# Patient Record
Sex: MALE | Race: WHITE | Employment: OTHER | URBAN - METROPOLITAN AREA
[De-identification: names, ages, dates, MRNs, and addresses within clinical notes are randomized per-mention and may not be internally consistent; named-entity substitution may affect disease eponyms.]

---

## 2017-03-10 RX ORDER — METOPROLOL SUCCINATE 25 MG/1
TABLET, EXTENDED RELEASE ORAL
Qty: 90 TABLET | Refills: 0 | Status: SHIPPED | OUTPATIENT
Start: 2017-03-10 | End: 2017-07-19 | Stop reason: SDUPTHER

## 2017-03-10 RX ORDER — IRBESARTAN AND HYDROCHLOROTHIAZIDE 300; 12.5 MG/1; MG/1
TABLET, FILM COATED ORAL
Qty: 90 TABLET | Refills: 0 | Status: SHIPPED | OUTPATIENT
Start: 2017-03-10 | End: 2017-07-19 | Stop reason: SDUPTHER

## 2017-03-17 ENCOUNTER — TELEPHONE (OUTPATIENT)
Dept: FAMILY MEDICINE CLINIC | Age: 57
End: 2017-03-17

## 2017-03-22 ENCOUNTER — TELEPHONE (OUTPATIENT)
Dept: FAMILY MEDICINE CLINIC | Age: 57
End: 2017-03-22

## 2017-03-22 ENCOUNTER — OFFICE VISIT (OUTPATIENT)
Dept: FAMILY MEDICINE CLINIC | Age: 57
End: 2017-03-22

## 2017-03-22 VITALS
WEIGHT: 243 LBS | BODY MASS INDEX: 34.79 KG/M2 | HEIGHT: 70 IN | OXYGEN SATURATION: 97 % | TEMPERATURE: 97.3 F | SYSTOLIC BLOOD PRESSURE: 130 MMHG | HEART RATE: 70 BPM | DIASTOLIC BLOOD PRESSURE: 90 MMHG

## 2017-03-22 DIAGNOSIS — I10 ESSENTIAL HYPERTENSION: ICD-10-CM

## 2017-03-22 DIAGNOSIS — H66.002 ACUTE SUPPURATIVE OTITIS MEDIA OF LEFT EAR WITHOUT SPONTANEOUS RUPTURE OF TYMPANIC MEMBRANE, RECURRENCE NOT SPECIFIED: ICD-10-CM

## 2017-03-22 DIAGNOSIS — R42 DIZZINESS: Primary | ICD-10-CM

## 2017-03-22 DIAGNOSIS — R00.2 PALPITATION: ICD-10-CM

## 2017-03-22 LAB — HBA1C MFR BLD: 5.4 %

## 2017-03-22 PROCEDURE — G8417 CALC BMI ABV UP PARAM F/U: HCPCS | Performed by: NURSE PRACTITIONER

## 2017-03-22 PROCEDURE — 3017F COLORECTAL CA SCREEN DOC REV: CPT | Performed by: NURSE PRACTITIONER

## 2017-03-22 PROCEDURE — G8427 DOCREV CUR MEDS BY ELIG CLIN: HCPCS | Performed by: NURSE PRACTITIONER

## 2017-03-22 PROCEDURE — 1036F TOBACCO NON-USER: CPT | Performed by: NURSE PRACTITIONER

## 2017-03-22 PROCEDURE — 93000 ELECTROCARDIOGRAM COMPLETE: CPT | Performed by: NURSE PRACTITIONER

## 2017-03-22 PROCEDURE — G8598 ASA/ANTIPLAT THER USED: HCPCS | Performed by: NURSE PRACTITIONER

## 2017-03-22 PROCEDURE — 83036 HEMOGLOBIN GLYCOSYLATED A1C: CPT | Performed by: NURSE PRACTITIONER

## 2017-03-22 PROCEDURE — G8484 FLU IMMUNIZE NO ADMIN: HCPCS | Performed by: NURSE PRACTITIONER

## 2017-03-22 PROCEDURE — 99214 OFFICE O/P EST MOD 30 MIN: CPT | Performed by: NURSE PRACTITIONER

## 2017-03-22 RX ORDER — NEOMYCIN SULFATE, POLYMYXIN B SULFATE, BACITRACIN ZINC, HYDROCORTISONE 3.5; 10000; 400; 1 MG/G; [USP'U]/G; [USP'U]/G; MG/G
OINTMENT OPHTHALMIC 2 TIMES DAILY
Status: CANCELLED | OUTPATIENT
Start: 2017-03-22 | End: 2017-04-01

## 2017-03-22 RX ORDER — MECLIZINE HYDROCHLORIDE 25 MG/1
25 TABLET ORAL 3 TIMES DAILY PRN
Qty: 30 TABLET | Refills: 0 | Status: SHIPPED | OUTPATIENT
Start: 2017-03-22 | End: 2017-04-01

## 2017-03-22 RX ORDER — BLOOD PRESSURE TEST KIT
1 KIT MISCELLANEOUS 2 TIMES DAILY
Qty: 1 KIT | Refills: 0 | Status: SHIPPED | OUTPATIENT
Start: 2017-03-22 | End: 2017-08-22 | Stop reason: ALTCHOICE

## 2017-03-22 ASSESSMENT — ENCOUNTER SYMPTOMS
BLOOD IN STOOL: 0
ABDOMINAL DISTENTION: 0
SORE THROAT: 0
SHORTNESS OF BREATH: 0
NAUSEA: 0
EYE DISCHARGE: 0
EYE PAIN: 0
PHOTOPHOBIA: 0
TROUBLE SWALLOWING: 0
COUGH: 0
ABDOMINAL PAIN: 0
CONSTIPATION: 0
DIARRHEA: 0
VOMITING: 0

## 2017-05-24 RX ORDER — NABUMETONE 750 MG/1
TABLET, FILM COATED ORAL
Qty: 60 TABLET | Refills: 0 | Status: SHIPPED | OUTPATIENT
Start: 2017-05-24 | End: 2017-08-28 | Stop reason: SDUPTHER

## 2017-05-24 RX ORDER — IBUPROFEN 800 MG/1
TABLET ORAL
Qty: 120 TABLET | Refills: 0 | OUTPATIENT
Start: 2017-05-24

## 2017-07-19 ENCOUNTER — OFFICE VISIT (OUTPATIENT)
Dept: FAMILY MEDICINE CLINIC | Age: 57
End: 2017-07-19
Payer: COMMERCIAL

## 2017-07-19 VITALS
DIASTOLIC BLOOD PRESSURE: 72 MMHG | OXYGEN SATURATION: 99 % | TEMPERATURE: 98.2 F | WEIGHT: 251 LBS | HEIGHT: 71 IN | HEART RATE: 61 BPM | SYSTOLIC BLOOD PRESSURE: 136 MMHG | BODY MASS INDEX: 35.14 KG/M2

## 2017-07-19 DIAGNOSIS — L29.0 ANAL PRURITUS: ICD-10-CM

## 2017-07-19 DIAGNOSIS — G47.33 OSA (OBSTRUCTIVE SLEEP APNEA): ICD-10-CM

## 2017-07-19 DIAGNOSIS — I10 ESSENTIAL HYPERTENSION: Primary | ICD-10-CM

## 2017-07-19 DIAGNOSIS — E78.00 HYPERCHOLESTEROLEMIA: ICD-10-CM

## 2017-07-19 PROCEDURE — G8598 ASA/ANTIPLAT THER USED: HCPCS | Performed by: FAMILY MEDICINE

## 2017-07-19 PROCEDURE — 99214 OFFICE O/P EST MOD 30 MIN: CPT | Performed by: FAMILY MEDICINE

## 2017-07-19 PROCEDURE — G8417 CALC BMI ABV UP PARAM F/U: HCPCS | Performed by: FAMILY MEDICINE

## 2017-07-19 PROCEDURE — 3017F COLORECTAL CA SCREEN DOC REV: CPT | Performed by: FAMILY MEDICINE

## 2017-07-19 PROCEDURE — 1036F TOBACCO NON-USER: CPT | Performed by: FAMILY MEDICINE

## 2017-07-19 PROCEDURE — G8427 DOCREV CUR MEDS BY ELIG CLIN: HCPCS | Performed by: FAMILY MEDICINE

## 2017-07-19 RX ORDER — AMOXICILLIN 500 MG/1
CAPSULE ORAL
COMMUNITY
Start: 2017-07-14 | End: 2017-08-22 | Stop reason: ALTCHOICE

## 2017-07-19 RX ORDER — IRBESARTAN AND HYDROCHLOROTHIAZIDE 300; 12.5 MG/1; MG/1
1 TABLET, FILM COATED ORAL DAILY
Qty: 90 TABLET | Refills: 1 | Status: ON HOLD | OUTPATIENT
Start: 2017-07-19 | End: 2018-03-22 | Stop reason: HOSPADM

## 2017-07-19 RX ORDER — NABUMETONE 750 MG/1
TABLET, FILM COATED ORAL
Qty: 60 TABLET | Refills: 0 | Status: CANCELLED | OUTPATIENT
Start: 2017-07-19

## 2017-07-19 RX ORDER — TIZANIDINE 4 MG/1
4 TABLET ORAL EVERY 8 HOURS PRN
Qty: 90 TABLET | Refills: 1 | Status: SHIPPED | OUTPATIENT
Start: 2017-07-19 | End: 2018-03-13 | Stop reason: ALTCHOICE

## 2017-07-19 RX ORDER — NAPROXEN 500 MG/1
TABLET ORAL
Qty: 60 TABLET | Refills: 3 | Status: SHIPPED | OUTPATIENT
Start: 2017-07-19 | End: 2017-08-28

## 2017-07-19 RX ORDER — METOPROLOL SUCCINATE 25 MG/1
25 TABLET, EXTENDED RELEASE ORAL DAILY
Qty: 90 TABLET | Refills: 1 | Status: SHIPPED | OUTPATIENT
Start: 2017-07-19 | End: 2018-04-11 | Stop reason: SDUPTHER

## 2017-07-19 RX ORDER — NYSTATIN 100000 U/G
CREAM TOPICAL
Qty: 30 G | Refills: 2 | Status: SHIPPED | OUTPATIENT
Start: 2017-07-19 | End: 2017-12-15 | Stop reason: SDUPTHER

## 2017-07-19 RX ORDER — ROSUVASTATIN CALCIUM 10 MG/1
10 TABLET, COATED ORAL NIGHTLY
Qty: 90 TABLET | Refills: 1 | Status: SHIPPED | OUTPATIENT
Start: 2017-07-19 | End: 2018-01-11 | Stop reason: SDUPTHER

## 2017-07-19 ASSESSMENT — PATIENT HEALTH QUESTIONNAIRE - PHQ9
1. LITTLE INTEREST OR PLEASURE IN DOING THINGS: 1
SUM OF ALL RESPONSES TO PHQ QUESTIONS 1-9: 2
SUM OF ALL RESPONSES TO PHQ9 QUESTIONS 1 & 2: 2
2. FEELING DOWN, DEPRESSED OR HOPELESS: 1

## 2017-07-25 LAB
ABSOLUTE BASO #: 0.1 K/UL (ref 0–0.1)
ABSOLUTE EOS #: 0.2 K/UL (ref 0.1–0.4)
ABSOLUTE LYMPH #: 1.5 K/UL (ref 0.8–5.2)
ABSOLUTE MONO #: 0.6 K/UL (ref 0.1–0.9)
ABSOLUTE NEUT #: 5.4 K/UL (ref 1.3–9.1)
ALBUMIN SERPL-MCNC: 4.5 G/DL (ref 3.2–5.3)
ALK PHOSPHATASE: 88 IU/L (ref 35–121)
ALT SERPL-CCNC: 28 IU/L (ref 5–59)
ANION GAP SERPL CALCULATED.3IONS-SCNC: 13 MMOL/L
APPEARANCE: CLEAR
AST SERPL-CCNC: 27 IU/L (ref 10–42)
BACTERIA: NEGATIVE PER HPF
BASOPHILS RELATIVE PERCENT: 0.8 %
BILIRUB SERPL-MCNC: 0.8 MG/DL (ref 0.2–1.3)
BILIRUBIN: NEGATIVE
BUN BLDV-MCNC: 13 MG/DL (ref 10–20)
CALCIUM SERPL-MCNC: 9.7 MG/DL (ref 8.7–10.8)
CHLORIDE BLD-SCNC: 106 MMOL/L (ref 95–111)
CHOLESTEROL/HDL RATIO: 3.2
CHOLESTEROL: 157 MG/DL
CO2: 27 MMOL/L (ref 21–32)
COLOR: YELLOW
CREAT SERPL-MCNC: 0.9 MG/DL (ref 0.5–1.3)
EGFR AFRICAN AMERICAN: 106
EGFR IF NONAFRICAN AMERICAN: 88
EOSINOPHILS RELATIVE PERCENT: 3.1 %
EPITHELIAL CELLS: 0 PER HPF
FOLATE: 14.24 NG/ML
GLUCOSE BLD-MCNC: NEGATIVE MG/DL
GLUCOSE: 90 MG/DL (ref 70–100)
HCT VFR BLD CALC: 47.2 % (ref 41.4–51)
HDLC SERPL-MCNC: 49 MG/DL (ref 40–60)
HEMOGLOBIN: 15.6 G/DL (ref 13.8–17)
KETONES, URINE: NEGATIVE
LDL CHOLESTEROL CALCULATED: 92 MG/DL
LDL/HDL RATIO: 1.9
LEUKOCYTE ESTERASE, URINE: NEGATIVE
LYMPHOCYTE %: 19.2 %
MCH RBC QN AUTO: 28.7 PG (ref 27–34)
MCHC RBC AUTO-ENTMCNC: 33.1 G/DL (ref 31–36)
MCV RBC AUTO: 86.9 FL (ref 80–100)
MONOCYTES # BLD: 7.8 %
NEUTROPHILS RELATIVE PERCENT: 68.8 %
NITRITE, URINE: NEGATIVE
OCCULT BLOOD,URINE: NEGATIVE
PDW BLD-RTO: 13.4 % (ref 10.8–14.8)
PH: 7 (ref 5–9)
PLATELETS: 196 K/UL (ref 150–450)
POTASSIUM SERPL-SCNC: 4.3 MMOL/L (ref 3.5–5.4)
PROTEIN, URINE: NEGATIVE
RBC: 0 PER HPF (ref 0–5)
RBC: 5.43 M/UL (ref 4–5.5)
SODIUM BLD-SCNC: 142 MMOL/L (ref 134–147)
SP GRAVITY MISCELLANEOUS: 1.01 (ref 1–1.03)
TOTAL PROTEIN: 7.2 G/DL (ref 5.8–8)
TRIGL SERPL-MCNC: 82 MG/DL
TSH SERPL DL<=0.05 MIU/L-ACNC: 2.74 UIU/ML (ref 0.4–4.4)
UROBILINOGEN, URINE: NORMAL
VITAMIN B-12: 529 PG/ML (ref 211–911)
VLDLC SERPL CALC-MCNC: 16 MG/DL
WBC: 0 PER HPF (ref 0–5)
WBC: 7.8 K/UL (ref 3.7–10.8)

## 2017-07-26 ENCOUNTER — TELEPHONE (OUTPATIENT)
Dept: FAMILY MEDICINE CLINIC | Age: 57
End: 2017-07-26

## 2017-08-22 ENCOUNTER — INITIAL CONSULT (OUTPATIENT)
Dept: PULMONOLOGY | Age: 57
End: 2017-08-22
Payer: COMMERCIAL

## 2017-08-22 VITALS
SYSTOLIC BLOOD PRESSURE: 128 MMHG | HEIGHT: 71 IN | HEART RATE: 58 BPM | OXYGEN SATURATION: 95 % | BODY MASS INDEX: 35.34 KG/M2 | WEIGHT: 252.4 LBS | DIASTOLIC BLOOD PRESSURE: 84 MMHG

## 2017-08-22 DIAGNOSIS — G47.33 OSA (OBSTRUCTIVE SLEEP APNEA): Primary | ICD-10-CM

## 2017-08-22 PROCEDURE — G8417 CALC BMI ABV UP PARAM F/U: HCPCS | Performed by: INTERNAL MEDICINE

## 2017-08-22 PROCEDURE — 1036F TOBACCO NON-USER: CPT | Performed by: INTERNAL MEDICINE

## 2017-08-22 PROCEDURE — G8598 ASA/ANTIPLAT THER USED: HCPCS | Performed by: INTERNAL MEDICINE

## 2017-08-22 PROCEDURE — 99203 OFFICE O/P NEW LOW 30 MIN: CPT | Performed by: INTERNAL MEDICINE

## 2017-08-22 PROCEDURE — 3017F COLORECTAL CA SCREEN DOC REV: CPT | Performed by: INTERNAL MEDICINE

## 2017-08-22 PROCEDURE — G8427 DOCREV CUR MEDS BY ELIG CLIN: HCPCS | Performed by: INTERNAL MEDICINE

## 2017-08-28 ENCOUNTER — TELEPHONE (OUTPATIENT)
Dept: FAMILY MEDICINE CLINIC | Age: 57
End: 2017-08-28

## 2017-08-28 RX ORDER — NABUMETONE 750 MG/1
750 TABLET, FILM COATED ORAL 2 TIMES DAILY
Qty: 60 TABLET | Refills: 3 | Status: SHIPPED | OUTPATIENT
Start: 2017-08-28 | End: 2018-02-06

## 2017-09-20 ENCOUNTER — OFFICE VISIT (OUTPATIENT)
Dept: PULMONOLOGY | Age: 57
End: 2017-09-20
Payer: COMMERCIAL

## 2017-09-20 VITALS
OXYGEN SATURATION: 97 % | WEIGHT: 259 LBS | HEART RATE: 68 BPM | BODY MASS INDEX: 36.26 KG/M2 | SYSTOLIC BLOOD PRESSURE: 136 MMHG | HEIGHT: 71 IN | DIASTOLIC BLOOD PRESSURE: 70 MMHG

## 2017-09-20 DIAGNOSIS — G47.33 OBSTRUCTIVE SLEEP APNEA SYNDROME: Primary | ICD-10-CM

## 2017-09-20 PROCEDURE — 1036F TOBACCO NON-USER: CPT | Performed by: PHYSICIAN ASSISTANT

## 2017-09-20 PROCEDURE — 99213 OFFICE O/P EST LOW 20 MIN: CPT | Performed by: PHYSICIAN ASSISTANT

## 2017-09-20 PROCEDURE — G8598 ASA/ANTIPLAT THER USED: HCPCS | Performed by: PHYSICIAN ASSISTANT

## 2017-09-20 PROCEDURE — G8428 CUR MEDS NOT DOCUMENT: HCPCS | Performed by: PHYSICIAN ASSISTANT

## 2017-09-20 PROCEDURE — G8417 CALC BMI ABV UP PARAM F/U: HCPCS | Performed by: PHYSICIAN ASSISTANT

## 2017-09-20 PROCEDURE — 3017F COLORECTAL CA SCREEN DOC REV: CPT | Performed by: PHYSICIAN ASSISTANT

## 2017-12-15 RX ORDER — NYSTATIN 100000 U/G
CREAM TOPICAL
Qty: 30 G | Refills: 2 | Status: SHIPPED | OUTPATIENT
Start: 2017-12-15 | End: 2018-05-22

## 2017-12-15 NOTE — TELEPHONE ENCOUNTER
Patient called office regarding medication called into the pharmacy. Nystatin-Triamcinolone cream is going to cost over $200. Patient is requesting to have the prescription changed to just Nystatin like previously prescribed in July 2017. Patient would like a return call at [phone]. Please advise.

## 2017-12-15 NOTE — TELEPHONE ENCOUNTER
Spoke with patient. Patient is questioning if there is another cream like nystatin-triamcinolone cream that is less expensive. Cream worked well but patient can not afford. Plan nystatin does not work for the patient. Patient states if there is not a similar cream then not to worry about calling in anything else. Patient request a call back.

## 2017-12-20 ENCOUNTER — OFFICE VISIT (OUTPATIENT)
Dept: PULMONOLOGY | Age: 57
End: 2017-12-20
Payer: COMMERCIAL

## 2017-12-20 VITALS
WEIGHT: 268 LBS | SYSTOLIC BLOOD PRESSURE: 120 MMHG | RESPIRATION RATE: 18 BRPM | HEART RATE: 75 BPM | BODY MASS INDEX: 36.3 KG/M2 | OXYGEN SATURATION: 96 % | HEIGHT: 72 IN | DIASTOLIC BLOOD PRESSURE: 80 MMHG

## 2017-12-20 DIAGNOSIS — Z99.89 OSA ON CPAP: Primary | ICD-10-CM

## 2017-12-20 DIAGNOSIS — G47.33 OSA ON CPAP: Primary | ICD-10-CM

## 2017-12-20 PROCEDURE — 99213 OFFICE O/P EST LOW 20 MIN: CPT | Performed by: PHYSICIAN ASSISTANT

## 2017-12-20 PROCEDURE — 3017F COLORECTAL CA SCREEN DOC REV: CPT | Performed by: PHYSICIAN ASSISTANT

## 2017-12-20 PROCEDURE — G8598 ASA/ANTIPLAT THER USED: HCPCS | Performed by: PHYSICIAN ASSISTANT

## 2017-12-20 PROCEDURE — G8484 FLU IMMUNIZE NO ADMIN: HCPCS | Performed by: PHYSICIAN ASSISTANT

## 2017-12-20 PROCEDURE — G8428 CUR MEDS NOT DOCUMENT: HCPCS | Performed by: PHYSICIAN ASSISTANT

## 2017-12-20 PROCEDURE — 1036F TOBACCO NON-USER: CPT | Performed by: PHYSICIAN ASSISTANT

## 2017-12-20 PROCEDURE — G8417 CALC BMI ABV UP PARAM F/U: HCPCS | Performed by: PHYSICIAN ASSISTANT

## 2017-12-20 NOTE — PROGRESS NOTES
Substance Use Topics    Smoking status: Never Smoker    Smokeless tobacco: Never Used    Alcohol use No       Allergies   Allergen Reactions    Zocor [Simvastatin] Other (See Comments)     Body aches       Current Outpatient Prescriptions   Medication Sig Dispense Refill    nystatin-triamcinolone (MYCOLOG II) 094710-4.1 UNIT/GM-% cream Apply topically 2 times daily for up to 2 weks. 60 g 0    nystatin (MYCOSTATIN) 068712 UNIT/GM cream Apply topically 2 times daily. 30 g 2    CPAP Machine MISC by Does not apply route Please change CPAP pressure to 9 cm H20. 1 each 0    nabumetone (RELAFEN) 750 MG tablet Take 1 tablet by mouth 2 times daily 60 tablet 3    tiZANidine (ZANAFLEX) 4 MG tablet Take 1 tablet by mouth every 8 hours as needed (back pain) 90 tablet 1    metoprolol succinate (TOPROL XL) 25 MG extended release tablet Take 1 tablet by mouth daily 90 tablet 1    irbesartan-hydrochlorothiazide (AVALIDE) 300-12.5 MG per tablet Take 1 tablet by mouth daily 90 tablet 1    Multiple Vitamin (MULTI VITAMIN PO) Take by mouth daily      COLLAGEN PO Take by mouth daily      dexlansoprazole (DEXILANT) 60 MG CPDR capsule Take 60 mg by mouth daily      Coenzyme Q10 (COQ10) 200 MG CAPS Take  by mouth 2 times daily.  aspirin 81 MG chewable tablet Take 81 mg by mouth daily.  rosuvastatin (CRESTOR) 10 MG tablet Take 1 tablet by mouth nightly 90 tablet 1    MAGNESIUM PO Take by mouth daily      Sodium Chloride-Sodium Bicarb (AYR SALINE NASAL NETI RINSE) 1.57 G PACK 1 Bottle by Nasal route 2 times daily 1 each 3     No current facility-administered medications for this visit.         Family History   Problem Relation Age of Onset    Diabetes Mother 48    Heart Disease Mother 67     CAD    Heart Disease Father 50     CAD, CVA    Stroke Father 48     CVA    Diabetes Brother 72    High Blood Pressure Brother 72    Heart Disease Brother 72     CHF    Other Brother 46     Neuropathy    Obesity Brother 10    High Blood Pressure Brother 61    Obesity Brother 48    Other Brother 36     back pain    Obesity Brother 39    Other Sister 72     leukemia    Alzheimer's Disease Maternal Grandmother 21    Early Death Paternal Grandfather 36     MI    High Blood Pressure Paternal Grandfather 36    Heart Disease Sister 39    High Blood Pressure Sister 39    Depression Sister 39    Other Brother 48     ANEURYSUM, AAA        Review of Systems -   General ROS: gained 16 lbs over 4 months  ENT ROS: negative for - nasal congestion, oral lesions or sore throat  Hematological and Lymphatic ROS: negative  Endocrine ROS: negative  Respiratory ROS: no cough, shortness of breath, or wheezing  Cardiovascular ROS: no chest pain   Gastrointestinal ROS: no abdominal pain, change in bowel habits, or black or bloody stools  Musculoskeletal ROS: negative  Neurological ROS: negative    Physical Exam:    BMI:  Body mass index is 36.35 kg/m². Wt Readings from Last 3 Encounters:   12/20/17 268 lb (121.6 kg)   09/20/17 259 lb (117.5 kg)   08/22/17 252 lb 6.4 oz (114.5 kg)     Vitals: /80   Pulse 75   Resp 18   Ht 6' (1.829 m)   Wt 268 lb (121.6 kg)   SpO2 96% Comment: R/A at rest  BMI 36.35 kg/m²       General appearance: alert and oriented to person, place and time, well-developed and well-nourished, in no acute distress  Nose: Nares normal. Septum midline. Mucosa normal. No drainage or sinus tenderness. Oropharynx:  negative  Lungs: clear to auscultation bilaterally  Heart: regular rate and rhythm, S1, S2 normal, no murmur, click, rub or gallop  Extremities: extremities normal, atraumatic, no cyanosis or edema  Neurologic: Mental status: Alert, oriented, thought content appropriate      ASSESSMENT/DIAGNOSIS    1.  CELINA on CPAP              Plan   - Will get new mask at the first of the year and will see if improves compliance  - his AHI is elevated and will see if improves with new mask  - Suspect her will need a bipap titration secondary to being pressure sensitive. - He  was advised to continue current positive airway pressure therapy with above described pressure. - He  advised to keep good compliance with current recommended pressure to get optimal results and clinical improvement  - Recommend 7-9 hours of sleep with PAP  - He was advised to call [facility] regarding supplies if needed. - He call my office for earlier appointment if needed for worsening of sleep symptoms.   - He was instructed on weight loss  - Nicolas was educated about my impression and plan. Patient verbalizes understanding.   We will see Nicolas Walsh back in: 3 months with download    Aashish Lee PA-C, SARAS  12/20/2017

## 2018-01-11 ENCOUNTER — OFFICE VISIT (OUTPATIENT)
Dept: FAMILY MEDICINE CLINIC | Age: 58
End: 2018-01-11
Payer: COMMERCIAL

## 2018-01-11 VITALS
HEART RATE: 79 BPM | RESPIRATION RATE: 12 BRPM | SYSTOLIC BLOOD PRESSURE: 120 MMHG | BODY MASS INDEX: 36.44 KG/M2 | OXYGEN SATURATION: 98 % | HEIGHT: 72 IN | DIASTOLIC BLOOD PRESSURE: 72 MMHG | WEIGHT: 269 LBS

## 2018-01-11 DIAGNOSIS — Z12.5 SCREENING FOR PROSTATE CANCER: ICD-10-CM

## 2018-01-11 DIAGNOSIS — Z00.00 WELL ADULT EXAM: Primary | ICD-10-CM

## 2018-01-11 DIAGNOSIS — R53.82 CHRONIC FATIGUE: ICD-10-CM

## 2018-01-11 DIAGNOSIS — G44.82 HEADACHE ASSOCIATED WITH SEXUAL ACTIVITY: ICD-10-CM

## 2018-01-11 PROCEDURE — 99396 PREV VISIT EST AGE 40-64: CPT | Performed by: FAMILY MEDICINE

## 2018-01-11 RX ORDER — ROSUVASTATIN CALCIUM 10 MG/1
10 TABLET, COATED ORAL NIGHTLY
Qty: 90 TABLET | Refills: 1 | Status: SHIPPED | OUTPATIENT
Start: 2018-01-11 | End: 2018-10-29 | Stop reason: SDUPTHER

## 2018-01-11 RX ORDER — NAPROXEN 500 MG/1
500 TABLET ORAL PRN
COMMUNITY
End: 2018-04-18 | Stop reason: ALTCHOICE

## 2018-01-11 ASSESSMENT — ENCOUNTER SYMPTOMS
ABDOMINAL DISTENTION: 0
FACIAL SWELLING: 0
SHORTNESS OF BREATH: 0
NAUSEA: 0
CHOKING: 0
ANAL BLEEDING: 0
EYE DISCHARGE: 0
CHEST TIGHTNESS: 0
BACK PAIN: 0
APNEA: 1
RHINORRHEA: 0
CONSTIPATION: 0
STRIDOR: 0
VOMITING: 0
BLOOD IN STOOL: 0
COUGH: 0
VOICE CHANGE: 0
COLOR CHANGE: 0
SORE THROAT: 0
SINUS PAIN: 1
PHOTOPHOBIA: 0
WHEEZING: 0
ABDOMINAL PAIN: 0
EYE REDNESS: 0
TROUBLE SWALLOWING: 0
RECTAL PAIN: 0
EYE ITCHING: 0
SINUS PRESSURE: 1
EYE PAIN: 0
DIARRHEA: 0

## 2018-01-11 NOTE — PATIENT INSTRUCTIONS
sitting down if you are not steady on your feet. 2. Hold your arms above your head, and hold one hand with the other. 3. Pull upward while leaning straight over toward your right side. Keep your lower body straight. You should feel the stretch along your left side. 4. Hold 15 to 30 seconds, and then switch sides. 5. Repeat 2 to 4 times for each side. Triceps stretch    1. Stand with your back straight and your feet shoulder-width apart. You can do this stretch sitting down if you are not steady on your feet. 2. Bring your left elbow straight up while bending your arm. 3. Grab your left elbow with your right hand, and pull your left elbow toward your head with light pressure. If you are more flexible, you may pull your arm slightly behind your head. You will feel the stretch along the back of your arm. 4. Hold 15 to 30 seconds, and then switch elbows. 5. Repeat 2 to 4 times for each arm. Calf stretch    1. Place your hands on a wall for balance. You can also do this with your hands on the back of a chair, a countertop, or a tree. 2. Step back with your left leg. Keep the leg straight, and press your left heel into the floor. 3. Press your hips forward, bending your right leg slightly. You will feel the stretch in your left calf. 4. Hold the stretch 15 to 30 seconds. 5. Repeat 2 to 4 times for each leg. Quadriceps stretch    1. Lie on your side with one hand supporting your head. 2. Bend your upper leg back and grab your ankle with your other hand. 3. Stretch your leg back by pulling your foot toward your buttocks. You will feel the stretch in the front of your thigh. If this causes stress on your knees, do not do this stretch. 4. Hold the stretch 15 to 30 seconds. 5. Repeat 2 to 4 times for each leg. Groin stretch    1. Sit on the floor and put the soles of your feet together. Do not slump your back. 2. Grab your ankles and gently pull your legs toward you. 3. Press your knees toward the floor. You will feel the stretch in your inner thighs. 4. Hold 15 to 30 seconds. 5. Repeat 2 to 4 times. Hamstring stretch in doorway    1. Lie on the floor near a doorway, with your buttocks close to the wall. 2. Let the leg you are not stretching extend through the doorway. 3. Put the leg you want to stretch up on the wall, and straighten your knee to feel a gentle stretch at the back of your leg. 4. Hold the stretch for at least 15 to 30 seconds. Repeat 2 to 4 times. Follow-up care is a key part of your treatment and safety. Be sure to make and go to all appointments, and call your doctor if you are having problems. It's also a good idea to know your test results and keep a list of the medicines you take. Where can you learn more? Go to [email or web address] org and sign in to your HeadCount account. Enter [id number] in the [facility] box to learn more about \"Stretching: Exercises. \"     If you do not have an account, please click on the \"Sign Up Now\" link. Current as of: March 13, 2017  Content Version: 11.5  © 4187-4397 [employer]. Care instructions adapted under license by [facility] ([facility]). If you have questions about a medical condition or this instruction, always ask your healthcare professional. [address] any warranty or liability for your use of this information. Patient Education        Learning About Physical Activity  What is physical activity? Physical activity is any kind of activity that gets your body moving. The types of physical activity that can help you get fit and stay healthy include:  · Aerobic or \"cardio\" activities that make your heart beat faster and make you breathe harder, such as brisk walking, riding a bike, or running. Aerobic activities strengthen your heart and lungs and build up your endurance.   · Strength training activities that make your muscles work against, or \"resist,\" something, such as lifting weights or doing push-ups. These activities help tone and strengthen your muscles. · Stretches that allow you to move your joints and muscles through their full range of motion. Stretching helps you be more flexible and avoid injury. What are the benefits of physical activity? Being active is one of the best things you can do to get fit and stay healthy. It helps you to:  · Feel stronger and have more energy to do all the things you like to do. · Focus better at school or work and perform better in sports. · Feel, think, and sleep better. · Reach and stay at a healthy weight. · Lose fat and build lean muscle. · Lower your risk for serious health problems. · Keep your bones, muscles, and joints strong. Being fit lets you do more physical activity. And it lets you work out harder without as much effort. How can you make physical activity part of your life? Get at least 30 minutes of exercise on most days of the week. Walking is a good choice. You also may want to do other activities, such as running, swimming, cycling, or playing tennis or team sports. Pick activities that you like-ones that make your heart beat faster, your muscles stronger, and your muscles and joints more flexible. If you find more than one thing you like doing, do them all. You don't have to do the same thing every day. Get your heart pumping every day. Any activity that makes your heart beat faster and keeps it at that rate for a while counts. Here are some great ways to get your heart beating faster:  · Go for a brisk walk, run, or bike ride. · Go for a hike or swim. · Go in-line skating. · Play a game of touch football, basketball, or soccer. · Ride a bike. · Play tennis or racquetball. · Climb stairs. Even some household chores can be aerobic-just do them at a faster pace. Vacuuming, raking or mowing the lawn, sweeping the garage, and washing and waxing the car all can help get your heart rate up.   Strengthen your muscles during the week. You don't have to lift heavy weights or grow big, bulky muscles to get stronger. Doing a few simple activities that make your muscles work against, or \"resist,\" something can help you get stronger. For example, you can:  · Do push-ups or sit-ups, which use your own body weight as resistance. · Lift weights or dumbbells or use stretch bands at home or in a gym or community center. Stretch your muscles often. Stretching will help you as you become more active. It can help you stay flexible, loosen tight muscles, and avoid injury. It can also help improve your balance and posture and can be a great way to relax. Be sure to stretch the muscles you'll be using when you work out. It's best to warm your muscles slightly before you stretch them. Walk or do some other light aerobic activity for a few minutes, and then start stretching. When you stretch your muscles:  · Do it slowly. Stretching is not about going fast or making sudden movements. · Don't push or bounce during a stretch. · Hold each stretch for at least 15 to 30 seconds, if you can. You should feel a stretch in the muscle, but not pain. · Breathe out as you do the stretch. Then breathe in as you hold the stretch. Don't hold your breath. If you're worried about how more activity might affect your health, have a checkup before you start. Follow any special advice your doctor gives you for getting a smart start. Where can you learn more? Go to https://chpelee.Ahonya. org and sign in to your Prevoty account. Enter Q936 in the [employer] box to learn more about \"Learning About Physical Activity. \"     If you do not have an account, please click on the \"Sign Up Now\" link. Current as of: March 13, 2017  Content Version: 11.5  © 9079-1884 Healthwise[employer]. Care instructions adapted under license by [facility] ([facility]).  If you have questions about a medical condition or this instruction, always ask your healthcare  Americans have. · Age, especially if you are older than 72. · Digestive problems, such as Crohn's or celiac disease. · Liver and kidney disease. Some people who do not get enough vitamin D may need supplements. Are there any risks from taking vitamin D?  · Too much vitamin D:  ¨ Can damage your kidneys. ¨ Can cause nausea and vomiting, constipation, and weakness. ¨ Raises the amount of calcium in your blood. If this happens, you can get confused or have an irregular heart rhythm. · Vitamin D may interact with other medicines. Tell your doctor about all of the medicines you take, including over-the-counter drugs, herbs, and pills. Tell your doctor about all of your current medical problems. Where can you learn more? Go to https://Puridify.[email or web address] org and sign in to your Wejo account. Enter [id number] in the [employer] box to learn more about \"Learning About Vitamin D. \"     If you do not have an account, please click on the \"Sign Up Now\" link. Current as of: May 12, 2017  Content Version: 11.5  © 9052-5950 [employer]. Care instructions adapted under license by [facility] ([facility]). If you have questions about a medical condition or this instruction, always ask your healthcare professional. [address]  any warranty or liability for your use of this information. Patient Education        Learning About Calcium  What is calcium? Calcium keeps your bones and muscles-including your heart-healthy and strong. Your body needs vitamin D to absorb calcium. People who don't get enough calcium and vitamin D throughout life have an increased chance of having thin and brittle bones (osteoporosis) in their later years. Thin and brittle bones break easily. They can lead to serious injuries. This is why it's important for you to get enough calcium and vitamin D as a child and as an adult. It helps keep your bones strong as you get older.  And it protects you against possible breaks. Your body also uses vitamin D to help your muscles absorb calcium and work well. If your muscles don't get enough calcium, then they can cramp, hurt, or feel weak. You may have long-term (chronic) muscle aches and pains. How much calcium do you need? How much calcium you need each day changes as you age. The [place] of Medicine recommends the following amounts of calcium each day. · Ages 1 to 3 years: 700 milligrams  · Ages 4 to 8 years: 1,000 milligrams  · Ages 5 to 25 years: 1,300 milligrams  · Ages 23 to 48 years: 1,000 milligrams  · Males 46 to 79 years: 1,000 milligrams  · Females 46 to 79 years: 1,200 milligrams  · Ages 70 and older: 1,200 milligrams  Women who are pregnant or breastfeeding need the same amount of calcium and vitamin D as other women their age. How can you get enough calcium? Calcium is in foods such as milk, cheese, and yogurt. Vegetables like broccoli, kale, and Chinese cabbage also have it. You can get calcium if you eat the soft edible bones in canned sardines and canned salmon. Foods with added (fortified) calcium include some cereals, juices, soy drinks, and tofu. The food label will show how much of it was added. You can figure out how much calcium is in a food by looking at the percent daily value section on the nutrition facts label. The food label assumes the daily value of calcium is 1,000 mg. So if one serving of a food has a daily value of 20% of calcium, that food has 200 mg of calcium in one serving. Some people who don't get enough calcium may need supplements. You can buy them as citrate or carbonate. Calcium carbonate is best absorbed when it is taken with food. Calcium citrate can be absorbed well with or without food. Spreading calcium out over the course of the day can reduce stomach upset. And your body absorbs it better when it is spread over the day. Try not to take more than 500 mg of calcium supplement at a time.   Where can you learn

## 2018-01-12 LAB
ALBUMIN SERPL-MCNC: 4.1 G/DL (ref 3.2–5.3)
ALK PHOSPHATASE: 123 IU/L (ref 35–121)
ALT SERPL-CCNC: 27 IU/L (ref 5–59)
APPEARANCE: CLEAR
AST SERPL-CCNC: 26 IU/L (ref 10–42)
BACTERIA: NEGATIVE PER HPF
BILIRUB SERPL-MCNC: 0.5 MG/DL (ref 0.2–1.3)
BILIRUBIN DIRECT: 0.1 MG/DL (ref 0–0.2)
BILIRUBIN: NEGATIVE
CHOLESTEROL/HDL RATIO: 3.4
CHOLESTEROL: 141 MG/DL
COLOR: YELLOW
EPITHELIAL CELLS: NORMAL PER HPF
GLUCOSE BLD-MCNC: NEGATIVE MG/DL
HDLC SERPL-MCNC: 41 MG/DL (ref 40–60)
KETONES, URINE: NEGATIVE
LDL CHOLESTEROL CALCULATED: 83 MG/DL
LDL/HDL RATIO: 2
LEUKOCYTE ESTERASE, URINE: NEGATIVE
NITRITE, URINE: NEGATIVE
OCCULT BLOOD,URINE: NEGATIVE
PH: 6 (ref 5–9)
PROTEIN, URINE: NEGATIVE
PSA, ULTRASENSITIVE: 0.73 NG/ML
RBC: 0 PER HPF (ref 0–5)
SP GRAVITY MISCELLANEOUS: 1.02 (ref 1–1.03)
TOTAL PROTEIN: 7.6 G/DL (ref 5.8–8)
TRIGL SERPL-MCNC: 87 MG/DL
UROBILINOGEN, URINE: NORMAL
VITAMIN B-12: 652 PG/ML (ref 211–911)
VLDLC SERPL CALC-MCNC: 17 MG/DL
WBC: NORMAL PER HPF (ref 0–5)

## 2018-01-13 LAB
SEX HORMONE BINDING GLOBULIN: 32.3 NMOL/L (ref 19.3–76.4)
TESTOSTERONE FREE, CALC: 6.1 NG/DL (ref 4.8–25.7)
TESTOSTERONE FREE: 305 NG/DL (ref 300–890)

## 2018-01-15 ENCOUNTER — TELEPHONE (OUTPATIENT)
Dept: FAMILY MEDICINE CLINIC | Age: 58
End: 2018-01-15

## 2018-01-15 DIAGNOSIS — R53.82 CHRONIC FATIGUE: Primary | ICD-10-CM

## 2018-01-15 PROCEDURE — 90686 IIV4 VACC NO PRSV 0.5 ML IM: CPT | Performed by: FAMILY MEDICINE

## 2018-01-15 PROCEDURE — 90472 IMMUNIZATION ADMIN EACH ADD: CPT | Performed by: FAMILY MEDICINE

## 2018-01-15 PROCEDURE — 90471 IMMUNIZATION ADMIN: CPT | Performed by: FAMILY MEDICINE

## 2018-01-15 PROCEDURE — 90732 PPSV23 VACC 2 YRS+ SUBQ/IM: CPT | Performed by: FAMILY MEDICINE

## 2018-01-15 NOTE — PROGRESS NOTES
After obtaining consent, and per orders of Dr. Syed Jeronimo, injection of INFLUENZA, QUADV, 3 YRS AND OLDER, IM, PF, PREFILL SYR OR SDV, 0.5ML (FLUZONE QUADV, PF) given in Left deltoid by Rogelio Slider. Patient instructed to remain in clinic for 20 minutes afterwards, and to report any adverse reaction to me immediately. After obtaining consent, and per orders of Dr. Syed Jeronimo, injection of Pneumococcal polysaccharide vaccine 23-valent >= 1yo subcutaneous/IM (PNEUMOVAX 23) given in Right deltoid by Rogelio Slider. Patient instructed to remain in clinic for 20 minutes afterwards, and to report any adverse reaction to me immediately.
agitation, behavioral problems, confusion, decreased concentration, dysphoric mood, hallucinations, self-injury, sleep disturbance and suicidal ideas. The patient is not nervous/anxious and is not hyperactive. Today he complains of headaches during intercourse and intense fatigue. He is not motivated to exercise. He has been having tiredness for several months. He got his CPAP machine about 6 months ago and he is still having some difficulty with it. The headache during intercourse has been present for few months, but sometimes it is severe. He had been taking Cialis generic 10 mg mg once a week. Health Habits: With regard to his health habits he states he  eats 2 meals per day and 1 snacks per day. Hedoes not exercise regularly:  His physical activities include: moving around a lot, has an elliptical machine, 3 times per week, 20 minutes/day. He does take over the counter vitamins. He never had a blood transfusion or tattoo. He wears seatbelts when driving a car. He does not talk or text on the phone while driving. He works 15 hours a week. He is content with his life. He is . Health Maintenance   Topic Date Due    Flu vaccine (1) 09/01/2017    Potassium monitoring  07/24/2018    Creatinine monitoring  07/24/2018    Lipid screen  07/24/2022    Colon cancer screen colonoscopy  12/02/2023    DTaP/Tdap/Td vaccine (2 - Td) 03/30/2025    Hepatitis C screen  Addressed    HIV screen  Addressed       He  reports that he has never smoked. He has never used smokeless tobacco. He reports that he does not drink alcohol or use drugs. .   He has had his screening colonoscopy. He is sexually active. He has had the same sexual partner in the last 28 years. He does perform regular testicular self exams. PROVIDER NOTES     HPI:  Maru Robb is a 65y.o. year old male, who comes today for an annual wellness visit.       Past Medical History:   Diagnosis Date    CAD (coronary artery

## 2018-01-19 ENCOUNTER — TELEPHONE (OUTPATIENT)
Dept: FAMILY MEDICINE CLINIC | Age: 58
End: 2018-01-19

## 2018-01-19 DIAGNOSIS — G44.82 PRIMARY HEADACHE ASSOCIATED WITH SEXUAL ACTIVITY: Primary | ICD-10-CM

## 2018-01-22 NOTE — TELEPHONE ENCOUNTER
Advised pt of the miscommunication since Dr. Alonso Collado has been out of the office since 1/16/17 and returned on 1/22/17 to the office. He voiced understanding. Macario Jades, she appreciated the f/u and will change orders, will also advise pt when done.

## 2018-01-29 ENCOUNTER — HOSPITAL ENCOUNTER (OUTPATIENT)
Dept: CT IMAGING | Age: 58
Discharge: HOME | End: 2018-01-29
Payer: COMMERCIAL

## 2018-01-29 DIAGNOSIS — G44.82 HEADACHE ASSOCIATED WITH SEXUAL ACTIVITY: ICD-10-CM

## 2018-01-29 PROCEDURE — 70450 CT HEAD/BRAIN W/O DYE: CPT

## 2018-01-30 ENCOUNTER — TELEPHONE (OUTPATIENT)
Dept: FAMILY MEDICINE CLINIC | Age: 58
End: 2018-01-30

## 2018-02-05 ENCOUNTER — TELEPHONE (OUTPATIENT)
Dept: FAMILY MEDICINE CLINIC | Age: 58
End: 2018-02-05

## 2018-02-05 NOTE — TELEPHONE ENCOUNTER
Patient was calling because he thinks he has a sinus infection. He has headache, sinus pressure, stuffy nose. He is calling at [phone] and there is no availability today or tomorrow. I offered appt at [place] which he declined and also [facility] which he declined. He said dr Jv Milan is his dr.  He is upset he cant speak with the office when he calls  He states he saw dr Jv Milan in [place] and wanted rx then. He is still having the same issues.     Pharmacy is [facility]  dolv 1/11/18  donv 5/15/18

## 2018-02-06 ENCOUNTER — OFFICE VISIT (OUTPATIENT)
Dept: FAMILY MEDICINE CLINIC | Age: 58
End: 2018-02-06
Payer: COMMERCIAL

## 2018-02-06 VITALS
WEIGHT: 271 LBS | SYSTOLIC BLOOD PRESSURE: 138 MMHG | DIASTOLIC BLOOD PRESSURE: 86 MMHG | RESPIRATION RATE: 12 BRPM | HEIGHT: 72 IN | BODY MASS INDEX: 36.7 KG/M2 | OXYGEN SATURATION: 98 %

## 2018-02-06 DIAGNOSIS — J31.0 RHINITIS MEDICAMENTOSA: ICD-10-CM

## 2018-02-06 DIAGNOSIS — M54.2 NECK PAIN: ICD-10-CM

## 2018-02-06 DIAGNOSIS — R51.9 HEADACHE AROUND THE EYES: Primary | ICD-10-CM

## 2018-02-06 DIAGNOSIS — T48.5X5A RHINITIS MEDICAMENTOSA: ICD-10-CM

## 2018-02-06 PROCEDURE — G8484 FLU IMMUNIZE NO ADMIN: HCPCS | Performed by: FAMILY MEDICINE

## 2018-02-06 PROCEDURE — 99214 OFFICE O/P EST MOD 30 MIN: CPT | Performed by: FAMILY MEDICINE

## 2018-02-06 PROCEDURE — G8417 CALC BMI ABV UP PARAM F/U: HCPCS | Performed by: FAMILY MEDICINE

## 2018-02-06 PROCEDURE — 96372 THER/PROPH/DIAG INJ SC/IM: CPT | Performed by: FAMILY MEDICINE

## 2018-02-06 PROCEDURE — G8427 DOCREV CUR MEDS BY ELIG CLIN: HCPCS | Performed by: FAMILY MEDICINE

## 2018-02-06 PROCEDURE — 1036F TOBACCO NON-USER: CPT | Performed by: FAMILY MEDICINE

## 2018-02-06 PROCEDURE — 3017F COLORECTAL CA SCREEN DOC REV: CPT | Performed by: FAMILY MEDICINE

## 2018-02-06 RX ORDER — MONTELUKAST SODIUM 10 MG/1
10 TABLET ORAL DAILY
Qty: 30 TABLET | Refills: 3 | Status: SHIPPED | OUTPATIENT
Start: 2018-02-06 | End: 2018-03-14 | Stop reason: ALTCHOICE

## 2018-02-06 RX ORDER — CETIRIZINE HYDROCHLORIDE 10 MG/1
10 TABLET ORAL DAILY
Qty: 30 TABLET | Refills: 5 | Status: SHIPPED | OUTPATIENT
Start: 2018-02-06 | End: 2018-03-19 | Stop reason: ALTCHOICE

## 2018-02-06 RX ORDER — BETAMETHASONE SODIUM PHOSPHATE AND BETAMETHASONE ACETATE 3; 3 MG/ML; MG/ML
6 INJECTION, SUSPENSION INTRA-ARTICULAR; INTRALESIONAL; INTRAMUSCULAR; SOFT TISSUE ONCE
Status: COMPLETED | OUTPATIENT
Start: 2018-02-06 | End: 2018-02-06

## 2018-02-06 RX ORDER — METHYLPREDNISOLONE ACETATE 40 MG/ML
40 INJECTION, SUSPENSION INTRA-ARTICULAR; INTRALESIONAL; INTRAMUSCULAR; SOFT TISSUE ONCE
Status: COMPLETED | OUTPATIENT
Start: 2018-02-06 | End: 2018-02-06

## 2018-02-06 RX ADMIN — BETAMETHASONE SODIUM PHOSPHATE AND BETAMETHASONE ACETATE 6 MG: 3; 3 INJECTION, SUSPENSION INTRA-ARTICULAR; INTRALESIONAL; INTRAMUSCULAR; SOFT TISSUE at 12:25

## 2018-02-06 RX ADMIN — METHYLPREDNISOLONE ACETATE 40 MG: 40 INJECTION, SUSPENSION INTRA-ARTICULAR; INTRALESIONAL; INTRAMUSCULAR; SOFT TISSUE at 12:24

## 2018-02-09 ENCOUNTER — HOSPITAL ENCOUNTER (OUTPATIENT)
Dept: ULTRASOUND IMAGING | Age: 58
Discharge: HOME | End: 2018-02-09
Payer: COMMERCIAL

## 2018-02-09 DIAGNOSIS — R10.33 PERIUMBILICAL PAIN: ICD-10-CM

## 2018-02-09 PROCEDURE — 76705 ECHO EXAM OF ABDOMEN: CPT

## 2018-03-01 ENCOUNTER — HOSPITAL ENCOUNTER (OUTPATIENT)
Age: 58
Setting detail: OUTPATIENT SURGERY
Discharge: HOME | End: 2018-03-01
Attending: INTERNAL MEDICINE | Admitting: INTERNAL MEDICINE
Payer: COMMERCIAL

## 2018-03-01 ENCOUNTER — ANESTHESIA EVENT (OUTPATIENT)
Dept: ENDOSCOPY | Age: 58
End: 2018-03-01
Payer: COMMERCIAL

## 2018-03-01 ENCOUNTER — ANESTHESIA (OUTPATIENT)
Dept: ENDOSCOPY | Age: 58
End: 2018-03-01
Payer: COMMERCIAL

## 2018-03-01 VITALS
RESPIRATION RATE: 20 BRPM | OXYGEN SATURATION: 94 % | HEART RATE: 67 BPM | WEIGHT: 272.2 LBS | TEMPERATURE: 98.8 F | DIASTOLIC BLOOD PRESSURE: 74 MMHG | SYSTOLIC BLOOD PRESSURE: 155 MMHG | BODY MASS INDEX: 36.87 KG/M2 | HEIGHT: 72 IN

## 2018-03-01 PROCEDURE — 7100000001 HC PACU RECOVERY - ADDTL 15 MIN: Performed by: INTERNAL MEDICINE

## 2018-03-01 PROCEDURE — 3609008300 HC SIGMOIDOSCOPY W/BIOPSY SINGLE/MULTIPLE: Performed by: INTERNAL MEDICINE

## 2018-03-01 PROCEDURE — 7100000000 HC PACU RECOVERY - FIRST 15 MIN: Performed by: INTERNAL MEDICINE

## 2018-03-01 PROCEDURE — 6370000000 HC RX 637 (ALT 250 FOR IP): Performed by: INTERNAL MEDICINE

## 2018-03-01 PROCEDURE — 88305 TISSUE EXAM BY PATHOLOGIST: CPT

## 2018-03-01 RX ORDER — SODIUM CHLORIDE 450 MG/100ML
INJECTION, SOLUTION INTRAVENOUS CONTINUOUS
Status: DISCONTINUED | OUTPATIENT
Start: 2018-03-01 | End: 2018-03-01 | Stop reason: HOSPADM

## 2018-03-01 RX ORDER — OMEGA-3 FATTY ACIDS/FISH OIL 300-1000MG
1 CAPSULE ORAL PRN
Status: ON HOLD | COMMUNITY
End: 2018-04-30 | Stop reason: HOSPADM

## 2018-03-01 ASSESSMENT — PAIN SCALES - GENERAL
PAINLEVEL_OUTOF10: 0
PAINLEVEL_OUTOF10: 0

## 2018-03-01 ASSESSMENT — PAIN - FUNCTIONAL ASSESSMENT: PAIN_FUNCTIONAL_ASSESSMENT: 0-10

## 2018-03-01 NOTE — PROGRESS NOTES
4981:  Patient to recovery post procedure. Family at bedside. Vinay Debar in to discuss results/findings, plan of care with patient/family. 2877:  Patient passed flatus, tolerating oral fluids. 9789:  Patient did not receive sedation. Discharge instructions provided to patient/family, verbalized understanding. Discharged ambulatory to private vehicle to be driven home by family member.

## 2018-03-10 ENCOUNTER — APPOINTMENT (OUTPATIENT)
Dept: GENERAL RADIOLOGY | Age: 58
End: 2018-03-10
Payer: COMMERCIAL

## 2018-03-10 ENCOUNTER — HOSPITAL ENCOUNTER (EMERGENCY)
Age: 58
Discharge: HOME | End: 2018-03-10
Payer: COMMERCIAL

## 2018-03-10 ENCOUNTER — APPOINTMENT (OUTPATIENT)
Dept: CT IMAGING | Age: 58
End: 2018-03-10
Payer: COMMERCIAL

## 2018-03-10 VITALS
HEART RATE: 64 BPM | OXYGEN SATURATION: 97 % | SYSTOLIC BLOOD PRESSURE: 165 MMHG | RESPIRATION RATE: 16 BRPM | DIASTOLIC BLOOD PRESSURE: 90 MMHG | TEMPERATURE: 97.6 F

## 2018-03-10 DIAGNOSIS — R10.9 ABDOMINAL PAIN, UNSPECIFIED ABDOMINAL LOCATION: ICD-10-CM

## 2018-03-10 DIAGNOSIS — K59.00 CONSTIPATION, UNSPECIFIED CONSTIPATION TYPE: Primary | ICD-10-CM

## 2018-03-10 LAB
ALBUMIN SERPL-MCNC: 3.9 G/DL (ref 3.5–5.1)
ALP BLD-CCNC: 108 U/L (ref 38–126)
ALT SERPL-CCNC: 29 U/L (ref 11–66)
ANION GAP SERPL CALCULATED.3IONS-SCNC: 13 MEQ/L (ref 8–16)
ANISOCYTOSIS: ABNORMAL
AST SERPL-CCNC: 27 U/L (ref 5–40)
BASOPHILS # BLD: 1.4 %
BASOPHILS ABSOLUTE: 0.1 THOU/MM3 (ref 0–0.1)
BILIRUB SERPL-MCNC: 0.6 MG/DL (ref 0.3–1.2)
BILIRUBIN DIRECT: < 0.2 MG/DL (ref 0–0.3)
BUN BLDV-MCNC: 16 MG/DL (ref 7–22)
CALCIUM SERPL-MCNC: 9.7 MG/DL (ref 8.5–10.5)
CHLORIDE BLD-SCNC: 103 MEQ/L (ref 98–111)
CO2: 25 MEQ/L (ref 23–33)
CREAT SERPL-MCNC: 0.8 MG/DL (ref 0.4–1.2)
EOSINOPHIL # BLD: 2.2 %
EOSINOPHILS ABSOLUTE: 0.2 THOU/MM3 (ref 0–0.4)
GFR SERPL CREATININE-BSD FRML MDRD: > 90 ML/MIN/1.73M2
GLUCOSE BLD-MCNC: 101 MG/DL (ref 70–108)
HCT VFR BLD CALC: 43.9 % (ref 42–52)
HEMOGLOBIN: 15.1 GM/DL (ref 14–18)
LACTIC ACID, SEPSIS: 1.5 MMOL/L (ref 0.5–1.9)
LIPASE: 23.5 U/L (ref 5.6–51.3)
LYMPHOCYTES # BLD: 19.5 %
LYMPHOCYTES ABSOLUTE: 1.7 THOU/MM3 (ref 1–4.8)
MAGNESIUM: 2.3 MG/DL (ref 1.6–2.4)
MCH RBC QN AUTO: 28.9 PG (ref 27–31)
MCHC RBC AUTO-ENTMCNC: 34.3 GM/DL (ref 33–37)
MCV RBC AUTO: 84.2 FL (ref 80–94)
MONOCYTES # BLD: 9.5 %
MONOCYTES ABSOLUTE: 0.8 THOU/MM3 (ref 0.4–1.3)
NUCLEATED RED BLOOD CELLS: 0 /100 WBC
OSMOLALITY CALCULATION: 282.6 MOSMOL/KG (ref 275–300)
PDW BLD-RTO: 15 % (ref 11.5–14.5)
PLATELET # BLD: 237 THOU/MM3 (ref 130–400)
PMV BLD AUTO: 6.4 FL (ref 7.4–10.4)
POTASSIUM SERPL-SCNC: 4.5 MEQ/L (ref 3.5–5.2)
RBC # BLD: 5.22 MILL/MM3 (ref 4.7–6.1)
SEG NEUTROPHILS: 67.4 %
SEGMENTED NEUTROPHILS ABSOLUTE COUNT: 5.9 THOU/MM3 (ref 1.8–7.7)
SODIUM BLD-SCNC: 141 MEQ/L (ref 135–145)
TOTAL PROTEIN: 7.6 G/DL (ref 6.1–8)
TSH SERPL DL<=0.05 MIU/L-ACNC: 3.36 UIU/ML (ref 0.4–4.2)
WBC # BLD: 8.7 THOU/MM3 (ref 4.8–10.8)

## 2018-03-10 PROCEDURE — 36415 COLL VENOUS BLD VENIPUNCTURE: CPT

## 2018-03-10 PROCEDURE — 84443 ASSAY THYROID STIM HORMONE: CPT

## 2018-03-10 PROCEDURE — 82248 BILIRUBIN DIRECT: CPT

## 2018-03-10 PROCEDURE — 99284 EMERGENCY DEPT VISIT MOD MDM: CPT

## 2018-03-10 PROCEDURE — 74176 CT ABD & PELVIS W/O CONTRAST: CPT

## 2018-03-10 PROCEDURE — 83735 ASSAY OF MAGNESIUM: CPT

## 2018-03-10 PROCEDURE — 85025 COMPLETE CBC W/AUTO DIFF WBC: CPT

## 2018-03-10 PROCEDURE — 80053 COMPREHEN METABOLIC PANEL: CPT

## 2018-03-10 PROCEDURE — 74018 RADEX ABDOMEN 1 VIEW: CPT

## 2018-03-10 PROCEDURE — 83690 ASSAY OF LIPASE: CPT

## 2018-03-10 PROCEDURE — 83605 ASSAY OF LACTIC ACID: CPT

## 2018-03-10 RX ORDER — CEPHALEXIN 250 MG/1
250 CAPSULE ORAL 4 TIMES DAILY
COMMUNITY
End: 2018-03-14 | Stop reason: DRUGHIGH

## 2018-03-10 ASSESSMENT — PAIN DESCRIPTION - LOCATION: LOCATION: ABDOMEN

## 2018-03-10 ASSESSMENT — PAIN DESCRIPTION - FREQUENCY: FREQUENCY: INTERMITTENT

## 2018-03-10 ASSESSMENT — ENCOUNTER SYMPTOMS
RHINORRHEA: 0
SHORTNESS OF BREATH: 0
EYE REDNESS: 0
CONSTIPATION: 1
NAUSEA: 0
WHEEZING: 0
ABDOMINAL PAIN: 1
VOMITING: 0
DIARRHEA: 0
SORE THROAT: 0
BACK PAIN: 0
COUGH: 0
EYE DISCHARGE: 0

## 2018-03-10 ASSESSMENT — PAIN SCALES - GENERAL: PAINLEVEL_OUTOF10: 10

## 2018-03-10 ASSESSMENT — PAIN DESCRIPTION - DESCRIPTORS: DESCRIPTORS: CRAMPING

## 2018-03-10 NOTE — ED NOTES
Pt was given soap suds enema, he tolerated it well and was able to have a BM. Pt states he feels much improved.       Jesus Perkins RN  03/10/18 4212

## 2018-03-10 NOTE — ED PROVIDER NOTES
[facility]  eMERGENCY dEPARTMENT eNCOUnter          CHIEF COMPLAINT       Chief Complaint   Patient presents with    Constipation       Nurses Notes reviewed and I agree except as noted in the HPI. HISTORY OF PRESENT ILLNESS    Nicolas Ordonez is a 65 y.o. male who presents to the Emergency Department with a surgical history of a cholecystectomy for the evaluation of constipation. The patient reports to have a colonoscopy on 3-1-18 and states to have had bowel difficulties since his surgery. Last night around 2200, the patient reports to have started to experience abdominal cramping secondary to his constipation. He rates his current abdominal pain as a 10/10 in severity and describes it as cramping in character. He reports minimal nausea in association with his constipation. He denies any fever, vomiting, dysuria, or change in urinary frequency. He states to have taken a dulcolax on Wednesday 3-7-18 and once last night at 1600 which provided no relief of his symptoms. No additional symptoms or complaints at this time. Location/Symptom: constipation, abdominal cramping  Timing/Onset: after surgery 3-1-18, increasing in abdominal cramping since 2200 yesterday  Context/Setting: progressively increase  Quality: aching, cramping  Duration: persistent, severe  Modifying Factors: none  Severity: 10/10    The HPI was provided by the patient. REVIEW OF SYSTEMS     Review of Systems   Constitutional: Negative for appetite change, chills, fatigue and fever. HENT: Negative for congestion, ear pain, rhinorrhea and sore throat. Eyes: Negative for discharge, redness and visual disturbance. Respiratory: Negative for cough, shortness of breath and wheezing. Cardiovascular: Negative for chest pain, palpitations and leg swelling. Gastrointestinal: Positive for abdominal pain (cramping) and constipation. Negative for diarrhea, nausea and vomiting.    Genitourinary: Negative for decreased urine displays no seizure activity. GCS eye subscore is 4. GCS verbal subscore is 5. GCS motor subscore is 6. Skin: Skin is warm and dry. No rash noted. He is not diaphoretic. Psychiatric: He has a normal mood and affect. His behavior is normal. Thought content normal.   Nursing note and vitals reviewed. DIFFERENTIAL DIAGNOSIS:   Constipation, ileus obstruction less likely    DIAGNOSTIC RESULTS     EKG: All EKG's are interpreted by the Emergency Department Physician who either signs or Co-signs this chart in the absence of a cardiologist.  EKG interpreted by No att. providers found:    None    RADIOLOGY: non-plain film images(s) such as CT, Ultrasound and MRI are read by the radiologist.    CT ABDOMEN PELVIS WO CONTRAST Additional Contrast? None   Final Result      1. No evidence of an acute process in the abdomen or pelvis. A moderate amount of stool is noted within the ascending colon. 2. There is mild fusiform dilation of the abdominal aorta which measures 3.3 cm in diameter and is slightly larger compared to the prior exam where it measured 3.0 cm. **This report has been created using voice recognition software. It may contain minor errors which are inherent in voice recognition technology. **      Final report electronically signed by Dr. Shirley Concepcion on 3/10/2018 7:41 AM      XR ABDOMEN (KUB) (SINGLE AP VIEW)   Final Result      Non obstructive bowel gas pattern. Small bowel ileus. No significant stool retention to suggest constipation. **This report has been created using voice recognition software. It may contain minor errors which are inherent in voice recognition technology. **      Final report electronically signed by Dr. Juan Dia on 3/10/2018 6:17 AM          LABS:   Labs Reviewed   CBC WITH AUTO DIFFERENTIAL - Abnormal; Notable for the following:        Result Value    RDW 15.0 (*)     MPV 6.4 (*)     All other components within normal limits   BASIC DISPOSITION/PLAN   Discharged    PATIENT REFERRED TO:  Frankie Bridges MD  Chloé Riggins  [address] [address]  [phone]    In 2 days        DISCHARGE MEDICATIONS:  Discharge Medication List as of 3/10/2018  8:06 AM          (Please note that portions of this note were completed with a voice recognition program.  Efforts were made to edit the dictations but occasionally words are mis-transcribed.)    Scribe:  Fidencio Gagnon 3/10/18 6:15 AM Scribing for and in the presence of Juan Carlos Oquendo PA-C    Signed by: Love Neville, 03/10/18 10:44 AM    Provider:  I personally performed the services described in the documentation, reviewed and edited the documentation which was dictated to the scribe in my presence, and it accurately records my words and actions.     uJan Carlos Oquendo PA-C 3/10/18 10:44 AM        SHANNAN Hi  03/10/18 [id number]

## 2018-03-10 NOTE — ED NOTES
Pt presents with c/o constipation. He reports having colonoscopy on 3/1/2018. Since that time he has had difficulty having a BM. Since Tuesday he hasn't had any BM he only reports very slight liquid stool. He reports severe cramps 10/10.       Tish Castellon RN  03/10/18 [id number]

## 2018-03-13 ENCOUNTER — OFFICE VISIT (OUTPATIENT)
Dept: ENT CLINIC | Age: 58
End: 2018-03-13
Payer: COMMERCIAL

## 2018-03-13 VITALS
DIASTOLIC BLOOD PRESSURE: 76 MMHG | BODY MASS INDEX: 35.78 KG/M2 | HEIGHT: 73 IN | HEART RATE: 72 BPM | RESPIRATION RATE: 12 BRPM | WEIGHT: 270 LBS | TEMPERATURE: 97.8 F | SYSTOLIC BLOOD PRESSURE: 138 MMHG

## 2018-03-13 DIAGNOSIS — J31.0 RHINITIS MEDICAMENTOSA: ICD-10-CM

## 2018-03-13 DIAGNOSIS — J34.89 CONCHA BULLOSA: ICD-10-CM

## 2018-03-13 DIAGNOSIS — J34.3 HYPERTROPHY OF INFERIOR NASAL TURBINATE: ICD-10-CM

## 2018-03-13 DIAGNOSIS — J34.2 NASAL SEPTAL DEVIATION: Primary | ICD-10-CM

## 2018-03-13 DIAGNOSIS — G47.33 OSA (OBSTRUCTIVE SLEEP APNEA): ICD-10-CM

## 2018-03-13 DIAGNOSIS — T48.5X5A RHINITIS MEDICAMENTOSA: ICD-10-CM

## 2018-03-13 DIAGNOSIS — Z78.9 DIFFICULTY WITH CPAP USE: ICD-10-CM

## 2018-03-13 PROCEDURE — G8482 FLU IMMUNIZE ORDER/ADMIN: HCPCS | Performed by: OTOLARYNGOLOGY

## 2018-03-13 PROCEDURE — G8427 DOCREV CUR MEDS BY ELIG CLIN: HCPCS | Performed by: OTOLARYNGOLOGY

## 2018-03-13 PROCEDURE — 3017F COLORECTAL CA SCREEN DOC REV: CPT | Performed by: OTOLARYNGOLOGY

## 2018-03-13 PROCEDURE — 1036F TOBACCO NON-USER: CPT | Performed by: OTOLARYNGOLOGY

## 2018-03-13 PROCEDURE — G8417 CALC BMI ABV UP PARAM F/U: HCPCS | Performed by: OTOLARYNGOLOGY

## 2018-03-13 PROCEDURE — 31575 DIAGNOSTIC LARYNGOSCOPY: CPT | Performed by: OTOLARYNGOLOGY

## 2018-03-13 PROCEDURE — 99214 OFFICE O/P EST MOD 30 MIN: CPT | Performed by: OTOLARYNGOLOGY

## 2018-03-13 RX ORDER — MOMETASONE FUROATE 50 UG/1
1 SPRAY, METERED NASAL DAILY
Qty: 1 INHALER | Refills: 1 | Status: SHIPPED | OUTPATIENT
Start: 2018-03-13 | End: 2018-04-18 | Stop reason: ALTCHOICE

## 2018-03-13 RX ORDER — NABUMETONE 750 MG/1
750 TABLET, FILM COATED ORAL 2 TIMES DAILY PRN
Status: ON HOLD | COMMUNITY
End: 2018-04-30 | Stop reason: HOSPADM

## 2018-03-13 ASSESSMENT — ENCOUNTER SYMPTOMS
COLOR CHANGE: 0
SHORTNESS OF BREATH: 0
COUGH: 0
DIARRHEA: 0
NAUSEA: 0
TROUBLE SWALLOWING: 0
SINUS PRESSURE: 0
VOICE CHANGE: 0
FACIAL SWELLING: 0
ABDOMINAL PAIN: 0
CHOKING: 0
SORE THROAT: 0
RHINORRHEA: 0
CHEST TIGHTNESS: 0
WHEEZING: 0
VOMITING: 0
APNEA: 1
STRIDOR: 0

## 2018-03-14 ENCOUNTER — OFFICE VISIT (OUTPATIENT)
Dept: FAMILY MEDICINE CLINIC | Age: 58
End: 2018-03-14
Payer: COMMERCIAL

## 2018-03-14 ENCOUNTER — HOSPITAL ENCOUNTER (OUTPATIENT)
Age: 58
Discharge: HOME | End: 2018-03-14
Payer: COMMERCIAL

## 2018-03-14 ENCOUNTER — HOSPITAL ENCOUNTER (OUTPATIENT)
Dept: GENERAL RADIOLOGY | Age: 58
Discharge: HOME | End: 2018-03-14
Payer: COMMERCIAL

## 2018-03-14 VITALS
BODY MASS INDEX: 36.05 KG/M2 | WEIGHT: 272 LBS | DIASTOLIC BLOOD PRESSURE: 88 MMHG | OXYGEN SATURATION: 98 % | RESPIRATION RATE: 12 BRPM | HEART RATE: 71 BPM | SYSTOLIC BLOOD PRESSURE: 132 MMHG | HEIGHT: 73 IN | TEMPERATURE: 98.2 F

## 2018-03-14 DIAGNOSIS — G47.33 OSA (OBSTRUCTIVE SLEEP APNEA): ICD-10-CM

## 2018-03-14 DIAGNOSIS — I77.819 DILATION OF DESCENDING AORTA (HCC): Primary | ICD-10-CM

## 2018-03-14 DIAGNOSIS — J34.2 DEVIATED SEPTUM: ICD-10-CM

## 2018-03-14 DIAGNOSIS — M54.2 NECK PAIN: ICD-10-CM

## 2018-03-14 DIAGNOSIS — J34.3 HYPERTROPHY OF INFERIOR NASAL TURBINATE: ICD-10-CM

## 2018-03-14 DIAGNOSIS — J34.89 CONCHA BULLOSA: ICD-10-CM

## 2018-03-14 DIAGNOSIS — T48.5X5A RHINITIS MEDICAMENTOSA: ICD-10-CM

## 2018-03-14 DIAGNOSIS — J31.0 RHINITIS MEDICAMENTOSA: ICD-10-CM

## 2018-03-14 DIAGNOSIS — J34.2 NASAL SEPTAL DEVIATION: ICD-10-CM

## 2018-03-14 DIAGNOSIS — Z78.9 DIFFICULTY WITH CPAP USE: ICD-10-CM

## 2018-03-14 PROCEDURE — G8427 DOCREV CUR MEDS BY ELIG CLIN: HCPCS | Performed by: FAMILY MEDICINE

## 2018-03-14 PROCEDURE — 99214 OFFICE O/P EST MOD 30 MIN: CPT | Performed by: FAMILY MEDICINE

## 2018-03-14 PROCEDURE — 1036F TOBACCO NON-USER: CPT | Performed by: FAMILY MEDICINE

## 2018-03-14 PROCEDURE — G8482 FLU IMMUNIZE ORDER/ADMIN: HCPCS | Performed by: FAMILY MEDICINE

## 2018-03-14 PROCEDURE — 3017F COLORECTAL CA SCREEN DOC REV: CPT | Performed by: FAMILY MEDICINE

## 2018-03-14 PROCEDURE — G8417 CALC BMI ABV UP PARAM F/U: HCPCS | Performed by: FAMILY MEDICINE

## 2018-03-14 PROCEDURE — 71046 X-RAY EXAM CHEST 2 VIEWS: CPT

## 2018-03-14 RX ORDER — HYDROXYZINE HYDROCHLORIDE 10 MG/1
TABLET, FILM COATED ORAL NIGHTLY PRN
COMMUNITY
Start: 2018-03-06 | End: 2018-05-15 | Stop reason: ALTCHOICE

## 2018-03-14 RX ORDER — CEPHALEXIN 500 MG/1
CAPSULE ORAL
Refills: 0 | COMMUNITY
Start: 2018-03-06 | End: 2018-03-19 | Stop reason: ALTCHOICE

## 2018-03-15 ENCOUNTER — TELEPHONE (OUTPATIENT)
Dept: FAMILY MEDICINE CLINIC | Age: 58
End: 2018-03-15

## 2018-03-22 ENCOUNTER — HOSPITAL ENCOUNTER (OUTPATIENT)
Dept: INPATIENT UNIT | Age: 58
Discharge: HOME | End: 2018-03-22
Attending: INTERNAL MEDICINE | Admitting: INTERNAL MEDICINE
Payer: COMMERCIAL

## 2018-03-22 ENCOUNTER — APPOINTMENT (OUTPATIENT)
Dept: CARDIAC CATH/INVASIVE PROCEDURES | Age: 58
End: 2018-03-22
Attending: INTERNAL MEDICINE
Payer: COMMERCIAL

## 2018-03-22 VITALS
RESPIRATION RATE: 24 BRPM | TEMPERATURE: 98 F | DIASTOLIC BLOOD PRESSURE: 98 MMHG | HEART RATE: 94 BPM | OXYGEN SATURATION: 96 % | SYSTOLIC BLOOD PRESSURE: 164 MMHG

## 2018-03-22 DIAGNOSIS — Z99.89 OSA ON CPAP: Primary | ICD-10-CM

## 2018-03-22 DIAGNOSIS — G47.33 OSA ON CPAP: Primary | ICD-10-CM

## 2018-03-22 LAB
ABO: NORMAL
ALBUMIN SERPL-MCNC: 3.9 G/DL (ref 3.5–5.1)
ALP BLD-CCNC: 93 U/L (ref 38–126)
ALT SERPL-CCNC: 36 U/L (ref 11–66)
ANION GAP SERPL CALCULATED.3IONS-SCNC: 11 MEQ/L (ref 8–16)
ANTIBODY SCREEN: NORMAL
AST SERPL-CCNC: 29 U/L (ref 5–40)
BILIRUB SERPL-MCNC: 0.5 MG/DL (ref 0.3–1.2)
BUN BLDV-MCNC: 14 MG/DL (ref 7–22)
CALCIUM SERPL-MCNC: 9.5 MG/DL (ref 8.5–10.5)
CHLORIDE BLD-SCNC: 101 MEQ/L (ref 98–111)
CHOLESTEROL, TOTAL: 116 MG/DL (ref 100–199)
CO2: 26 MEQ/L (ref 23–33)
COLLECTED BY:: ABNORMAL
CREAT SERPL-MCNC: 0.8 MG/DL (ref 0.4–1.2)
EKG ATRIAL RATE: 62 BPM
EKG P AXIS: 35 DEGREES
EKG P-R INTERVAL: 162 MS
EKG Q-T INTERVAL: 414 MS
EKG QRS DURATION: 106 MS
EKG QTC CALCULATION (BAZETT): 420 MS
EKG R AXIS: 0 DEGREES
EKG T AXIS: 7 DEGREES
EKG VENTRICULAR RATE: 62 BPM
GFR SERPL CREATININE-BSD FRML MDRD: > 90 ML/MIN/1.73M2
GLUCOSE BLD-MCNC: 101 MG/DL (ref 70–108)
HCT VFR BLD CALC: 43.6 % (ref 42–52)
HDLC SERPL-MCNC: 40 MG/DL
HEMOGLOBIN: 14.6 GM/DL (ref 14–18)
INR BLD: 0.92 (ref 0.85–1.13)
LDL CHOLESTEROL CALCULATED: 57 MG/DL
MCH RBC QN AUTO: 27.9 PG (ref 27–31)
MCHC RBC AUTO-ENTMCNC: 33.4 GM/DL (ref 33–37)
MCV RBC AUTO: 83.4 FL (ref 80–94)
PDW BLD-RTO: 15.7 % (ref 11.5–14.5)
PLATELET # BLD: 226 THOU/MM3 (ref 130–400)
PMV BLD AUTO: 6.8 FL (ref 7.4–10.4)
POC O2 SATURATION: 70 % (ref 94–97)
POC O2 SATURATION: 72 % (ref 94–97)
POC O2 SATURATION: 74 % (ref 94–97)
POTASSIUM REFLEX MAGNESIUM: 4.3 MEQ/L (ref 3.5–5.2)
RBC # BLD: 5.23 MILL/MM3 (ref 4.7–6.1)
RH FACTOR: NORMAL
SODIUM BLD-SCNC: 138 MEQ/L (ref 135–145)
SOURCE, BLOOD GAS: ABNORMAL
TOTAL PROTEIN: 7.2 G/DL (ref 6.1–8)
TRIGL SERPL-MCNC: 95 MG/DL (ref 0–199)
WBC # BLD: 8.4 THOU/MM3 (ref 4.8–10.8)

## 2018-03-22 PROCEDURE — C1760 CLOSURE DEV, VASC: HCPCS

## 2018-03-22 PROCEDURE — 85610 PROTHROMBIN TIME: CPT

## 2018-03-22 PROCEDURE — 93460 R&L HRT ART/VENTRICLE ANGIO: CPT | Performed by: INTERNAL MEDICINE

## 2018-03-22 PROCEDURE — 85027 COMPLETE CBC AUTOMATED: CPT

## 2018-03-22 PROCEDURE — 2500000003 HC RX 250 WO HCPCS

## 2018-03-22 PROCEDURE — 86850 RBC ANTIBODY SCREEN: CPT

## 2018-03-22 PROCEDURE — 93010 ELECTROCARDIOGRAM REPORT: CPT | Performed by: NUCLEAR MEDICINE

## 2018-03-22 PROCEDURE — 92978 ENDOLUMINL IVUS OCT C 1ST: CPT | Performed by: INTERNAL MEDICINE

## 2018-03-22 PROCEDURE — 2720000010 HC SURG SUPPLY STERILE

## 2018-03-22 PROCEDURE — 86901 BLOOD TYPING SEROLOGIC RH(D): CPT

## 2018-03-22 PROCEDURE — C1894 INTRO/SHEATH, NON-LASER: HCPCS

## 2018-03-22 PROCEDURE — C1753 CATH, INTRAVAS ULTRASOUND: HCPCS

## 2018-03-22 PROCEDURE — 93005 ELECTROCARDIOGRAM TRACING: CPT | Performed by: INTERNAL MEDICINE

## 2018-03-22 PROCEDURE — C1769 GUIDE WIRE: HCPCS

## 2018-03-22 PROCEDURE — 80061 LIPID PANEL: CPT

## 2018-03-22 PROCEDURE — 2780000010 HC IMPLANT OTHER

## 2018-03-22 PROCEDURE — 93571 IV DOP VEL&/PRESS C FLO 1ST: CPT | Performed by: INTERNAL MEDICINE

## 2018-03-22 PROCEDURE — 2580000003 HC RX 258: Performed by: INTERNAL MEDICINE

## 2018-03-22 PROCEDURE — 6360000002 HC RX W HCPCS

## 2018-03-22 PROCEDURE — 86900 BLOOD TYPING SEROLOGIC ABO: CPT

## 2018-03-22 PROCEDURE — 80053 COMPREHEN METABOLIC PANEL: CPT

## 2018-03-22 PROCEDURE — 36415 COLL VENOUS BLD VENIPUNCTURE: CPT

## 2018-03-22 PROCEDURE — 82810 BLOOD GASES O2 SAT ONLY: CPT

## 2018-03-22 RX ORDER — SODIUM CHLORIDE 0.9 % (FLUSH) 0.9 %
10 SYRINGE (ML) INJECTION PRN
Status: DISCONTINUED | OUTPATIENT
Start: 2018-03-22 | End: 2018-03-22 | Stop reason: HOSPADM

## 2018-03-22 RX ORDER — DILTIAZEM HYDROCHLORIDE 120 MG/1
120 CAPSULE, COATED, EXTENDED RELEASE ORAL DAILY
Qty: 30 CAPSULE | Refills: 3 | Status: SHIPPED | OUTPATIENT
Start: 2018-03-22 | End: 2018-05-22

## 2018-03-22 RX ORDER — ASPIRIN 325 MG
325 TABLET ORAL ONCE
Status: DISCONTINUED | OUTPATIENT
Start: 2018-03-22 | End: 2018-03-22 | Stop reason: HOSPADM

## 2018-03-22 RX ORDER — ACETAMINOPHEN 325 MG/1
650 TABLET ORAL EVERY 4 HOURS PRN
Status: DISCONTINUED | OUTPATIENT
Start: 2018-03-22 | End: 2018-03-22 | Stop reason: HOSPADM

## 2018-03-22 RX ORDER — SODIUM CHLORIDE 0.9 % (FLUSH) 0.9 %
10 SYRINGE (ML) INJECTION EVERY 12 HOURS SCHEDULED
Status: DISCONTINUED | OUTPATIENT
Start: 2018-03-22 | End: 2018-03-22 | Stop reason: HOSPADM

## 2018-03-22 RX ORDER — SODIUM CHLORIDE 9 MG/ML
100 INJECTION, SOLUTION INTRAVENOUS CONTINUOUS
Status: DISCONTINUED | OUTPATIENT
Start: 2018-03-22 | End: 2018-03-22 | Stop reason: HOSPADM

## 2018-03-22 RX ORDER — ATROPINE SULFATE 0.4 MG/ML
0.5 AMPUL (ML) INJECTION
Status: DISCONTINUED | OUTPATIENT
Start: 2018-03-22 | End: 2018-03-22 | Stop reason: HOSPADM

## 2018-03-22 RX ORDER — SODIUM CHLORIDE 9 MG/ML
INJECTION, SOLUTION INTRAVENOUS CONTINUOUS
Status: DISCONTINUED | OUTPATIENT
Start: 2018-03-22 | End: 2018-03-22

## 2018-03-22 RX ORDER — SODIUM CHLORIDE 0.9 % (FLUSH) 0.9 %
10 SYRINGE (ML) INJECTION PRN
Status: DISCONTINUED | OUTPATIENT
Start: 2018-03-22 | End: 2018-03-22

## 2018-03-22 RX ORDER — ONDANSETRON 2 MG/ML
4 INJECTION INTRAMUSCULAR; INTRAVENOUS EVERY 6 HOURS PRN
Status: DISCONTINUED | OUTPATIENT
Start: 2018-03-22 | End: 2018-03-22 | Stop reason: RX

## 2018-03-22 RX ORDER — ONDANSETRON 4 MG/1
4 TABLET, ORALLY DISINTEGRATING ORAL EVERY 6 HOURS PRN
Status: DISCONTINUED | OUTPATIENT
Start: 2018-03-22 | End: 2018-03-22 | Stop reason: HOSPADM

## 2018-03-22 RX ADMIN — SODIUM CHLORIDE: 9 INJECTION, SOLUTION INTRAVENOUS at 08:48

## 2018-03-22 NOTE — FLOWSHEET NOTE
Verbalized understanding of discharge instructions and was taken per w/c to discharge door where family waited with auto. No s/s of bleeding from groin site noted.

## 2018-03-22 NOTE — FLOWSHEET NOTE
Ambulated around unit and tolerated well. On returning to room no s/s of bleeding or hematoma noted.

## 2018-03-22 NOTE — FLOWSHEET NOTE
Returned to room from cath lab. Denied c/o discomfort. Skin warm and dry. Right groin dressing w/o s/s of bleeding or hematoma. Good circulation/sensation to right groin, leg, and foot. Iv infusing w/o s/s of infiltration.

## 2018-03-23 NOTE — PROCEDURES
[address] [place] [address]                              CARDIAC CATHETERIZATION    PATIENT NAME: Adrianna Pringle                   :        1960  MED REC NO:   [id number]                           ROOM:       [id number]  ACCOUNT NO:   [de-identified]                           ADMIT DATE: 2018  PROVIDER:     Bala Denis. Melissa Lopez M.D.    [place] Marker:  2018    HISTORY OF PRESENT ILLNESS:  This patient is a 67-year-old gentleman who  has been complaining of recurrent shortness of breath and chest pain. The  patient _____ possible pulmonary hypertension. The patient had a stress  test.  It was abnormal.  He was treated medically, continued to be  symptomatic, subsequently admitted for heart cath possible intervention. He also considering a noncardiac surgery under general anesthesia. The  patient did have the nuclear stress test.  It was abnormal, had a history  of hypertension and hypercholesteremia, under control. The patient  understands the procedure, the benefits, the risks and he wants it to be  done. PROCEDURES PERFORMED:  1.  IV conscious sedation. The patient was given the IV conscious sedation  in increment dosages by the cath lab circulating RN, monitored by the  monitor cath lab monitor tech was monitored under my supervision through  the entire time of procedure. The procedure started at 11:00 a.m. and  finished at 12:00 a.m. The patient had no acute complication from the procedure. 2.  Left heart cath. Right femoral artery was cannulated with a 6-[place]izen of [place] sheath. Coronary  angiogram showed the RCA is a dominant artery, diffuse disease of the mid  RCA and an area of concentric narrowing in that area at least moderate in  nature. PDA and posterolateral branch were patent. Left main was patent, bifurcates to the LAD and circumflex. The left  coronary system is essentially patent.   It is a nondominant system. The left ventriculogram showed left ventricular end-diastolic pressure  around 10, hypokinesia and ejection fraction of about 45-50%. No mitral  regurg. No gradient across the aortic valve. 3.  Right heart cath. The right femoral vein cannulated with a 5-[place]  sheath. A balloon-tipped catheter was utilized. There was no step-up in  oxygen saturation from different chambers of the right side of the heart. RA pressure was 8 and the PA pressure was 28/12 and RV he pressure was 26  with PEEP of 2. There was no step-up in oxygen saturation from different  chambers of the right side of the heart. The wedge pressure was around 3.  4.  Due to the ambiguity of the RCA lesion, we proceeded with the IVUS of  the RCA. The patient was given the Angiomax bolus and the RCA was  cannulated with the right Amplatz catheter. It was somewhat hard to  cannulate. The BMW wire was placed distal to the RCA. The IVUS was  performed. The end arterial lumen was about 6 mm2. but the percentage of  between 65-70%. Distally, the RCA was patent. Although, the patient seemed to have about 60-70% narrowing but the patient  has large arteries and therefore before I commit the patient to stent, I  felt he will benefit in this particular situation from the FFR we provided. 5.  FFR of the RCA. The usual _____ were made and the patient RCA  cannulated this time with the right Ludwig guide catheter. The FFR wire  was placed at the distal end of the RCA passing the point of the severe  stenosis. The patient did receive adenosine per protocol over 4 minutes. The FFR ratio was 0.88. With the above findings, I decided to continue with medical treatment,  especially with the patient having the possibility of a noncardiac surgery  soon.     The angiogram of the right iliac artery showed patent right iliac artery  with a good distal runoff, successful deployment of the Angio-Seal

## 2018-03-30 ENCOUNTER — OFFICE VISIT (OUTPATIENT)
Dept: PULMONOLOGY | Age: 58
End: 2018-03-30
Payer: COMMERCIAL

## 2018-03-30 VITALS
BODY MASS INDEX: 35.92 KG/M2 | HEIGHT: 73 IN | DIASTOLIC BLOOD PRESSURE: 82 MMHG | OXYGEN SATURATION: 94 % | HEART RATE: 63 BPM | SYSTOLIC BLOOD PRESSURE: 134 MMHG | WEIGHT: 271 LBS

## 2018-03-30 DIAGNOSIS — Z99.89 OSA ON CPAP: Primary | ICD-10-CM

## 2018-03-30 DIAGNOSIS — G47.33 OSA ON CPAP: Primary | ICD-10-CM

## 2018-03-30 PROCEDURE — G8427 DOCREV CUR MEDS BY ELIG CLIN: HCPCS | Performed by: PHYSICIAN ASSISTANT

## 2018-03-30 PROCEDURE — 99213 OFFICE O/P EST LOW 20 MIN: CPT | Performed by: PHYSICIAN ASSISTANT

## 2018-03-30 PROCEDURE — G8417 CALC BMI ABV UP PARAM F/U: HCPCS | Performed by: PHYSICIAN ASSISTANT

## 2018-03-30 PROCEDURE — G8482 FLU IMMUNIZE ORDER/ADMIN: HCPCS | Performed by: PHYSICIAN ASSISTANT

## 2018-03-30 PROCEDURE — 1036F TOBACCO NON-USER: CPT | Performed by: PHYSICIAN ASSISTANT

## 2018-03-30 PROCEDURE — 3017F COLORECTAL CA SCREEN DOC REV: CPT | Performed by: PHYSICIAN ASSISTANT

## 2018-03-30 ASSESSMENT — ENCOUNTER SYMPTOMS
RESPIRATORY NEGATIVE: 1
WHEEZING: 0
EYES NEGATIVE: 1
SINUS PAIN: 0
SPUTUM PRODUCTION: 0
NAUSEA: 0
SORE THROAT: 0
HEARTBURN: 0
COUGH: 0
SHORTNESS OF BREATH: 0
GASTROINTESTINAL NEGATIVE: 1
ORTHOPNEA: 0

## 2018-04-11 RX ORDER — METOPROLOL SUCCINATE 25 MG/1
TABLET, EXTENDED RELEASE ORAL
Qty: 90 TABLET | Refills: 1 | Status: SHIPPED | OUTPATIENT
Start: 2018-04-11 | End: 2018-10-12 | Stop reason: SDUPTHER

## 2018-04-12 ENCOUNTER — OFFICE VISIT (OUTPATIENT)
Dept: ENT CLINIC | Age: 58
End: 2018-04-12

## 2018-04-12 VITALS
SYSTOLIC BLOOD PRESSURE: 118 MMHG | TEMPERATURE: 98.4 F | HEART RATE: 60 BPM | HEIGHT: 73 IN | DIASTOLIC BLOOD PRESSURE: 70 MMHG | WEIGHT: 277 LBS | BODY MASS INDEX: 36.71 KG/M2 | RESPIRATION RATE: 16 BRPM

## 2018-04-12 DIAGNOSIS — J34.3 HYPERTROPHY OF INFERIOR NASAL TURBINATE: ICD-10-CM

## 2018-04-12 DIAGNOSIS — J34.2 NASAL SEPTAL DEVIATION: Primary | ICD-10-CM

## 2018-04-12 DIAGNOSIS — T48.5X5A RHINITIS MEDICAMENTOSA: ICD-10-CM

## 2018-04-12 DIAGNOSIS — J31.0 RHINITIS MEDICAMENTOSA: ICD-10-CM

## 2018-04-12 DIAGNOSIS — Z78.9 DIFFICULTY WITH CPAP USE: ICD-10-CM

## 2018-04-12 DIAGNOSIS — G47.33 OSA (OBSTRUCTIVE SLEEP APNEA): ICD-10-CM

## 2018-04-12 DIAGNOSIS — J34.89 CONCHA BULLOSA: ICD-10-CM

## 2018-04-12 PROCEDURE — PREOPEXAM PRE-OP EXAM: Performed by: OTOLARYNGOLOGY

## 2018-04-12 ASSESSMENT — ENCOUNTER SYMPTOMS
RHINORRHEA: 0
APNEA: 0
NAUSEA: 0
SHORTNESS OF BREATH: 0
WHEEZING: 0
SORE THROAT: 0
COUGH: 0
CHOKING: 0
COLOR CHANGE: 0
DIARRHEA: 0
FACIAL SWELLING: 0
ABDOMINAL PAIN: 0
CHEST TIGHTNESS: 0
TROUBLE SWALLOWING: 0
STRIDOR: 0
SINUS PRESSURE: 0
VOICE CHANGE: 0
VOMITING: 0

## 2018-04-18 ENCOUNTER — TELEPHONE (OUTPATIENT)
Dept: ENT CLINIC | Age: 58
End: 2018-04-18

## 2018-04-24 PROBLEM — Z78.9 DIFFICULTY WITH CPAP USE: Status: ACTIVE | Noted: 2018-04-24

## 2018-04-24 PROBLEM — J31.0 RHINITIS MEDICAMENTOSA: Status: ACTIVE | Noted: 2018-04-24

## 2018-04-24 PROBLEM — J34.2 NASAL SEPTAL DEVIATION: Status: ACTIVE | Noted: 2018-04-24

## 2018-04-24 PROBLEM — T48.5X5A RHINITIS MEDICAMENTOSA: Status: ACTIVE | Noted: 2018-04-24

## 2018-04-24 PROBLEM — J34.89 CONCHA BULLOSA: Status: ACTIVE | Noted: 2018-04-24

## 2018-04-24 PROBLEM — J34.3 HYPERTROPHY OF INFERIOR NASAL TURBINATE: Status: ACTIVE | Noted: 2018-04-24

## 2018-04-24 RX ORDER — SULFAMETHOXAZOLE AND TRIMETHOPRIM 800; 160 MG/1; MG/1
1 TABLET ORAL 2 TIMES DAILY
Qty: 28 TABLET | Refills: 2 | OUTPATIENT
Start: 2018-04-24 | End: 2018-05-08

## 2018-04-24 RX ORDER — RANITIDINE 150 MG/1
150 TABLET ORAL 2 TIMES DAILY
Qty: 60 TABLET | Refills: 3 | OUTPATIENT
Start: 2018-04-24 | End: 2018-05-24

## 2018-04-24 RX ORDER — HYDROCODONE BITARTRATE AND ACETAMINOPHEN 7.5; 325 MG/1; MG/1
1 TABLET ORAL EVERY 6 HOURS PRN
Qty: 20 TABLET | Refills: 0 | OUTPATIENT
Start: 2018-04-24 | End: 2018-04-29

## 2018-04-24 RX ORDER — PREDNISONE 20 MG/1
20 TABLET ORAL DAILY
Qty: 4 TABLET | Refills: 0 | OUTPATIENT
Start: 2018-04-24 | End: 2018-04-27

## 2018-04-25 ENCOUNTER — HOSPITAL ENCOUNTER (OUTPATIENT)
Age: 58
Setting detail: OUTPATIENT SURGERY
Discharge: HOME | End: 2018-04-25
Attending: OTOLARYNGOLOGY | Admitting: OTOLARYNGOLOGY
Payer: COMMERCIAL

## 2018-04-25 ENCOUNTER — ANESTHESIA EVENT (OUTPATIENT)
Dept: OPERATING ROOM | Age: 58
End: 2018-04-25
Payer: COMMERCIAL

## 2018-04-25 ENCOUNTER — ANESTHESIA (OUTPATIENT)
Dept: OPERATING ROOM | Age: 58
End: 2018-04-25
Payer: COMMERCIAL

## 2018-04-25 VITALS
WEIGHT: 266 LBS | SYSTOLIC BLOOD PRESSURE: 137 MMHG | TEMPERATURE: 97.9 F | HEART RATE: 60 BPM | HEIGHT: 72 IN | BODY MASS INDEX: 36.03 KG/M2 | OXYGEN SATURATION: 97 % | DIASTOLIC BLOOD PRESSURE: 83 MMHG | RESPIRATION RATE: 16 BRPM

## 2018-04-25 DIAGNOSIS — J34.3 HYPERTROPHY OF INFERIOR NASAL TURBINATE: ICD-10-CM

## 2018-04-25 DIAGNOSIS — J34.89 CONCHA BULLOSA: ICD-10-CM

## 2018-04-25 DIAGNOSIS — J34.2 NASAL SEPTAL DEVIATION: Primary | ICD-10-CM

## 2018-04-25 LAB — POTASSIUM SERPL-SCNC: 4.7 MEQ/L (ref 3.5–5.2)

## 2018-04-25 PROCEDURE — 2580000003 HC RX 258: Performed by: OTOLARYNGOLOGY

## 2018-04-25 PROCEDURE — 36415 COLL VENOUS BLD VENIPUNCTURE: CPT

## 2018-04-25 PROCEDURE — 84132 ASSAY OF SERUM POTASSIUM: CPT

## 2018-04-25 RX ORDER — LABETALOL HYDROCHLORIDE 5 MG/ML
5 INJECTION, SOLUTION INTRAVENOUS EVERY 10 MIN PRN
Status: DISCONTINUED | OUTPATIENT
Start: 2018-04-25 | End: 2018-04-25 | Stop reason: HOSPADM

## 2018-04-25 RX ORDER — FENTANYL CITRATE 50 UG/ML
50 INJECTION, SOLUTION INTRAMUSCULAR; INTRAVENOUS EVERY 5 MIN PRN
Status: DISCONTINUED | OUTPATIENT
Start: 2018-04-25 | End: 2018-04-25 | Stop reason: HOSPADM

## 2018-04-25 RX ORDER — DEXAMETHASONE SODIUM PHOSPHATE 4 MG/ML
12 INJECTION, SOLUTION INTRA-ARTICULAR; INTRALESIONAL; INTRAMUSCULAR; INTRAVENOUS; SOFT TISSUE
Status: DISCONTINUED | OUTPATIENT
Start: 2018-04-25 | End: 2018-04-25 | Stop reason: HOSPADM

## 2018-04-25 RX ORDER — HYDRALAZINE HYDROCHLORIDE 20 MG/ML
5 INJECTION INTRAMUSCULAR; INTRAVENOUS EVERY 10 MIN PRN
Status: DISCONTINUED | OUTPATIENT
Start: 2018-04-25 | End: 2018-04-25 | Stop reason: HOSPADM

## 2018-04-25 RX ORDER — SODIUM CHLORIDE 9 MG/ML
INJECTION, SOLUTION INTRAVENOUS CONTINUOUS
Status: DISCONTINUED | OUTPATIENT
Start: 2018-04-25 | End: 2018-04-25 | Stop reason: HOSPADM

## 2018-04-25 RX ORDER — MEPERIDINE HYDROCHLORIDE 25 MG/ML
12.5 INJECTION INTRAMUSCULAR; INTRAVENOUS; SUBCUTANEOUS EVERY 5 MIN PRN
Status: DISCONTINUED | OUTPATIENT
Start: 2018-04-25 | End: 2018-04-25 | Stop reason: HOSPADM

## 2018-04-25 RX ORDER — PROMETHAZINE HYDROCHLORIDE 25 MG/ML
6.25 INJECTION, SOLUTION INTRAMUSCULAR; INTRAVENOUS
Status: DISCONTINUED | OUTPATIENT
Start: 2018-04-25 | End: 2018-04-25 | Stop reason: HOSPADM

## 2018-04-25 RX ORDER — ONDANSETRON 2 MG/ML
4 INJECTION INTRAMUSCULAR; INTRAVENOUS
Status: DISCONTINUED | OUTPATIENT
Start: 2018-04-25 | End: 2018-04-25 | Stop reason: HOSPADM

## 2018-04-25 RX ORDER — FENTANYL CITRATE 50 UG/ML
25 INJECTION, SOLUTION INTRAMUSCULAR; INTRAVENOUS EVERY 5 MIN PRN
Status: DISCONTINUED | OUTPATIENT
Start: 2018-04-25 | End: 2018-04-25 | Stop reason: HOSPADM

## 2018-04-25 RX ADMIN — SODIUM CHLORIDE: 9 INJECTION, SOLUTION INTRAVENOUS at 13:38

## 2018-04-25 ASSESSMENT — PAIN SCALES - GENERAL: PAINLEVEL_OUTOF10: 0

## 2018-04-25 ASSESSMENT — PAIN - FUNCTIONAL ASSESSMENT: PAIN_FUNCTIONAL_ASSESSMENT: 0-10

## 2018-04-26 ENCOUNTER — TELEPHONE (OUTPATIENT)
Dept: ENT CLINIC | Age: 58
End: 2018-04-26

## 2018-04-30 ENCOUNTER — ANESTHESIA (OUTPATIENT)
Dept: OPERATING ROOM | Age: 58
End: 2018-04-30
Payer: COMMERCIAL

## 2018-04-30 ENCOUNTER — ANESTHESIA EVENT (OUTPATIENT)
Dept: OPERATING ROOM | Age: 58
End: 2018-04-30
Payer: COMMERCIAL

## 2018-04-30 ENCOUNTER — HOSPITAL ENCOUNTER (OUTPATIENT)
Age: 58
Discharge: HOME | End: 2018-05-01
Attending: OTOLARYNGOLOGY | Admitting: OTOLARYNGOLOGY
Payer: COMMERCIAL

## 2018-04-30 VITALS
TEMPERATURE: 70 F | DIASTOLIC BLOOD PRESSURE: 91 MMHG | RESPIRATION RATE: 1 BRPM | OXYGEN SATURATION: 92 % | SYSTOLIC BLOOD PRESSURE: 139 MMHG

## 2018-04-30 DIAGNOSIS — J34.2 NASAL SEPTAL DEVIATION: Primary | ICD-10-CM

## 2018-04-30 DIAGNOSIS — Z78.9 DIFFICULTY WITH CPAP USE: ICD-10-CM

## 2018-04-30 DIAGNOSIS — J34.89 CONCHA BULLOSA: ICD-10-CM

## 2018-04-30 DIAGNOSIS — J34.3 HYPERTROPHY OF INFERIOR NASAL TURBINATE: ICD-10-CM

## 2018-04-30 PROBLEM — G89.18 POSTOPERATIVE PAIN: Status: ACTIVE | Noted: 2018-04-30

## 2018-04-30 LAB — POTASSIUM SERPL-SCNC: 4.3 MEQ/L (ref 3.5–5.2)

## 2018-04-30 PROCEDURE — 6360000002 HC RX W HCPCS: Performed by: SPECIALIST

## 2018-04-30 PROCEDURE — 7100000001 HC PACU RECOVERY - ADDTL 15 MIN: Performed by: OTOLARYNGOLOGY

## 2018-04-30 PROCEDURE — 2500000003 HC RX 250 WO HCPCS: Performed by: OTOLARYNGOLOGY

## 2018-04-30 PROCEDURE — S0028 INJECTION, FAMOTIDINE, 20 MG: HCPCS | Performed by: OTOLARYNGOLOGY

## 2018-04-30 PROCEDURE — 2580000003 HC RX 258: Performed by: OTOLARYNGOLOGY

## 2018-04-30 PROCEDURE — 6370000000 HC RX 637 (ALT 250 FOR IP): Performed by: OTOLARYNGOLOGY

## 2018-04-30 PROCEDURE — 3700000001 HC ADD 15 MINUTES (ANESTHESIA): Performed by: OTOLARYNGOLOGY

## 2018-04-30 PROCEDURE — 2500000003 HC RX 250 WO HCPCS: Performed by: SPECIALIST

## 2018-04-30 PROCEDURE — 7100000000 HC PACU RECOVERY - FIRST 15 MIN: Performed by: OTOLARYNGOLOGY

## 2018-04-30 PROCEDURE — 7100000010 HC PHASE II RECOVERY - FIRST 15 MIN: Performed by: OTOLARYNGOLOGY

## 2018-04-30 PROCEDURE — 2780000010 HC IMPLANT OTHER: Performed by: OTOLARYNGOLOGY

## 2018-04-30 PROCEDURE — 7100000011 HC PHASE II RECOVERY - ADDTL 15 MIN: Performed by: OTOLARYNGOLOGY

## 2018-04-30 PROCEDURE — 6360000002 HC RX W HCPCS: Performed by: OTOLARYNGOLOGY

## 2018-04-30 PROCEDURE — 3600000004 HC SURGERY LEVEL 4 BASE: Performed by: OTOLARYNGOLOGY

## 2018-04-30 PROCEDURE — 3700000000 HC ANESTHESIA ATTENDED CARE: Performed by: OTOLARYNGOLOGY

## 2018-04-30 PROCEDURE — 84132 ASSAY OF SERUM POTASSIUM: CPT

## 2018-04-30 PROCEDURE — 6370000000 HC RX 637 (ALT 250 FOR IP)

## 2018-04-30 PROCEDURE — 36415 COLL VENOUS BLD VENIPUNCTURE: CPT

## 2018-04-30 PROCEDURE — 3600000014 HC SURGERY LEVEL 4 ADDTL 15MIN: Performed by: OTOLARYNGOLOGY

## 2018-04-30 PROCEDURE — 6370000000 HC RX 637 (ALT 250 FOR IP): Performed by: SPECIALIST

## 2018-04-30 PROCEDURE — 2720000010 HC SURG SUPPLY STERILE: Performed by: OTOLARYNGOLOGY

## 2018-04-30 PROCEDURE — 94761 N-INVAS EAR/PLS OXIMETRY MLT: CPT

## 2018-04-30 DEVICE — AGENT HEMOSTATIC SURGIFLOW MATRIX KIT W/THROMBIN: Type: IMPLANTABLE DEVICE | Status: FUNCTIONAL

## 2018-04-30 RX ORDER — GLYCOPYRROLATE 1 MG/5 ML
SYRINGE (ML) INTRAVENOUS PRN
Status: DISCONTINUED | OUTPATIENT
Start: 2018-04-30 | End: 2018-04-30 | Stop reason: SDUPTHER

## 2018-04-30 RX ORDER — DILTIAZEM HYDROCHLORIDE 120 MG/1
120 CAPSULE, COATED, EXTENDED RELEASE ORAL DAILY
Status: DISCONTINUED | OUTPATIENT
Start: 2018-05-01 | End: 2018-05-01 | Stop reason: HOSPADM

## 2018-04-30 RX ORDER — LABETALOL HYDROCHLORIDE 5 MG/ML
5 INJECTION, SOLUTION INTRAVENOUS EVERY 10 MIN PRN
Status: DISCONTINUED | OUTPATIENT
Start: 2018-04-30 | End: 2018-04-30 | Stop reason: HOSPADM

## 2018-04-30 RX ORDER — LIDOCAINE HYDROCHLORIDE AND EPINEPHRINE 10; 10 MG/ML; UG/ML
INJECTION, SOLUTION INFILTRATION; PERINEURAL PRN
Status: DISCONTINUED | OUTPATIENT
Start: 2018-04-30 | End: 2018-04-30 | Stop reason: HOSPADM

## 2018-04-30 RX ORDER — FENTANYL CITRATE 50 UG/ML
INJECTION, SOLUTION INTRAMUSCULAR; INTRAVENOUS PRN
Status: DISCONTINUED | OUTPATIENT
Start: 2018-04-30 | End: 2018-04-30 | Stop reason: SDUPTHER

## 2018-04-30 RX ORDER — METOPROLOL TARTRATE 5 MG/5ML
INJECTION INTRAVENOUS PRN
Status: DISCONTINUED | OUTPATIENT
Start: 2018-04-30 | End: 2018-04-30 | Stop reason: SDUPTHER

## 2018-04-30 RX ORDER — MIDAZOLAM HYDROCHLORIDE 1 MG/ML
INJECTION INTRAMUSCULAR; INTRAVENOUS PRN
Status: DISCONTINUED | OUTPATIENT
Start: 2018-04-30 | End: 2018-04-30 | Stop reason: SDUPTHER

## 2018-04-30 RX ORDER — ROPIVACAINE HYDROCHLORIDE 5 MG/ML
INJECTION, SOLUTION EPIDURAL; INFILTRATION; PERINEURAL PRN
Status: DISCONTINUED | OUTPATIENT
Start: 2018-04-30 | End: 2018-04-30 | Stop reason: HOSPADM

## 2018-04-30 RX ORDER — HYDROCODONE BITARTRATE AND ACETAMINOPHEN 7.5; 325 MG/1; MG/1
TABLET ORAL
Status: COMPLETED
Start: 2018-04-30 | End: 2018-04-30

## 2018-04-30 RX ORDER — DEXAMETHASONE SODIUM PHOSPHATE 4 MG/ML
INJECTION, SOLUTION INTRA-ARTICULAR; INTRALESIONAL; INTRAMUSCULAR; INTRAVENOUS; SOFT TISSUE PRN
Status: DISCONTINUED | OUTPATIENT
Start: 2018-04-30 | End: 2018-04-30 | Stop reason: HOSPADM

## 2018-04-30 RX ORDER — OXYMETAZOLINE HYDROCHLORIDE 0.05 G/100ML
SPRAY NASAL PRN
Status: DISCONTINUED | OUTPATIENT
Start: 2018-04-30 | End: 2018-04-30 | Stop reason: HOSPADM

## 2018-04-30 RX ORDER — HYDROCODONE BITARTRATE AND ACETAMINOPHEN 7.5; 325 MG/1; MG/1
1 TABLET ORAL ONCE
Status: COMPLETED | OUTPATIENT
Start: 2018-04-30 | End: 2018-04-30

## 2018-04-30 RX ORDER — ACETAMINOPHEN 325 MG/1
650 TABLET ORAL EVERY 4 HOURS PRN
Status: DISCONTINUED | OUTPATIENT
Start: 2018-04-30 | End: 2018-05-01 | Stop reason: HOSPADM

## 2018-04-30 RX ORDER — FENTANYL CITRATE 50 UG/ML
50 INJECTION, SOLUTION INTRAMUSCULAR; INTRAVENOUS EVERY 5 MIN PRN
Status: DISCONTINUED | OUTPATIENT
Start: 2018-04-30 | End: 2018-04-30 | Stop reason: HOSPADM

## 2018-04-30 RX ORDER — MORPHINE SULFATE 2 MG/ML
2 INJECTION, SOLUTION INTRAMUSCULAR; INTRAVENOUS
Status: DISCONTINUED | OUTPATIENT
Start: 2018-04-30 | End: 2018-05-01 | Stop reason: HOSPADM

## 2018-04-30 RX ORDER — OXYMETAZOLINE HYDROCHLORIDE 0.05 G/100ML
2 SPRAY NASAL ONCE
Status: COMPLETED | OUTPATIENT
Start: 2018-04-30 | End: 2018-04-30

## 2018-04-30 RX ORDER — HYDROCODONE BITARTRATE AND ACETAMINOPHEN 5; 325 MG/1; MG/1
2 TABLET ORAL EVERY 4 HOURS PRN
Status: DISCONTINUED | OUTPATIENT
Start: 2018-04-30 | End: 2018-05-01 | Stop reason: HOSPADM

## 2018-04-30 RX ORDER — SODIUM CHLORIDE 0.9 % (FLUSH) 0.9 %
10 SYRINGE (ML) INJECTION EVERY 12 HOURS SCHEDULED
Status: DISCONTINUED | OUTPATIENT
Start: 2018-04-30 | End: 2018-05-01 | Stop reason: HOSPADM

## 2018-04-30 RX ORDER — MEPERIDINE HYDROCHLORIDE 25 MG/ML
12.5 INJECTION INTRAMUSCULAR; INTRAVENOUS; SUBCUTANEOUS EVERY 5 MIN PRN
Status: DISCONTINUED | OUTPATIENT
Start: 2018-04-30 | End: 2018-04-30 | Stop reason: HOSPADM

## 2018-04-30 RX ORDER — HYDROCODONE BITARTRATE AND ACETAMINOPHEN 5; 325 MG/1; MG/1
1 TABLET ORAL EVERY 4 HOURS PRN
Status: DISCONTINUED | OUTPATIENT
Start: 2018-04-30 | End: 2018-05-01 | Stop reason: HOSPADM

## 2018-04-30 RX ORDER — ONDANSETRON 2 MG/ML
INJECTION INTRAMUSCULAR; INTRAVENOUS PRN
Status: DISCONTINUED | OUTPATIENT
Start: 2018-04-30 | End: 2018-04-30 | Stop reason: SDUPTHER

## 2018-04-30 RX ORDER — MINERAL OIL AND WHITE PETROLATUM 150; 830 MG/G; MG/G
OINTMENT OPHTHALMIC PRN
Status: DISCONTINUED | OUTPATIENT
Start: 2018-04-30 | End: 2018-04-30 | Stop reason: SDUPTHER

## 2018-04-30 RX ORDER — GINSENG 100 MG
CAPSULE ORAL PRN
Status: DISCONTINUED | OUTPATIENT
Start: 2018-04-30 | End: 2018-04-30 | Stop reason: HOSPADM

## 2018-04-30 RX ORDER — METOPROLOL SUCCINATE 25 MG/1
25 TABLET, EXTENDED RELEASE ORAL DAILY
Status: DISCONTINUED | OUTPATIENT
Start: 2018-05-01 | End: 2018-05-01 | Stop reason: HOSPADM

## 2018-04-30 RX ORDER — PROPOFOL 10 MG/ML
INJECTION, EMULSION INTRAVENOUS PRN
Status: DISCONTINUED | OUTPATIENT
Start: 2018-04-30 | End: 2018-04-30 | Stop reason: SDUPTHER

## 2018-04-30 RX ORDER — LIDOCAINE HYDROCHLORIDE 20 MG/ML
INJECTION, SOLUTION INFILTRATION; PERINEURAL PRN
Status: DISCONTINUED | OUTPATIENT
Start: 2018-04-30 | End: 2018-04-30 | Stop reason: SDUPTHER

## 2018-04-30 RX ORDER — SODIUM CHLORIDE 9 MG/ML
INJECTION, SOLUTION INTRAVENOUS CONTINUOUS
Status: DISCONTINUED | OUTPATIENT
Start: 2018-04-30 | End: 2018-04-30

## 2018-04-30 RX ORDER — ONDANSETRON 2 MG/ML
4 INJECTION INTRAMUSCULAR; INTRAVENOUS
Status: DISCONTINUED | OUTPATIENT
Start: 2018-04-30 | End: 2018-04-30 | Stop reason: HOSPADM

## 2018-04-30 RX ORDER — PREDNISONE 20 MG/1
20 TABLET ORAL DAILY
Qty: 4 TABLET | Refills: 0 | Status: SHIPPED | OUTPATIENT
Start: 2018-04-30 | End: 2018-05-03

## 2018-04-30 RX ORDER — SODIUM CHLORIDE 0.9 % (FLUSH) 0.9 %
10 SYRINGE (ML) INJECTION PRN
Status: DISCONTINUED | OUTPATIENT
Start: 2018-04-30 | End: 2018-05-01 | Stop reason: HOSPADM

## 2018-04-30 RX ORDER — MORPHINE SULFATE 4 MG/ML
4 INJECTION, SOLUTION INTRAMUSCULAR; INTRAVENOUS
Status: DISCONTINUED | OUTPATIENT
Start: 2018-04-30 | End: 2018-05-01 | Stop reason: HOSPADM

## 2018-04-30 RX ORDER — DEXAMETHASONE SODIUM PHOSPHATE 4 MG/ML
12 INJECTION, SOLUTION INTRA-ARTICULAR; INTRALESIONAL; INTRAMUSCULAR; INTRAVENOUS; SOFT TISSUE
Status: DISCONTINUED | OUTPATIENT
Start: 2018-04-30 | End: 2018-04-30 | Stop reason: HOSPADM

## 2018-04-30 RX ORDER — DEXAMETHASONE SODIUM PHOSPHATE 4 MG/ML
INJECTION, SOLUTION INTRA-ARTICULAR; INTRALESIONAL; INTRAMUSCULAR; INTRAVENOUS; SOFT TISSUE
Status: DISPENSED
Start: 2018-04-30 | End: 2018-05-01

## 2018-04-30 RX ORDER — HYDROXYZINE HYDROCHLORIDE 10 MG/1
10 TABLET, FILM COATED ORAL 4 TIMES DAILY PRN
Status: DISCONTINUED | OUTPATIENT
Start: 2018-04-30 | End: 2018-05-01 | Stop reason: HOSPADM

## 2018-04-30 RX ORDER — HYDROCODONE BITARTRATE AND ACETAMINOPHEN 7.5; 325 MG/1; MG/1
1 TABLET ORAL EVERY 6 HOURS PRN
Qty: 20 TABLET | Refills: 0 | Status: SHIPPED | OUTPATIENT
Start: 2018-04-30 | End: 2018-05-05

## 2018-04-30 RX ORDER — DEXAMETHASONE SODIUM PHOSPHATE 4 MG/ML
4 INJECTION, SOLUTION INTRA-ARTICULAR; INTRALESIONAL; INTRAMUSCULAR; INTRAVENOUS; SOFT TISSUE ONCE
Status: COMPLETED | OUTPATIENT
Start: 2018-04-30 | End: 2018-04-30

## 2018-04-30 RX ORDER — EPHEDRINE SULFATE/0.9% NACL/PF 50 MG/5 ML
SYRINGE (ML) INTRAVENOUS PRN
Status: DISCONTINUED | OUTPATIENT
Start: 2018-04-30 | End: 2018-04-30 | Stop reason: SDUPTHER

## 2018-04-30 RX ORDER — ROCURONIUM BROMIDE 10 MG/ML
INJECTION, SOLUTION INTRAVENOUS PRN
Status: DISCONTINUED | OUTPATIENT
Start: 2018-04-30 | End: 2018-04-30 | Stop reason: SDUPTHER

## 2018-04-30 RX ORDER — RANITIDINE 150 MG/1
150 TABLET ORAL 2 TIMES DAILY
Qty: 60 TABLET | Refills: 0 | Status: SHIPPED | OUTPATIENT
Start: 2018-04-30 | End: 2018-05-22

## 2018-04-30 RX ORDER — ONDANSETRON 2 MG/ML
4 INJECTION INTRAMUSCULAR; INTRAVENOUS EVERY 6 HOURS PRN
Status: DISCONTINUED | OUTPATIENT
Start: 2018-04-30 | End: 2018-05-01 | Stop reason: HOSPADM

## 2018-04-30 RX ORDER — SUCCINYLCHOLINE CHLORIDE 20 MG/ML
INJECTION INTRAMUSCULAR; INTRAVENOUS PRN
Status: DISCONTINUED | OUTPATIENT
Start: 2018-04-30 | End: 2018-04-30 | Stop reason: SDUPTHER

## 2018-04-30 RX ADMIN — PHENYLEPHRINE HYDROCHLORIDE 100 MCG: 10 INJECTION INTRAMUSCULAR; INTRAVENOUS; SUBCUTANEOUS at 12:06

## 2018-04-30 RX ADMIN — SUCCINYLCHOLINE CHLORIDE 140 MG: 20 INJECTION, SOLUTION INTRAMUSCULAR; INTRAVENOUS at 10:36

## 2018-04-30 RX ADMIN — FENTANYL CITRATE 50 MCG: 50 INJECTION, SOLUTION INTRAMUSCULAR; INTRAVENOUS at 10:37

## 2018-04-30 RX ADMIN — HYDROCODONE BITARTRATE AND ACETAMINOPHEN 1 TABLET: 7.5; 325 TABLET ORAL at 16:35

## 2018-04-30 RX ADMIN — METOPROLOL TARTRATE 2 MG: 5 INJECTION, SOLUTION INTRAVENOUS at 12:53

## 2018-04-30 RX ADMIN — PHENYLEPHRINE HYDROCHLORIDE 100 MCG: 10 INJECTION INTRAMUSCULAR; INTRAVENOUS; SUBCUTANEOUS at 11:35

## 2018-04-30 RX ADMIN — PHENYLEPHRINE HYDROCHLORIDE 200 MCG: 10 INJECTION INTRAMUSCULAR; INTRAVENOUS; SUBCUTANEOUS at 10:55

## 2018-04-30 RX ADMIN — HYDROCODONE BITARTRATE AND ACETAMINOPHEN 1 TABLET: 5; 325 TABLET ORAL at 22:49

## 2018-04-30 RX ADMIN — Medication 0.1 MG: at 10:28

## 2018-04-30 RX ADMIN — HYDROCODONE BITARTRATE AND ACETAMINOPHEN 1 TABLET: 7.5; 325 TABLET ORAL at 14:21

## 2018-04-30 RX ADMIN — PROPOFOL 150 MG: 10 INJECTION, EMULSION INTRAVENOUS at 10:36

## 2018-04-30 RX ADMIN — FENTANYL CITRATE 25 MCG: 50 INJECTION, SOLUTION INTRAMUSCULAR; INTRAVENOUS at 12:45

## 2018-04-30 RX ADMIN — Medication 50 MG: at 10:44

## 2018-04-30 RX ADMIN — OXYMETAZOLINE HYDROCHLORIDE 2 SPRAY: 5 SPRAY NASAL at 22:54

## 2018-04-30 RX ADMIN — FENTANYL CITRATE 25 MCG: 50 INJECTION, SOLUTION INTRAMUSCULAR; INTRAVENOUS at 11:55

## 2018-04-30 RX ADMIN — FAMOTIDINE 20 MG: 10 INJECTION INTRAVENOUS at 09:28

## 2018-04-30 RX ADMIN — LIDOCAINE HYDROCHLORIDE 80 MG: 20 INJECTION, SOLUTION INFILTRATION; PERINEURAL at 10:36

## 2018-04-30 RX ADMIN — SODIUM CHLORIDE: 9 INJECTION, SOLUTION INTRAVENOUS at 09:27

## 2018-04-30 RX ADMIN — PHENYLEPHRINE HYDROCHLORIDE 100 MCG: 10 INJECTION INTRAMUSCULAR; INTRAVENOUS; SUBCUTANEOUS at 11:54

## 2018-04-30 RX ADMIN — HYDROXYZINE HYDROCHLORIDE 10 MG: 10 TABLET ORAL at 22:50

## 2018-04-30 RX ADMIN — PHENYLEPHRINE HYDROCHLORIDE 100 MCG: 10 INJECTION INTRAMUSCULAR; INTRAVENOUS; SUBCUTANEOUS at 11:16

## 2018-04-30 RX ADMIN — SODIUM CHLORIDE: 9 INJECTION, SOLUTION INTRAVENOUS at 11:30

## 2018-04-30 RX ADMIN — DEXAMETHASONE SODIUM PHOSPHATE 4 MG: 4 INJECTION, SOLUTION INTRAMUSCULAR; INTRAVENOUS at 20:08

## 2018-04-30 RX ADMIN — PHENYLEPHRINE HYDROCHLORIDE 100 MCG: 10 INJECTION INTRAMUSCULAR; INTRAVENOUS; SUBCUTANEOUS at 11:40

## 2018-04-30 RX ADMIN — FENTANYL CITRATE 25 MCG: 50 INJECTION, SOLUTION INTRAMUSCULAR; INTRAVENOUS at 12:49

## 2018-04-30 RX ADMIN — ONDANSETRON 4 MG: 2 INJECTION INTRAMUSCULAR; INTRAVENOUS at 12:40

## 2018-04-30 RX ADMIN — PROPOFOL 5 MG: 10 INJECTION, EMULSION INTRAVENOUS at 10:45

## 2018-04-30 RX ADMIN — Medication 10 MG: at 11:25

## 2018-04-30 RX ADMIN — FENTANYL CITRATE 25 MCG: 50 INJECTION, SOLUTION INTRAMUSCULAR; INTRAVENOUS at 11:59

## 2018-04-30 RX ADMIN — Medication 10 ML: at 23:19

## 2018-04-30 RX ADMIN — DEXAMETHASONE SODIUM PHOSPHATE 12 MG: 4 INJECTION, SOLUTION INTRAMUSCULAR; INTRAVENOUS at 10:17

## 2018-04-30 RX ADMIN — MIDAZOLAM HYDROCHLORIDE 2 MG: 1 INJECTION, SOLUTION INTRAMUSCULAR; INTRAVENOUS at 10:28

## 2018-04-30 RX ADMIN — FENTANYL CITRATE 50 MCG: 50 INJECTION, SOLUTION INTRAMUSCULAR; INTRAVENOUS at 10:28

## 2018-04-30 RX ADMIN — MINERAL OIL AND WHITE PETROLATUM 1 INCH: 150; 830 OINTMENT OPHTHALMIC at 10:37

## 2018-04-30 RX ADMIN — METOPROLOL TARTRATE 1 MG: 5 INJECTION, SOLUTION INTRAVENOUS at 12:56

## 2018-04-30 ASSESSMENT — PAIN SCALES - GENERAL
PAINLEVEL_OUTOF10: 6
PAINLEVEL_OUTOF10: 0
PAINLEVEL_OUTOF10: 0
PAINLEVEL_OUTOF10: 7
PAINLEVEL_OUTOF10: 0
PAINLEVEL_OUTOF10: 5
PAINLEVEL_OUTOF10: 6
PAINLEVEL_OUTOF10: 10
PAINLEVEL_OUTOF10: 0
PAINLEVEL_OUTOF10: 10
PAINLEVEL_OUTOF10: 8
PAINLEVEL_OUTOF10: 0
PAINLEVEL_OUTOF10: 10
PAINLEVEL_OUTOF10: 7
PAINLEVEL_OUTOF10: 7
PAINLEVEL_OUTOF10: 8

## 2018-04-30 ASSESSMENT — PULMONARY FUNCTION TESTS
PIF_VALUE: 20
PIF_VALUE: 19
PIF_VALUE: 20
PIF_VALUE: 22
PIF_VALUE: 20
PIF_VALUE: 22
PIF_VALUE: 19
PIF_VALUE: 17
PIF_VALUE: 20
PIF_VALUE: 20
PIF_VALUE: 18
PIF_VALUE: 18
PIF_VALUE: 20
PIF_VALUE: 19
PIF_VALUE: 20
PIF_VALUE: 0
PIF_VALUE: 19
PIF_VALUE: 20
PIF_VALUE: 19
PIF_VALUE: 20
PIF_VALUE: 18
PIF_VALUE: 19
PIF_VALUE: 20
PIF_VALUE: 20
PIF_VALUE: 21
PIF_VALUE: 10
PIF_VALUE: 20
PIF_VALUE: 20
PIF_VALUE: 18
PIF_VALUE: 19
PIF_VALUE: 20
PIF_VALUE: 19
PIF_VALUE: 20
PIF_VALUE: 19
PIF_VALUE: 20
PIF_VALUE: 19
PIF_VALUE: 19
PIF_VALUE: 20
PIF_VALUE: 21
PIF_VALUE: 19
PIF_VALUE: 20
PIF_VALUE: 22
PIF_VALUE: 19
PIF_VALUE: 20
PIF_VALUE: 19
PIF_VALUE: 20
PIF_VALUE: 19
PIF_VALUE: 21
PIF_VALUE: 19
PIF_VALUE: 18
PIF_VALUE: 20
PIF_VALUE: 18
PIF_VALUE: 20
PIF_VALUE: 1
PIF_VALUE: 20
PIF_VALUE: 19
PIF_VALUE: 20
PIF_VALUE: 21
PIF_VALUE: 7
PIF_VALUE: 18
PIF_VALUE: 19
PIF_VALUE: 19
PIF_VALUE: 20
PIF_VALUE: 21
PIF_VALUE: 17
PIF_VALUE: 19
PIF_VALUE: 19
PIF_VALUE: 21
PIF_VALUE: 21
PIF_VALUE: 20
PIF_VALUE: 18
PIF_VALUE: 19
PIF_VALUE: 21
PIF_VALUE: 19
PIF_VALUE: 20
PIF_VALUE: 19
PIF_VALUE: 20
PIF_VALUE: 22
PIF_VALUE: 9
PIF_VALUE: 8
PIF_VALUE: 6
PIF_VALUE: 1
PIF_VALUE: 18
PIF_VALUE: 19
PIF_VALUE: 1
PIF_VALUE: 1
PIF_VALUE: 20
PIF_VALUE: 19
PIF_VALUE: 20
PIF_VALUE: 19
PIF_VALUE: 20
PIF_VALUE: 1
PIF_VALUE: 20
PIF_VALUE: 0
PIF_VALUE: 0
PIF_VALUE: 18
PIF_VALUE: 20
PIF_VALUE: 22
PIF_VALUE: 20
PIF_VALUE: 8
PIF_VALUE: 19
PIF_VALUE: 0
PIF_VALUE: 18
PIF_VALUE: 20
PIF_VALUE: 18
PIF_VALUE: 19
PIF_VALUE: 0
PIF_VALUE: 20
PIF_VALUE: 5
PIF_VALUE: 20
PIF_VALUE: 20
PIF_VALUE: 18
PIF_VALUE: 19
PIF_VALUE: 17
PIF_VALUE: 19
PIF_VALUE: 20
PIF_VALUE: 19
PIF_VALUE: 20
PIF_VALUE: 19
PIF_VALUE: 20
PIF_VALUE: 18
PIF_VALUE: 19
PIF_VALUE: 20
PIF_VALUE: 18
PIF_VALUE: 20
PIF_VALUE: 19
PIF_VALUE: 20
PIF_VALUE: 21
PIF_VALUE: 19
PIF_VALUE: 20
PIF_VALUE: 20
PIF_VALUE: 21
PIF_VALUE: 18
PIF_VALUE: 19
PIF_VALUE: 20
PIF_VALUE: 1
PIF_VALUE: 0
PIF_VALUE: 20
PIF_VALUE: 21
PIF_VALUE: 19
PIF_VALUE: 1
PIF_VALUE: 1
PIF_VALUE: 5
PIF_VALUE: 23
PIF_VALUE: 20
PIF_VALUE: 24
PIF_VALUE: 19
PIF_VALUE: 19
PIF_VALUE: 21
PIF_VALUE: 20
PIF_VALUE: 19

## 2018-04-30 ASSESSMENT — PAIN DESCRIPTION - LOCATION
LOCATION: HEAD
LOCATION: HEAD

## 2018-04-30 ASSESSMENT — PAIN DESCRIPTION - PAIN TYPE
TYPE: ACUTE PAIN
TYPE: ACUTE PAIN

## 2018-04-30 ASSESSMENT — PAIN SCALES - WONG BAKER: WONGBAKER_NUMERICALRESPONSE: 0

## 2018-05-01 ENCOUNTER — OFFICE VISIT (OUTPATIENT)
Dept: ENT CLINIC | Age: 58
End: 2018-05-01

## 2018-05-01 VITALS
BODY MASS INDEX: 36.55 KG/M2 | HEART RATE: 84 BPM | TEMPERATURE: 97.5 F | WEIGHT: 269.5 LBS | SYSTOLIC BLOOD PRESSURE: 118 MMHG | RESPIRATION RATE: 18 BRPM | DIASTOLIC BLOOD PRESSURE: 76 MMHG

## 2018-05-01 VITALS
OXYGEN SATURATION: 95 % | WEIGHT: 266.4 LBS | DIASTOLIC BLOOD PRESSURE: 77 MMHG | HEART RATE: 80 BPM | SYSTOLIC BLOOD PRESSURE: 155 MMHG | HEIGHT: 72 IN | TEMPERATURE: 97.3 F | RESPIRATION RATE: 20 BRPM | BODY MASS INDEX: 36.08 KG/M2

## 2018-05-01 DIAGNOSIS — Z98.890 STATUS POST NASAL SEPTOPLASTY: ICD-10-CM

## 2018-05-01 DIAGNOSIS — Z78.9 DIFFICULTY WITH CPAP USE: ICD-10-CM

## 2018-05-01 DIAGNOSIS — J34.89 RHINORRHEA: ICD-10-CM

## 2018-05-01 DIAGNOSIS — J34.89 CONCHA BULLOSA: ICD-10-CM

## 2018-05-01 DIAGNOSIS — G47.33 OSA (OBSTRUCTIVE SLEEP APNEA): ICD-10-CM

## 2018-05-01 DIAGNOSIS — J34.3 HYPERTROPHY OF INFERIOR NASAL TURBINATE: ICD-10-CM

## 2018-05-01 DIAGNOSIS — J34.2 NASAL SEPTAL DEVIATION: Primary | ICD-10-CM

## 2018-05-01 PROCEDURE — 99024 POSTOP FOLLOW-UP VISIT: CPT | Performed by: OTOLARYNGOLOGY

## 2018-05-01 PROCEDURE — 94761 N-INVAS EAR/PLS OXIMETRY MLT: CPT

## 2018-05-01 PROCEDURE — 6370000000 HC RX 637 (ALT 250 FOR IP): Performed by: OTOLARYNGOLOGY

## 2018-05-01 PROCEDURE — 2580000003 HC RX 258: Performed by: OTOLARYNGOLOGY

## 2018-05-01 RX ORDER — PSEUDOEPHEDRINE HYDROCHLORIDE 30 MG/1
30 TABLET ORAL EVERY 6 HOURS
Qty: 56 TABLET | Refills: 1 | Status: SHIPPED | OUTPATIENT
Start: 2018-05-01 | End: 2018-05-15

## 2018-05-01 RX ORDER — OXYMETAZOLINE HYDROCHLORIDE 0.05 G/100ML
SPRAY NASAL
Qty: 1 BOTTLE | Refills: 3 | COMMUNITY
Start: 2018-05-01 | End: 2018-05-08 | Stop reason: ALTCHOICE

## 2018-05-01 RX ADMIN — HYDROCODONE BITARTRATE AND ACETAMINOPHEN 1 TABLET: 5; 325 TABLET ORAL at 08:56

## 2018-05-01 RX ADMIN — DILTIAZEM HYDROCHLORIDE 120 MG: 120 CAPSULE, EXTENDED RELEASE ORAL at 09:00

## 2018-05-01 RX ADMIN — Medication 10 ML: at 09:06

## 2018-05-01 RX ADMIN — HYDROCODONE BITARTRATE AND ACETAMINOPHEN 1 TABLET: 5; 325 TABLET ORAL at 03:55

## 2018-05-01 RX ADMIN — METOPROLOL SUCCINATE 25 MG: 25 TABLET, EXTENDED RELEASE ORAL at 09:02

## 2018-05-01 RX ADMIN — SACUBITRIL AND VALSARTAN 1 TABLET: 49; 51 TABLET, FILM COATED ORAL at 08:59

## 2018-05-01 RX ADMIN — HYDROXYZINE HYDROCHLORIDE 10 MG: 10 TABLET ORAL at 08:57

## 2018-05-01 ASSESSMENT — ENCOUNTER SYMPTOMS
ABDOMINAL PAIN: 0
CHEST TIGHTNESS: 0
RHINORRHEA: 0
COUGH: 0
SHORTNESS OF BREATH: 0
TROUBLE SWALLOWING: 0
SINUS PRESSURE: 0
WHEEZING: 0
SORE THROAT: 0
COLOR CHANGE: 0
VOMITING: 0
CHOKING: 0
DIARRHEA: 0
VOICE CHANGE: 0
FACIAL SWELLING: 0
APNEA: 0
NAUSEA: 0
STRIDOR: 0

## 2018-05-01 ASSESSMENT — PAIN SCALES - GENERAL
PAINLEVEL_OUTOF10: 4

## 2018-05-01 ASSESSMENT — PAIN DESCRIPTION - LOCATION: LOCATION: HEAD

## 2018-05-01 ASSESSMENT — PAIN DESCRIPTION - PAIN TYPE: TYPE: ACUTE PAIN

## 2018-05-08 ENCOUNTER — OFFICE VISIT (OUTPATIENT)
Dept: ENT CLINIC | Age: 58
End: 2018-05-08

## 2018-05-08 VITALS
TEMPERATURE: 97.7 F | WEIGHT: 263 LBS | HEART RATE: 60 BPM | RESPIRATION RATE: 12 BRPM | BODY MASS INDEX: 35.62 KG/M2 | DIASTOLIC BLOOD PRESSURE: 72 MMHG | SYSTOLIC BLOOD PRESSURE: 128 MMHG | HEIGHT: 72 IN

## 2018-05-08 DIAGNOSIS — G47.33 OSA (OBSTRUCTIVE SLEEP APNEA): ICD-10-CM

## 2018-05-08 DIAGNOSIS — J34.89 RHINORRHEA: ICD-10-CM

## 2018-05-08 DIAGNOSIS — Z78.9 DIFFICULTY WITH CPAP USE: ICD-10-CM

## 2018-05-08 DIAGNOSIS — J34.2 NASAL SEPTAL DEVIATION: Primary | ICD-10-CM

## 2018-05-08 DIAGNOSIS — J34.89 CONCHA BULLOSA: ICD-10-CM

## 2018-05-08 DIAGNOSIS — Z98.890 STATUS POST NASAL SEPTOPLASTY: ICD-10-CM

## 2018-05-08 DIAGNOSIS — J34.3 HYPERTROPHY OF INFERIOR NASAL TURBINATE: ICD-10-CM

## 2018-05-08 PROCEDURE — 99024 POSTOP FOLLOW-UP VISIT: CPT | Performed by: OTOLARYNGOLOGY

## 2018-05-08 RX ORDER — MOMETASONE FUROATE 50 UG/1
2 SPRAY, METERED NASAL DAILY
COMMUNITY
End: 2018-05-22

## 2018-05-08 ASSESSMENT — ENCOUNTER SYMPTOMS
RHINORRHEA: 0
VOICE CHANGE: 0
SORE THROAT: 0
SINUS PRESSURE: 0
NAUSEA: 0
VOMITING: 0
WHEEZING: 0
COUGH: 0
SHORTNESS OF BREATH: 0
CHOKING: 0
COLOR CHANGE: 0
ABDOMINAL PAIN: 0
STRIDOR: 0
CHEST TIGHTNESS: 0
TROUBLE SWALLOWING: 0
APNEA: 0
FACIAL SWELLING: 0
DIARRHEA: 0

## 2018-05-15 ENCOUNTER — OFFICE VISIT (OUTPATIENT)
Dept: FAMILY MEDICINE CLINIC | Age: 58
End: 2018-05-15
Payer: COMMERCIAL

## 2018-05-15 VITALS
DIASTOLIC BLOOD PRESSURE: 88 MMHG | BODY MASS INDEX: 35.89 KG/M2 | TEMPERATURE: 98.1 F | OXYGEN SATURATION: 98 % | SYSTOLIC BLOOD PRESSURE: 130 MMHG | HEART RATE: 72 BPM | HEIGHT: 72 IN | WEIGHT: 265 LBS | RESPIRATION RATE: 12 BRPM

## 2018-05-15 DIAGNOSIS — E78.00 HYPERCHOLESTEROLEMIA: ICD-10-CM

## 2018-05-15 DIAGNOSIS — I10 ESSENTIAL HYPERTENSION: ICD-10-CM

## 2018-05-15 DIAGNOSIS — I83.90 VARICOSE VEINS OF LOWER EXTREMITY: Primary | ICD-10-CM

## 2018-05-15 DIAGNOSIS — I86.8 SPIDER VARICOSE VEINS: ICD-10-CM

## 2018-05-15 PROCEDURE — G8417 CALC BMI ABV UP PARAM F/U: HCPCS | Performed by: FAMILY MEDICINE

## 2018-05-15 PROCEDURE — 99214 OFFICE O/P EST MOD 30 MIN: CPT | Performed by: FAMILY MEDICINE

## 2018-05-15 PROCEDURE — 1036F TOBACCO NON-USER: CPT | Performed by: FAMILY MEDICINE

## 2018-05-15 PROCEDURE — G8427 DOCREV CUR MEDS BY ELIG CLIN: HCPCS | Performed by: FAMILY MEDICINE

## 2018-05-15 PROCEDURE — 3017F COLORECTAL CA SCREEN DOC REV: CPT | Performed by: FAMILY MEDICINE

## 2018-05-16 ENCOUNTER — TELEPHONE (OUTPATIENT)
Dept: UROLOGY | Age: 58
End: 2018-05-16

## 2018-05-16 ENCOUNTER — OFFICE VISIT (OUTPATIENT)
Dept: UROLOGY | Age: 58
End: 2018-05-16
Payer: COMMERCIAL

## 2018-05-16 VITALS
DIASTOLIC BLOOD PRESSURE: 96 MMHG | BODY MASS INDEX: 35.76 KG/M2 | HEIGHT: 72 IN | SYSTOLIC BLOOD PRESSURE: 148 MMHG | WEIGHT: 264 LBS

## 2018-05-16 DIAGNOSIS — N40.0 BENIGN PROSTATIC HYPERPLASIA, UNSPECIFIED WHETHER LOWER URINARY TRACT SYMPTOMS PRESENT: Primary | ICD-10-CM

## 2018-05-16 DIAGNOSIS — N50.82 SCROTUM PAIN: ICD-10-CM

## 2018-05-16 DIAGNOSIS — Z12.5 PROSTATE CANCER SCREENING: ICD-10-CM

## 2018-05-16 LAB
BILIRUBIN URINE: NEGATIVE
BLOOD URINE, POC: NEGATIVE
CHARACTER, URINE: CLEAR
COLOR, URINE: YELLOW
GLUCOSE URINE: NEGATIVE MG/DL
KETONES, URINE: NEGATIVE
LEUKOCYTE CLUMPS, URINE: NEGATIVE
NITRITE, URINE: NEGATIVE
PH, URINE: 5.5
POST VOID RESIDUAL (PVR): 99 ML
PROTEIN, URINE: NEGATIVE MG/DL
SPECIFIC GRAVITY, URINE: 1.01 (ref 1–1.03)
UROBILINOGEN, URINE: 0.2 EU/DL

## 2018-05-16 PROCEDURE — 99213 OFFICE O/P EST LOW 20 MIN: CPT | Performed by: NURSE PRACTITIONER

## 2018-05-16 PROCEDURE — G8427 DOCREV CUR MEDS BY ELIG CLIN: HCPCS | Performed by: NURSE PRACTITIONER

## 2018-05-16 PROCEDURE — 81003 URINALYSIS AUTO W/O SCOPE: CPT | Performed by: NURSE PRACTITIONER

## 2018-05-16 PROCEDURE — 51798 US URINE CAPACITY MEASURE: CPT | Performed by: NURSE PRACTITIONER

## 2018-05-16 PROCEDURE — G8417 CALC BMI ABV UP PARAM F/U: HCPCS | Performed by: NURSE PRACTITIONER

## 2018-05-16 PROCEDURE — 1036F TOBACCO NON-USER: CPT | Performed by: NURSE PRACTITIONER

## 2018-05-16 PROCEDURE — 3017F COLORECTAL CA SCREEN DOC REV: CPT | Performed by: NURSE PRACTITIONER

## 2018-05-17 ENCOUNTER — TELEPHONE (OUTPATIENT)
Dept: FAMILY MEDICINE CLINIC | Age: 58
End: 2018-05-17

## 2018-05-22 ENCOUNTER — OFFICE VISIT (OUTPATIENT)
Dept: ENT CLINIC | Age: 58
End: 2018-05-22

## 2018-05-22 VITALS
RESPIRATION RATE: 12 BRPM | SYSTOLIC BLOOD PRESSURE: 134 MMHG | WEIGHT: 273 LBS | BODY MASS INDEX: 36.98 KG/M2 | HEART RATE: 64 BPM | HEIGHT: 72 IN | TEMPERATURE: 97.9 F | DIASTOLIC BLOOD PRESSURE: 76 MMHG

## 2018-05-22 DIAGNOSIS — J34.3 HYPERTROPHY OF INFERIOR NASAL TURBINATE: ICD-10-CM

## 2018-05-22 DIAGNOSIS — J34.2 NASAL SEPTAL DEVIATION: ICD-10-CM

## 2018-05-22 DIAGNOSIS — Z98.890 STATUS POST NASAL SEPTOPLASTY: ICD-10-CM

## 2018-05-22 DIAGNOSIS — J34.0 CELLULITIS OF MUCOUS MEMBRANE OF NOSE: Primary | ICD-10-CM

## 2018-05-22 PROCEDURE — 99024 POSTOP FOLLOW-UP VISIT: CPT | Performed by: OTOLARYNGOLOGY

## 2018-05-22 RX ORDER — LOSARTAN POTASSIUM 100 MG/1
100 TABLET ORAL DAILY
COMMUNITY
End: 2021-04-06

## 2018-05-22 RX ORDER — SPIRONOLACTONE 25 MG/1
25 TABLET ORAL DAILY
COMMUNITY
End: 2018-10-23 | Stop reason: DRUGHIGH

## 2018-05-22 RX ORDER — ASPIRIN 81 MG/1
81 TABLET, CHEWABLE ORAL DAILY
COMMUNITY
End: 2021-12-28

## 2018-05-22 RX ORDER — TIZANIDINE 4 MG/1
4 TABLET ORAL NIGHTLY PRN
COMMUNITY
End: 2018-09-10 | Stop reason: CLARIF

## 2018-05-22 RX ORDER — SULFAMETHOXAZOLE AND TRIMETHOPRIM 800; 160 MG/1; MG/1
1 TABLET ORAL 2 TIMES DAILY
Qty: 28 TABLET | Refills: 2 | Status: SHIPPED | OUTPATIENT
Start: 2018-05-22 | End: 2018-05-22

## 2018-05-22 RX ORDER — NAPROXEN 500 MG/1
500 TABLET ORAL PRN
COMMUNITY
End: 2018-07-31

## 2018-05-22 RX ORDER — NABUMETONE 750 MG/1
750 TABLET, FILM COATED ORAL PRN
COMMUNITY
End: 2018-07-31

## 2018-05-22 ASSESSMENT — ENCOUNTER SYMPTOMS
CHEST TIGHTNESS: 0
TROUBLE SWALLOWING: 0
SORE THROAT: 0
WHEEZING: 0
CHOKING: 0
SHORTNESS OF BREATH: 0
RHINORRHEA: 0
DIARRHEA: 0
FACIAL SWELLING: 0
STRIDOR: 0
COUGH: 0
ABDOMINAL PAIN: 0
SINUS PRESSURE: 0
NAUSEA: 0
COLOR CHANGE: 0
VOMITING: 0
VOICE CHANGE: 0
APNEA: 0

## 2018-05-24 ENCOUNTER — HOSPITAL ENCOUNTER (OUTPATIENT)
Dept: ULTRASOUND IMAGING | Age: 58
Discharge: HOME | End: 2018-05-24
Payer: COMMERCIAL

## 2018-05-24 DIAGNOSIS — N50.82 SCROTUM PAIN: ICD-10-CM

## 2018-05-24 PROCEDURE — 76870 US EXAM SCROTUM: CPT

## 2018-05-25 ENCOUNTER — TELEPHONE (OUTPATIENT)
Dept: ENT CLINIC | Age: 58
End: 2018-05-25

## 2018-05-25 ENCOUNTER — TELEPHONE (OUTPATIENT)
Dept: UROLOGY | Age: 58
End: 2018-05-25

## 2018-05-27 LAB
ORGANISM: ABNORMAL
THROAT/NOSE CULTURE: ABNORMAL

## 2018-05-29 ENCOUNTER — TELEPHONE (OUTPATIENT)
Dept: FAMILY MEDICINE CLINIC | Age: 58
End: 2018-05-29

## 2018-05-29 RX ORDER — AMOXICILLIN AND CLAVULANATE POTASSIUM 875; 125 MG/1; MG/1
1 TABLET, FILM COATED ORAL 2 TIMES DAILY
Qty: 20 TABLET | Refills: 0 | Status: SHIPPED | OUTPATIENT
Start: 2018-05-29 | End: 2018-06-05 | Stop reason: SDUPTHER

## 2018-06-05 ENCOUNTER — OFFICE VISIT (OUTPATIENT)
Dept: ENT CLINIC | Age: 58
End: 2018-06-05

## 2018-06-05 VITALS
WEIGHT: 273.3 LBS | HEIGHT: 72 IN | HEART RATE: 97 BPM | DIASTOLIC BLOOD PRESSURE: 60 MMHG | TEMPERATURE: 98.1 F | BODY MASS INDEX: 37.02 KG/M2 | RESPIRATION RATE: 16 BRPM | SYSTOLIC BLOOD PRESSURE: 124 MMHG

## 2018-06-05 DIAGNOSIS — G47.33 OSA (OBSTRUCTIVE SLEEP APNEA): ICD-10-CM

## 2018-06-05 DIAGNOSIS — Z98.890 STATUS POST NASAL SEPTOPLASTY: Primary | ICD-10-CM

## 2018-06-05 DIAGNOSIS — Z78.9 DIFFICULTY WITH CPAP USE: ICD-10-CM

## 2018-06-05 PROCEDURE — 99024 POSTOP FOLLOW-UP VISIT: CPT | Performed by: OTOLARYNGOLOGY

## 2018-06-05 RX ORDER — AMOXICILLIN AND CLAVULANATE POTASSIUM 875; 125 MG/1; MG/1
1 TABLET, FILM COATED ORAL 2 TIMES DAILY
Qty: 28 TABLET | Refills: 0 | Status: SHIPPED | OUTPATIENT
Start: 2018-06-05 | End: 2018-06-12 | Stop reason: ALTCHOICE

## 2018-06-05 ASSESSMENT — ENCOUNTER SYMPTOMS
COLOR CHANGE: 0
RHINORRHEA: 0
APNEA: 0
VOICE CHANGE: 0
FACIAL SWELLING: 0
DIARRHEA: 0
CHOKING: 0
COUGH: 0
CHEST TIGHTNESS: 0
TROUBLE SWALLOWING: 0
SORE THROAT: 0
WHEEZING: 0
ABDOMINAL PAIN: 0
SINUS PRESSURE: 0
VOMITING: 0
STRIDOR: 0
NAUSEA: 0
SHORTNESS OF BREATH: 0

## 2018-06-12 ENCOUNTER — TELEPHONE (OUTPATIENT)
Dept: PULMONOLOGY | Age: 58
End: 2018-06-12

## 2018-06-12 ENCOUNTER — OFFICE VISIT (OUTPATIENT)
Dept: PULMONOLOGY | Age: 58
End: 2018-06-12
Payer: COMMERCIAL

## 2018-06-12 VITALS
OXYGEN SATURATION: 93 % | DIASTOLIC BLOOD PRESSURE: 84 MMHG | SYSTOLIC BLOOD PRESSURE: 124 MMHG | HEART RATE: 70 BPM | WEIGHT: 273.6 LBS | HEIGHT: 72 IN | BODY MASS INDEX: 37.06 KG/M2

## 2018-06-12 DIAGNOSIS — G47.33 OSA ON CPAP: Primary | ICD-10-CM

## 2018-06-12 DIAGNOSIS — Z99.89 OSA ON CPAP: Primary | ICD-10-CM

## 2018-06-12 PROCEDURE — G8417 CALC BMI ABV UP PARAM F/U: HCPCS | Performed by: PHYSICIAN ASSISTANT

## 2018-06-12 PROCEDURE — 3017F COLORECTAL CA SCREEN DOC REV: CPT | Performed by: PHYSICIAN ASSISTANT

## 2018-06-12 PROCEDURE — G8427 DOCREV CUR MEDS BY ELIG CLIN: HCPCS | Performed by: PHYSICIAN ASSISTANT

## 2018-06-12 PROCEDURE — 1036F TOBACCO NON-USER: CPT | Performed by: PHYSICIAN ASSISTANT

## 2018-06-12 PROCEDURE — 99213 OFFICE O/P EST LOW 20 MIN: CPT | Performed by: PHYSICIAN ASSISTANT

## 2018-06-12 ASSESSMENT — ENCOUNTER SYMPTOMS
GASTROINTESTINAL NEGATIVE: 1
SORE THROAT: 0
EYES NEGATIVE: 1
SHORTNESS OF BREATH: 0
WHEEZING: 0
ORTHOPNEA: 0
SPUTUM PRODUCTION: 0
COUGH: 0
NAUSEA: 0
HEARTBURN: 0
RESPIRATORY NEGATIVE: 1
SINUS PAIN: 0

## 2018-06-25 ENCOUNTER — TELEPHONE (OUTPATIENT)
Dept: ENT CLINIC | Age: 58
End: 2018-06-25

## 2018-07-31 RX ORDER — NABUMETONE 750 MG/1
TABLET, FILM COATED ORAL
Qty: 60 TABLET | Refills: 3 | Status: SHIPPED | OUTPATIENT
Start: 2018-07-31 | End: 2019-05-20 | Stop reason: SDUPTHER

## 2018-08-25 ENCOUNTER — HOSPITAL ENCOUNTER (EMERGENCY)
Age: 58
Discharge: HOME | End: 2018-08-25
Attending: EMERGENCY MEDICINE
Payer: COMMERCIAL

## 2018-08-25 ENCOUNTER — TELEPHONE (OUTPATIENT)
Dept: UROLOGY | Age: 58
End: 2018-08-25

## 2018-08-25 ENCOUNTER — APPOINTMENT (OUTPATIENT)
Dept: ULTRASOUND IMAGING | Age: 58
End: 2018-08-25
Payer: COMMERCIAL

## 2018-08-25 VITALS
OXYGEN SATURATION: 96 % | BODY MASS INDEX: 36.57 KG/M2 | HEIGHT: 72 IN | TEMPERATURE: 48.4 F | DIASTOLIC BLOOD PRESSURE: 80 MMHG | HEART RATE: 70 BPM | SYSTOLIC BLOOD PRESSURE: 128 MMHG | WEIGHT: 270 LBS | RESPIRATION RATE: 18 BRPM

## 2018-08-25 DIAGNOSIS — I86.1 LEFT VARICOCELE: Primary | ICD-10-CM

## 2018-08-25 DIAGNOSIS — N43.3 HYDROCELE, UNSPECIFIED HYDROCELE TYPE: ICD-10-CM

## 2018-08-25 DIAGNOSIS — N50.82 SCROTAL PAIN: Primary | ICD-10-CM

## 2018-08-25 LAB
BILIRUBIN URINE: NEGATIVE
BLOOD, URINE: NEGATIVE
CHARACTER, URINE: CLEAR
COLOR: YELLOW
GLUCOSE URINE: NEGATIVE MG/DL
KETONES, URINE: NEGATIVE
LEUKOCYTE ESTERASE, URINE: NEGATIVE
NITRITE, URINE: NEGATIVE
PH UA: 5.5
PROTEIN UA: NEGATIVE
SPECIFIC GRAVITY, URINE: 1.02 (ref 1–1.03)
UROBILINOGEN, URINE: 0.2 EU/DL

## 2018-08-25 PROCEDURE — 81003 URINALYSIS AUTO W/O SCOPE: CPT

## 2018-08-25 PROCEDURE — 76870 US EXAM SCROTUM: CPT

## 2018-08-25 PROCEDURE — 99284 EMERGENCY DEPT VISIT MOD MDM: CPT

## 2018-08-25 RX ORDER — LEVOFLOXACIN 500 MG/1
500 TABLET, FILM COATED ORAL DAILY
Qty: 10 TABLET | Refills: 0 | Status: SHIPPED | OUTPATIENT
Start: 2018-08-25 | End: 2018-09-04

## 2018-08-25 ASSESSMENT — ENCOUNTER SYMPTOMS
COUGH: 0
ABDOMINAL PAIN: 0
VOMITING: 0
CONSTIPATION: 0
BLOOD IN STOOL: 0
CHEST TIGHTNESS: 0
SHORTNESS OF BREATH: 0
BACK PAIN: 0
NAUSEA: 0
DIARRHEA: 0
SORE THROAT: 0
RHINORRHEA: 0
WHEEZING: 0

## 2018-08-25 ASSESSMENT — PAIN SCALES - GENERAL: PAINLEVEL_OUTOF10: 5

## 2018-08-25 ASSESSMENT — PAIN DESCRIPTION - LOCATION: LOCATION: GROIN

## 2018-08-25 NOTE — ED TRIAGE NOTES
Patient presents with \"lump\" on scrotum that he noticed this morning. States it is sore and has progressively gotten bigger today.  States it was not there yesterday

## 2018-08-26 NOTE — ED PROVIDER NOTES
[facility]  eMERGENCY dEPARTMENT eNCOUnter          CHIEF COMPLAINT       Chief Complaint   Patient presents with    Groin Pain       Nurses Notes reviewed and I agree except as noted in the HPI. HISTORY OF PRESENT ILLNESS    Nicolas Chin is a 65 y.o. male who presents to Emergency Department with left groin pain. Patient reports a history of hypertension, hyperlipidemia, CAD, cardiac catheterization, and a right testicular cyst for which he has seen Dr. Myrna Jackson (Urology) in the past. He states that he awoke this morning with a lump in his left groin which he reports has been progressively growing throughout the day. He reports associated pain which he rates at a 5/10 in severity. Patient denies experiencing any dysuria, fever, chills, nausea, emesis, or hematuria. Patient denies further complaints at initial encounter. REVIEW OF SYSTEMS     Review of Systems   Constitutional: Negative for appetite change, chills, diaphoresis, fatigue and fever. HENT: Negative for congestion, rhinorrhea and sore throat. Respiratory: Negative for cough, chest tightness, shortness of breath and wheezing. Cardiovascular: Negative for chest pain, palpitations and leg swelling. Gastrointestinal: Negative for abdominal pain, blood in stool, constipation, diarrhea, nausea and vomiting. Genitourinary: Negative for decreased urine volume, difficulty urinating, dysuria, hematuria and urgency. Left groin lump   Musculoskeletal: Negative for back pain, joint swelling, neck pain and neck stiffness. Skin: Negative for pallor and rash. Neurological: Negative for dizziness, syncope, weakness, light-headedness, numbness and headaches. Hematological: Negative for adenopathy. Psychiatric/Behavioral: Negative for confusion and suicidal ideas. The patient is not nervous/anxious.            PAST MEDICAL HISTORY    has a past medical history of CAD (coronary artery disease); GERD (gastroesophageal reflux disease); Hyperlipidemia; Hypertension; and CELINA (obstructive sleep apnea). SURGICAL HISTORY      has a past surgical history that includes Cholecystectomy (); Colonoscopy (); Tonsillectomy; pr sigmoidoscopy,biopsy (Left, 3/1/2018); Cardiac catheterization (2/21/15); and pr repair of nasal septum (N/A, 2018). CURRENT MEDICATIONS       Discharge Medication List as of 2018 11:37 PM      CONTINUE these medications which have NOT CHANGED    Details   levofloxacin (LEVAQUIN) 500 MG tablet Take 1 tablet by mouth daily for 10 days, Disp-10 tablet, R-0Normal      nabumetone (RELAFEN) 750 MG tablet TAKE ONE TABLET BY MOUTH TWICE DAILY, Disp-60 tablet, R-3Please consider 90 day supplies to promote better adherenceNormal      CPAP Machine MISC Starting 2018, Disp-1 each, R-0, PrintPlease change CPAP pressure to auto 5-15 cm H20.      spironolactone (ALDACTONE) 25 MG tablet Take 25 mg by mouth dailyHistorical Med      losartan (COZAAR) 100 MG tablet Take 100 mg by mouth dailyHistorical Med      aspirin 81 MG chewable tablet Take 81 mg by mouth dailyHistorical Med      HYDROXYZINE HCL PO Take by mouth nightly as neededHistorical Med      tiZANidine (ZANAFLEX) 4 MG tablet Take 4 mg by mouth nightly as neededHistorical Med      metoprolol succinate (TOPROL XL) 25 MG extended release tablet TAKE ONE TABLET BY MOUTH ONCE DAILY, Disp-90 tablet, R-1Normal      rosuvastatin (CRESTOR) 10 MG tablet Take 1 tablet by mouth nightly, Disp-90 tablet, R-1Normal      dexlansoprazole (DEXILANT) 60 MG CPDR capsule Take 60 mg by mouth daily as needed Historical Med      Coenzyme Q10 (COQ10) 200 MG CAPS Take  by mouth 2 times daily. ALLERGIES     is allergic to tramadol; bactrim [sulfamethoxazole-trimethoprim]; and zocor [simvastatin]. FAMILY HISTORY     indicated that his mother is . He indicated that his father is .  He indicated that both of his sisters are atraumatic. Right Ear: External ear normal.   Left Ear: External ear normal.   Eyes: Conjunctivae, EOM and lids are normal. Right eye exhibits no exudate. Left eye exhibits no exudate. No scleral icterus. Neck: Normal range of motion. Neck supple. No JVD present. No tracheal deviation present. Cardiovascular: Normal rate, S1 normal, S2 normal, normal heart sounds and intact distal pulses. Exam reveals no gallop. No murmur heard. Pulmonary/Chest: Effort normal and breath sounds normal. No accessory muscle usage or stridor. No respiratory distress. He has no decreased breath sounds. He has no wheezes. He has no rhonchi. He has no rales. He exhibits no tenderness. Abdominal: Soft. Normal appearance and bowel sounds are normal. He exhibits no distension. There is no tenderness. There is no rigidity, no rebound and no guarding. Hernia confirmed negative in the left inguinal area. Genitourinary: Testes normal. Left testis shows no mass, no swelling and no tenderness. Genitourinary Comments: 1.5 cm in diameter cystic lesion noted behind left testicle on upper epididymus. Associated mild tenderness also noted   Musculoskeletal: Normal range of motion. Lymphadenopathy:        Left: No inguinal adenopathy present. Neurological: He is alert and oriented to person, place, and time. He displays no atrophy and no tremor. GCS eye subscore is 4. GCS verbal subscore is 5. GCS motor subscore is 6. Skin: Skin is warm and dry. No rash noted. Psychiatric: He has a normal mood and affect. His speech is normal and behavior is normal.   Nursing note and vitals reviewed.         DIFFERENTIAL DIAGNOSIS:   Epididymal cyst, epididymitis, testicular cancer    DIAGNOSTIC RESULTS     EKG: All EKG's are interpreted by the Emergency Department Physician who either signs or Co-signs this chart in the absence of a cardiologist.  None    RADIOLOGY: non-plain film images(s) such as CT, Ultrasound and MRI are read by the

## 2018-08-26 NOTE — ED NOTES
Updated on plan of care. Call light in reach. Resting comfortably.        Chanelle Gibson RN  08/25/18 [id number]

## 2018-08-26 NOTE — ED NOTES
Patient in ultrasound     [address], [address] [place] [address]  08/25/18 [id number]

## 2018-08-27 ENCOUNTER — TELEPHONE (OUTPATIENT)
Dept: UROLOGY | Age: 58
End: 2018-08-27

## 2018-08-27 NOTE — TELEPHONE ENCOUNTER
See telephone encounter with [facility] ER f/u made for 8/28/18.

## 2018-08-27 NOTE — TELEPHONE ENCOUNTER
Patient was calling to let Johnathan Goff know he had a US on Saturday due to he went to the [facility] ER for swelling in left testicle.

## 2018-08-28 ENCOUNTER — HOSPITAL ENCOUNTER (OUTPATIENT)
Dept: MRI IMAGING | Age: 58
Discharge: HOME | End: 2018-08-28
Payer: COMMERCIAL

## 2018-08-28 ENCOUNTER — OFFICE VISIT (OUTPATIENT)
Dept: UROLOGY | Age: 58
End: 2018-08-28
Payer: COMMERCIAL

## 2018-08-28 ENCOUNTER — OFFICE VISIT (OUTPATIENT)
Dept: ENT CLINIC | Age: 58
End: 2018-08-28
Payer: COMMERCIAL

## 2018-08-28 VITALS
SYSTOLIC BLOOD PRESSURE: 138 MMHG | BODY MASS INDEX: 36.57 KG/M2 | WEIGHT: 270 LBS | HEIGHT: 72 IN | DIASTOLIC BLOOD PRESSURE: 84 MMHG

## 2018-08-28 VITALS
TEMPERATURE: 98 F | DIASTOLIC BLOOD PRESSURE: 70 MMHG | WEIGHT: 278 LBS | HEART RATE: 80 BPM | RESPIRATION RATE: 12 BRPM | SYSTOLIC BLOOD PRESSURE: 148 MMHG | BODY MASS INDEX: 37.65 KG/M2 | HEIGHT: 72 IN

## 2018-08-28 DIAGNOSIS — F45.8 HYPERVENTILATION SYNDROME: ICD-10-CM

## 2018-08-28 DIAGNOSIS — G47.33 OSA (OBSTRUCTIVE SLEEP APNEA): ICD-10-CM

## 2018-08-28 DIAGNOSIS — S43.402A SPRAIN OF LEFT SHOULDER, UNSPECIFIED SHOULDER SPRAIN TYPE, INITIAL ENCOUNTER: ICD-10-CM

## 2018-08-28 DIAGNOSIS — M54.12 RADICULOPATHY, CERVICAL: ICD-10-CM

## 2018-08-28 DIAGNOSIS — T48.5X5A RHINITIS MEDICAMENTOSA: Primary | ICD-10-CM

## 2018-08-28 DIAGNOSIS — Z98.890 STATUS POST NASAL SEPTOPLASTY: ICD-10-CM

## 2018-08-28 DIAGNOSIS — N50.89 MASS OF LEFT TESTICLE: Primary | ICD-10-CM

## 2018-08-28 DIAGNOSIS — Z78.9 DIFFICULTY WITH CPAP USE: ICD-10-CM

## 2018-08-28 DIAGNOSIS — R09.81 NASAL CONGESTION: ICD-10-CM

## 2018-08-28 DIAGNOSIS — J31.0 RHINITIS MEDICAMENTOSA: Primary | ICD-10-CM

## 2018-08-28 LAB
BILIRUBIN URINE: NEGATIVE
BLOOD URINE, POC: NORMAL
CHARACTER, URINE: CLEAR
COLOR, URINE: YELLOW
GLUCOSE URINE: NEGATIVE MG/DL
KETONES, URINE: NEGATIVE
LEUKOCYTE CLUMPS, URINE: NEGATIVE
NITRITE, URINE: NEGATIVE
PH, URINE: 5.5
PROTEIN, URINE: NEGATIVE MG/DL
SPECIFIC GRAVITY, URINE: 1.02 (ref 1–1.03)
UROBILINOGEN, URINE: 0.2 EU/DL

## 2018-08-28 PROCEDURE — 99214 OFFICE O/P EST MOD 30 MIN: CPT | Performed by: NURSE PRACTITIONER

## 2018-08-28 PROCEDURE — G8427 DOCREV CUR MEDS BY ELIG CLIN: HCPCS | Performed by: NURSE PRACTITIONER

## 2018-08-28 PROCEDURE — 99213 OFFICE O/P EST LOW 20 MIN: CPT | Performed by: OTOLARYNGOLOGY

## 2018-08-28 PROCEDURE — 1036F TOBACCO NON-USER: CPT | Performed by: OTOLARYNGOLOGY

## 2018-08-28 PROCEDURE — 3017F COLORECTAL CA SCREEN DOC REV: CPT | Performed by: OTOLARYNGOLOGY

## 2018-08-28 PROCEDURE — G8417 CALC BMI ABV UP PARAM F/U: HCPCS | Performed by: NURSE PRACTITIONER

## 2018-08-28 PROCEDURE — 3017F COLORECTAL CA SCREEN DOC REV: CPT | Performed by: NURSE PRACTITIONER

## 2018-08-28 PROCEDURE — 81003 URINALYSIS AUTO W/O SCOPE: CPT | Performed by: NURSE PRACTITIONER

## 2018-08-28 PROCEDURE — G8427 DOCREV CUR MEDS BY ELIG CLIN: HCPCS | Performed by: OTOLARYNGOLOGY

## 2018-08-28 PROCEDURE — 1036F TOBACCO NON-USER: CPT | Performed by: NURSE PRACTITIONER

## 2018-08-28 PROCEDURE — G8417 CALC BMI ABV UP PARAM F/U: HCPCS | Performed by: OTOLARYNGOLOGY

## 2018-08-28 RX ORDER — IPRATROPIUM BROMIDE 42 UG/1
2 SPRAY, METERED NASAL 3 TIMES DAILY
Qty: 1 BOTTLE | Refills: 3 | Status: SHIPPED | OUTPATIENT
Start: 2018-08-28 | End: 2019-07-30

## 2018-08-28 RX ORDER — MOMETASONE FUROATE 50 UG/1
1 SPRAY, METERED NASAL DAILY
Qty: 1 INHALER | Refills: 1 | Status: SHIPPED | OUTPATIENT
Start: 2018-08-28 | End: 2018-08-28 | Stop reason: ALTCHOICE

## 2018-08-28 ASSESSMENT — ENCOUNTER SYMPTOMS
APNEA: 0
FACIAL SWELLING: 0
DIARRHEA: 0
TROUBLE SWALLOWING: 0
STRIDOR: 0
COLOR CHANGE: 0
CHOKING: 0
CHEST TIGHTNESS: 0
SORE THROAT: 0
VOMITING: 0
NAUSEA: 0
COUGH: 0
SHORTNESS OF BREATH: 0
VOICE CHANGE: 0
ABDOMINAL PAIN: 0
SINUS PRESSURE: 0
WHEEZING: 0
RHINORRHEA: 0

## 2018-08-28 NOTE — PROGRESS NOTES
[address], [facility]  [place] [address]  [address] [address]  Dept: [phone]  Dept Fax: [phone]  Loc: [phone]    Charlee Kimble is a 65 y.o. male who was referred by No ref. provider found for:  Chief Complaint   Patient presents with    Nose Problem     Patient is here having trouble breathing through nose, needs nasal culture    . HPI:     Charlee Kimble is a 65 y.o. male who presents today for trouble breathing through his nose. Would like to have a nasal culture done. He gets slimy drainage out once in a while. He feels like he is stuffy. He uses Afrin to keep his nose open. He has a CPAP and has not been able to use it all night for quite a while. He has nasal pillows for his CPAP. He wakes up and is not be able to breathe or catch his breathe. He was supposed to have a MRI and got claustrophobic. He states he has never had this happen before. He has also been having what he calls a [place] fog\". He gets a funny feeling and lightheaded. History:      Allergies   Allergen Reactions    Tramadol Other (See Comments)     Made me feel edgy    Bactrim [Sulfamethoxazole-Trimethoprim] Hives    Zocor [Simvastatin] Other (See Comments)     Body aches     Current Outpatient Prescriptions   Medication Sig Dispense Refill    ipratropium (ATROVENT) 0.06 % nasal spray 2 sprays by Nasal route 3 times daily 1 Bottle 3    mometasone (NASONEX) 50 MCG/ACT nasal spray 1 spray by Nasal route daily 1 Inhaler 1    levofloxacin (LEVAQUIN) 500 MG tablet Take 1 tablet by mouth daily for 10 days 10 tablet 0    nabumetone (RELAFEN) 750 MG tablet TAKE ONE TABLET BY MOUTH TWICE DAILY 60 tablet 3    CPAP Machine MISC by Does not apply route Please change CPAP pressure to auto 5-15 cm H20. 1 each 0    spironolactone (ALDACTONE) 25 MG tablet Take 25 mg by mouth daily      losartan (COZAAR) 100 MG tablet Take 100 mg by mouth daily  aspirin 81 MG chewable tablet Take 81 mg by mouth daily      tiZANidine (ZANAFLEX) 4 MG tablet Take 4 mg by mouth nightly as needed      metoprolol succinate (TOPROL XL) 25 MG extended release tablet TAKE ONE TABLET BY MOUTH ONCE DAILY 90 tablet 1    rosuvastatin (CRESTOR) 10 MG tablet Take 1 tablet by mouth nightly 90 tablet 1    Coenzyme Q10 (COQ10) 200 MG CAPS Take  by mouth 2 times daily. No current facility-administered medications for this visit.       Past Medical History:   Diagnosis Date    CAD (coronary artery disease) 2014    GERD (gastroesophageal reflux disease)     Hyperlipidemia 2010    Hypertension 2008    CELINA (obstructive sleep apnea) 2014    Dr. Bandar Carter      Past Surgical History:   Procedure Laterality Date    CARDIAC CATHETERIZATION  2/21/15    03/2018    CHOLECYSTECTOMY  2008    laparoscopic, Dysfunctional gallbladder    COLONOSCOPY  2013    normal    UT REPAIR OF NASAL SEPTUM N/A 4/30/2018    SEPTOPLASTY SUBMUCOUS RESECTION TURBINATES, PARTIAL RIGHT INFERIOR, NASAL ENDOSCOPY WITH LONNY BULLOSA performed by Stephanie Schuler MD at [address] [place] [address] Rd Left 3/1/2018    SIGMOIDOSCOPY BIOPSY FLEXIBLE performed by Mery Desouza MD at [facility]    TONSILLECTOMY       Family History   Problem Relation Age of Onset    Diabetes Mother 48    Heart Disease Mother 67        CAD    Heart Disease Father 50        CAD, CVA    Stroke Father 48        CVA    Diabetes Brother 72    High Blood Pressure Brother 72    Heart Disease Brother 72        CHF    Other Brother 46        Neuropathy    Obesity Brother 8    High Blood Pressure Brother 61    Obesity Brother 48    Other Brother 36        back pain    Obesity Brother 39    Other Sister 72        leukemia    Alzheimer's Disease Maternal Grandmother 21    Early Death Paternal Grandfather 36        MI    High Blood Pressure Paternal Grandfather 36    Heart Disease Sister 39    High Blood Pressure Sister 39    Depression Sister 39    Other Brother 48        [facility], [facility]     Social History   Substance Use Topics    Smoking status: Never Smoker    Smokeless tobacco: Never Used    Alcohol use No       Subjective:      Review of Systems   Constitutional: Negative for activity change, appetite change, chills, diaphoresis, fatigue, fever and unexpected weight change. HENT: Negative for congestion, dental problem, ear discharge, ear pain, facial swelling, hearing loss, mouth sores, nosebleeds, postnasal drip, rhinorrhea, sinus pressure, sneezing, sore throat, tinnitus, trouble swallowing and voice change. Eyes: Negative for visual disturbance. Respiratory: Negative for apnea, cough, choking, chest tightness, shortness of breath, wheezing and stridor. Cardiovascular: Negative for chest pain, palpitations and leg swelling. Gastrointestinal: Negative for abdominal pain, diarrhea, nausea and vomiting. Endocrine: Negative for cold intolerance, heat intolerance, polydipsia and polyuria. Genitourinary: Negative for dysuria, enuresis and hematuria. Musculoskeletal: Negative for arthralgias, gait problem, neck pain and neck stiffness. Skin: Negative for color change and rash. Allergic/Immunologic: Negative for environmental allergies, food allergies and immunocompromised state. Neurological: Negative for dizziness, syncope, facial asymmetry, speech difficulty, light-headedness and headaches. Hematological: Negative for adenopathy. Does not bruise/bleed easily. Psychiatric/Behavioral: Negative for confusion and sleep disturbance. The patient is not nervous/anxious. Objective:     BP (!) 148/70 (Site: Left Arm, Position: Sitting)   Pulse 80   Temp 98 °F (36.7 °C) (Oral)   Resp 12   Ht 6' (1.829 m)   Wt 278 lb (126.1 kg)   BMI 37.70 kg/m²     Physical Exam   Nose: nasal passages wide open, no drainage visible.   He also admitted that he used Afrin    Data:  All of the past medical history, past surgical history, family history, social history, allergies and current medications were reviewed with the patient. Assessment & Plan   Diagnoses and all orders for this visit:     Diagnosis Orders   1. Rhinitis medicamentosa  ipratropium (ATROVENT) 0.06 % nasal spray    mometasone (NASONEX) 50 MCG/ACT nasal spray   2. Nasal congestion  ipratropium (ATROVENT) 0.06 % nasal spray    mometasone (NASONEX) 50 MCG/ACT nasal spray   3. Hyperventilation syndrome     4. Status post nasal septoplasty  ipratropium (ATROVENT) 0.06 % nasal spray    mometasone (NASONEX) 50 MCG/ACT nasal spray   5. CELINA (obstructive sleep apnea)     6. Difficulty with CPAP use         The findings were explained and his questions were answered. He is instructed on how to stop using the Afrin. I will prescribe him Atrovent and Nasonex. 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 They should avoid topical decongestant sprays, Vicks, menthol cough drops and prolonged strong oral decongestants. The patient should use the topical steroid nasal spray for a few days, then try to stop the addicting decongestant medication. If needed to sleep, they may use Afrin as little as possible, on one side only, until the other side opens up. At that point, stop the Afrin altogether and continue the steroid spray, and keep the return visit. Sometimes Breathe-right strips may help. If they are having trouble or have questions they should call the office. Return in 1 month. Return in about 1 month (around 9/28/2018). Joseph Vázquez CMA ([facility]), am scribing for, and in the presence of Dr. Olya Flores.  Electronically signed by Bubba Ross on 8/28/18 at 1:42 PM.     (Please note that portions of this note were completed with a voice recognition program. Efforts were made to edit the dictations but occasionally words are mis-transcribed.)    I agree to the above

## 2018-08-29 ENCOUNTER — APPOINTMENT (OUTPATIENT)
Dept: CT IMAGING | Age: 58
End: 2018-08-29
Payer: COMMERCIAL

## 2018-08-29 ENCOUNTER — TELEPHONE (OUTPATIENT)
Dept: UROLOGY | Age: 58
End: 2018-08-29

## 2018-08-29 ENCOUNTER — HOSPITAL ENCOUNTER (EMERGENCY)
Age: 58
Discharge: HOME | End: 2018-08-29
Payer: COMMERCIAL

## 2018-08-29 VITALS
OXYGEN SATURATION: 95 % | HEART RATE: 77 BPM | TEMPERATURE: 98.3 F | DIASTOLIC BLOOD PRESSURE: 88 MMHG | RESPIRATION RATE: 17 BRPM | WEIGHT: 270 LBS | SYSTOLIC BLOOD PRESSURE: 136 MMHG | BODY MASS INDEX: 36.62 KG/M2

## 2018-08-29 DIAGNOSIS — I86.1 BILATERAL VARICOCELES: ICD-10-CM

## 2018-08-29 DIAGNOSIS — R10.84 GENERALIZED ABDOMINAL PAIN: Primary | ICD-10-CM

## 2018-08-29 LAB
ALBUMIN SERPL-MCNC: 4.1 G/DL (ref 3.5–5.1)
ALP BLD-CCNC: 114 U/L (ref 38–126)
ALT SERPL-CCNC: 26 U/L (ref 11–66)
ANION GAP SERPL CALCULATED.3IONS-SCNC: 13 MEQ/L (ref 8–16)
AST SERPL-CCNC: 23 U/L (ref 5–40)
BASOPHILS # BLD: 0.6 %
BASOPHILS ABSOLUTE: 0.1 THOU/MM3 (ref 0–0.1)
BILIRUB SERPL-MCNC: 0.5 MG/DL (ref 0.3–1.2)
BILIRUBIN URINE: NEGATIVE
BLOOD, URINE: NEGATIVE
BUN BLDV-MCNC: 11 MG/DL (ref 7–22)
C-REACTIVE PROTEIN: 5.29 MG/DL (ref 0–1)
CALCIUM SERPL-MCNC: 9.1 MG/DL (ref 8.5–10.5)
CHARACTER, URINE: CLEAR
CHLORIDE BLD-SCNC: 100 MEQ/L (ref 98–111)
CO2: 25 MEQ/L (ref 23–33)
COLOR: YELLOW
CREAT SERPL-MCNC: 0.9 MG/DL (ref 0.4–1.2)
EOSINOPHIL # BLD: 1.9 %
EOSINOPHILS ABSOLUTE: 0.2 THOU/MM3 (ref 0–0.4)
ERYTHROCYTE [DISTWIDTH] IN BLOOD BY AUTOMATED COUNT: 13.2 % (ref 11.5–14.5)
ERYTHROCYTE [DISTWIDTH] IN BLOOD BY AUTOMATED COUNT: 40.3 FL (ref 35–45)
GFR SERPL CREATININE-BSD FRML MDRD: 87 ML/MIN/1.73M2
GLUCOSE BLD-MCNC: 96 MG/DL (ref 70–108)
GLUCOSE URINE: NEGATIVE MG/DL
HCT VFR BLD CALC: 40.7 % (ref 42–52)
HEMOGLOBIN: 13.8 GM/DL (ref 14–18)
IMMATURE GRANS (ABS): 0.04 THOU/MM3 (ref 0–0.07)
IMMATURE GRANULOCYTES: 0.4 %
KETONES, URINE: NEGATIVE
LEUKOCYTE ESTERASE, URINE: NEGATIVE
LYMPHOCYTES # BLD: 16.2 %
LYMPHOCYTES ABSOLUTE: 1.8 THOU/MM3 (ref 1–4.8)
MCH RBC QN AUTO: 28.7 PG (ref 26–33)
MCHC RBC AUTO-ENTMCNC: 33.9 GM/DL (ref 32.2–35.5)
MCV RBC AUTO: 84.6 FL (ref 80–94)
MONOCYTES # BLD: 9.6 %
MONOCYTES ABSOLUTE: 1.1 THOU/MM3 (ref 0.4–1.3)
NITRITE, URINE: NEGATIVE
NUCLEATED RED BLOOD CELLS: 0 /100 WBC
OSMOLALITY CALCULATION: 274.9 MOSMOL/KG (ref 275–300)
PH UA: 5.5
PLATELET # BLD: 253 THOU/MM3 (ref 130–400)
PMV BLD AUTO: 8.1 FL (ref 9.4–12.4)
POTASSIUM SERPL-SCNC: 4.3 MEQ/L (ref 3.5–5.2)
PROTEIN UA: NEGATIVE
RBC # BLD: 4.81 MILL/MM3 (ref 4.7–6.1)
SEG NEUTROPHILS: 71.3 %
SEGMENTED NEUTROPHILS ABSOLUTE COUNT: 7.9 THOU/MM3 (ref 1.8–7.7)
SODIUM BLD-SCNC: 138 MEQ/L (ref 135–145)
SPECIFIC GRAVITY, URINE: 1.02 (ref 1–1.03)
TOTAL PROTEIN: 7.7 G/DL (ref 6.1–8)
UROBILINOGEN, URINE: 0.2 EU/DL
WBC # BLD: 11.1 THOU/MM3 (ref 4.8–10.8)

## 2018-08-29 PROCEDURE — 80053 COMPREHEN METABOLIC PANEL: CPT

## 2018-08-29 PROCEDURE — 96361 HYDRATE IV INFUSION ADD-ON: CPT

## 2018-08-29 PROCEDURE — 2580000003 HC RX 258: Performed by: PHYSICIAN ASSISTANT

## 2018-08-29 PROCEDURE — 81003 URINALYSIS AUTO W/O SCOPE: CPT

## 2018-08-29 PROCEDURE — 85025 COMPLETE CBC W/AUTO DIFF WBC: CPT

## 2018-08-29 PROCEDURE — 74177 CT ABD & PELVIS W/CONTRAST: CPT

## 2018-08-29 PROCEDURE — 36415 COLL VENOUS BLD VENIPUNCTURE: CPT

## 2018-08-29 PROCEDURE — 96374 THER/PROPH/DIAG INJ IV PUSH: CPT

## 2018-08-29 PROCEDURE — 99284 EMERGENCY DEPT VISIT MOD MDM: CPT

## 2018-08-29 PROCEDURE — 6360000004 HC RX CONTRAST MEDICATION: Performed by: PHYSICIAN ASSISTANT

## 2018-08-29 PROCEDURE — 83605 ASSAY OF LACTIC ACID: CPT

## 2018-08-29 PROCEDURE — 86140 C-REACTIVE PROTEIN: CPT

## 2018-08-29 PROCEDURE — 6360000002 HC RX W HCPCS: Performed by: PHYSICIAN ASSISTANT

## 2018-08-29 RX ORDER — 0.9 % SODIUM CHLORIDE 0.9 %
1000 INTRAVENOUS SOLUTION INTRAVENOUS ONCE
Status: COMPLETED | OUTPATIENT
Start: 2018-08-29 | End: 2018-08-29

## 2018-08-29 RX ORDER — KETOROLAC TROMETHAMINE 10 MG/1
10 TABLET, FILM COATED ORAL EVERY 6 HOURS PRN
Qty: 15 TABLET | Refills: 0 | Status: SHIPPED | OUTPATIENT
Start: 2018-08-29 | End: 2018-09-10 | Stop reason: CLARIF

## 2018-08-29 RX ORDER — KETOROLAC TROMETHAMINE 30 MG/ML
30 INJECTION, SOLUTION INTRAMUSCULAR; INTRAVENOUS ONCE
Status: COMPLETED | OUTPATIENT
Start: 2018-08-29 | End: 2018-08-29

## 2018-08-29 RX ADMIN — IOPAMIDOL 80 ML: 755 INJECTION, SOLUTION INTRAVENOUS at 21:17

## 2018-08-29 RX ADMIN — KETOROLAC TROMETHAMINE 30 MG: 30 INJECTION, SOLUTION INTRAMUSCULAR at 20:19

## 2018-08-29 RX ADMIN — SODIUM CHLORIDE 1000 ML: 9 INJECTION, SOLUTION INTRAVENOUS at 20:19

## 2018-08-29 ASSESSMENT — ENCOUNTER SYMPTOMS
EYE REDNESS: 0
NAUSEA: 0
ABDOMINAL PAIN: 0
VOMITING: 0
BACK PAIN: 0
DIARRHEA: 0
RHINORRHEA: 0
SHORTNESS OF BREATH: 0
SORE THROAT: 0
WHEEZING: 0
EYE DISCHARGE: 0
COUGH: 0

## 2018-08-29 ASSESSMENT — PAIN SCALES - GENERAL
PAINLEVEL_OUTOF10: 6
PAINLEVEL_OUTOF10: 6

## 2018-08-29 ASSESSMENT — PAIN DESCRIPTION - LOCATION
LOCATION: ABDOMEN;GROIN
LOCATION: GROIN;SCROTUM

## 2018-08-29 ASSESSMENT — PAIN DESCRIPTION - PAIN TYPE
TYPE: ACUTE PAIN
TYPE: ACUTE PAIN

## 2018-08-29 ASSESSMENT — PAIN DESCRIPTION - ORIENTATION: ORIENTATION: LEFT

## 2018-08-29 NOTE — ED PROVIDER NOTES
[facility]  eMERGENCY dEPARTMENT eNCOUnter          CHIEF COMPLAINT       Chief Complaint   Patient presents with    Abdominal Pain    Groin Swelling       Nurses Notes reviewed and I agree except as noted in the HPI. HISTORY OF PRESENT ILLNESS    Nicolas Butt is a 65 y.o. male who presents to the Emergency Department for the evaluation of left testicular pain radiating to left abdominal pain onset 8/25/18. Patient was seen within the department on 8/25/18 for similar complaints. During his visit, he had a normal testicle US and was advised to follow up with Urology. Patient was seen at Dr. Liv Godinez office yesterday where they diagnosed him with left varicocele. They ordered a CT scan and blood work which is planned for 8/31/18. Urology advised the patient to return to the ED if symptoms worsened. The patient reports the area has become larger and pain has worsened. He explains that when the pain is exacerbated it shoots into his left abdominal area. He reports a constant 6/10 pain. The patient reports dysuria and difficulty urinating. Denies fever or chills, nausea or emesis, numbness or tingling, shortness of breath, hematuria, or chest pain. Patient denies further complaints at time of initial encounter. REVIEW OF SYSTEMS     Review of Systems   Constitutional: Negative for appetite change, chills, fatigue and fever. HENT: Negative for congestion, ear pain, rhinorrhea and sore throat. Eyes: Negative for discharge, redness and visual disturbance. Respiratory: Negative for cough, shortness of breath and wheezing. Cardiovascular: Negative for chest pain, palpitations and leg swelling. Gastrointestinal: Negative for abdominal pain, diarrhea, nausea and vomiting. Genitourinary: Positive for difficulty urinating, dysuria and testicular pain. Negative for decreased urine volume and hematuria.    Musculoskeletal: Negative for arthralgias, back pain, joint swelling and neck Take 1 tablet by mouth nightly, Disp-90 tablet, R-1Normal      Coenzyme Q10 (COQ10) 200 MG CAPS Take  by mouth 2 times daily. ALLERGIES     is allergic to tramadol; bactrim [sulfamethoxazole-trimethoprim]; and zocor [simvastatin]. FAMILY HISTORY     indicated that his mother is . He indicated that his father is . He indicated that both of his sisters are alive. He indicated that two of his five brothers are alive. He indicated that his maternal grandmother is . He indicated that his maternal grandfather is . He indicated that his paternal grandmother is . He indicated that his paternal grandfather is . family history includes Alzheimer's Disease (age of onset: 21) in his maternal grandmother; Depression (age of onset: 39) in his sister; Diabetes (age of onset: 48) in his mother; Diabetes (age of onset: 72) in his brother; Early Death (age of onset: 36) in his paternal grandfather; Heart Disease (age of onset: 39) in his sister; Heart Disease (age of onset: 50) in his father; Heart Disease (age of onset: 72) in his brother; Heart Disease (age of onset: 67) in his mother; High Blood Pressure (age of onset: 36) in his paternal grandfather; High Blood Pressure (age of onset: 39) in his sister; High Blood Pressure (age of onset: 61) in his brother; High Blood Pressure (age of onset: 72) in his brother; Obesity (age of onset: 8) in his brother; Obesity (age of onset: 39) in his brother; Obesity (age of onset: 48) in his brother; Other (age of onset: 36) in his brother; Other (age of onset: 48) in his brother; Other (age of onset: 46) in his brother; Other (age of onset: 72) in his sister; Stroke (age of onset: 48) in his father. SOCIAL HISTORY      reports that he has never smoked. He has never used smokeless tobacco. He reports that he does not drink alcohol or use drugs. PHYSICAL EXAM     INITIAL VITALS:  weight is 270 lb (122.5 kg).  His oral temperature is 98.3 °F (36.8 °C). His blood pressure is 136/88 and his pulse is 77. His respiration is 17 and oxygen saturation is 95%. Physical Exam   Constitutional: He is oriented to person, place, and time. Vital signs are normal. He appears well-developed and well-nourished. Non-toxic appearance. No distress. HENT:   Head: Normocephalic and atraumatic. Right Ear: Hearing normal.   Left Ear: Hearing normal.   Nose: Nose normal. No rhinorrhea. Mouth/Throat: Uvula is midline, oropharynx is clear and moist and mucous membranes are normal.   Eyes: Pupils are equal, round, and reactive to light. Conjunctivae and EOM are normal. No scleral icterus. Neck: No muscular tenderness present. No neck rigidity. No tracheal deviation present. Cardiovascular: Normal rate, regular rhythm and normal heart sounds. No murmur heard. Pulmonary/Chest: Effort normal and breath sounds normal. No stridor. No respiratory distress. He has no decreased breath sounds. He has no wheezes. Abdominal: Soft. Normal appearance and bowel sounds are normal. He exhibits no distension and no pulsatile midline mass. There is no tenderness. There is no rigidity, no rebound, no guarding, no CVA tenderness, no tenderness at McBurney's point and negative Navarrete's sign. No hernia. Genitourinary: Testes normal and penis normal. Right testis shows no tenderness. Left testis shows no tenderness. Circumcised. Genitourinary Comments: Palpable mass of the superior and posterior to the left testicle. Tenderness to palpate the palpable mass. Negative tenderness to palpation inguinal canal.    Musculoskeletal: Normal range of motion. Lymphadenopathy:     He has no cervical adenopathy. Right: No inguinal adenopathy present. Left: No inguinal adenopathy present. Neurological: He is alert and oriented to person, place, and time. He has normal strength. No sensory deficit. Gait normal.   Skin: Skin is warm, dry and intact.  No rash noted. He is not diaphoretic. No pallor. Psychiatric: He has a normal mood and affect. His speech is normal and behavior is normal. Thought content normal.   Nursing note and vitals reviewed. DIFFERENTIAL DIAGNOSIS:   Including but not limited to Varicocele, torsion, cancer, epididymitis, kidney stone     DIAGNOSTIC RESULTS     EKG: All EKG's are interpreted by the Emergency Department Physician who either signs or Co-signs this chart in the absence of a cardiologist.    None    RADIOLOGY: non-plain film images(s) such as CT, Ultrasound and MRI are read by the radiologist.    CT ABDOMEN PELVIS W IV CONTRAST Additional Contrast? None   Final Result   1. Anorectal mucosal thickening of the colon. 2. Bilateral fat-containing inguinal hernias. **This report has been created using voice recognition software. It may contain minor errors which are inherent in voice recognition technology. **      Final report electronically signed by Dr. Vipul Albarran on 8/29/2018 9:30 PM          LABS:     Labs Reviewed   CBC WITH AUTO DIFFERENTIAL - Abnormal; Notable for the following:        Result Value    WBC 11.1 (*)     Hemoglobin 13.8 (*)     Hematocrit 40.7 (*)     MPV 8.1 (*)     Segs Absolute 7.9 (*)     All other components within normal limits   C-REACTIVE PROTEIN - Abnormal; Notable for the following:     CRP 5.29 (*)     All other components within normal limits   GLOMERULAR FILTRATION RATE, ESTIMATED - Abnormal; Notable for the following:     Est, Glom Filt Rate 87 (*)     All other components within normal limits   OSMOLALITY - Abnormal; Notable for the following:     Osmolality Calc 274.9 (*)     All other components within normal limits   URINE RT REFLEX TO CULTURE   COMPREHENSIVE METABOLIC PANEL   ANION GAP   LACTIC ACID, PLASMA       EMERGENCY DEPARTMENT COURSE:   Vitals:    Vitals:    08/29/18 1940 08/29/18 1942 08/29/18 2037   BP:  (!) 144/93 136/88   Pulse: 83  77   Resp: 18  17   Temp: 98.3 °F (36.8 °C)     TempSrc: Oral     SpO2: 96%  95%   Weight: 270 lb (122.5 kg)         7:59 PM: The patient was seen and evaluated. MDM:  The patient was seen within the ED today for the evaluation of left testicular pain. The patient arrived in no acute distress and in stable condition. Within the department, I observed the patient's vital signs to be within acceptable range. On exam, I appreciated Palpable mass of the superior and posterior aspect of the left testicle. Tenderness to palpate the palpable mass. Negative tenderness to palpation inguinal canal. Radiological studies within the department revealed Bilateral fat-containing inguinal hernias. Laboratory work was reassuring. Within the department, the patient was treated with toradol. His pain was resolved. I observed the patient's condition to improve during the duration of his stay. On re-exam the patient's abdomen is soft and non-tender, with no peritoneal signs. Vital signs have remained stable. The patient remains non-toxic appearing with no distress evident in the ER. I explained my proposed course of treatment to the patient, who was amenable to my decision, and I answered all questions that were asked. He was discharged home in stable condition with prescriptions for toradol, and the patient will return to the ED if his symptoms become more severe in nature or otherwise change. I advised the patient to follow-up with [facility]. I also discussed return to ED precautions with the patient who verbalized understanding. CRITICAL CARE:   None     CONSULTS:  Angy Cantu NP (urology)-agrees patient is appropriate to discharge and follow up with S[facility] Masterson as scheduled. PROCEDURES:  None    FINAL IMPRESSION      1. Generalized abdominal pain    2. Bilateral varicoceles          DISPOSITION/PLAN   Discharge    PATIENT REFERRED TO:  [facility]  [address] [place] [address].  [facility]    As scheduled sooner if

## 2018-08-29 NOTE — TELEPHONE ENCOUNTER
Patient called stating he wanted [facility] to know that he has a growth on his left testicle that is big and hard and he is going to the ER [facility]. He is scheduled for CT on Friday, but he did not want to wait that long. Says if you wish to talk to him please call HIM @[phone].

## 2018-08-29 NOTE — ED TRIAGE NOTES
PT arrives to the ED with a chief complaint of abdominal and testicle pain and groin swelling. The patient states he was recently seen in the ED for the same problem. The patient went to the urologist and they told him he had a swelling vein in his scrotum, and the patient was to go for a CT scan and look into further treatment. However the swelling has become worse and pain increased. Provider to see patient.

## 2018-08-30 ENCOUNTER — TELEPHONE (OUTPATIENT)
Dept: UROLOGY | Age: 58
End: 2018-08-30

## 2018-08-30 DIAGNOSIS — I86.1 VARICOCELE PRESENT ON ULTRASOUND OF SCROTUM: Primary | ICD-10-CM

## 2018-08-30 DIAGNOSIS — I82.90 THROMBOSIS: ICD-10-CM

## 2018-09-10 ENCOUNTER — OFFICE VISIT (OUTPATIENT)
Dept: FAMILY MEDICINE CLINIC | Age: 58
End: 2018-09-10
Payer: COMMERCIAL

## 2018-09-10 VITALS
TEMPERATURE: 98 F | HEIGHT: 72 IN | WEIGHT: 275 LBS | DIASTOLIC BLOOD PRESSURE: 98 MMHG | RESPIRATION RATE: 12 BRPM | SYSTOLIC BLOOD PRESSURE: 158 MMHG | BODY MASS INDEX: 37.25 KG/M2 | HEART RATE: 58 BPM

## 2018-09-10 DIAGNOSIS — I10 ESSENTIAL HYPERTENSION: ICD-10-CM

## 2018-09-10 DIAGNOSIS — Z91.89 AT RISK FOR ANXIETY: ICD-10-CM

## 2018-09-10 DIAGNOSIS — G47.9 SLEEP DISTURBANCE: ICD-10-CM

## 2018-09-10 DIAGNOSIS — F40.240 CLAUSTROPHOBIA: ICD-10-CM

## 2018-09-10 DIAGNOSIS — K14.8 TONGUE LUMP: Primary | ICD-10-CM

## 2018-09-10 PROCEDURE — 1036F TOBACCO NON-USER: CPT | Performed by: NURSE PRACTITIONER

## 2018-09-10 PROCEDURE — G8417 CALC BMI ABV UP PARAM F/U: HCPCS | Performed by: NURSE PRACTITIONER

## 2018-09-10 PROCEDURE — G8427 DOCREV CUR MEDS BY ELIG CLIN: HCPCS | Performed by: NURSE PRACTITIONER

## 2018-09-10 PROCEDURE — 3017F COLORECTAL CA SCREEN DOC REV: CPT | Performed by: NURSE PRACTITIONER

## 2018-09-10 PROCEDURE — 99214 OFFICE O/P EST MOD 30 MIN: CPT | Performed by: NURSE PRACTITIONER

## 2018-09-10 RX ORDER — ALPRAZOLAM 0.25 MG/1
TABLET ORAL
Qty: 2 TABLET | Refills: 0 | Status: SHIPPED | OUTPATIENT
Start: 2018-09-10 | End: 2018-10-23 | Stop reason: ALTCHOICE

## 2018-09-10 ASSESSMENT — PATIENT HEALTH QUESTIONNAIRE - PHQ9
SUM OF ALL RESPONSES TO PHQ9 QUESTIONS 1 & 2: 0
2. FEELING DOWN, DEPRESSED OR HOPELESS: 0
1. LITTLE INTEREST OR PLEASURE IN DOING THINGS: 0
SUM OF ALL RESPONSES TO PHQ QUESTIONS 1-9: 0
SUM OF ALL RESPONSES TO PHQ QUESTIONS 1-9: 0

## 2018-09-10 ASSESSMENT — ENCOUNTER SYMPTOMS
COUGH: 0
SORE THROAT: 0
CHEST TIGHTNESS: 0
ABDOMINAL PAIN: 0
CONSTIPATION: 0
DIARRHEA: 0
SINUS PAIN: 0
SHORTNESS OF BREATH: 0
VOMITING: 0
SINUS PRESSURE: 0
NAUSEA: 0

## 2018-09-10 NOTE — PROGRESS NOTES
to scheduled MRI. May take second tablet if 1 is ineffective to tolerate being in the MRI  MUST HAVE A  FOR THE TEST. Beckham Rotunda Dispense: 2 tablet; Refill: 0    3. At risk for anxiety    - ALPRAZolam (XANAX) 0.25 MG tablet; 0.25 mg tablets. Take 1 tablet 30-60 minutes prior to scheduled MRI. May take second tablet if 1 is ineffective to tolerate being in the MRI  MUST HAVE A  FOR THE TEST. Beckham Rotunda Dispense: 2 tablet; Refill: 0    4. Sleep disturbance      5. Essential hypertension  · Check you blood pressure at least 2 times daily. Check while sitting down, feet flat on the ground, and arm at the level of your heart  · Please let us know if you blood pressure readings are 140/90 or higher  · Avoid all salt in the diet  · Drink plenty of water - half of  Your body weight in ounces daily  · Exercise - Aim for 30 minutes daily of aerobic exercise. Can run, jog, walk, swim, weight lift - anything to get the heart rate elevated. Pt states he was in a hurry this morning, also nervous about this MRI getting done. He has not taken his blood pressure medicines yes this am.   Bring log back with you at your next visit. We will decide then if medication adjustment is needed. Return in about 2 weeks (around 9/24/2018) for already scheduled. An electronic signature was used to authenticate this note.     --CARLOS Ortiz - CNP on 9/10/2018 at 12:24 PM

## 2018-09-10 NOTE — PATIENT INSTRUCTIONS
You may receive a survey about your visit with us today. The feedback from our patients helps us identify what is working well and where the service to all patients can be enhanced. Thank you! · Check you blood pressure at least 2 times daily. Check while sitting down, feet flat on the ground, and arm at the level of your heart  · Please let us know if you blood pressure readings are 140/90 or higher  · Avoid all salt in the diet  · Drink plenty of water - half of  Your body weight in ounces daily  · Exercise - Aim for 30 minutes daily of aerobic exercise. Can run, jog, walk, swim, weight lift - anything to get the heart rate elevated. Bring your home BP monitor when you return to compare readings (if still using wrist cuff). Patient Education        DASH Diet: Care Instructions  Your Care Instructions    The DASH diet is an eating plan that can help lower your blood pressure. DASH stands for Dietary Approaches to Stop Hypertension. Hypertension is high blood pressure. The DASH diet focuses on eating foods that are high in calcium, potassium, and magnesium. These nutrients can lower blood pressure. The foods that are highest in these nutrients are fruits, vegetables, low-fat dairy products, nuts, seeds, and legumes. But taking calcium, potassium, and magnesium supplements instead of eating foods that are high in those nutrients does not have the same effect. The DASH diet also includes whole grains, fish, and poultry. The DASH diet is one of several lifestyle changes your doctor may recommend to lower your high blood pressure. Your doctor may also want you to decrease the amount of sodium in your diet. Lowering sodium while following the DASH diet can lower blood pressure even further than just the DASH diet alone. Follow-up care is a key part of your treatment and safety. Be sure to make and go to all appointments, and call your doctor if you are having problems.  It's also a good idea to know your test results and keep a list of the medicines you take. How can you care for yourself at home? Following the DASH diet  · Eat 4 to 5 servings of fruit each day. A serving is 1 medium-sized piece of fruit, ½ cup chopped or canned fruit, 1/4 cup dried fruit, or 4 ounces (½ cup) of fruit juice. Choose fruit more often than fruit juice. · Eat 4 to 5 servings of vegetables each day. A serving is 1 cup of lettuce or raw leafy vegetables, ½ cup of chopped or cooked vegetables, or 4 ounces (½ cup) of vegetable juice. Choose vegetables more often than vegetable juice. · Get 2 to 3 servings of low-fat and fat-free dairy each day. A serving is 8 ounces of milk, 1 cup of yogurt, or 1 ½ ounces of cheese. · Eat 6 to 8 servings of grains each day. A serving is 1 slice of bread, 1 ounce of dry cereal, or ½ cup of cooked rice, pasta, or cooked cereal. Try to choose whole-grain products as much as possible. · Limit lean meat, poultry, and fish to 2 servings each day. A serving is 3 ounces, about the size of a deck of cards. · Eat 4 to 5 servings of nuts, seeds, and legumes (cooked dried beans, lentils, and split peas) each week. A serving is 1/3 cup of nuts, 2 tablespoons of seeds, or ½ cup of cooked beans or peas. · Limit fats and oils to 2 to 3 servings each day. A serving is 1 teaspoon of vegetable oil or 2 tablespoons of salad dressing. · Limit sweets and added sugars to 5 servings or less a week. A serving is 1 tablespoon jelly or jam, ½ cup sorbet, or 1 cup of lemonade. · Eat less than 2,300 milligrams (mg) of sodium a day. If you limit your sodium to 1,500 mg a day, you can lower your blood pressure even more. Tips for success  · Start small. Do not try to make dramatic changes to your diet all at once. You might feel that you are missing out on your favorite foods and then be more likely to not follow the plan. Make small changes, and stick with them. Once those changes become habit, add a few more changes.   · Try relaxation exercises for 10 to 20 minutes a day. You can play soothing, relaxing music while you do them, if you wish. · Tell others in your house that you are going to do your relaxation exercises. Ask them not to disturb you. · Find a comfortable place, away from all distractions and noise. · Lie down on your back, or sit with your back straight. · Focus on your breathing. Make it slow and steady. · Breathe in through your nose. Breathe out through either your nose or mouth. · Breathe deeply, filling up the area between your navel and your rib cage. Breathe so that your belly goes up and down. · Do not hold your breath. · Breathe like this for 5 to 10 minutes. Notice the feeling of calmness throughout your whole body. As you continue to breathe slowly and deeply, relax by doing the following for another 5 to 10 minutes:  · Tighten and relax each muscle group in your body. You can begin at your toes and work your way up to your head. · Imagine your muscle groups relaxing and becoming heavy. · Empty your mind of all thoughts. · Let yourself relax more and more deeply. · Become aware of the state of calmness that surrounds you. · When your relaxation time is over, you can bring yourself back to alertness by moving your fingers and toes and then your hands and feet and then stretching and moving your entire body. Sometimes people fall asleep during relaxation, but they usually wake up shortly afterward. · Always give yourself time to return to full alertness before you drive a car or do anything that might cause an accident if you are not fully alert. Never play a relaxation tape while driving a car. When should you call for help? Call 911 anytime you think you may need emergency care.  For example, call if:    · You feel you cannot stop from hurting yourself or someone else.    Watch closely for changes in your health, and be sure to contact your doctor if:    · Your panic attacks get worse.     · You have new or different anxiety.     · You are not getting better as expected. Where can you learn more? Go to https://chpepiceweb.Bit9. org and sign in to your [employer] account. Enter H601 in the [facility] box to learn more about \"Panic Attacks: Care Instructions. \"     If you do not have an account, please click on the \"Sign Up Now\" link. Current as of: December 7, 2017  Content Version: 11.7  © 2631-1833 [employer]. Care instructions adapted under license by [facility] ([facility]). If you have questions about a medical condition or this instruction, always ask your healthcare professional. [address] any warranty or liability for your use of this information. Patient Education        High Blood Pressure: Care Instructions  Your Care Instructions    If your blood pressure is usually above 130/80, you have high blood pressure, or hypertension. That means the top number is 130 or higher or the bottom number is 80 or higher, or both. Despite what a lot of people think, high blood pressure usually doesn't cause headaches or make you feel dizzy or lightheaded. It usually has no symptoms. But it does increase your risk for heart attack, stroke, and kidney or eye damage. The higher your blood pressure, the more your risk increases. Your doctor will give you a goal for your blood pressure. Your goal will be based on your health and your age. Lifestyle changes, such as eating healthy and being active, are always important to help lower blood pressure. You might also take medicine to reach your blood pressure goal.  Follow-up care is a key part of your treatment and safety. Be sure to make and go to all appointments, and call your doctor if you are having problems. It's also a good idea to know your test results and keep a list of the medicines you take. How can you care for yourself at home?   Medical treatment  · If you stop taking your medicine, your blood pressure will go back up. You may take one or more types of medicine to lower your blood pressure. Be safe with medicines. Take your medicine exactly as prescribed. Call your doctor if you think you are having a problem with your medicine. · Talk to your doctor before you start taking aspirin every day. Aspirin can help certain people lower their risk of a heart attack or stroke. But taking aspirin isn't right for everyone, because it can cause serious bleeding. · See your doctor regularly. You may need to see the doctor more often at first or until your blood pressure comes down. · If you are taking blood pressure medicine, talk to your doctor before you take decongestants or anti-inflammatory medicine, such as ibuprofen. Some of these medicines can raise blood pressure. · Learn how to check your blood pressure at home. Lifestyle changes  · Stay at a healthy weight. This is especially important if you put on weight around the waist. Losing even 10 pounds can help you lower your blood pressure. · If your doctor recommends it, get more exercise. Walking is a good choice. Bit by bit, increase the amount you walk every day. Try for at least 30 minutes on most days of the week. You also may want to swim, bike, or do other activities. · Avoid or limit alcohol. Talk to your doctor about whether you can drink any alcohol. · Try to limit how much sodium you eat to less than 2,300 milligrams (mg) a day. Your doctor may ask you to try to eat less than 1,500 mg a day. · Eat plenty of fruits (such as bananas and oranges), vegetables, legumes, whole grains, and low-fat dairy products. · Lower the amount of saturated fat in your diet. Saturated fat is found in animal products such as milk, cheese, and meat. Limiting these foods may help you lose weight and also lower your risk for heart disease. · Do not smoke. Smoking increases your risk for heart attack and stroke.  If you need help quitting, talk to your doctor about

## 2018-09-21 ENCOUNTER — OFFICE VISIT (OUTPATIENT)
Dept: ENT CLINIC | Age: 58
End: 2018-09-21
Payer: COMMERCIAL

## 2018-09-21 VITALS
BODY MASS INDEX: 37.72 KG/M2 | SYSTOLIC BLOOD PRESSURE: 130 MMHG | TEMPERATURE: 98.2 F | DIASTOLIC BLOOD PRESSURE: 76 MMHG | RESPIRATION RATE: 16 BRPM | HEART RATE: 68 BPM | WEIGHT: 278.1 LBS

## 2018-09-21 DIAGNOSIS — K14.8 TONGUE MASS: Primary | ICD-10-CM

## 2018-09-21 DIAGNOSIS — J31.0 RHINITIS MEDICAMENTOSA: ICD-10-CM

## 2018-09-21 DIAGNOSIS — T48.5X5A RHINITIS MEDICAMENTOSA: ICD-10-CM

## 2018-09-21 DIAGNOSIS — J32.2 CHRONIC ETHMOIDAL SINUSITIS: ICD-10-CM

## 2018-09-21 DIAGNOSIS — T46.5X5A ADVERSE EFFECT OF ANGIOTENSIN 2 RECEPTOR ANTAGONIST, INITIAL ENCOUNTER: ICD-10-CM

## 2018-09-21 DIAGNOSIS — G47.33 OBSTRUCTIVE SLEEP APNEA SYNDROME: ICD-10-CM

## 2018-09-21 DIAGNOSIS — R09.89 THROAT CLEARING: ICD-10-CM

## 2018-09-21 DIAGNOSIS — R05.9 COUGH: ICD-10-CM

## 2018-09-21 PROCEDURE — 1036F TOBACCO NON-USER: CPT | Performed by: OTOLARYNGOLOGY

## 2018-09-21 PROCEDURE — 31231 NASAL ENDOSCOPY DX: CPT | Performed by: OTOLARYNGOLOGY

## 2018-09-21 PROCEDURE — 3017F COLORECTAL CA SCREEN DOC REV: CPT | Performed by: OTOLARYNGOLOGY

## 2018-09-21 PROCEDURE — G8417 CALC BMI ABV UP PARAM F/U: HCPCS | Performed by: OTOLARYNGOLOGY

## 2018-09-21 PROCEDURE — 99213 OFFICE O/P EST LOW 20 MIN: CPT | Performed by: OTOLARYNGOLOGY

## 2018-09-21 PROCEDURE — 31575 DIAGNOSTIC LARYNGOSCOPY: CPT | Performed by: OTOLARYNGOLOGY

## 2018-09-21 PROCEDURE — G8427 DOCREV CUR MEDS BY ELIG CLIN: HCPCS | Performed by: OTOLARYNGOLOGY

## 2018-09-21 RX ORDER — OLOPATADINE HYDROCHLORIDE 665 UG/1
2 SPRAY NASAL 2 TIMES DAILY
Qty: 1 BOTTLE | Refills: 5 | Status: SHIPPED | OUTPATIENT
Start: 2018-09-21 | End: 2019-07-30

## 2018-09-21 ASSESSMENT — ENCOUNTER SYMPTOMS
NAUSEA: 0
SINUS PRESSURE: 0
APNEA: 0
ABDOMINAL PAIN: 0
VOICE CHANGE: 0
SORE THROAT: 0
COUGH: 0
SHORTNESS OF BREATH: 0
FACIAL SWELLING: 0
WHEEZING: 0
STRIDOR: 0
COLOR CHANGE: 0
CHEST TIGHTNESS: 0
VOMITING: 0
CHOKING: 0
DIARRHEA: 0
TROUBLE SWALLOWING: 0
RHINORRHEA: 0

## 2018-09-21 NOTE — PROGRESS NOTES
CT Facial Bones WO Contrast       The findings were explained and his questions were answered. Options were discussed including Checking with PCP regarding avoiding ACE/ARB BP meds. Call if worse. Cough instead of clearing throat. We discussed keeping his CPAP equipment extremely clean. 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 They should avoid topical decongestant sprays, Vicks, menthol cough drops and prolonged strong oral decongestants. The patient should use the topical steroid nasal spray for a few days, then try to stop the addicting decongestant medication. If needed to sleep, they may use Afrin as little as possible, on one side only, until the other side opens up. At that point, stop the Afrin altogether and continue the steroid spray, and keep the return visit. Sometimes Breathe-right strips may help. I took a nasal culture, ordered Atrovent and a CT of his sinuses  in 4 weeks. We will add culture-directed antibiotics. He may use the Atrovent nasal spray in the evening. Do not use Olive leaf nasal spray, Afrin or any other OTC nasal sprays. He can use the mometasone and Atrovent 30 minutes apart from each other in no particular order. Also continue rinsing. If they are having trouble or have questions they should call the office. He agreed. 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 This will  need to be biopsied if it persists. ICourtney CMA (MAKIMA), am scribing for, and in the presence of Dr. Pravin Gomez. Electronically signed by Charlotte Cruz CMA ([facility]) on 9/21/18 at 8:29 AM.     (Please note that portions of this note were completed with a voice recognition program. Efforts were made to edit the dictations but occasionally words are mis-transcribed.)     I agree to the above documentation placed by my scribe. I have personally evaluated this patient.  Additional findings

## 2018-09-23 LAB — AEROBIC CULTURE: NORMAL

## 2018-09-24 ENCOUNTER — TELEPHONE (OUTPATIENT)
Dept: ENT CLINIC | Age: 58
End: 2018-09-24

## 2018-09-24 ENCOUNTER — OFFICE VISIT (OUTPATIENT)
Dept: PULMONOLOGY | Age: 58
End: 2018-09-24
Payer: COMMERCIAL

## 2018-09-24 ENCOUNTER — OFFICE VISIT (OUTPATIENT)
Dept: FAMILY MEDICINE CLINIC | Age: 58
End: 2018-09-24
Payer: COMMERCIAL

## 2018-09-24 VITALS
HEIGHT: 72 IN | WEIGHT: 275 LBS | SYSTOLIC BLOOD PRESSURE: 138 MMHG | BODY MASS INDEX: 37.25 KG/M2 | DIASTOLIC BLOOD PRESSURE: 88 MMHG | HEART RATE: 64 BPM | OXYGEN SATURATION: 97 %

## 2018-09-24 VITALS
TEMPERATURE: 97.8 F | WEIGHT: 274 LBS | RESPIRATION RATE: 14 BRPM | DIASTOLIC BLOOD PRESSURE: 91 MMHG | HEIGHT: 72 IN | SYSTOLIC BLOOD PRESSURE: 168 MMHG | BODY MASS INDEX: 37.11 KG/M2 | HEART RATE: 51 BPM

## 2018-09-24 DIAGNOSIS — G47.33 OSA ON CPAP: Primary | ICD-10-CM

## 2018-09-24 DIAGNOSIS — Z13.1 DIABETES MELLITUS SCREENING: ICD-10-CM

## 2018-09-24 DIAGNOSIS — L29.0 PRURITUS ANI: ICD-10-CM

## 2018-09-24 DIAGNOSIS — Z99.89 OSA ON CPAP: Primary | ICD-10-CM

## 2018-09-24 DIAGNOSIS — Z00.00 WELL ADULT EXAM: Primary | ICD-10-CM

## 2018-09-24 PROCEDURE — 99213 OFFICE O/P EST LOW 20 MIN: CPT | Performed by: PHYSICIAN ASSISTANT

## 2018-09-24 PROCEDURE — 90688 IIV4 VACCINE SPLT 0.5 ML IM: CPT | Performed by: FAMILY MEDICINE

## 2018-09-24 PROCEDURE — G8417 CALC BMI ABV UP PARAM F/U: HCPCS | Performed by: PHYSICIAN ASSISTANT

## 2018-09-24 PROCEDURE — 90471 IMMUNIZATION ADMIN: CPT | Performed by: FAMILY MEDICINE

## 2018-09-24 PROCEDURE — 99386 PREV VISIT NEW AGE 40-64: CPT | Performed by: FAMILY MEDICINE

## 2018-09-24 PROCEDURE — 3017F COLORECTAL CA SCREEN DOC REV: CPT | Performed by: PHYSICIAN ASSISTANT

## 2018-09-24 PROCEDURE — G8427 DOCREV CUR MEDS BY ELIG CLIN: HCPCS | Performed by: PHYSICIAN ASSISTANT

## 2018-09-24 PROCEDURE — 1036F TOBACCO NON-USER: CPT | Performed by: PHYSICIAN ASSISTANT

## 2018-09-24 ASSESSMENT — ENCOUNTER SYMPTOMS
SHORTNESS OF BREATH: 1
RESPIRATORY NEGATIVE: 1
HEARTBURN: 0
WHEEZING: 0
ORTHOPNEA: 0
SPUTUM PRODUCTION: 0
NAUSEA: 0
EYES NEGATIVE: 1
APNEA: 1
TROUBLE SWALLOWING: 1
ANAL BLEEDING: 1
GASTROINTESTINAL NEGATIVE: 1
COUGH: 0
SINUS PAIN: 1
EYE REDNESS: 1
SINUS PRESSURE: 1
SORE THROAT: 0
WHEEZING: 1
EYE ITCHING: 1
CHOKING: 1
RECTAL PAIN: 1
SHORTNESS OF BREATH: 0

## 2018-09-24 NOTE — PROGRESS NOTES
smoked. He has never used smokeless tobacco. He reports that he does not drink alcohol or use drugs. .   He has had his screening colonoscopy. He is sexually active. He has had the same sexual partner in the last 39 years. He does perform regular testicular self exams. Recent (8/29/2018) CT abdomen as follows:     1. Anorectal mucosal thickening of the colon.           2. Bilateral fat-containing inguinal hernias.         PROVIDER NOTES     HPI:  Beth Nam is a 65y.o. year old male, who comes today for an annual wellness visit.       Past Medical History:   Diagnosis Date    CAD (coronary artery disease) 2014    GERD (gastroesophageal reflux disease)     Hyperlipidemia 2010    Hypertension 2008    CELINA (obstructive sleep apnea) 2014    Dr. Mera Tyler       Past Surgical History:   Procedure Laterality Date    CARDIAC CATHETERIZATION  2/21/15    03/2018    CHOLECYSTECTOMY  2008    laparoscopic, Dysfunctional gallbladder    COLONOSCOPY  2013    normal    VA REPAIR OF NASAL SEPTUM N/A 4/30/2018    SEPTOPLASTY SUBMUCOUS RESECTION TURBINATES, PARTIAL RIGHT INFERIOR, NASAL ENDOSCOPY WITH LONNY BULLOSA performed by Alana Martinez MD at [address] [place] [address] Left 3/1/2018    SIGMOIDOSCOPY BIOPSY FLEXIBLE performed by Leslie dOell MD at [facility]    TONSILLECTOMY         Family History   Problem Relation Age of Onset    Diabetes Mother 48    Heart Disease Mother 67        CAD    Heart Disease Father 50        CAD, CVA    Stroke Father 48        CVA    Diabetes Brother 72    High Blood Pressure Brother 72    Heart Disease Brother 72        CHF    Other Brother 46        Neuropathy    Obesity Brother 8    High Blood Pressure Brother 61    Obesity Brother 48    Other Brother 36        back pain    Obesity Brother 39    Other Sister 72        leukemia    Alzheimer's Disease Maternal Grandmother 21    Early Death Paternal Grandfather 36        MI    High Blood gallops  Abdomen - soft, nontender, nondistended, no masses or organomegaly   Male - no penile lesions or discharge, no testicular masses or tenderness, no hernias  Rectal - normal sphincter tone, no mass, lesions or tenderness. Normal prostate exam  Neurological - alert, oriented, normal speech, no focal findings or movement disorder noted  Extremities - peripheral pulses normal, no pedal edema, no clubbing or cyanosis  Skin - normal coloration and turgor, no rashes, no suspicious skin lesions noted    Assessment:   Diagnosis Orders   1. Well adult exam     2. Pruritus ani  Vitamin D 25 Hydroxy    TSH with Reflex    HIV Screen    Vitamin A    Iron   3. Diabetes mellitus screening  Glucose, Fasting       Plan:    Return in about 3 months (around 12/24/2018). .  Check BP BID and bring results back in 1 month.    Counseling was provided today regarding the following topics:  Healthy eating habits, exercise and lifestyle, vitamin/mineral supplementation,  and testicular self exam.      Judd Mahan MD

## 2018-09-24 NOTE — PATIENT INSTRUCTIONS
testicle.     · You notice a lump in a testicle. Where can you learn more? Go to [email or web address] and sign in to your [employer] account. Enter S253 in the [facility] box to learn more about \"Testicular Self-Exam: Care Instructions. \"     If you do not have an account, please click on the \"Sign Up Now\" link. Current as of: December 3, 2017  Content Version: 11.7  © 5435-7846 [employer]. Care instructions adapted under license by [employer] [address] If you have questions about a medical condition or this instruction, always ask your healthcare professional. [address] any warranty or liability for your use of this information.

## 2018-09-25 NOTE — TELEPHONE ENCOUNTER
Did not see lots of secretions in his throat 3 days ago. Nasal culture was no growth. Last organisms that did grow were all sensitive to Cefdinir. These were strep B, Proteus mirabilis, and MSSA. I suggest at least 4 weeks of that antibiotic. It can take some time before the sinus secretions benjy. He should irrigate at least twice a day. He can double up on the packets and get a better detergent action, clearing the thicker secretions. A common cause of secretions in the throat when lying down is uncontrolled GERD. I suggest a strict GERD regimen and take omeprazole with supper. Significant elevation of the upper half of his body could help with the secretion issue and his CPAP. His Cozaar can cause most of his throat symptoms on its own and he needs to be off  that for a couple of months. They can take that long for the symptoms to go away. He should talk to his primary care physician about changing to a different class of blood pressure medication. He should follow recommendations from his visit 3 days ago. Atrovent usually helps dry up  excessive secretions. Please let me know if he wants the prescription for the cefdinir.

## 2018-10-03 ENCOUNTER — HOSPITAL ENCOUNTER (OUTPATIENT)
Age: 58
Discharge: HOME | End: 2018-10-03
Payer: COMMERCIAL

## 2018-10-03 ENCOUNTER — TELEPHONE (OUTPATIENT)
Dept: FAMILY MEDICINE CLINIC | Age: 58
End: 2018-10-03

## 2018-10-03 DIAGNOSIS — L29.0 PRURITUS ANI: ICD-10-CM

## 2018-10-03 DIAGNOSIS — Z13.1 DIABETES MELLITUS SCREENING: ICD-10-CM

## 2018-10-03 LAB
ALBUMIN SERPL-MCNC: 4 G/DL (ref 3.5–5.1)
ALP BLD-CCNC: 119 U/L (ref 38–126)
ALT SERPL-CCNC: 24 U/L (ref 11–66)
ANION GAP SERPL CALCULATED.3IONS-SCNC: 12 MEQ/L (ref 8–16)
AST SERPL-CCNC: 21 U/L (ref 5–40)
BILIRUB SERPL-MCNC: 0.5 MG/DL (ref 0.3–1.2)
BILIRUBIN DIRECT: < 0.2 MG/DL (ref 0–0.3)
BUN BLDV-MCNC: 8 MG/DL (ref 7–22)
CALCIUM SERPL-MCNC: 9.1 MG/DL (ref 8.5–10.5)
CHLORIDE BLD-SCNC: 104 MEQ/L (ref 98–111)
CHOLESTEROL, TOTAL: 146 MG/DL (ref 100–199)
CO2: 23 MEQ/L (ref 23–33)
CREAT SERPL-MCNC: 0.7 MG/DL (ref 0.4–1.2)
GFR SERPL CREATININE-BSD FRML MDRD: > 90 ML/MIN/1.73M2
GLUCOSE BLD-MCNC: 96 MG/DL (ref 70–108)
GLUCOSE FASTING: 95 MG/DL (ref 70–108)
HDLC SERPL-MCNC: 42 MG/DL
IRON: 57 UG/DL (ref 65–195)
LDL CHOLESTEROL CALCULATED: 87 MG/DL
POTASSIUM SERPL-SCNC: 4.3 MEQ/L (ref 3.5–5.2)
SODIUM BLD-SCNC: 139 MEQ/L (ref 135–145)
TOTAL PROTEIN: 7.7 G/DL (ref 6.1–8)
TRIGL SERPL-MCNC: 83 MG/DL (ref 0–199)
TSH SERPL DL<=0.05 MIU/L-ACNC: 1.69 UIU/ML (ref 0.4–4.2)
VITAMIN D 25-HYDROXY: 30 NG/ML (ref 30–100)

## 2018-10-03 PROCEDURE — 87389 HIV-1 AG W/HIV-1&-2 AB AG IA: CPT

## 2018-10-03 PROCEDURE — 82947 ASSAY GLUCOSE BLOOD QUANT: CPT

## 2018-10-03 PROCEDURE — 82306 VITAMIN D 25 HYDROXY: CPT

## 2018-10-03 PROCEDURE — 80053 COMPREHEN METABOLIC PANEL: CPT

## 2018-10-03 PROCEDURE — 86147 CARDIOLIPIN ANTIBODY EA IG: CPT

## 2018-10-03 PROCEDURE — 83540 ASSAY OF IRON: CPT

## 2018-10-03 PROCEDURE — 84443 ASSAY THYROID STIM HORMONE: CPT

## 2018-10-03 PROCEDURE — 85730 THROMBOPLASTIN TIME PARTIAL: CPT

## 2018-10-03 PROCEDURE — 82248 BILIRUBIN DIRECT: CPT

## 2018-10-03 PROCEDURE — 84590 ASSAY OF VITAMIN A: CPT

## 2018-10-03 PROCEDURE — 86146 BETA-2 GLYCOPROTEIN ANTIBODY: CPT

## 2018-10-03 PROCEDURE — 85303 CLOT INHIBIT PROT C ACTIVITY: CPT

## 2018-10-03 PROCEDURE — 81240 F2 GENE: CPT

## 2018-10-03 PROCEDURE — 85306 CLOT INHIBIT PROT S FREE: CPT

## 2018-10-03 PROCEDURE — 85300 ANTITHROMBIN III ACTIVITY: CPT

## 2018-10-03 PROCEDURE — 83090 ASSAY OF HOMOCYSTEINE: CPT

## 2018-10-03 PROCEDURE — 85610 PROTHROMBIN TIME: CPT

## 2018-10-03 PROCEDURE — 85613 RUSSELL VIPER VENOM DILUTED: CPT

## 2018-10-03 PROCEDURE — 36415 COLL VENOUS BLD VENIPUNCTURE: CPT

## 2018-10-03 PROCEDURE — 85307 ASSAY ACTIVATED PROTEIN C: CPT

## 2018-10-03 PROCEDURE — 80061 LIPID PANEL: CPT

## 2018-10-03 NOTE — TELEPHONE ENCOUNTER
In the [place], the [employer] issued guidelines in 2006 advocating routine voluntary HIV screening of all patients aged 15 to 59 years as a normal part of medical care, including patients without risk factors like him. We routinely do it during wellness visits. Its  voluntary. If he does not want to do it, its okay.

## 2018-10-05 LAB — HIV-2 AB: NEGATIVE

## 2018-10-06 LAB — RETINOL (VITAMIN A): NORMAL

## 2018-10-09 ENCOUNTER — HOSPITAL ENCOUNTER (OUTPATIENT)
Age: 58
Discharge: HOME | End: 2018-10-09
Payer: COMMERCIAL

## 2018-10-09 ENCOUNTER — TELEPHONE (OUTPATIENT)
Dept: FAMILY MEDICINE CLINIC | Age: 58
End: 2018-10-09

## 2018-10-09 DIAGNOSIS — D64.9 ANEMIA, UNSPECIFIED TYPE: ICD-10-CM

## 2018-10-09 DIAGNOSIS — D64.9 ANEMIA, UNSPECIFIED TYPE: Primary | ICD-10-CM

## 2018-10-09 LAB
BASOPHILS # BLD: 0.5 %
BASOPHILS ABSOLUTE: 0 THOU/MM3 (ref 0–0.1)
EOSINOPHIL # BLD: 1.9 %
EOSINOPHILS ABSOLUTE: 0.2 THOU/MM3 (ref 0–0.4)
ERYTHROCYTE [DISTWIDTH] IN BLOOD BY AUTOMATED COUNT: 13.4 % (ref 11.5–14.5)
ERYTHROCYTE [DISTWIDTH] IN BLOOD BY AUTOMATED COUNT: 39.6 FL (ref 35–45)
HCT VFR BLD CALC: 39.5 % (ref 42–52)
HEMOGLOBIN: 13.4 GM/DL (ref 14–18)
IMMATURE GRANS (ABS): 0.04 THOU/MM3 (ref 0–0.07)
IMMATURE GRANULOCYTES: 0.4 %
LYMPHOCYTES # BLD: 18.5 %
LYMPHOCYTES ABSOLUTE: 1.8 THOU/MM3 (ref 1–4.8)
MCH RBC QN AUTO: 27.7 PG (ref 26–33)
MCHC RBC AUTO-ENTMCNC: 33.9 GM/DL (ref 32.2–35.5)
MCV RBC AUTO: 81.6 FL (ref 80–94)
MISC. #1 REFERENCE GROUP TEST: ABNORMAL
MONOCYTES # BLD: 11.4 %
MONOCYTES ABSOLUTE: 1.1 THOU/MM3 (ref 0.4–1.3)
NUCLEATED RED BLOOD CELLS: 0 /100 WBC
PLATELET # BLD: 260 THOU/MM3 (ref 130–400)
PMV BLD AUTO: 7.8 FL (ref 9.4–12.4)
RBC # BLD: 4.84 MILL/MM3 (ref 4.7–6.1)
SEG NEUTROPHILS: 67.3 %
SEGMENTED NEUTROPHILS ABSOLUTE COUNT: 6.5 THOU/MM3 (ref 1.8–7.7)
WBC # BLD: 9.6 THOU/MM3 (ref 4.8–10.8)

## 2018-10-09 PROCEDURE — 85025 COMPLETE CBC W/AUTO DIFF WBC: CPT

## 2018-10-09 PROCEDURE — 36415 COLL VENOUS BLD VENIPUNCTURE: CPT

## 2018-10-09 NOTE — TELEPHONE ENCOUNTER
Patient returned call. States that he was reviewing some of the blood work he can see and his wife is a former phlebotomist so she told him that his vitamin d level and his iron level were low. Patient states that he has a brother and sister currently going through CLL (Chronic lymphocytic leukemia) so is wanting to get a CBC done. Patient also wants to know what you have to say about all lab results from 10-3-18.

## 2018-10-10 ENCOUNTER — TELEPHONE (OUTPATIENT)
Dept: UROLOGY | Age: 58
End: 2018-10-10

## 2018-10-10 DIAGNOSIS — I82.90 THROMBOSIS: Primary | ICD-10-CM

## 2018-10-12 ENCOUNTER — TELEPHONE (OUTPATIENT)
Dept: UROLOGY | Age: 58
End: 2018-10-12

## 2018-10-12 RX ORDER — METOPROLOL SUCCINATE 25 MG/1
TABLET, EXTENDED RELEASE ORAL
Qty: 90 TABLET | Refills: 1 | Status: SHIPPED | OUTPATIENT
Start: 2018-10-12 | End: 2021-04-06

## 2018-10-17 ENCOUNTER — HOSPITAL ENCOUNTER (OUTPATIENT)
Dept: ULTRASOUND IMAGING | Age: 58
Discharge: HOME | End: 2018-10-17
Payer: COMMERCIAL

## 2018-10-17 DIAGNOSIS — I86.1 VARICOCELE PRESENT ON ULTRASOUND OF SCROTUM: ICD-10-CM

## 2018-10-17 PROCEDURE — 76870 US EXAM SCROTUM: CPT

## 2018-10-19 ENCOUNTER — HOSPITAL ENCOUNTER (OUTPATIENT)
Dept: CT IMAGING | Age: 58
Discharge: HOME | End: 2018-10-19
Payer: COMMERCIAL

## 2018-10-19 DIAGNOSIS — J32.2 CHRONIC ETHMOIDAL SINUSITIS: ICD-10-CM

## 2018-10-19 PROCEDURE — 70486 CT MAXILLOFACIAL W/O DYE: CPT

## 2018-10-23 ENCOUNTER — OFFICE VISIT (OUTPATIENT)
Dept: UROLOGY | Age: 58
End: 2018-10-23
Payer: COMMERCIAL

## 2018-10-23 VITALS
DIASTOLIC BLOOD PRESSURE: 78 MMHG | WEIGHT: 281 LBS | HEIGHT: 72 IN | SYSTOLIC BLOOD PRESSURE: 138 MMHG | BODY MASS INDEX: 38.06 KG/M2

## 2018-10-23 DIAGNOSIS — I86.1 VARICOCELE: Primary | ICD-10-CM

## 2018-10-23 LAB
BILIRUBIN URINE: NEGATIVE
BLOOD URINE, POC: NORMAL
CHARACTER, URINE: CLEAR
COLOR, URINE: YELLOW
GLUCOSE URINE: NEGATIVE MG/DL
KETONES, URINE: NEGATIVE
LEUKOCYTE CLUMPS, URINE: NEGATIVE
NITRITE, URINE: NEGATIVE
PH, URINE: 6
PROTEIN, URINE: NEGATIVE MG/DL
SPECIFIC GRAVITY, URINE: 1.01 (ref 1–1.03)
UROBILINOGEN, URINE: 0.2 EU/DL

## 2018-10-23 PROCEDURE — G8417 CALC BMI ABV UP PARAM F/U: HCPCS | Performed by: NURSE PRACTITIONER

## 2018-10-23 PROCEDURE — G8482 FLU IMMUNIZE ORDER/ADMIN: HCPCS | Performed by: NURSE PRACTITIONER

## 2018-10-23 PROCEDURE — 1036F TOBACCO NON-USER: CPT | Performed by: NURSE PRACTITIONER

## 2018-10-23 PROCEDURE — 99213 OFFICE O/P EST LOW 20 MIN: CPT | Performed by: NURSE PRACTITIONER

## 2018-10-23 PROCEDURE — 81003 URINALYSIS AUTO W/O SCOPE: CPT | Performed by: NURSE PRACTITIONER

## 2018-10-23 PROCEDURE — G8427 DOCREV CUR MEDS BY ELIG CLIN: HCPCS | Performed by: NURSE PRACTITIONER

## 2018-10-23 PROCEDURE — 3017F COLORECTAL CA SCREEN DOC REV: CPT | Performed by: NURSE PRACTITIONER

## 2018-10-23 RX ORDER — NAPROXEN 500 MG/1
500 TABLET ORAL PRN
COMMUNITY
End: 2019-10-08

## 2018-10-23 RX ORDER — HYDROXYZINE HYDROCHLORIDE 10 MG/1
10 TABLET, FILM COATED ORAL 3 TIMES DAILY PRN
COMMUNITY
End: 2019-10-08

## 2018-10-23 RX ORDER — ESOMEPRAZOLE MAGNESIUM 20 MG/1
20 FOR SUSPENSION ORAL PRN
COMMUNITY
End: 2021-04-06

## 2018-10-23 RX ORDER — AMLODIPINE BESYLATE 5 MG/1
5 TABLET ORAL DAILY
COMMUNITY
End: 2021-04-06

## 2018-10-23 RX ORDER — ACETAMINOPHEN 160 MG
5000 TABLET,DISINTEGRATING ORAL
COMMUNITY

## 2018-10-23 RX ORDER — SPIRONOLACTONE 50 MG/1
50 TABLET, FILM COATED ORAL DAILY
COMMUNITY
End: 2020-04-27 | Stop reason: SDUPTHER

## 2018-10-24 NOTE — PROGRESS NOTES
Chasity Mercado is a 63 y.o. male seen for testicular pain and swelling. Pt follows with our office for BPH. PSA 1/2018 0.73. He reports he uses Cialis 5 mg on Monday and 5 mg on Thursday that he obtains off the internet to improve urinary symptoms as well as aid in erectile dysfunction. Pt reports he can tell a difference when he takes the medication routinely. Pt hasn't wanted to start flomax or finasteride previously. Pt presented to ER on 8/25/18 secondary to complaints of left sided scrotal pain and swelling. Pt was started on Levaquin by the on call provider and had scrotal US while in ER that noted bilateral mild debris-containing hydroceles. Probable tiny appendix epididymis adjacent to the right epididymal head. 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 Ct abd/pelvis with IV contrast on 8/29/18 was negative for abdominal mass and noted bilateral fat containing inguinal hernias. He completed a thrombotic risk panel that noted a low to moderately positive cardiolipin Ab IgM level. Pt was referred to hematology. Nicolas reports pain and swelling have improved. He denies dysuria, gross hematuria, flank pain, fever, chills, suprapubic pain, and feeling of incomplete emptying. He reports nocturia of 2 x per night.       Current Outpatient Prescriptions   Medication Sig Dispense Refill    amLODIPine (NORVASC) 5 MG tablet Take 5 mg by mouth daily      spironolactone (ALDACTONE) 50 MG tablet Take 50 mg by mouth daily      naproxen (NAPROSYN) 500 MG tablet Take 500 mg by mouth as needed for Pain      hydrOXYzine (ATARAX) 10 MG tablet Take 10 mg by mouth 3 times daily as needed for Itching      Cholecalciferol (VITAMIN D3) 2000 units CAPS Take by mouth      MAGNESIUM GLYCINATE PLUS PO Take by mouth      esomeprazole Magnesium (NEXIUM) 20 MG PACK Take 20 mg by mouth as needed      metoprolol succinate (TOPROL XL) 25 MG extended release tablet TAKE 1 TABLET BY MOUTH ONCE DAILY 90 tablet 1

## 2018-10-25 ENCOUNTER — OFFICE VISIT (OUTPATIENT)
Dept: ENT CLINIC | Age: 58
End: 2018-10-25
Payer: COMMERCIAL

## 2018-10-25 VITALS
RESPIRATION RATE: 16 BRPM | DIASTOLIC BLOOD PRESSURE: 70 MMHG | HEART RATE: 64 BPM | SYSTOLIC BLOOD PRESSURE: 110 MMHG | BODY MASS INDEX: 38.37 KG/M2 | HEIGHT: 72 IN | TEMPERATURE: 97.8 F | WEIGHT: 283.3 LBS

## 2018-10-25 DIAGNOSIS — J34.1 MAXILLARY SINUS CYST: ICD-10-CM

## 2018-10-25 DIAGNOSIS — Z78.9 DIFFICULTY WITH CPAP USE: ICD-10-CM

## 2018-10-25 DIAGNOSIS — Z98.890 STATUS POST NASAL SEPTOPLASTY: ICD-10-CM

## 2018-10-25 DIAGNOSIS — J34.2 NASAL SEPTAL DEVIATION: ICD-10-CM

## 2018-10-25 DIAGNOSIS — G47.33 OSA (OBSTRUCTIVE SLEEP APNEA): ICD-10-CM

## 2018-10-25 DIAGNOSIS — M26.629 TMJ SYNDROME: ICD-10-CM

## 2018-10-25 DIAGNOSIS — R09.81 NASAL CONGESTION: Primary | ICD-10-CM

## 2018-10-25 PROCEDURE — G8417 CALC BMI ABV UP PARAM F/U: HCPCS | Performed by: OTOLARYNGOLOGY

## 2018-10-25 PROCEDURE — G8428 CUR MEDS NOT DOCUMENT: HCPCS | Performed by: OTOLARYNGOLOGY

## 2018-10-25 PROCEDURE — G8482 FLU IMMUNIZE ORDER/ADMIN: HCPCS | Performed by: OTOLARYNGOLOGY

## 2018-10-25 PROCEDURE — 1036F TOBACCO NON-USER: CPT | Performed by: OTOLARYNGOLOGY

## 2018-10-25 PROCEDURE — 99213 OFFICE O/P EST LOW 20 MIN: CPT | Performed by: OTOLARYNGOLOGY

## 2018-10-25 PROCEDURE — 3017F COLORECTAL CA SCREEN DOC REV: CPT | Performed by: OTOLARYNGOLOGY

## 2018-10-25 ASSESSMENT — ENCOUNTER SYMPTOMS
TROUBLE SWALLOWING: 0
SHORTNESS OF BREATH: 0
DIARRHEA: 0
SINUS PRESSURE: 1
STRIDOR: 0
COLOR CHANGE: 0
ABDOMINAL PAIN: 0
FACIAL SWELLING: 0
APNEA: 1
SORE THROAT: 0
VOMITING: 0
WHEEZING: 0
CHOKING: 0
NAUSEA: 0
COUGH: 0
RHINORRHEA: 0
CHEST TIGHTNESS: 0
VOICE CHANGE: 0

## 2018-10-29 RX ORDER — ROSUVASTATIN CALCIUM 10 MG/1
TABLET, COATED ORAL
Qty: 90 TABLET | Refills: 1 | Status: SHIPPED | OUTPATIENT
Start: 2018-10-29 | End: 2019-03-20 | Stop reason: SDUPTHER

## 2018-11-06 ENCOUNTER — TELEPHONE (OUTPATIENT)
Dept: FAMILY MEDICINE CLINIC | Age: 58
End: 2018-11-06

## 2018-11-06 DIAGNOSIS — R93.89 ABNORMAL CT SCAN: Primary | ICD-10-CM

## 2018-11-06 NOTE — TELEPHONE ENCOUNTER
Patient has an outstanding order for a CT Abdomen order by Dr. Vila Dubin. Upon calling and speaking with the patient he indicated he completed a CT Abdomen Pelvis order by Urology on 8-. Patient wanting to know if Dr. Vila Dubin is still wanting him to proceed with her CT she suggested for Dec 2018. Please review chart and advise.

## 2018-11-08 NOTE — TELEPHONE ENCOUNTER
Spoke with patient and advised of Dr. Scout Ma recommendation. Patient declined additional testing at this time and will discuss with provider in the future.

## 2018-11-08 NOTE — TELEPHONE ENCOUNTER
Ultrasound ordered. Left message for patient to return call to office since number listed is a work number.

## 2018-12-19 ENCOUNTER — OFFICE VISIT (OUTPATIENT)
Dept: FAMILY MEDICINE CLINIC | Age: 58
End: 2018-12-19
Payer: COMMERCIAL

## 2018-12-19 VITALS
WEIGHT: 286 LBS | SYSTOLIC BLOOD PRESSURE: 130 MMHG | OXYGEN SATURATION: 98 % | TEMPERATURE: 98.6 F | HEIGHT: 72 IN | BODY MASS INDEX: 38.74 KG/M2 | DIASTOLIC BLOOD PRESSURE: 85 MMHG | HEART RATE: 72 BPM

## 2018-12-19 DIAGNOSIS — I77.819 DILATION OF AORTA (HCC): ICD-10-CM

## 2018-12-19 DIAGNOSIS — B02.9 HERPES ZOSTER WITHOUT COMPLICATION: Primary | ICD-10-CM

## 2018-12-19 PROCEDURE — 99213 OFFICE O/P EST LOW 20 MIN: CPT | Performed by: NURSE PRACTITIONER

## 2018-12-19 PROCEDURE — G8417 CALC BMI ABV UP PARAM F/U: HCPCS | Performed by: NURSE PRACTITIONER

## 2018-12-19 PROCEDURE — G8427 DOCREV CUR MEDS BY ELIG CLIN: HCPCS | Performed by: NURSE PRACTITIONER

## 2018-12-19 PROCEDURE — 1036F TOBACCO NON-USER: CPT | Performed by: NURSE PRACTITIONER

## 2018-12-19 PROCEDURE — 3017F COLORECTAL CA SCREEN DOC REV: CPT | Performed by: NURSE PRACTITIONER

## 2018-12-19 PROCEDURE — G8482 FLU IMMUNIZE ORDER/ADMIN: HCPCS | Performed by: NURSE PRACTITIONER

## 2018-12-19 RX ORDER — FAMCICLOVIR 500 MG/1
500 TABLET, FILM COATED ORAL 3 TIMES DAILY
Qty: 21 TABLET | Refills: 0 | Status: SHIPPED | OUTPATIENT
Start: 2018-12-19 | End: 2018-12-26

## 2018-12-19 NOTE — PROGRESS NOTES
facility-administered medications for this visit. Review of systems:  Review of Systems - History obtained from the patient  General ROS: negative for - chills, fatigue or fever  Psychological ROS: negative for - anxiety, depression or sleep disturbances  ENT ROS: negative for - headaches, nasal congestion or nasal discharge  Allergy and Immunology ROS: negative for - seasonal allergies  Hematological and Lymphatic ROS: negative for - bruising  Endocrine ROS: negative for - malaise/lethargy, polydipsia/polyuria or temperature intolerance  Respiratory ROS: negative for - cough,  shortness of breath or wheezing  Cardiovascular ROS: negative for - chest pain or edema  Gastrointestinal ROS: negative for - abdominal pain, constipation, diarrhea or nausea/vomiting  Musculoskeletal ROS: negative for - joint pain or muscle pain  Neurological ROS: negative for - dizziness  Dermatological ROS: Positive for - rash    Physical Examination:  /85 (Site: Right Upper Arm, Position: Sitting, Cuff Size: Large Adult)   Pulse 72   Temp 98.6 °F (37 °C) (Oral)   Ht 6' (1.829 m)   Wt 286 lb (129.7 kg)   SpO2 98%   BMI 38.79 kg/m²     General-  Alert and oriented x 3, NAD  HEENT: NC, AT, PERRLA, EOMI, anicteric sclerae  Ears: Normal tympanic membranes bilaterally  Nose: patent, no lesions  Mouth: no lesions, moist mucosas  Neck - supple, no significant adenopathy  Chest - clear to auscultation, no wheezes, rales or rhonchi, symmetric air entry  Heart - normal rate, regular rhythm, normal S1, S2, no murmurs, rubs, clicks or gallops  Abdomen - soft, nontender, nondistended, no masses or organomegaly  Extremities - peripheral pulses normal, no pedal edema, no clubbing or cyanosis  Derm - there is a macular rash to the low mid back area approximately the size in adult man's hand. In the right groin there is a similar macular rash, with some maculopapular lesions. There are no vesicles noted at this point.   There is no rash

## 2018-12-26 ENCOUNTER — OFFICE VISIT (OUTPATIENT)
Dept: PULMONOLOGY | Age: 58
End: 2018-12-26
Payer: COMMERCIAL

## 2018-12-26 VITALS
HEIGHT: 72 IN | WEIGHT: 286.8 LBS | HEART RATE: 55 BPM | DIASTOLIC BLOOD PRESSURE: 84 MMHG | BODY MASS INDEX: 38.85 KG/M2 | OXYGEN SATURATION: 96 % | SYSTOLIC BLOOD PRESSURE: 128 MMHG

## 2018-12-26 DIAGNOSIS — Z99.89 OSA ON CPAP: Primary | ICD-10-CM

## 2018-12-26 DIAGNOSIS — G47.33 OSA ON CPAP: Primary | ICD-10-CM

## 2018-12-26 PROCEDURE — 1036F TOBACCO NON-USER: CPT | Performed by: PHYSICIAN ASSISTANT

## 2018-12-26 PROCEDURE — G8482 FLU IMMUNIZE ORDER/ADMIN: HCPCS | Performed by: PHYSICIAN ASSISTANT

## 2018-12-26 PROCEDURE — 3017F COLORECTAL CA SCREEN DOC REV: CPT | Performed by: PHYSICIAN ASSISTANT

## 2018-12-26 PROCEDURE — G8417 CALC BMI ABV UP PARAM F/U: HCPCS | Performed by: PHYSICIAN ASSISTANT

## 2018-12-26 PROCEDURE — 99213 OFFICE O/P EST LOW 20 MIN: CPT | Performed by: PHYSICIAN ASSISTANT

## 2018-12-26 PROCEDURE — G8427 DOCREV CUR MEDS BY ELIG CLIN: HCPCS | Performed by: PHYSICIAN ASSISTANT

## 2018-12-26 ASSESSMENT — ENCOUNTER SYMPTOMS
BACK PAIN: 0
DIARRHEA: 0
NAUSEA: 0
STRIDOR: 0
CHEST TIGHTNESS: 0
WHEEZING: 0
SHORTNESS OF BREATH: 0
ALLERGIC/IMMUNOLOGIC NEGATIVE: 1
COUGH: 0
EYES NEGATIVE: 1

## 2019-03-20 RX ORDER — ROSUVASTATIN CALCIUM 10 MG/1
TABLET, COATED ORAL
Qty: 90 TABLET | Refills: 1 | Status: SHIPPED | OUTPATIENT
Start: 2019-03-20 | End: 2019-12-07 | Stop reason: SDUPTHER

## 2019-04-29 ENCOUNTER — OFFICE VISIT (OUTPATIENT)
Dept: FAMILY MEDICINE CLINIC | Age: 59
End: 2019-04-29
Payer: COMMERCIAL

## 2019-04-29 VITALS
SYSTOLIC BLOOD PRESSURE: 135 MMHG | HEIGHT: 72 IN | WEIGHT: 258.8 LBS | RESPIRATION RATE: 12 BRPM | OXYGEN SATURATION: 98 % | HEART RATE: 80 BPM | DIASTOLIC BLOOD PRESSURE: 85 MMHG | BODY MASS INDEX: 35.05 KG/M2

## 2019-04-29 DIAGNOSIS — I10 ESSENTIAL HYPERTENSION: ICD-10-CM

## 2019-04-29 DIAGNOSIS — M54.9 UPPER BACK PAIN: Primary | ICD-10-CM

## 2019-04-29 DIAGNOSIS — K14.8 TONGUE LESION: ICD-10-CM

## 2019-04-29 DIAGNOSIS — E78.00 HYPERCHOLESTEROLEMIA: ICD-10-CM

## 2019-04-29 PROCEDURE — 1036F TOBACCO NON-USER: CPT | Performed by: FAMILY MEDICINE

## 2019-04-29 PROCEDURE — G8427 DOCREV CUR MEDS BY ELIG CLIN: HCPCS | Performed by: FAMILY MEDICINE

## 2019-04-29 PROCEDURE — 99214 OFFICE O/P EST MOD 30 MIN: CPT | Performed by: FAMILY MEDICINE

## 2019-04-29 PROCEDURE — G8417 CALC BMI ABV UP PARAM F/U: HCPCS | Performed by: FAMILY MEDICINE

## 2019-04-29 PROCEDURE — 3017F COLORECTAL CA SCREEN DOC REV: CPT | Performed by: FAMILY MEDICINE

## 2019-04-29 ASSESSMENT — PATIENT HEALTH QUESTIONNAIRE - PHQ9
SUM OF ALL RESPONSES TO PHQ QUESTIONS 1-9: 0
SUM OF ALL RESPONSES TO PHQ9 QUESTIONS 1 & 2: 0
1. LITTLE INTEREST OR PLEASURE IN DOING THINGS: 0
SUM OF ALL RESPONSES TO PHQ QUESTIONS 1-9: 0
2. FEELING DOWN, DEPRESSED OR HOPELESS: 0

## 2019-04-29 NOTE — PROGRESS NOTES
[facility] [address] [facility], [address]  Ph:   [phone]  Fax: [phone]    Provider:  Dr. Selma Lara    Patient:  Ingrid Small  YOB: 1960      Visit Date:  4/29/2019     Reason For Visit:   Chief Complaint   Patient presents with    Other     spot on tongue x 1 year, denies pain    Back Pain     in between shoulder blades, states is seeing chiropractor      Other     pt states he stopped taking amlodipine d/t blood pressures better after weight loss         Nicolas is a 63 y.o. male who comes today to the office for follow up of HTN and hypercholesterolemia. He has hx of CAD and has been doing well. He saw Arminda Perez few months ago (January 28, 2019)    Hypertension: He is taking Losartan 100 mg 1 tablet PO daily and Metoprolol Succinate ER 25 mg 1 tablet PO daily. Recently he stopped the Norvasc 5 mg prescribed by Dr. Ambar Liang in October when he adjusted his medications due to high BP. Nicolas states his he has lost several pounds (30) and his BP was getting to 100/60 and he was fatigue so he decided to stop it. Today his BP was 135/85. He is not sure what he is taking as today. He is adherent to low salt diet. His blood pressure is well controlled at home. He denies chest pain, dyspnea, irregular heart beat, lower extremity edema, near-syncope, orthopnea and palpitations. Cardiovascular risk factors: dyslipidemia, family history of premature cardiovascular disease, hypertension, male gender and obesity (BMI >= 30 kg/m2). History of target organ damage: CAD.      Hyperlipidemia:  He is taking Crestor 10 mg PO 3-4 times a week. He forgets to take it every day. Last labs done in October 2018 showed Total cholesterol of 146, HDL 42, , Triglycerides 83. There is a family history of hyperlipidemia. There is a family history of early ischemia heart disease. He has personal history of CAD. He is adherent to low cholesterol diet.     Upper back pain: Today he complains of upper back pain which is constant there. He has been seeing the chiropractor, but it is not getting better. He is taking antiinflammatories off and on. The pain is at T7-8-9    Tongue lesion:   He found this lesion in the tongue about a year ago. Frim time to time it gets thicker and tender. Now it is flared up. Significant Past Medical History:    Past Medical History:   Diagnosis Date    CAD (coronary artery disease) 2014    GERD (gastroesophageal reflux disease)     Hyperlipidemia 2010    Hypertension 2008    CELINA (obstructive sleep apnea) 2014    Dr. Mccullough Iron           Allergies:  is allergic to tramadol; bactrim [sulfamethoxazole-trimethoprim]; and zocor [simvastatin]. Medications:   Current Outpatient Medications   Medication Sig Dispense Refill    rosuvastatin (CRESTOR) 10 MG tablet TAKE ONE TABLET BY MOUTH NIGHTLY 90 tablet 1    spironolactone (ALDACTONE) 50 MG tablet Take 50 mg by mouth daily      naproxen (NAPROSYN) 500 MG tablet Take 500 mg by mouth as needed for Pain      hydrOXYzine (ATARAX) 10 MG tablet Take 10 mg by mouth 3 times daily as needed for Itching      Cholecalciferol (VITAMIN D3) 2000 units CAPS Take by mouth      MAGNESIUM GLYCINATE PLUS PO Take by mouth      esomeprazole Magnesium (NEXIUM) 20 MG PACK Take 20 mg by mouth as needed      metoprolol succinate (TOPROL XL) 25 MG extended release tablet TAKE 1 TABLET BY MOUTH ONCE DAILY 90 tablet 1    nystatin-triamcinolone (MYCOLOG II) 911966-6.1 UNIT/GM-% cream Apply topically 2 times daily.  (Patient taking differently: as needed Apply topically 2 times daily.) 30 g 0    nabumetone (RELAFEN) 750 MG tablet TAKE ONE TABLET BY MOUTH TWICE DAILY 60 tablet 3    CPAP Machine MISC by Does not apply route Please change CPAP pressure to auto 5-15 cm H20. 1 each 0    losartan (COZAAR) 100 MG tablet Take 100 mg by mouth daily      aspirin 81 MG chewable tablet Take 81 mg by mouth daily      Coenzyme Q10 (COQ10) 200 MG CAPS Take  by mouth 2 times daily.  amLODIPine (NORVASC) 5 MG tablet Take 5 mg by mouth daily      olopatadine (PATANASE) 0.6 % SOLN nassl soln 2 sprays by Nasal route 2 times daily 1 Bottle 5    ipratropium (ATROVENT) 0.06 % nasal spray 2 sprays by Nasal route 3 times daily 1 Bottle 3     No current facility-administered medications for this visit.         Review of systems:  Review of Systems - History obtained from chart review and the patient  General ROS: negative for - chills, fatigue or fever  Psychological ROS: negative for - anxiety, depression or sleep disturbances  ENT ROS: negative for - headaches, nasal congestion or nasal discharge  Allergy and Immunology ROS: negative for - seasonal allergies  Hematological and Lymphatic ROS: negative for - bruising  Endocrine ROS: negative for - malaise/lethargy, polydipsia/polyuria or temperature intolerance  Respiratory ROS: negative for - cough,  shortness of breath or wheezing  Cardiovascular ROS: negative for - chest pain or edema  Gastrointestinal ROS: negative for - abdominal pain, constipation, diarrhea or nausea/vomiting  Musculoskeletal ROS: negative for - joint pain or muscle pain  Neurological ROS: negative for - dizziness  Dermatological ROS: negative for - rash    Physical Examination:  /85 (Site: Left Upper Arm, Position: Sitting, Cuff Size: Large Adult)   Pulse 80   Resp 12   Ht 6' (1.829 m)   Wt 258 lb 12.8 oz (117.4 kg)   SpO2 98%   BMI 35.10 kg/m²     General-  Alert and oriented x 3, NAD  HEENT: NC, AT, PERRLA, EOMI, anicteric sclerae  Ears: Normal tympanic membranes bilaterally  Nose: patent, no lesions  Mouth: no lesions, moist mucosas  Neck - supple, no significant adenopathy  Chest - clear to auscultation, no wheezes, rales or rhonchi, symmetric air entry  Heart - normal rate, regular rhythm, normal S1, S2, no murmurs, rubs, clicks or gallops  Abdomen - soft, nontender, nondistended, no masses or organomegaly  Extremities - peripheral pulses normal, no pedal edema, no clubbing or cyanosis. He has several spider varicose veins in the ankle area    Impression:   Diagnosis Orders   1. Upper back pain  XR THORACIC SPINE (3 VIEWS)   2. Tongue lesion  Liza Montes MD, Otolaryngology, [facility]   3. Essential hypertension     4. Hypercholesterolemia         Plan:  Was due to a thoracic spine x-ray. We'll refer back to Dr. Justin New for the tongue lesion  He has been compared to later on his weight loss and encouraged to continue it. Continue Losartan 100 mg 1 tablet PO daily and Metoprolol Succinate ER 25 mg 1 tablet PO daily. Continue Crestor 10 mg PO 3-4 times a week        No orders of the defined types were placed in this encounter. Follow Up:  Return in about 4 months (around 8/29/2019).     Mode Smith MD

## 2019-04-30 ENCOUNTER — HOSPITAL ENCOUNTER (OUTPATIENT)
Dept: GENERAL RADIOLOGY | Age: 59
Discharge: HOME | End: 2019-04-30
Payer: COMMERCIAL

## 2019-04-30 ENCOUNTER — HOSPITAL ENCOUNTER (OUTPATIENT)
Age: 59
Discharge: HOME | End: 2019-04-30
Payer: COMMERCIAL

## 2019-04-30 DIAGNOSIS — M54.9 UPPER BACK PAIN: ICD-10-CM

## 2019-04-30 PROCEDURE — 72072 X-RAY EXAM THORAC SPINE 3VWS: CPT

## 2019-05-01 ENCOUNTER — TELEPHONE (OUTPATIENT)
Dept: FAMILY MEDICINE CLINIC | Age: 59
End: 2019-05-01

## 2019-05-01 NOTE — TELEPHONE ENCOUNTER
----- Message from Aamir López MD sent at 5/1/2019  1:27 PM EDT -----  X-ray of the thoracic spine show mild scoliosis of the cervical thoracic spine, mild level is scoliosis of the lower thoracic spine and moderate spondylosis of the lower thoracic spine with a few bulky bridging osteophytes. In few words he has several bone spurs in the thoracic spine which could be the reason for his upper back pain. These are probably caused by degenerative arthritis of the spine.   Treatment should be physical therapy and nonsteroidal anti-inflammatory medication if no contraindication

## 2019-05-01 NOTE — TELEPHONE ENCOUNTER
Spoke with patient regarding results. Patient currently has Relafen 750 mg that he has not been taking on a regular basis and says as long as that qualifies as a med Dr. Mani Hood is okay with. Patient declines referral for physical therapy at this time and states he will increased exercises at home. Please advise.

## 2019-05-01 NOTE — TELEPHONE ENCOUNTER
I rather see him away from [facility]'s due to his hx of CAD. There is not an absolute contraindication. But if he  absolutely needs to take medicine,  Naprosyn is the one linked the least to increase Cardiovascular events/strokes according to one study. Will give him some muscle relaxants (Zanaflex 4 mg PO TID as needed # 60) to help when the pain exacerbates.

## 2019-06-22 ENCOUNTER — APPOINTMENT (OUTPATIENT)
Dept: ULTRASOUND IMAGING | Age: 59
End: 2019-06-22
Payer: COMMERCIAL

## 2019-06-22 ENCOUNTER — HOSPITAL ENCOUNTER (EMERGENCY)
Age: 59
Discharge: HOME | End: 2019-06-22
Payer: COMMERCIAL

## 2019-06-22 VITALS
RESPIRATION RATE: 16 BRPM | HEIGHT: 72 IN | TEMPERATURE: 98.5 F | DIASTOLIC BLOOD PRESSURE: 77 MMHG | WEIGHT: 255 LBS | HEART RATE: 68 BPM | BODY MASS INDEX: 34.54 KG/M2 | SYSTOLIC BLOOD PRESSURE: 142 MMHG | OXYGEN SATURATION: 96 %

## 2019-06-22 DIAGNOSIS — I86.1 VARICOCELE: Primary | ICD-10-CM

## 2019-06-22 DIAGNOSIS — N50.3 EPIDIDYMAL CYST: ICD-10-CM

## 2019-06-22 LAB
ALBUMIN SERPL-MCNC: 3.7 G/DL (ref 3.5–5.1)
ALP BLD-CCNC: 106 U/L (ref 38–126)
ALT SERPL-CCNC: 23 U/L (ref 11–66)
ANION GAP SERPL CALCULATED.3IONS-SCNC: 11 MEQ/L (ref 8–16)
AST SERPL-CCNC: 21 U/L (ref 5–40)
BASOPHILS # BLD: 0.4 %
BASOPHILS ABSOLUTE: 0 THOU/MM3 (ref 0–0.1)
BILIRUB SERPL-MCNC: 0.3 MG/DL (ref 0.3–1.2)
BILIRUBIN DIRECT: < 0.2 MG/DL (ref 0–0.3)
BILIRUBIN URINE: NEGATIVE
BLOOD, URINE: NEGATIVE
BUN BLDV-MCNC: 17 MG/DL (ref 7–22)
C-REACTIVE PROTEIN: 3.93 MG/DL (ref 0–1)
CALCIUM SERPL-MCNC: 9.3 MG/DL (ref 8.5–10.5)
CHARACTER, URINE: CLEAR
CHLORIDE BLD-SCNC: 103 MEQ/L (ref 98–111)
CO2: 24 MEQ/L (ref 23–33)
COLOR: YELLOW
CREAT SERPL-MCNC: 0.9 MG/DL (ref 0.4–1.2)
EOSINOPHIL # BLD: 2.1 %
EOSINOPHILS ABSOLUTE: 0.2 THOU/MM3 (ref 0–0.4)
ERYTHROCYTE [DISTWIDTH] IN BLOOD BY AUTOMATED COUNT: 12.7 % (ref 11.5–14.5)
ERYTHROCYTE [DISTWIDTH] IN BLOOD BY AUTOMATED COUNT: 39.9 FL (ref 35–45)
GFR SERPL CREATININE-BSD FRML MDRD: 87 ML/MIN/1.73M2
GLUCOSE BLD-MCNC: 100 MG/DL (ref 70–108)
GLUCOSE URINE: NEGATIVE MG/DL
HCT VFR BLD CALC: 38.2 % (ref 42–52)
HEMOGLOBIN: 12.8 GM/DL (ref 14–18)
IMMATURE GRANS (ABS): 0.04 THOU/MM3 (ref 0–0.07)
IMMATURE GRANULOCYTES: 0.4 %
KETONES, URINE: NEGATIVE
LEUKOCYTE ESTERASE, URINE: NEGATIVE
LYMPHOCYTES # BLD: 20.3 %
LYMPHOCYTES ABSOLUTE: 2.1 THOU/MM3 (ref 1–4.8)
MCH RBC QN AUTO: 28.9 PG (ref 26–33)
MCHC RBC AUTO-ENTMCNC: 33.5 GM/DL (ref 32.2–35.5)
MCV RBC AUTO: 86.2 FL (ref 80–94)
MONOCYTES # BLD: 12 %
MONOCYTES ABSOLUTE: 1.2 THOU/MM3 (ref 0.4–1.3)
NITRITE, URINE: NEGATIVE
NUCLEATED RED BLOOD CELLS: 0 /100 WBC
OSMOLALITY CALCULATION: 277.3 MOSMOL/KG (ref 275–300)
PH UA: 5.5 (ref 5–9)
PLATELET # BLD: 253 THOU/MM3 (ref 130–400)
PMV BLD AUTO: 8.1 FL (ref 9.4–12.4)
POTASSIUM SERPL-SCNC: 4.2 MEQ/L (ref 3.5–5.2)
PROTEIN UA: NEGATIVE
RBC # BLD: 4.43 MILL/MM3 (ref 4.7–6.1)
SEDIMENTATION RATE, ERYTHROCYTE: 42 MM/HR (ref 0–10)
SEG NEUTROPHILS: 64.8 %
SEGMENTED NEUTROPHILS ABSOLUTE COUNT: 6.7 THOU/MM3 (ref 1.8–7.7)
SODIUM BLD-SCNC: 138 MEQ/L (ref 135–145)
SPECIFIC GRAVITY, URINE: 1.03 (ref 1–1.03)
TOTAL PROTEIN: 7 G/DL (ref 6.1–8)
UROBILINOGEN, URINE: 0.2 EU/DL (ref 0–1)
WBC # BLD: 10.4 THOU/MM3 (ref 4.8–10.8)

## 2019-06-22 PROCEDURE — 80053 COMPREHEN METABOLIC PANEL: CPT

## 2019-06-22 PROCEDURE — 76870 US EXAM SCROTUM: CPT

## 2019-06-22 PROCEDURE — 82248 BILIRUBIN DIRECT: CPT

## 2019-06-22 PROCEDURE — 86140 C-REACTIVE PROTEIN: CPT

## 2019-06-22 PROCEDURE — 85651 RBC SED RATE NONAUTOMATED: CPT

## 2019-06-22 PROCEDURE — 81003 URINALYSIS AUTO W/O SCOPE: CPT

## 2019-06-22 PROCEDURE — 99284 EMERGENCY DEPT VISIT MOD MDM: CPT

## 2019-06-22 PROCEDURE — 85025 COMPLETE CBC W/AUTO DIFF WBC: CPT

## 2019-06-22 PROCEDURE — 36415 COLL VENOUS BLD VENIPUNCTURE: CPT

## 2019-06-22 RX ORDER — CIPROFLOXACIN 500 MG/1
500 TABLET, FILM COATED ORAL 2 TIMES DAILY
Qty: 14 TABLET | Refills: 0 | Status: SHIPPED | OUTPATIENT
Start: 2019-06-22 | End: 2019-06-29

## 2019-06-22 RX ORDER — HYDROCODONE BITARTRATE AND ACETAMINOPHEN 5; 325 MG/1; MG/1
1 TABLET ORAL ONCE
Status: DISCONTINUED | OUTPATIENT
Start: 2019-06-22 | End: 2019-06-23 | Stop reason: HOSPADM

## 2019-06-22 ASSESSMENT — ENCOUNTER SYMPTOMS
BACK PAIN: 0
DIARRHEA: 0
SORE THROAT: 0
VOMITING: 0
EYE DISCHARGE: 0
RHINORRHEA: 0
NAUSEA: 0
CONSTIPATION: 0
WHEEZING: 0
SHORTNESS OF BREATH: 0
EYE REDNESS: 0
COLOR CHANGE: 0
COUGH: 0

## 2019-06-22 ASSESSMENT — PAIN DESCRIPTION - PAIN TYPE: TYPE: ACUTE PAIN

## 2019-06-22 ASSESSMENT — PAIN SCALES - GENERAL
PAINLEVEL_OUTOF10: 0
PAINLEVEL_OUTOF10: 6

## 2019-06-22 ASSESSMENT — PAIN DESCRIPTION - LOCATION: LOCATION: GROIN;SCROTUM

## 2019-06-22 NOTE — ED NOTES
Patient states last year he had the same symptoms on his left testicle that he was put on antibiotic for. He rates pain at a 5/10 but pain increases with touch. He denies any heat or redness to site. He denies history of a fever. VSS. Respirations easy and regular.       Andrew Jordan RN  06/22/19 [id number]

## 2019-06-23 ASSESSMENT — ENCOUNTER SYMPTOMS: ABDOMINAL PAIN: 1

## 2019-06-23 NOTE — ED PROVIDER NOTES
[facility] Emergency Department    CHIEF COMPLAINT       Chief Complaint   Patient presents with    Groin Swelling       Nurses Notes reviewed and I agree except as noted in the HPI. HISTORY OF PRESENT ILLNESS    Nicolas Walsh joseph 63 y.o. male who presents to the ED for evaluation of right testicle pain and swelling. The patient states that he had similar symptoms in his left testicle last fall and was diagnosed with a varicocele. The patient denies any burning or pain with urination, blood in his urine, penile pain or swelling, or penile discharge. He states that he is followed by Dr. Ramona Overton, Urology. The patient rates his current pain at a 6/10 but states that it has been getting progressively worse and now radiating into his right lower abdomen. The patient otherwise denies any abdominal pain, nausea, vomiting, diarrhea, constipation, chest pain, or shortness of breath. He denies any fevers or chills. The patient denies any other symptoms or relevant history at this time. HPI was provided by the patient. REVIEW OF SYSTEMS     Review of Systems   Constitutional: Negative for chills, fatigue and fever. HENT: Negative for congestion, ear pain, rhinorrhea and sore throat. Eyes: Negative for discharge and redness. Respiratory: Negative for cough, shortness of breath and wheezing. Cardiovascular: Negative for chest pain and palpitations. Gastrointestinal: Positive for abdominal pain (right lower; radiation from right testicle). Negative for constipation, diarrhea, nausea and vomiting. Genitourinary: Positive for scrotal swelling and testicular pain. Negative for decreased urine volume, difficulty urinating, discharge, dysuria, flank pain, frequency, genital sores, hematuria, penile pain, penile swelling and urgency. Musculoskeletal: Negative for arthralgias, back pain, myalgias, neck pain and neck stiffness. Skin: Negative for color change, pallor and rash.    Neurological: Negative for dizziness, syncope, weakness, light-headedness, numbness and headaches. Hematological: Negative for adenopathy. Psychiatric/Behavioral: Negative for agitation, confusion, dysphoric mood and suicidal ideas. The patient is not nervous/anxious. PAST MEDICAL HISTORY     Past Medical History:   Diagnosis Date    CAD (coronary artery disease) 2014    GERD (gastroesophageal reflux disease)     Hyperlipidemia 2010    Hypertension 2008    CELINA (obstructive sleep apnea) 2014    Dr. William Clemente      has a past surgical history that includes Cholecystectomy (2008); Colonoscopy (2013); Tonsillectomy; pr sigmoidoscopy,biopsy (Left, 3/1/2018); Cardiac catheterization (2/21/15); and pr repair of nasal septum (N/A, 4/30/2018).     CURRENT MEDICATIONS       Discharge Medication List as of 6/22/2019 11:23 PM      CONTINUE these medications which have NOT CHANGED    Details   nabumetone (RELAFEN) 750 MG tablet TAKE 1 TABLET BY MOUTH TWICE DAILY, Disp-60 tablet, R-3Please consider 90 day supplies to promote better adherenceNormal      chlorhexidine (PERIDEX) 0.12 % solution R-3Historical Med      rosuvastatin (CRESTOR) 10 MG tablet TAKE ONE TABLET BY MOUTH NIGHTLY, Disp-90 tablet, R-1Normal      amLODIPine (NORVASC) 5 MG tablet Take 5 mg by mouth dailyHistorical Med      spironolactone (ALDACTONE) 50 MG tablet Take 50 mg by mouth dailyHistorical Med      naproxen (NAPROSYN) 500 MG tablet Take 500 mg by mouth as needed for PainHistorical Med      hydrOXYzine (ATARAX) 10 MG tablet Take 10 mg by mouth 3 times daily as needed for ItchingHistorical Med      Cholecalciferol (VITAMIN D3) 2000 units CAPS Take by mouthHistorical Med      MAGNESIUM GLYCINATE PLUS PO Take by mouthHistorical Med      esomeprazole Magnesium (NEXIUM) 20 MG PACK Take 20 mg by mouth as neededHistorical Med      metoprolol succinate (TOPROL XL) 25 MG extended release tablet TAKE 1 TABLET BY MOUTH ONCE DAILY, Disp-90 in his brother; Obesity (age of onset: 8) in his brother; Obesity (age of onset: 39) in his brother; Obesity (age of onset: 48) in his brother; Other (age of onset: 36) in his brother; Other (age of onset: 48) in his brother; Other (age of onset: 46) in his brother; Other (age of onset: 72) in his sister; Stroke (age of onset: 48) in his father. SOCIAL HISTORY       Social History     Socioeconomic History    Marital status:      Spouse name: Eugenia Mathews Number of children: 2    Years of education: 15    Highest education level: Not on file   Occupational History    Occupation: [employer]     Comment: self employeed   Social Needs    Financial resource strain: Not on file    Food insecurity:     Worry: Not on file     Inability: Not on file    Transportation needs:     Medical: Not on file     Non-medical: Not on file   Tobacco Use    Smoking status: Never Smoker    Smokeless tobacco: Never Used   Substance and Sexual Activity    Alcohol use: No    Drug use: No    Sexual activity: Yes     Partners: Female   Lifestyle    Physical activity:     Days per week: Not on file     Minutes per session: Not on file    Stress: Not on file   Relationships    Social connections:     Talks on phone: Not on file     Gets together: Not on file     Attends Christianity service: Not on file     Active member of club or organization: Not on file     Attends meetings of clubs or organizations: Not on file     Relationship status: Not on file    Intimate partner violence:     Fear of current or ex partner: Not on file     Emotionally abused: Not on file     Physically abused: Not on file     Forced sexual activity: Not on file   Other Topics Concern    Not on file   Social History Narrative    Not on file       PHYSICAL EXAM     INITIAL VITALS:  height is 6' (1.829 m) and weight is 255 lb (115.7 kg). His oral temperature is 98.5 °F (36.9 °C). His blood pressure is 142/77 (abnormal) and his pulse is 68.  His is warm and dry. No rash noted. He is not diaphoretic. No erythema. No pallor. Psychiatric: He has a normal mood and affect. His behavior is normal. Thought content normal.   Nursing note and vitals reviewed. DIFFERENTIAL DIAGNOSIS:   Including but not limited to: varicocele, hydrocele, spermatocele, epididymitis, orchitis, testicular torsion, UTI     DIAGNOSTIC RESULTS     EKG: All EKG's are interpreted by the Emergency Department Physician who eithersigns or Co-signs this chart in the absence of a cardiologist.    None    RADIOLOGY: non-plainfilm images(s) such as CT, Ultrasound and MRI are read by the radiologist.  Plain radiographic images are visualized and preliminarily interpreted by the emergency physician unless otherwise stated below. US SCROTUM AND TESTICLES   Final Result   1. Negative exam for testicular torsion. 2. Small bilateral varicoceles not excluded. 3. Small bilateral hydroceles present. 4. Small right epididymal head cyst.   =   **This report has been created using voice recognition software. It may contain minor errors which are inherent in voice recognition technology. **      Final report electronically signed by Dr. Griselda Joaquin on 6/22/2019 10:33 PM            LABS:   Labs Reviewed   CBC WITH AUTO DIFFERENTIAL - Abnormal; Notable for the following components:       Result Value    RBC 4.43 (*)     Hemoglobin 12.8 (*)     Hematocrit 38.2 (*)     MPV 8.1 (*)     All other components within normal limits   C-REACTIVE PROTEIN - Abnormal; Notable for the following components:    CRP 3.93 (*)     All other components within normal limits   SEDIMENTATION RATE - Abnormal; Notable for the following components:    Sed Rate 42 (*)     All other components within normal limits   GLOMERULAR FILTRATION RATE, ESTIMATED - Abnormal; Notable for the following components:    Est, Glom Filt Rate 87 (*)     All other components within normal limits   URINE RT REFLEX TO CULTURE   BASIC METABOLIC PANEL   HEPATIC FUNCTION PANEL   ANION GAP   OSMOLALITY       EMERGENCY DEPARTMENT COURSE:   Vitals:    Vitals:    06/22/19 1915   BP: (!) 142/77   Pulse: 68   Resp: 16   Temp: 98.5 °F (36.9 °C)   TempSrc: Oral   SpO2: 96%   Weight: 255 lb (115.7 kg)   Height: 6' (1.829 m)     MDM  The patient was seen and evaluated within the ED today with right testicular pain and swelling. Within the department, I observed the patient's vital signs to be within acceptable range, and he was afebrile. On exam, I appreciated mild edema of the left testicle with minimal associated tenderness. I did not palpate a firm mass. There is no erythema, warmth, or signs of cellulitis of the scrotum. There is no abdominal tenderness. US studies within the department revealed no testicular torsion. There are possible small bilateral varicoceles as well as small bilateral hydroceles. There is a small right epididymal head cyst.  Laboratory work revealed a CRP of 3.93 and ESR of 42. Within the department, the patient was treated with Tramadol. I observed the patient's condition to improve during the duration of the stay. I explained my proposed course of treatment to the patient and his wife, who were amenable to my treatment and discharge decisions. He was discharged home in stable condition with prescriptions for Cipro, and the patient will return to the ED if the symptoms become more severe in nature or otherwise change. He will otherwise follow-up with Dr. Lauren Reid, Urology, for further evaluation and management. CRITICAL CARE:   None    CONSULTS:  Discussed the case with my attending physician in the Emergency Department, who agreed with my workup, treatment, and disposition decisions. PROCEDURES:  None    FINAL IMPRESSION     1. Varicocele    2.  Epididymal cyst          DISPOSITION/PLAN   I have given the patient strict written and verbal instructions about care at home, follow-up, and signs and symptoms of worsening of condition, and the patient did verbalize understanding of these instructions. Patient was discharged in stable condition. Will return if symptoms change or worsen, or for any sign or symptom deemed emergent by the patient or family members. Follow up as an outpatient or sooner if symptoms warrant. PATIENT REFERREDTO:  Iliana Muller MD  [address] [phone]    Call in 2 days  As needed, If symptoms worsen, For re-check      DISCHARGE MEDICATIONS:  Discharge Medication List as of 6/22/2019 11:23 PM      START taking these medications    Details   ciprofloxacin (CIPRO) 500 MG tablet Take 1 tablet by mouth 2 times daily for 7 days, Disp-14 tablet, R-0Print             (Please note that portions of this note were completed with a voice recognition program.  Efforts were made to edit the dictations but occasionally words are mis-transcribed.)    Scribe:  Tata Melchor 6/22/19 8:01 PM Scribing for and in the presence of Cal[facility]. Signed by: Love Diaz, 06/23/19 9:11 AM    Provider:  I personally performed the services described in the documentation,reviewed and edited the documentation which was dictated to the scribe in my presence, and it accurately records my words and actions.     Calixto Capone [facility]  06/23/19 9:11 AM    GIN Diane PA-C  06/23/19 [id number]

## 2019-06-23 NOTE — ED NOTES
Patient resting in bed, urine obtained. Patient able to ambulate to restroom without assistance. No other needs voiced at this time. Will continue to monitor.       Eduardo Bolton RN  06/22/19 [id number]

## 2019-06-25 ENCOUNTER — OFFICE VISIT (OUTPATIENT)
Dept: UROLOGY | Age: 59
End: 2019-06-25
Payer: COMMERCIAL

## 2019-06-25 VITALS
DIASTOLIC BLOOD PRESSURE: 72 MMHG | WEIGHT: 261.4 LBS | SYSTOLIC BLOOD PRESSURE: 122 MMHG | BODY MASS INDEX: 35.41 KG/M2 | HEIGHT: 72 IN

## 2019-06-25 DIAGNOSIS — Z12.5 SCREENING PSA (PROSTATE SPECIFIC ANTIGEN): ICD-10-CM

## 2019-06-25 DIAGNOSIS — K40.90 RIGHT INGUINAL HERNIA: ICD-10-CM

## 2019-06-25 DIAGNOSIS — N50.82 SCROTAL PAIN: ICD-10-CM

## 2019-06-25 DIAGNOSIS — R39.12 WEAK URINARY STREAM: ICD-10-CM

## 2019-06-25 DIAGNOSIS — I82.90 THROMBOSIS: Primary | ICD-10-CM

## 2019-06-25 LAB
BILIRUBIN URINE: NEGATIVE
BLOOD URINE, POC: NEGATIVE
CHARACTER, URINE: CLEAR
COLOR, URINE: YELLOW
GLUCOSE URINE: NEGATIVE MG/DL
KETONES, URINE: NEGATIVE
LEUKOCYTE CLUMPS, URINE: NEGATIVE
NITRITE, URINE: NEGATIVE
PH, URINE: 7 (ref 5–9)
POST VOID RESIDUAL (PVR): 109 ML
PROTEIN, URINE: NEGATIVE MG/DL
SPECIFIC GRAVITY, URINE: 1.02 (ref 1–1.03)
UROBILINOGEN, URINE: 0.2 EU/DL (ref 0–1)

## 2019-06-25 PROCEDURE — 81003 URINALYSIS AUTO W/O SCOPE: CPT | Performed by: NURSE PRACTITIONER

## 2019-06-25 PROCEDURE — 99214 OFFICE O/P EST MOD 30 MIN: CPT | Performed by: NURSE PRACTITIONER

## 2019-06-25 PROCEDURE — G8417 CALC BMI ABV UP PARAM F/U: HCPCS | Performed by: NURSE PRACTITIONER

## 2019-06-25 PROCEDURE — 51798 US URINE CAPACITY MEASURE: CPT | Performed by: NURSE PRACTITIONER

## 2019-06-25 PROCEDURE — 1036F TOBACCO NON-USER: CPT | Performed by: NURSE PRACTITIONER

## 2019-06-25 PROCEDURE — 3017F COLORECTAL CA SCREEN DOC REV: CPT | Performed by: NURSE PRACTITIONER

## 2019-06-25 PROCEDURE — G8427 DOCREV CUR MEDS BY ELIG CLIN: HCPCS | Performed by: NURSE PRACTITIONER

## 2019-06-25 ASSESSMENT — ENCOUNTER SYMPTOMS
EYES NEGATIVE: 1
ABDOMINAL PAIN: 1
ALLERGIC/IMMUNOLOGIC NEGATIVE: 1
RESPIRATORY NEGATIVE: 1

## 2019-06-25 NOTE — PATIENT INSTRUCTIONS
now or seek immediate medical care if:    · You have new or worse belly pain.     · You are vomiting.     · You cannot pass stools or gas.     · You cannot push the hernia back into place with gentle pressure when you are lying down.     · The area over the hernia turns red or becomes tender.    Watch closely for changes in your health, and be sure to contact your doctor if you have any problems. Where can you learn more? Go to https://ShelfFlippeWeeding Technologieseb.Planana. org and sign in to your [employer] account. Enter C129 in the Mobile Health Consumer box to learn more about \"Hernia: Care Instructions. \"     If you do not have an account, please click on the \"Sign Up Now\" link. Current as of: November 7, 2018  Content Version: 12.0  © 1329-7205 Healthwise, Incorporated. Care instructions adapted under license by [facility] ([facility]). If you have questions about a medical condition or this instruction, always ask your healthcare professional. [place] [address] any warranty or liability for your use of this information.

## 2019-06-25 NOTE — PROGRESS NOTES
for 7 days 14 tablet 0    nabumetone (RELAFEN) 750 MG tablet TAKE 1 TABLET BY MOUTH TWICE DAILY 60 tablet 3    chlorhexidine (PERIDEX) 0.12 % solution   3    rosuvastatin (CRESTOR) 10 MG tablet TAKE ONE TABLET BY MOUTH NIGHTLY 90 tablet 1    amLODIPine (NORVASC) 5 MG tablet Take 5 mg by mouth daily      spironolactone (ALDACTONE) 50 MG tablet Take 50 mg by mouth daily      naproxen (NAPROSYN) 500 MG tablet Take 500 mg by mouth as needed for Pain      hydrOXYzine (ATARAX) 10 MG tablet Take 10 mg by mouth 3 times daily as needed for Itching      Cholecalciferol (VITAMIN D3) 2000 units CAPS Take by mouth      MAGNESIUM GLYCINATE PLUS PO Take by mouth      esomeprazole Magnesium (NEXIUM) 20 MG PACK Take 20 mg by mouth as needed      metoprolol succinate (TOPROL XL) 25 MG extended release tablet TAKE 1 TABLET BY MOUTH ONCE DAILY 90 tablet 1    nystatin-triamcinolone (MYCOLOG II) 042328-8.1 UNIT/GM-% cream Apply topically 2 times daily. (Patient taking differently: as needed Apply topically 2 times daily.) 30 g 0    olopatadine (PATANASE) 0.6 % SOLN nassl soln 2 sprays by Nasal route 2 times daily 1 Bottle 5    ipratropium (ATROVENT) 0.06 % nasal spray 2 sprays by Nasal route 3 times daily 1 Bottle 3    CPAP Machine MISC by Does not apply route Please change CPAP pressure to auto 5-15 cm H20. 1 each 0    losartan (COZAAR) 100 MG tablet Take 100 mg by mouth daily      aspirin 81 MG chewable tablet Take 81 mg by mouth daily      Coenzyme Q10 (COQ10) 200 MG CAPS Take  by mouth 2 times daily. No current facility-administered medications for this visit. Past Medical History  Nicolas  has a past medical history of CAD (coronary artery disease), GERD (gastroesophageal reflux disease), Hyperlipidemia, Hypertension, and CELINA (obstructive sleep apnea). Past Surgical History  The patient  has a past surgical history that includes Cholecystectomy (2008); Colonoscopy (2013);  Tonsillectomy; pr Allergic/Immunologic: Negative. Neurological: Negative. Hematological: Negative. Psychiatric/Behavioral: Negative. Objective: There were no vitals taken for this visit. Physical Exam   Constitutional: He appears well-developed and well-nourished. No distress. HENT:   Head: Normocephalic and atraumatic. Eyes: Right eye exhibits no discharge. Left eye exhibits no discharge. No scleral icterus. Neck: No JVD present. No tracheal deviation present. Cardiovascular: Normal rate, regular rhythm, normal heart sounds and intact distal pulses. No murmur heard. Pulmonary/Chest: Effort normal and breath sounds normal. No stridor. No respiratory distress. Abdominal: Soft. Bowel sounds are normal. He exhibits no distension. There is no tenderness. There is no guarding. A hernia is present. Hernia confirmed positive in the right inguinal area. Genitourinary: Penis normal. Right testis shows no swelling and no tenderness. Left testis shows no swelling and no tenderness. Circumcised. Skin: He is not diaphoretic. POC  No results found for this visit on 06/25/19. Radiology  The patient has had a Scrotal Ultrasound which I have reviewed along with its accompanying report. The study demonstrates small bilateral hydroceles, small right epididymal head cyst, small bilateral varicoceles not excluded. Assessment:   Scrotal/groin pain--Possible R groin hernia  Small bilateral hydroceles  Small right epididymal head cyst  Bilateral inguinal hernias per prior CT imaging. Hx of thrombosis of varicoceles  BPH with LUTS  Erectile dysfunction      Plan:     Referral to general surgery for possible right groin hernia. Pt has seen Dr. Dejuan Burnett in the past.  CT 8/2018 with bilateral fat-containing inguinal hernias. Pt reports symptoms started following episode of heavy lifting. No obvious acute urologic findings at this time. Scrotal US with only small suggestion of bilateral varicoceles. Pt to continue Cialis. Discussed a trial of Flomax and pt wants to continue to hold off at this time. He will call if any worsening in symptoms. F/u in 6 months with PVR. SHERICE today without nodule or induration. Check PSA now and call results. Referral to general surgery. F/u in 6 months with PVR. PSA now.

## 2019-06-26 ENCOUNTER — HOSPITAL ENCOUNTER (OUTPATIENT)
Age: 59
Discharge: HOME | End: 2019-06-26
Payer: COMMERCIAL

## 2019-06-26 ENCOUNTER — TELEPHONE (OUTPATIENT)
Dept: UROLOGY | Age: 59
End: 2019-06-26

## 2019-06-26 DIAGNOSIS — Z12.5 SCREENING PSA (PROSTATE SPECIFIC ANTIGEN): ICD-10-CM

## 2019-06-26 LAB — PROSTATE SPECIFIC ANTIGEN: 0.49 NG/ML (ref 0–1)

## 2019-06-26 PROCEDURE — 36415 COLL VENOUS BLD VENIPUNCTURE: CPT

## 2019-06-26 PROCEDURE — 84153 ASSAY OF PSA TOTAL: CPT

## 2019-06-26 NOTE — TELEPHONE ENCOUNTER
Pt notified and voiced understanding. He also was asking if he needed to be taking the Cipro prescribed by the ER and he said that it makes him feel awful. Inocencia Larson said that he does not need to take it anymore.  He voiced understanding

## 2019-06-27 ENCOUNTER — TELEPHONE (OUTPATIENT)
Dept: FAMILY MEDICINE CLINIC | Age: 59
End: 2019-06-27

## 2019-06-27 NOTE — TELEPHONE ENCOUNTER
Pt. Is calling and wanting to speak to someone concering a medical condition. Please contact pt.  At [phone]

## 2019-07-09 ENCOUNTER — OFFICE VISIT (OUTPATIENT)
Dept: FAMILY MEDICINE CLINIC | Age: 59
End: 2019-07-09
Payer: COMMERCIAL

## 2019-07-09 VITALS
DIASTOLIC BLOOD PRESSURE: 78 MMHG | OXYGEN SATURATION: 99 % | RESPIRATION RATE: 12 BRPM | HEIGHT: 72 IN | WEIGHT: 258.3 LBS | BODY MASS INDEX: 34.98 KG/M2 | HEART RATE: 74 BPM | SYSTOLIC BLOOD PRESSURE: 137 MMHG

## 2019-07-09 DIAGNOSIS — R10.31 INGUINAL PAIN, RIGHT: ICD-10-CM

## 2019-07-09 DIAGNOSIS — N64.4 NIPPLE PAIN: Primary | ICD-10-CM

## 2019-07-09 DIAGNOSIS — N64.59 ABNORMAL BREAST EXAM: ICD-10-CM

## 2019-07-09 DIAGNOSIS — R20.0 NUMBNESS AND TINGLING OF BOTH LEGS: ICD-10-CM

## 2019-07-09 DIAGNOSIS — R20.2 NUMBNESS AND TINGLING OF BOTH LEGS: ICD-10-CM

## 2019-07-09 DIAGNOSIS — N50.811 RIGHT TESTICULAR PAIN: ICD-10-CM

## 2019-07-09 DIAGNOSIS — K14.8 TONGUE MASS: ICD-10-CM

## 2019-07-09 PROCEDURE — G8427 DOCREV CUR MEDS BY ELIG CLIN: HCPCS | Performed by: NURSE PRACTITIONER

## 2019-07-09 PROCEDURE — 1036F TOBACCO NON-USER: CPT | Performed by: NURSE PRACTITIONER

## 2019-07-09 PROCEDURE — 99214 OFFICE O/P EST MOD 30 MIN: CPT | Performed by: NURSE PRACTITIONER

## 2019-07-09 PROCEDURE — 3017F COLORECTAL CA SCREEN DOC REV: CPT | Performed by: NURSE PRACTITIONER

## 2019-07-09 PROCEDURE — G8417 CALC BMI ABV UP PARAM F/U: HCPCS | Performed by: NURSE PRACTITIONER

## 2019-07-09 NOTE — PATIENT INSTRUCTIONS
example, call if:    · You have weakness, numbness, or tingling in both legs.     · You lose bowel or bladder control.     · You have symptoms of a stroke. These may include:  ? Sudden numbness, tingling, weakness, or loss of movement in your face, arm, or leg, especially on only one side of your body. ? Sudden vision changes. ? Sudden trouble speaking. ? Sudden confusion or trouble understanding simple statements. ? Sudden problems with walking or balance. ? A sudden, severe headache that is different from past headaches.    Watch closely for changes in your health, and be sure to contact your doctor if you have any problems, or if:    · You do not get better as expected. Where can you learn more? Go to https://Ekaya.comeb.sli.do. org and sign in to your [employer] account. Enter T706 in the [employer] box to learn more about \"Numbness and Tingling: Care Instructions. \"     If you do not have an account, please click on the \"Sign Up Now\" link. Current as of: [place] 3, 2018  Content Version: 12.0  © 6952-3117 Healthwise, Incorporated. Care instructions adapted under license by [facility] ([facility]). If you have questions about a medical condition or this instruction, always ask your healthcare professional. [place] [address] any warranty or liability for your use of this information. Patient Education        Inguinal Hernia: Care Instructions  Your Care Instructions    An inguinal hernia occurs when tissue bulges through a weak spot in your groin area. You may see or feel a tender bulge in the groin or scrotum. You may also have pain, pressure or burning, or a feeling that something has \"given way. \"  Hernias are caused by a weakness in the belly wall. The bulge or discomfort may occur after heavy lifting, straining, or coughing. Hernias do not heal on their own, and they tend to get worse over time.   If your hernia does not bother you, you most likely can wait to have

## 2019-07-11 ENCOUNTER — NURSE ONLY (OUTPATIENT)
Dept: LAB | Age: 59
End: 2019-07-11

## 2019-07-11 ENCOUNTER — OFFICE VISIT (OUTPATIENT)
Dept: SURGERY | Age: 59
End: 2019-07-11
Payer: COMMERCIAL

## 2019-07-11 VITALS
OXYGEN SATURATION: 99 % | WEIGHT: 259 LBS | DIASTOLIC BLOOD PRESSURE: 82 MMHG | HEIGHT: 72 IN | RESPIRATION RATE: 16 BRPM | TEMPERATURE: 97.4 F | SYSTOLIC BLOOD PRESSURE: 130 MMHG | BODY MASS INDEX: 35.08 KG/M2 | HEART RATE: 64 BPM

## 2019-07-11 DIAGNOSIS — N50.811 RIGHT TESTICULAR PAIN: ICD-10-CM

## 2019-07-11 DIAGNOSIS — R20.0 NUMBNESS AND TINGLING OF BOTH LEGS: ICD-10-CM

## 2019-07-11 DIAGNOSIS — K40.90 INGUINAL HERNIA, RIGHT: Primary | ICD-10-CM

## 2019-07-11 DIAGNOSIS — N64.4 NIPPLE PAIN: ICD-10-CM

## 2019-07-11 DIAGNOSIS — K14.8 TONGUE MASS: ICD-10-CM

## 2019-07-11 DIAGNOSIS — R10.31 INGUINAL PAIN, RIGHT: ICD-10-CM

## 2019-07-11 DIAGNOSIS — R20.2 NUMBNESS AND TINGLING OF BOTH LEGS: ICD-10-CM

## 2019-07-11 LAB
BASOPHILS # BLD: 0.6 %
BASOPHILS ABSOLUTE: 0.1 THOU/MM3 (ref 0–0.1)
EOSINOPHIL # BLD: 1.3 %
EOSINOPHILS ABSOLUTE: 0.1 THOU/MM3 (ref 0–0.4)
ERYTHROCYTE [DISTWIDTH] IN BLOOD BY AUTOMATED COUNT: 13 % (ref 11.5–14.5)
ERYTHROCYTE [DISTWIDTH] IN BLOOD BY AUTOMATED COUNT: 41.1 FL (ref 35–45)
ESTRADIOL LEVEL: 27 PG/ML
HCT VFR BLD CALC: 39.2 % (ref 42–52)
HEMOGLOBIN: 13 GM/DL (ref 14–18)
IMMATURE GRANS (ABS): 0.04 THOU/MM3 (ref 0–0.07)
IMMATURE GRANULOCYTES: 0.4 %
LUTEINIZING HORMONE: 4.3 MIU/ML (ref 0.6–6.3)
LYMPHOCYTES # BLD: 15.7 %
LYMPHOCYTES ABSOLUTE: 1.6 THOU/MM3 (ref 1–4.8)
MCH RBC QN AUTO: 29.1 PG (ref 26–33)
MCHC RBC AUTO-ENTMCNC: 33.2 GM/DL (ref 32.2–35.5)
MCV RBC AUTO: 87.7 FL (ref 80–94)
MONOCYTES # BLD: 10 %
MONOCYTES ABSOLUTE: 1 THOU/MM3 (ref 0.4–1.3)
NUCLEATED RED BLOOD CELLS: 0 /100 WBC
PLATELET # BLD: 259 THOU/MM3 (ref 130–400)
PMV BLD AUTO: 8.2 FL (ref 9.4–12.4)
RBC # BLD: 4.47 MILL/MM3 (ref 4.7–6.1)
SEDIMENTATION RATE, ERYTHROCYTE: 55 MM/HR (ref 0–10)
SEG NEUTROPHILS: 72 %
SEGMENTED NEUTROPHILS ABSOLUTE COUNT: 7.5 THOU/MM3 (ref 1.8–7.7)
T4 FREE: 1 NG/DL (ref 0.93–1.76)
TSH SERPL DL<=0.05 MIU/L-ACNC: 1.93 UIU/ML (ref 0.4–4.2)
WBC # BLD: 10.4 THOU/MM3 (ref 4.8–10.8)

## 2019-07-11 PROCEDURE — 1036F TOBACCO NON-USER: CPT | Performed by: SURGERY

## 2019-07-11 PROCEDURE — G8417 CALC BMI ABV UP PARAM F/U: HCPCS | Performed by: SURGERY

## 2019-07-11 PROCEDURE — G8427 DOCREV CUR MEDS BY ELIG CLIN: HCPCS | Performed by: SURGERY

## 2019-07-11 PROCEDURE — 3017F COLORECTAL CA SCREEN DOC REV: CPT | Performed by: SURGERY

## 2019-07-11 PROCEDURE — 99203 OFFICE O/P NEW LOW 30 MIN: CPT | Performed by: SURGERY

## 2019-07-13 LAB
ANA SCREEN: NORMAL
BETA HCG QUANT, MALE: < 1 IU/L (ref 0–3)
C-REACTIVE PROTEIN, HIGH SENSITIVITY: 59.1 MG/L
TESTOSTERONE FREE: NORMAL

## 2019-07-16 ENCOUNTER — TELEPHONE (OUTPATIENT)
Dept: FAMILY MEDICINE CLINIC | Age: 59
End: 2019-07-16

## 2019-07-23 ENCOUNTER — TELEPHONE (OUTPATIENT)
Dept: FAMILY MEDICINE CLINIC | Age: 59
End: 2019-07-23

## 2019-07-23 DIAGNOSIS — N64.4 NIPPLE PAIN: Primary | ICD-10-CM

## 2019-07-23 DIAGNOSIS — N64.59 ABNORMAL BREAST EXAM: ICD-10-CM

## 2019-07-25 ENCOUNTER — HOSPITAL ENCOUNTER (OUTPATIENT)
Dept: WOMENS IMAGING | Age: 59
Discharge: HOME | End: 2019-07-25
Payer: COMMERCIAL

## 2019-07-25 ENCOUNTER — TELEPHONE (OUTPATIENT)
Dept: FAMILY MEDICINE CLINIC | Age: 59
End: 2019-07-25

## 2019-07-25 DIAGNOSIS — N64.4 NIPPLE PAIN: ICD-10-CM

## 2019-07-25 DIAGNOSIS — N64.59 ABNORMAL BREAST EXAM: ICD-10-CM

## 2019-07-25 PROCEDURE — 77066 DX MAMMO INCL CAD BI: CPT

## 2019-07-25 PROCEDURE — 76642 ULTRASOUND BREAST LIMITED: CPT

## 2019-07-30 ENCOUNTER — OFFICE VISIT (OUTPATIENT)
Dept: ENT CLINIC | Age: 59
End: 2019-07-30
Payer: COMMERCIAL

## 2019-07-30 VITALS
SYSTOLIC BLOOD PRESSURE: 110 MMHG | DIASTOLIC BLOOD PRESSURE: 66 MMHG | BODY MASS INDEX: 35.08 KG/M2 | WEIGHT: 259 LBS | RESPIRATION RATE: 14 BRPM | TEMPERATURE: 98.2 F | HEIGHT: 72 IN | HEART RATE: 76 BPM

## 2019-07-30 DIAGNOSIS — K14.8 LESION OF TONGUE: Primary | ICD-10-CM

## 2019-07-30 PROCEDURE — 41100 BIOPSY OF TONGUE: CPT | Performed by: OTOLARYNGOLOGY

## 2019-07-30 PROCEDURE — 99205 OFFICE O/P NEW HI 60 MIN: CPT | Performed by: OTOLARYNGOLOGY

## 2019-08-02 ENCOUNTER — OFFICE VISIT (OUTPATIENT)
Dept: ENT CLINIC | Age: 59
End: 2019-08-02

## 2019-08-02 VITALS
DIASTOLIC BLOOD PRESSURE: 66 MMHG | TEMPERATURE: 98.2 F | HEART RATE: 76 BPM | WEIGHT: 259 LBS | SYSTOLIC BLOOD PRESSURE: 110 MMHG | RESPIRATION RATE: 14 BRPM | BODY MASS INDEX: 35.08 KG/M2 | HEIGHT: 72 IN

## 2019-08-02 DIAGNOSIS — K14.8 TONGUE LESION: Primary | ICD-10-CM

## 2019-08-02 PROCEDURE — 99024 POSTOP FOLLOW-UP VISIT: CPT | Performed by: OTOLARYNGOLOGY

## 2019-08-06 ENCOUNTER — PROCEDURE VISIT (OUTPATIENT)
Dept: NEUROLOGY | Age: 59
End: 2019-08-06
Payer: COMMERCIAL

## 2019-08-06 DIAGNOSIS — R20.0 BILATERAL LEG NUMBNESS: ICD-10-CM

## 2019-08-06 DIAGNOSIS — M54.16 LUMBAR RADICULOPATHY: Primary | ICD-10-CM

## 2019-08-06 PROCEDURE — 95886 MUSC TEST DONE W/N TEST COMP: CPT | Performed by: PSYCHIATRY & NEUROLOGY

## 2019-08-06 PROCEDURE — 95910 NRV CNDJ TEST 7-8 STUDIES: CPT | Performed by: PSYCHIATRY & NEUROLOGY

## 2019-08-07 ENCOUNTER — TELEPHONE (OUTPATIENT)
Dept: FAMILY MEDICINE CLINIC | Age: 59
End: 2019-08-07

## 2019-08-07 DIAGNOSIS — R10.31 INGUINAL PAIN, RIGHT: ICD-10-CM

## 2019-08-07 DIAGNOSIS — R20.2 NUMBNESS AND TINGLING OF BOTH LEGS: ICD-10-CM

## 2019-08-07 DIAGNOSIS — N64.4 NIPPLE PAIN: Primary | ICD-10-CM

## 2019-08-07 DIAGNOSIS — R20.0 NUMBNESS AND TINGLING OF BOTH LEGS: ICD-10-CM

## 2019-08-07 DIAGNOSIS — K14.8 TONGUE MASS: ICD-10-CM

## 2019-08-07 DIAGNOSIS — N50.811 RIGHT TESTICULAR PAIN: ICD-10-CM

## 2019-08-07 NOTE — TELEPHONE ENCOUNTER
----- Message from Daria Mcguire MD sent at 8/6/2019  5:59 PM EDT -----  Repeat CRP before Friday's visit

## 2019-08-08 ENCOUNTER — NURSE ONLY (OUTPATIENT)
Dept: LAB | Age: 59
End: 2019-08-08

## 2019-08-08 DIAGNOSIS — K14.8 TONGUE MASS: ICD-10-CM

## 2019-08-08 DIAGNOSIS — N64.4 NIPPLE PAIN: ICD-10-CM

## 2019-08-08 DIAGNOSIS — N50.811 RIGHT TESTICULAR PAIN: ICD-10-CM

## 2019-08-08 DIAGNOSIS — R20.0 NUMBNESS AND TINGLING OF BOTH LEGS: ICD-10-CM

## 2019-08-08 DIAGNOSIS — R20.2 NUMBNESS AND TINGLING OF BOTH LEGS: ICD-10-CM

## 2019-08-08 DIAGNOSIS — R10.31 INGUINAL PAIN, RIGHT: ICD-10-CM

## 2019-08-09 ENCOUNTER — OFFICE VISIT (OUTPATIENT)
Dept: FAMILY MEDICINE CLINIC | Age: 59
End: 2019-08-09
Payer: COMMERCIAL

## 2019-08-09 VITALS
DIASTOLIC BLOOD PRESSURE: 80 MMHG | HEIGHT: 72 IN | RESPIRATION RATE: 16 BRPM | SYSTOLIC BLOOD PRESSURE: 117 MMHG | HEART RATE: 77 BPM | BODY MASS INDEX: 34.97 KG/M2 | TEMPERATURE: 97.6 F | WEIGHT: 258.2 LBS

## 2019-08-09 DIAGNOSIS — R79.82 ELEVATED C-REACTIVE PROTEIN (CRP): Primary | ICD-10-CM

## 2019-08-09 DIAGNOSIS — R10.2 PELVIC PAIN IN MALE: ICD-10-CM

## 2019-08-09 LAB — C-REACTIVE PROTEIN, HIGH SENSITIVITY: 70.5 MG/L

## 2019-08-09 PROCEDURE — 3017F COLORECTAL CA SCREEN DOC REV: CPT | Performed by: FAMILY MEDICINE

## 2019-08-09 PROCEDURE — 99214 OFFICE O/P EST MOD 30 MIN: CPT | Performed by: FAMILY MEDICINE

## 2019-08-09 PROCEDURE — 1036F TOBACCO NON-USER: CPT | Performed by: FAMILY MEDICINE

## 2019-08-09 PROCEDURE — G8427 DOCREV CUR MEDS BY ELIG CLIN: HCPCS | Performed by: FAMILY MEDICINE

## 2019-08-09 PROCEDURE — G8417 CALC BMI ABV UP PARAM F/U: HCPCS | Performed by: FAMILY MEDICINE

## 2019-08-09 NOTE — PROGRESS NOTES
of the colon.           2. Bilateral fat-containing inguinal hernias.       **This report has been created using voice recognition software. It may contain minor errors which are inherent in voice recognition technology. **       Final report electronically signed by Dr. Shweta Gutierrez on 8/29/2018 9:30 PM         He was referred to Dr. Jc Amin for thromboembolic work up and to Dr. Kelsi Knowles for GI work up. In June 2019 he presented to ED secondary to groin pain and swelling on the right rated 6/10. Scrotal US 6/22/19 was negative for testicular torsion and noted small bilateral varicoceles were not excluded, noted small bilateral hydroceles, small right epididymal head cyst.  No evidence of epididymitis or orchitis. There was a concern about maybe he pulled a muscle as he was carrying a 50 lb bag days earlier to the beginning of the pain. He was referred to general surgery (by Urology) for the bilateral fat-containing  inguinal hernias. CT scan abdomen from March 2018:     2. There is mild fusiform dilation of the abdominal aorta which measures 3.3 cm in diameter and is slightly larger compared to the prior exam where it measured 3.0 cm.         July 9 he returned to my office for left breast/nipple pain and sensation of fullness in that breast.  Work up was negative, except for the elevation of the ESR and CRP. Mammogram as follows:       IMPRESSION: Mammogram BI-RADS: 0: Incomplete: Needs Additional    Imaging       1. The area of fibroglandular tissue in the right breast central    to the nipple in the retroareolar region appears indeterminate.     An ultrasound is recommended.     2. The area of fibroglandular tissue in the left breast central    to the nipple in the retroareolar region appears indeterminate.     An ultrasound is recommended.     3. A targeted ultrasound was performed.         US results as follows:       IMPRESSION: Ultrasound BI-RADS: 2: Benign   1.  There is no sonographic

## 2019-08-12 ENCOUNTER — TELEPHONE (OUTPATIENT)
Dept: FAMILY MEDICINE CLINIC | Age: 59
End: 2019-08-12

## 2019-08-12 NOTE — TELEPHONE ENCOUNTER
Pt notified and voiced understanding of results and recommendations   Dr Mumtaz Jimenez, please order blood tests and sign referral   This patient also stated that he is expecting Dr Mumtaz Jimenez to order US of the aorta area, asking if this is correct   Will call to schedule MRI test  Please advise

## 2019-08-12 NOTE — TELEPHONE ENCOUNTER
Call to [facility] central scheduling to set up MRI for this patient. Per Sarthak Wright at central scheduling, because MRI if for Pelvis, it has to be noted specifically on the MRI order \"Prostate or male reproductive system. \" Sarthak Wright states if not noted on order, scheduling this patient for MRI pelvis can be delayed.     Dr Chance Garay please advise

## 2019-08-13 ENCOUNTER — TELEPHONE (OUTPATIENT)
Dept: FAMILY MEDICINE CLINIC | Age: 59
End: 2019-08-13

## 2019-08-13 NOTE — TELEPHONE ENCOUNTER
Called and spoke with Nicolas regarding prior lab work : IGM. Patient states he has only ever gotten lab work completed at [employer] / [facility] / or Dr. Anahy Perales office. Dr. Anahy Perales office faxed all the records they have with no documentation of labs Dr. Warren Lennox is looking for. Contacted [facility] regarding prior testing and they did not find anything on record. Patient is also wondering about referral to Dr. Abdoulaye Hernandez which prior encounter indicates he will not see the patient for listed diagnosis. Please advise.

## 2019-08-13 NOTE — TELEPHONE ENCOUNTER
Dr Ilene Lopes called [facility] central scheduling again and spoke to [facility] who states basically what they need to know is if you are looking for prostate cancer because of the elevated C-reactive protein diagnosis on this order. [facility] states this then could change the duration of the MRI test from 30 mins to 60 mins. And states need to know before pt can be scheduled for MRI.

## 2019-08-14 DIAGNOSIS — R79.82 ELEVATED C-REACTIVE PROTEIN (CRP): ICD-10-CM

## 2019-08-14 DIAGNOSIS — R10.2 PELVIC PAIN IN MALE: Primary | ICD-10-CM

## 2019-08-14 ASSESSMENT — ENCOUNTER SYMPTOMS
SINUS PAIN: 0
EYE REDNESS: 0
FACIAL SWELLING: 0
STRIDOR: 0
BACK PAIN: 1
WHEEZING: 0
RESPIRATORY NEGATIVE: 1
SORE THROAT: 0
PHOTOPHOBIA: 0
SHORTNESS OF BREATH: 0
VOICE CHANGE: 0
CHOKING: 0
COUGH: 0
EYE ITCHING: 0
COLOR CHANGE: 0
RECTAL PAIN: 1
SINUS PRESSURE: 0
APNEA: 0
CHEST TIGHTNESS: 0
TROUBLE SWALLOWING: 0
EYE DISCHARGE: 0
EYE PAIN: 0
RHINORRHEA: 0

## 2019-08-14 NOTE — PROGRESS NOTES
tablet Take 81 mg by mouth daily      Coenzyme Q10 (COQ10) 200 MG CAPS Take  by mouth 2 times daily. No current facility-administered medications for this visit. Allergies   Allergen Reactions    Tramadol Other (See Comments)     Made me feel edgy    Bactrim [Sulfamethoxazole-Trimethoprim] Hives    Zocor [Simvastatin] Other (See Comments)     Body aches       Subjective:      Review of Systems   Constitutional: Negative for activity change, appetite change, chills, diaphoresis, fatigue, fever and unexpected weight change. HENT: Negative. Negative for congestion, dental problem, drooling, ear discharge, ear pain, facial swelling, hearing loss, mouth sores, nosebleeds, postnasal drip, rhinorrhea, sinus pressure, sinus pain, sneezing, sore throat, tinnitus, trouble swallowing and voice change. Eyes: Negative for photophobia, pain, discharge, redness, itching and visual disturbance. Respiratory: Negative. Negative for apnea, cough, choking, chest tightness, shortness of breath, wheezing and stridor. Cardiovascular: Negative. Negative for chest pain, palpitations and leg swelling. Gastrointestinal: Positive for rectal pain. Endocrine: Negative. Negative for cold intolerance, heat intolerance, polydipsia, polyphagia and polyuria. Genitourinary: Positive for difficulty urinating, genital sores, scrotal swelling and testicular pain. Negative for decreased urine volume, discharge, dysuria, enuresis, flank pain, frequency, hematuria, penile pain, penile swelling and urgency. Musculoskeletal: Positive for back pain. Negative for arthralgias, gait problem, joint swelling, myalgias, neck pain and neck stiffness. Skin: Negative for color change, pallor, rash and wound. Allergic/Immunologic: Negative for environmental allergies, food allergies and immunocompromised state. Neurological: Negative.   Negative for dizziness, tremors, seizures, syncope, facial asymmetry, speech difficulty,

## 2019-08-16 ENCOUNTER — HOSPITAL ENCOUNTER (OUTPATIENT)
Dept: ULTRASOUND IMAGING | Age: 59
Discharge: HOME | End: 2019-08-16
Payer: COMMERCIAL

## 2019-08-16 DIAGNOSIS — R93.89 ABNORMAL CT SCAN: ICD-10-CM

## 2019-08-16 PROCEDURE — 76775 US EXAM ABDO BACK WALL LIM: CPT

## 2019-08-20 ENCOUNTER — TELEPHONE (OUTPATIENT)
Dept: FAMILY MEDICINE CLINIC | Age: 59
End: 2019-08-20

## 2019-08-22 DIAGNOSIS — R79.82 ELEVATED C-REACTIVE PROTEIN (CRP): Primary | ICD-10-CM

## 2019-08-26 ENCOUNTER — TELEPHONE (OUTPATIENT)
Dept: UROLOGY | Age: 59
End: 2019-08-26

## 2019-08-26 ENCOUNTER — NURSE ONLY (OUTPATIENT)
Dept: LAB | Age: 59
End: 2019-08-26

## 2019-08-26 DIAGNOSIS — R79.82 ELEVATED C-REACTIVE PROTEIN (CRP): ICD-10-CM

## 2019-08-26 LAB — C-REACTIVE PROTEIN: 3.77 MG/DL (ref 0–1)

## 2019-09-03 ENCOUNTER — TELEPHONE (OUTPATIENT)
Dept: FAMILY MEDICINE CLINIC | Age: 59
End: 2019-09-03

## 2019-09-18 ENCOUNTER — NURSE ONLY (OUTPATIENT)
Dept: LAB | Age: 59
End: 2019-09-18

## 2019-09-18 LAB
ALBUMIN SERPL-MCNC: 4 G/DL (ref 3.5–5.1)
ALP BLD-CCNC: 103 U/L (ref 38–126)
ALT SERPL-CCNC: 24 U/L (ref 11–66)
ANION GAP SERPL CALCULATED.3IONS-SCNC: 11 MEQ/L (ref 8–16)
AST SERPL-CCNC: 23 U/L (ref 5–40)
BILIRUB SERPL-MCNC: 0.5 MG/DL (ref 0.3–1.2)
BILIRUBIN DIRECT: < 0.2 MG/DL (ref 0–0.3)
BUN BLDV-MCNC: 10 MG/DL (ref 7–22)
CALCIUM SERPL-MCNC: 9.6 MG/DL (ref 8.5–10.5)
CHLORIDE BLD-SCNC: 103 MEQ/L (ref 98–111)
CHOLESTEROL, TOTAL: 122 MG/DL (ref 100–199)
CO2: 25 MEQ/L (ref 23–33)
CREAT SERPL-MCNC: 0.8 MG/DL (ref 0.4–1.2)
ERYTHROCYTE [DISTWIDTH] IN BLOOD BY AUTOMATED COUNT: 13.8 % (ref 11.5–14.5)
ERYTHROCYTE [DISTWIDTH] IN BLOOD BY AUTOMATED COUNT: 43.9 FL (ref 35–45)
GFR SERPL CREATININE-BSD FRML MDRD: > 90 ML/MIN/1.73M2
GLUCOSE BLD-MCNC: 96 MG/DL (ref 70–108)
HCT VFR BLD CALC: 41.9 % (ref 42–52)
HDLC SERPL-MCNC: 38 MG/DL
HEMOGLOBIN: 13.4 GM/DL (ref 14–18)
LDL CHOLESTEROL CALCULATED: 57 MG/DL
MCH RBC QN AUTO: 28 PG (ref 26–33)
MCHC RBC AUTO-ENTMCNC: 32 GM/DL (ref 32.2–35.5)
MCV RBC AUTO: 87.5 FL (ref 80–94)
PLATELET # BLD: 225 THOU/MM3 (ref 130–400)
PMV BLD AUTO: 8.4 FL (ref 9.4–12.4)
POTASSIUM SERPL-SCNC: 4.4 MEQ/L (ref 3.5–5.2)
RBC # BLD: 4.79 MILL/MM3 (ref 4.7–6.1)
SODIUM BLD-SCNC: 139 MEQ/L (ref 135–145)
TOTAL PROTEIN: 7.5 G/DL (ref 6.1–8)
TRIGL SERPL-MCNC: 133 MG/DL (ref 0–199)
WBC # BLD: 8.4 THOU/MM3 (ref 4.8–10.8)

## 2019-10-08 ENCOUNTER — OFFICE VISIT (OUTPATIENT)
Dept: FAMILY MEDICINE CLINIC | Age: 59
End: 2019-10-08
Payer: COMMERCIAL

## 2019-10-08 VITALS
HEIGHT: 72 IN | BODY MASS INDEX: 36.03 KG/M2 | TEMPERATURE: 98.1 F | RESPIRATION RATE: 12 BRPM | SYSTOLIC BLOOD PRESSURE: 134 MMHG | WEIGHT: 266 LBS | DIASTOLIC BLOOD PRESSURE: 84 MMHG | HEART RATE: 56 BPM

## 2019-10-08 DIAGNOSIS — N50.819 TESTICULAR PAIN: ICD-10-CM

## 2019-10-08 DIAGNOSIS — I10 ESSENTIAL HYPERTENSION: Primary | ICD-10-CM

## 2019-10-08 DIAGNOSIS — E78.00 HYPERCHOLESTEROLEMIA: ICD-10-CM

## 2019-10-08 PROCEDURE — 1036F TOBACCO NON-USER: CPT | Performed by: FAMILY MEDICINE

## 2019-10-08 PROCEDURE — 99214 OFFICE O/P EST MOD 30 MIN: CPT | Performed by: FAMILY MEDICINE

## 2019-10-08 PROCEDURE — G8482 FLU IMMUNIZE ORDER/ADMIN: HCPCS | Performed by: FAMILY MEDICINE

## 2019-10-08 PROCEDURE — G8417 CALC BMI ABV UP PARAM F/U: HCPCS | Performed by: FAMILY MEDICINE

## 2019-10-08 PROCEDURE — 90688 IIV4 VACCINE SPLT 0.5 ML IM: CPT | Performed by: FAMILY MEDICINE

## 2019-10-08 PROCEDURE — 3017F COLORECTAL CA SCREEN DOC REV: CPT | Performed by: FAMILY MEDICINE

## 2019-10-08 PROCEDURE — 90471 IMMUNIZATION ADMIN: CPT | Performed by: FAMILY MEDICINE

## 2019-10-08 PROCEDURE — G8427 DOCREV CUR MEDS BY ELIG CLIN: HCPCS | Performed by: FAMILY MEDICINE

## 2019-10-08 RX ORDER — VITAMIN E 268 MG
400 CAPSULE ORAL DAILY
COMMUNITY

## 2019-10-09 ENCOUNTER — TELEPHONE (OUTPATIENT)
Dept: UROLOGY | Age: 59
End: 2019-10-09

## 2019-10-11 ENCOUNTER — OFFICE VISIT (OUTPATIENT)
Dept: UROLOGY | Age: 59
End: 2019-10-11
Payer: COMMERCIAL

## 2019-10-11 ENCOUNTER — TELEPHONE (OUTPATIENT)
Dept: UROLOGY | Age: 59
End: 2019-10-11

## 2019-10-11 ENCOUNTER — HOSPITAL ENCOUNTER (OUTPATIENT)
Age: 59
Discharge: HOME | End: 2019-10-11
Payer: COMMERCIAL

## 2019-10-11 ENCOUNTER — APPOINTMENT (OUTPATIENT)
Dept: ULTRASOUND IMAGING | Age: 59
End: 2019-10-11
Payer: COMMERCIAL

## 2019-10-11 ENCOUNTER — HOSPITAL ENCOUNTER (EMERGENCY)
Age: 59
Discharge: HOME | End: 2019-10-11
Attending: FAMILY MEDICINE
Payer: COMMERCIAL

## 2019-10-11 VITALS
OXYGEN SATURATION: 98 % | DIASTOLIC BLOOD PRESSURE: 81 MMHG | RESPIRATION RATE: 16 BRPM | WEIGHT: 264 LBS | BODY MASS INDEX: 35.8 KG/M2 | TEMPERATURE: 98.8 F | HEART RATE: 64 BPM | SYSTOLIC BLOOD PRESSURE: 133 MMHG

## 2019-10-11 VITALS
WEIGHT: 264 LBS | DIASTOLIC BLOOD PRESSURE: 80 MMHG | HEIGHT: 72 IN | SYSTOLIC BLOOD PRESSURE: 138 MMHG | BODY MASS INDEX: 35.76 KG/M2

## 2019-10-11 DIAGNOSIS — N43.2 OTHER HYDROCELE: ICD-10-CM

## 2019-10-11 DIAGNOSIS — N50.819 TESTICULAR PAIN: ICD-10-CM

## 2019-10-11 DIAGNOSIS — I86.1 VARICOCELE: Primary | ICD-10-CM

## 2019-10-11 DIAGNOSIS — N50.82 SCROTAL PAIN: Primary | ICD-10-CM

## 2019-10-11 LAB
BILIRUBIN URINE: NEGATIVE
BLOOD URINE, POC: NEGATIVE
C-REACTIVE PROTEIN: 0.89 MG/DL (ref 0–1)
CHARACTER, URINE: CLEAR
COLOR, URINE: YELLOW
GLUCOSE URINE: NEGATIVE MG/DL
KETONES, URINE: NEGATIVE
LEUKOCYTE CLUMPS, URINE: NEGATIVE
NITRITE, URINE: NEGATIVE
PH, URINE: 6 (ref 5–9)
PROTEIN, URINE: NEGATIVE MG/DL
SPECIFIC GRAVITY, URINE: >= 1.03 (ref 1–1.03)
UROBILINOGEN, URINE: 0.2 EU/DL (ref 0–1)

## 2019-10-11 PROCEDURE — 86140 C-REACTIVE PROTEIN: CPT

## 2019-10-11 PROCEDURE — 99214 OFFICE O/P EST MOD 30 MIN: CPT | Performed by: NURSE PRACTITIONER

## 2019-10-11 PROCEDURE — G8427 DOCREV CUR MEDS BY ELIG CLIN: HCPCS | Performed by: NURSE PRACTITIONER

## 2019-10-11 PROCEDURE — 1036F TOBACCO NON-USER: CPT | Performed by: NURSE PRACTITIONER

## 2019-10-11 PROCEDURE — 3017F COLORECTAL CA SCREEN DOC REV: CPT | Performed by: NURSE PRACTITIONER

## 2019-10-11 PROCEDURE — 36415 COLL VENOUS BLD VENIPUNCTURE: CPT

## 2019-10-11 PROCEDURE — G8417 CALC BMI ABV UP PARAM F/U: HCPCS | Performed by: NURSE PRACTITIONER

## 2019-10-11 PROCEDURE — G8482 FLU IMMUNIZE ORDER/ADMIN: HCPCS | Performed by: NURSE PRACTITIONER

## 2019-10-11 PROCEDURE — 81003 URINALYSIS AUTO W/O SCOPE: CPT | Performed by: NURSE PRACTITIONER

## 2019-10-11 PROCEDURE — 99284 EMERGENCY DEPT VISIT MOD MDM: CPT

## 2019-10-11 PROCEDURE — 76870 US EXAM SCROTUM: CPT

## 2019-10-11 PROCEDURE — 82105 ALPHA-FETOPROTEIN SERUM: CPT

## 2019-10-11 ASSESSMENT — ENCOUNTER SYMPTOMS
EYES NEGATIVE: 1
ALLERGIC/IMMUNOLOGIC NEGATIVE: 1
RESPIRATORY NEGATIVE: 1

## 2019-10-11 ASSESSMENT — PAIN DESCRIPTION - DESCRIPTORS: DESCRIPTORS: ACHING

## 2019-10-11 ASSESSMENT — PAIN DESCRIPTION - LOCATION: LOCATION: SCROTUM

## 2019-10-11 ASSESSMENT — PAIN DESCRIPTION - PAIN TYPE: TYPE: ACUTE PAIN

## 2019-10-11 ASSESSMENT — PAIN SCALES - GENERAL: PAINLEVEL_OUTOF10: 4

## 2019-10-11 ASSESSMENT — PAIN DESCRIPTION - FREQUENCY: FREQUENCY: CONTINUOUS

## 2019-10-11 ASSESSMENT — PAIN DESCRIPTION - ORIENTATION: ORIENTATION: RIGHT;LEFT

## 2019-10-12 ASSESSMENT — ENCOUNTER SYMPTOMS
BACK PAIN: 0
VOMITING: 0
NAUSEA: 0
CHEST TIGHTNESS: 0
CONSTIPATION: 0
ABDOMINAL PAIN: 0
STRIDOR: 0
DIARRHEA: 0
APNEA: 0
COUGH: 0
SHORTNESS OF BREATH: 0
WHEEZING: 0
CHOKING: 0

## 2019-10-13 LAB — AFP-TUMOR MARKER: 2.6 UG/L

## 2019-10-14 ENCOUNTER — TELEPHONE (OUTPATIENT)
Dept: FAMILY MEDICINE CLINIC | Age: 59
End: 2019-10-14

## 2019-11-20 ENCOUNTER — OFFICE VISIT (OUTPATIENT)
Dept: FAMILY MEDICINE CLINIC | Age: 59
End: 2019-11-20
Payer: COMMERCIAL

## 2019-11-20 VITALS
WEIGHT: 272.4 LBS | SYSTOLIC BLOOD PRESSURE: 146 MMHG | HEART RATE: 80 BPM | TEMPERATURE: 97.5 F | DIASTOLIC BLOOD PRESSURE: 94 MMHG | HEIGHT: 72 IN | BODY MASS INDEX: 36.9 KG/M2

## 2019-11-20 DIAGNOSIS — J02.9 ACUTE PHARYNGITIS, UNSPECIFIED ETIOLOGY: ICD-10-CM

## 2019-11-20 DIAGNOSIS — B96.89 ACUTE BACTERIAL SINUSITIS: Primary | ICD-10-CM

## 2019-11-20 DIAGNOSIS — J01.90 ACUTE BACTERIAL SINUSITIS: Primary | ICD-10-CM

## 2019-11-20 PROCEDURE — 3017F COLORECTAL CA SCREEN DOC REV: CPT | Performed by: NURSE PRACTITIONER

## 2019-11-20 PROCEDURE — G8427 DOCREV CUR MEDS BY ELIG CLIN: HCPCS | Performed by: NURSE PRACTITIONER

## 2019-11-20 PROCEDURE — G8482 FLU IMMUNIZE ORDER/ADMIN: HCPCS | Performed by: NURSE PRACTITIONER

## 2019-11-20 PROCEDURE — 99213 OFFICE O/P EST LOW 20 MIN: CPT | Performed by: NURSE PRACTITIONER

## 2019-11-20 PROCEDURE — 1036F TOBACCO NON-USER: CPT | Performed by: NURSE PRACTITIONER

## 2019-11-20 PROCEDURE — G8417 CALC BMI ABV UP PARAM F/U: HCPCS | Performed by: NURSE PRACTITIONER

## 2019-11-20 RX ORDER — AMOXICILLIN AND CLAVULANATE POTASSIUM 875; 125 MG/1; MG/1
1 TABLET, FILM COATED ORAL 2 TIMES DAILY
Qty: 14 TABLET | Refills: 0 | Status: SHIPPED | OUTPATIENT
Start: 2019-11-20 | End: 2019-11-27

## 2019-12-09 RX ORDER — ROSUVASTATIN CALCIUM 10 MG/1
TABLET, COATED ORAL
Qty: 90 TABLET | Refills: 1 | Status: SHIPPED | OUTPATIENT
Start: 2019-12-09 | End: 2020-06-08

## 2019-12-31 ENCOUNTER — HOSPITAL ENCOUNTER (OUTPATIENT)
Dept: ULTRASOUND IMAGING | Age: 59
Discharge: HOME | End: 2019-12-31
Payer: COMMERCIAL

## 2019-12-31 ENCOUNTER — OFFICE VISIT (OUTPATIENT)
Dept: UROLOGY | Age: 59
End: 2019-12-31
Payer: COMMERCIAL

## 2019-12-31 ENCOUNTER — TELEPHONE (OUTPATIENT)
Dept: UROLOGY | Age: 59
End: 2019-12-31

## 2019-12-31 VITALS
HEIGHT: 72 IN | DIASTOLIC BLOOD PRESSURE: 86 MMHG | WEIGHT: 280 LBS | SYSTOLIC BLOOD PRESSURE: 138 MMHG | BODY MASS INDEX: 37.93 KG/M2

## 2019-12-31 DIAGNOSIS — N40.1 BENIGN PROSTATIC HYPERPLASIA WITH LOWER URINARY TRACT SYMPTOMS, SYMPTOM DETAILS UNSPECIFIED: Primary | ICD-10-CM

## 2019-12-31 DIAGNOSIS — N50.82 SCROTAL PAIN: ICD-10-CM

## 2019-12-31 LAB
BILIRUBIN URINE: NEGATIVE
BLOOD URINE, POC: NEGATIVE
CHARACTER, URINE: CLEAR
COLOR, URINE: YELLOW
GLUCOSE URINE: NEGATIVE MG/DL
KETONES, URINE: NEGATIVE
LEUKOCYTE CLUMPS, URINE: NEGATIVE
NITRITE, URINE: NEGATIVE
PH, URINE: 6 (ref 5–9)
POST VOID RESIDUAL (PVR): 61 ML
PROTEIN, URINE: NEGATIVE MG/DL
SPECIFIC GRAVITY, URINE: >= 1.03 (ref 1–1.03)
UROBILINOGEN, URINE: 0.2 EU/DL (ref 0–1)

## 2019-12-31 PROCEDURE — G8482 FLU IMMUNIZE ORDER/ADMIN: HCPCS | Performed by: NURSE PRACTITIONER

## 2019-12-31 PROCEDURE — 51798 US URINE CAPACITY MEASURE: CPT | Performed by: NURSE PRACTITIONER

## 2019-12-31 PROCEDURE — 3017F COLORECTAL CA SCREEN DOC REV: CPT | Performed by: NURSE PRACTITIONER

## 2019-12-31 PROCEDURE — 1036F TOBACCO NON-USER: CPT | Performed by: NURSE PRACTITIONER

## 2019-12-31 PROCEDURE — G8427 DOCREV CUR MEDS BY ELIG CLIN: HCPCS | Performed by: NURSE PRACTITIONER

## 2019-12-31 PROCEDURE — 81003 URINALYSIS AUTO W/O SCOPE: CPT | Performed by: NURSE PRACTITIONER

## 2019-12-31 PROCEDURE — 76870 US EXAM SCROTUM: CPT

## 2019-12-31 PROCEDURE — 99214 OFFICE O/P EST MOD 30 MIN: CPT | Performed by: NURSE PRACTITIONER

## 2019-12-31 PROCEDURE — G8417 CALC BMI ABV UP PARAM F/U: HCPCS | Performed by: NURSE PRACTITIONER

## 2019-12-31 RX ORDER — TADALAFIL 5 MG/1
5 TABLET ORAL DAILY
Qty: 30 TABLET | Refills: 5 | Status: SHIPPED | OUTPATIENT
Start: 2019-12-31 | End: 2020-07-02

## 2020-01-02 ENCOUNTER — TELEPHONE (OUTPATIENT)
Dept: UROLOGY | Age: 60
End: 2020-01-02

## 2020-01-02 NOTE — TELEPHONE ENCOUNTER
Patient was advised of the US results. He voiced understanding and declined the appointment. He will continue to use the scrotal support.

## 2020-01-02 NOTE — TELEPHONE ENCOUNTER
Please let pt know scrotal US was stable without any new findings. Small fluid collections improved in appearance. Continues with bilateral varicoceles. If pt has significant scrotal pain or continued symptoms we can schedule appt with physician to discuss if any indication for surgical intervention. Otherwise recommend scrotal support.

## 2020-03-11 ENCOUNTER — PATIENT MESSAGE (OUTPATIENT)
Dept: FAMILY MEDICINE CLINIC | Age: 60
End: 2020-03-11

## 2020-03-12 ENCOUNTER — OFFICE VISIT (OUTPATIENT)
Dept: FAMILY MEDICINE CLINIC | Age: 60
End: 2020-03-12
Payer: COMMERCIAL

## 2020-03-12 VITALS
HEIGHT: 72 IN | HEART RATE: 60 BPM | RESPIRATION RATE: 12 BRPM | SYSTOLIC BLOOD PRESSURE: 133 MMHG | WEIGHT: 284.2 LBS | BODY MASS INDEX: 38.49 KG/M2 | TEMPERATURE: 98 F | DIASTOLIC BLOOD PRESSURE: 88 MMHG

## 2020-03-12 PROCEDURE — G8427 DOCREV CUR MEDS BY ELIG CLIN: HCPCS | Performed by: FAMILY MEDICINE

## 2020-03-12 PROCEDURE — 1036F TOBACCO NON-USER: CPT | Performed by: FAMILY MEDICINE

## 2020-03-12 PROCEDURE — 99213 OFFICE O/P EST LOW 20 MIN: CPT | Performed by: FAMILY MEDICINE

## 2020-03-12 PROCEDURE — G8417 CALC BMI ABV UP PARAM F/U: HCPCS | Performed by: FAMILY MEDICINE

## 2020-03-12 PROCEDURE — G8482 FLU IMMUNIZE ORDER/ADMIN: HCPCS | Performed by: FAMILY MEDICINE

## 2020-03-12 PROCEDURE — 3017F COLORECTAL CA SCREEN DOC REV: CPT | Performed by: FAMILY MEDICINE

## 2020-03-12 RX ORDER — OLOPATADINE HYDROCHLORIDE 1 MG/ML
1 SOLUTION/ DROPS OPHTHALMIC 2 TIMES DAILY
Qty: 5 ML | Refills: 2 | Status: SHIPPED | OUTPATIENT
Start: 2020-03-12 | End: 2020-04-11

## 2020-03-12 ASSESSMENT — PATIENT HEALTH QUESTIONNAIRE - PHQ9
SUM OF ALL RESPONSES TO PHQ9 QUESTIONS 1 & 2: 0
2. FEELING DOWN, DEPRESSED OR HOPELESS: 0
SUM OF ALL RESPONSES TO PHQ QUESTIONS 1-9: 0
1. LITTLE INTEREST OR PLEASURE IN DOING THINGS: 0
SUM OF ALL RESPONSES TO PHQ QUESTIONS 1-9: 0

## 2020-03-12 NOTE — PROGRESS NOTES
[address]  [address] [place], [address]  Ph:   [phone]  Fax: [phone]    Provider:  Dr. Meera Rodriguez Note    Patient Name:  Bethany Friedman   : 1960   Age: 63 y.o. Date of Exam:  2020       Reason For Visit:   Chief Complaint   Patient presents with   [facility] Burn     Both eyes burning, watering x 2 wks     Sinusitis     Increased sinus symptoms         Nicolas is a 63 y.o. male who comes today to the office because of eyes burning and itching, sore throat and congestion. Symptoms started 2 week(s) ago. The symptoms have gotten unchanged. He has not tried medications over the counter without improvement. He has not been exposed to somebody with similar symptoms. Significant Past Medical History:    Past Medical History:   Diagnosis Date    CAD (coronary artery disease)     GERD (gastroesophageal reflux disease)     Hyperlipidemia     Hypertension     CELINA (obstructive sleep apnea)     Dr. Hester Pouch           Allergies:  is allergic to tramadol; bactrim [sulfamethoxazole-trimethoprim]; and zocor [simvastatin].     Medications:   Current Outpatient Medications   Medication Sig Dispense Refill    olopatadine (PATANOL) 0.1 % ophthalmic solution Place 1 drop into both eyes 2 times daily 5 mL 2    budesonide (RHINOCORT AQUA) 32 MCG/ACT nasal spray 2 sprays by Each Nostril route daily 3 Bottle 1    tadalafil (CIALIS) 5 MG tablet Take 1 tablet by mouth daily 30 tablet 5    rosuvastatin (CRESTOR) 10 MG tablet TAKE 1 TABLET BY MOUTH NIGHTLY 90 tablet 1    Omega-3 Fatty Acids (OMEGA-3 FISH OIL PO) Take by mouth      Multiple Vitamins-Minerals (MULTIVITAMIN ADULT PO) Take by mouth Indications: Ultra Light 121      vitamin E 400 UNIT capsule Take 400 Units by mouth daily      zinc 50 MG CAPS Take by mouth      amLODIPine (NORVASC) 5 MG tablet Take 5 mg by mouth daily      spironolactone (ALDACTONE) 50 MG tablet Take 50 mg PERRLA, EOMI, anicteric sclerae. Erythematous mildly swollen conjunctiva. Ears: Normal tympanic membranes bilaterally  Nose: swollen turbinates, clear drainage  Mouth:  moist mucosas  Neck - supple, no significant adenopathy  Chest - clear to auscultation  Heart - normal rate, regular rhythm      Impression:   Diagnosis Orders   1. Allergic conjunctivitis and rhinitis, bilateral     2. Seasonal allergic rhinitis, unspecified trigger         Plan:  No orders of the defined types were placed in this encounter. Orders Placed This Encounter   Medications    olopatadine (PATANOL) 0.1 % ophthalmic solution     Sig: Place 1 drop into both eyes 2 times daily     Dispense:  5 mL     Refill:  2    budesonide (RHINOCORT AQUA) 32 MCG/ACT nasal spray     Si sprays by Each Nostril route daily     Dispense:  3 Bottle     Refill:  1       Follow Up:  Return in about 1 month (around 2020).     Kate Benson MD

## 2020-04-24 ENCOUNTER — TELEPHONE (OUTPATIENT)
Dept: FAMILY MEDICINE CLINIC | Age: 60
End: 2020-04-24

## 2020-04-25 ENCOUNTER — PATIENT MESSAGE (OUTPATIENT)
Dept: FAMILY MEDICINE CLINIC | Age: 60
End: 2020-04-25

## 2020-04-27 ENCOUNTER — OFFICE VISIT (OUTPATIENT)
Dept: PRIMARY CARE CLINIC | Age: 60
End: 2020-04-27
Payer: COMMERCIAL

## 2020-04-27 VITALS
WEIGHT: 292 LBS | BODY MASS INDEX: 39.6 KG/M2 | SYSTOLIC BLOOD PRESSURE: 138 MMHG | TEMPERATURE: 97.9 F | HEART RATE: 90 BPM | RESPIRATION RATE: 18 BRPM | DIASTOLIC BLOOD PRESSURE: 78 MMHG

## 2020-04-27 PROCEDURE — G8417 CALC BMI ABV UP PARAM F/U: HCPCS | Performed by: FAMILY MEDICINE

## 2020-04-27 PROCEDURE — G8427 DOCREV CUR MEDS BY ELIG CLIN: HCPCS | Performed by: FAMILY MEDICINE

## 2020-04-27 PROCEDURE — 3017F COLORECTAL CA SCREEN DOC REV: CPT | Performed by: FAMILY MEDICINE

## 2020-04-27 PROCEDURE — 99214 OFFICE O/P EST MOD 30 MIN: CPT | Performed by: FAMILY MEDICINE

## 2020-04-27 PROCEDURE — 1036F TOBACCO NON-USER: CPT | Performed by: FAMILY MEDICINE

## 2020-04-27 RX ORDER — CIPROFLOXACIN 500 MG/1
500 TABLET, FILM COATED ORAL 2 TIMES DAILY
Qty: 20 TABLET | Refills: 0 | Status: SHIPPED | OUTPATIENT
Start: 2020-04-27 | End: 2020-05-07

## 2020-04-27 RX ORDER — SPIRONOLACTONE 50 MG/1
50 TABLET, FILM COATED ORAL DAILY
Qty: 90 TABLET | Refills: 1 | Status: SHIPPED | OUTPATIENT
Start: 2020-04-27 | End: 2021-04-06

## 2020-04-27 ASSESSMENT — ENCOUNTER SYMPTOMS
DIARRHEA: 0
WHEEZING: 0
RHINORRHEA: 0
EYE PAIN: 0
COUGH: 0
SHORTNESS OF BREATH: 0
BACK PAIN: 1
ABDOMINAL PAIN: 1
CHEST TIGHTNESS: 0
SORE THROAT: 0
BLOOD IN STOOL: 0
CONSTIPATION: 0
NAUSEA: 0
VOMITING: 0

## 2020-04-27 NOTE — PATIENT INSTRUCTIONS
Patient Education        Skin Abscess: Care Instructions  Your Care Instructions    A skin abscess is a bacterial infection that forms a pocket of pus. A boil is a kind of skin abscess. The doctor may have cut an opening in the abscess so that the pus can drain out. You may have gauze in the cut so that the abscess will stay open and keep draining. You may need antibiotics. You will need to follow up with your doctor to make sure the infection has gone away. The doctor has checked you carefully, but problems can develop later. If you notice any problems or new symptoms, get medical treatment right away. Follow-up care is a key part of your treatment and safety. Be sure to make and go to all appointments, and call your doctor if you are having problems. It's also a good idea to know your test results and keep a list of the medicines you take. How can you care for yourself at home? · Apply warm and dry compresses, a heating pad set on low, or a hot water bottle 3 or 4 times a day for pain. Keep a cloth between the heat source and your skin. · If your doctor prescribed antibiotics, take them as directed. Do not stop taking them just because you feel better. You need to take the full course of antibiotics. · Take pain medicines exactly as directed. ? If the doctor gave you a prescription medicine for pain, take it as prescribed. ? If you are not taking a prescription pain medicine, ask your doctor if you can take an over-the-counter medicine. · Keep your bandage clean and dry. Change the bandage whenever it gets wet or dirty, or at least one time a day. · If the abscess was packed with gauze:  ? Keep follow-up appointments to have the gauze changed or removed. If the doctor instructed you to remove the gauze, follow the instructions you were given for how to remove it. ? After the gauze is removed, soak the area in warm water for 15 to 20 minutes 2 times a day, until the wound closes.   When should you call or worsening numbness in your legs.     · You have new or worsening weakness in your legs. (This could make it hard to stand up.)     · You lose control of your bladder or bowels.    Watch closely for changes in your health, and be sure to contact your doctor if:    · You have a fever, lose weight, or don't feel well.     · You do not get better as expected. Where can you learn more? Go to https://[employer]. org and sign in to your [email or web address] account. Enter W229 in the Preo box to learn more about \"Back Pain: Care Instructions. \"     If you do not have an account, please click on the \"Sign Up Now\" link. Current as of: June 26, 2019Content Version: 12.4  © 5285-2383 Healthwise, Incorporated. Care instructions adapted under license by [facility] ([facility]). If you have questions about a medical condition or this instruction, always ask your healthcare professional. [place] [address] any warranty or liability for your use of this information. Patient Education        Leg and Ankle Edema: Care Instructions  Your Care Instructions  Swelling in the legs, ankles, and feet is called edema. It is common after you sit or stand for a while. Long plane flights or car rides often cause swelling in the legs and feet. You may also have swelling if you have to stand for long periods of time at your job. Problems with the veins in the legs (varicose veins) and changes in hormones can also cause swelling. Sometimes the swelling in the ankles and feet is caused by a more serious problem, such as heart failure, infection, blood clots, or liver or kidney disease. Follow-up care is a key part of your treatment and safety. Be sure to make and go to all appointments, and call your doctor if you are having problems. It's also a good idea to know your test results and keep a list of the medicines you take. How can you care for yourself at home?   · If your doctor gave you medicine, take

## 2020-04-28 ENCOUNTER — HOSPITAL ENCOUNTER (OUTPATIENT)
Age: 60
Discharge: HOME | End: 2020-04-28
Payer: COMMERCIAL

## 2020-04-28 ENCOUNTER — HOSPITAL ENCOUNTER (OUTPATIENT)
Dept: GENERAL RADIOLOGY | Age: 60
Discharge: HOME | End: 2020-04-28
Payer: COMMERCIAL

## 2020-04-28 PROCEDURE — 72100 X-RAY EXAM L-S SPINE 2/3 VWS: CPT

## 2020-04-28 NOTE — TELEPHONE ENCOUNTER
I talked to him today. Taking the antibiotic. X ray showed some arthritis and osteophytes. Recommended to take the Relafen for now and do the exercises for acute back pain. He will call back if worsening of the symptoms.

## 2020-04-29 ENCOUNTER — TELEPHONE (OUTPATIENT)
Dept: PRIMARY CARE CLINIC | Age: 60
End: 2020-04-29

## 2020-05-04 ENCOUNTER — VIRTUAL VISIT (OUTPATIENT)
Dept: FAMILY MEDICINE CLINIC | Age: 60
End: 2020-05-04
Payer: COMMERCIAL

## 2020-05-04 PROCEDURE — 99212 OFFICE O/P EST SF 10 MIN: CPT | Performed by: FAMILY MEDICINE

## 2020-05-04 PROCEDURE — G8428 CUR MEDS NOT DOCUMENT: HCPCS | Performed by: FAMILY MEDICINE

## 2020-05-04 PROCEDURE — 3017F COLORECTAL CA SCREEN DOC REV: CPT | Performed by: FAMILY MEDICINE

## 2020-05-04 RX ORDER — CLINDAMYCIN HYDROCHLORIDE 300 MG/1
300 CAPSULE ORAL 2 TIMES DAILY
Qty: 14 CAPSULE | Refills: 0 | Status: SHIPPED | OUTPATIENT
Start: 2020-05-04 | End: 2020-05-11

## 2020-05-04 NOTE — PROGRESS NOTES
physical exam findings- abdominal wall inspection : he has a quarter size mild erythematous area, where the infected cyst is located. No tender to palpation when he touches it. ASSESSMENT/PLAN:    1. Adverse effect due to correct medicinal substance, properly given, initial encounter  Stop Cipro. Start clindamycin 300 mg 1 tab by mouth twice a day    2. Cutaneous abscess of abdominal wall  As above      Return if symptoms worsen or fail to improve. Garo Morillo is a 63 y.o. male being evaluated by a Virtual Visit (video visit) encounter to address concerns as mentioned above. A caregiver was present when appropriate. Due to this being a TeleHealth encounter (During GZGXG-80 public health emergency), evaluation of the following organ systems was limited: Vitals/Constitutional/EENT/Resp/CV/GI//MS/Neuro/Skin/Heme-Lymph-Imm. Pursuant to the emergency declaration under the [address] [place] [address], [address] [place] [address] and the [employer] Act, this Virtual Visit was conducted with patient's (and/or legal guardian's) consent, to reduce the patient's risk of exposure to COVID-19 and provide necessary medical care. The patient (and/or legal guardian) has also been advised to contact this office for worsening conditions or problems, and seek emergency medical treatment and/or call 911 if deemed necessary. Patient identification was verified at the start of the visit: Yes    Total time spent on this encounter: 9 minutes    Services were provided through a video synchronous discussion virtually to substitute for in-person clinic visit. Patient and provider were located at their individual homes. --Alexandre Sanchez MD on 5/4/2020 at 11:49 AM    An electronic signature was used to authenticate this note.

## 2020-05-08 ENCOUNTER — TELEPHONE (OUTPATIENT)
Dept: FAMILY MEDICINE CLINIC | Age: 60
End: 2020-05-08

## 2020-05-11 ENCOUNTER — NURSE ONLY (OUTPATIENT)
Dept: LAB | Age: 60
End: 2020-05-11

## 2020-05-11 LAB
ALBUMIN SERPL-MCNC: 4.5 G/DL (ref 3.5–5.1)
ALBUMIN SERPL-MCNC: 4.6 G/DL (ref 3.5–5.1)
ALP BLD-CCNC: 99 U/L (ref 38–126)
ALP BLD-CCNC: 99 U/L (ref 38–126)
ALT SERPL-CCNC: 26 U/L (ref 11–66)
ALT SERPL-CCNC: 29 U/L (ref 11–66)
ANION GAP SERPL CALCULATED.3IONS-SCNC: 9 MEQ/L (ref 8–16)
AST SERPL-CCNC: 29 U/L (ref 5–40)
AST SERPL-CCNC: 30 U/L (ref 5–40)
BACTERIA: NORMAL
BASOPHILS # BLD: 0.8 %
BASOPHILS ABSOLUTE: 0.1 THOU/MM3 (ref 0–0.1)
BILIRUB SERPL-MCNC: 0.5 MG/DL (ref 0.3–1.2)
BILIRUB SERPL-MCNC: 0.6 MG/DL (ref 0.3–1.2)
BILIRUBIN DIRECT: < 0.2 MG/DL (ref 0–0.3)
BILIRUBIN URINE: NEGATIVE
BLOOD, URINE: NEGATIVE
BUN BLDV-MCNC: 12 MG/DL (ref 7–22)
C-REACTIVE PROTEIN, HIGH SENSITIVITY: 2.5 MG/L
CALCIUM SERPL-MCNC: 9.5 MG/DL (ref 8.5–10.5)
CASTS: NORMAL /LPF
CASTS: NORMAL /LPF
CHARACTER, URINE: CLEAR
CHLORIDE BLD-SCNC: 103 MEQ/L (ref 98–111)
CHOLESTEROL, TOTAL: 148 MG/DL (ref 100–199)
CO2: 26 MEQ/L (ref 23–33)
COLOR: YELLOW
CREAT SERPL-MCNC: 0.8 MG/DL (ref 0.4–1.2)
CRYSTALS: NORMAL
EOSINOPHIL # BLD: 2.2 %
EOSINOPHILS ABSOLUTE: 0.2 THOU/MM3 (ref 0–0.4)
EPITHELIAL CELLS, UA: NORMAL /HPF
ERYTHROCYTE [DISTWIDTH] IN BLOOD BY AUTOMATED COUNT: 12.9 % (ref 11.5–14.5)
ERYTHROCYTE [DISTWIDTH] IN BLOOD BY AUTOMATED COUNT: 42.1 FL (ref 35–45)
GFR SERPL CREATININE-BSD FRML MDRD: > 90 ML/MIN/1.73M2
GLUCOSE BLD-MCNC: 101 MG/DL (ref 70–108)
GLUCOSE, URINE: NEGATIVE MG/DL
HCT VFR BLD CALC: 47.9 % (ref 42–52)
HDLC SERPL-MCNC: 44 MG/DL
HEMOGLOBIN: 16.1 GM/DL (ref 14–18)
IMMATURE GRANS (ABS): 0.02 THOU/MM3 (ref 0–0.07)
IMMATURE GRANULOCYTES: 0.3 %
KETONES, URINE: NEGATIVE
LDL CHOLESTEROL CALCULATED: 76 MG/DL
LEUKOCYTE ESTERASE, URINE: NEGATIVE
LYMPHOCYTES # BLD: 22.8 %
LYMPHOCYTES ABSOLUTE: 1.6 THOU/MM3 (ref 1–4.8)
MCH RBC QN AUTO: 30.1 PG (ref 26–33)
MCHC RBC AUTO-ENTMCNC: 33.6 GM/DL (ref 32.2–35.5)
MCV RBC AUTO: 89.7 FL (ref 80–94)
MISCELLANEOUS LAB TEST RESULT: NORMAL
MONOCYTES # BLD: 10.6 %
MONOCYTES ABSOLUTE: 0.8 THOU/MM3 (ref 0.4–1.3)
NITRITE, URINE: NEGATIVE
NUCLEATED RED BLOOD CELLS: 0 /100 WBC
PH UA: 5.5 (ref 5–9)
PLATELET # BLD: 236 THOU/MM3 (ref 130–400)
PMV BLD AUTO: 8.9 FL (ref 9.4–12.4)
POTASSIUM SERPL-SCNC: 4.3 MEQ/L (ref 3.5–5.2)
PROSTATE SPECIFIC ANTIGEN: 0.43 NG/ML (ref 0–1)
PROTEIN UA: NEGATIVE MG/DL
RBC # BLD: 5.34 MILL/MM3 (ref 4.7–6.1)
RBC URINE: NORMAL /HPF
RENAL EPITHELIAL, UA: NORMAL
SEG NEUTROPHILS: 63.3 %
SEGMENTED NEUTROPHILS ABSOLUTE COUNT: 4.6 THOU/MM3 (ref 1.8–7.7)
SODIUM BLD-SCNC: 138 MEQ/L (ref 135–145)
SPECIFIC GRAVITY UA: 1.02 (ref 1–1.03)
TOTAL PROTEIN: 7.4 G/DL (ref 6.1–8)
TOTAL PROTEIN: 7.6 G/DL (ref 6.1–8)
TRIGL SERPL-MCNC: 141 MG/DL (ref 0–199)
TSH SERPL DL<=0.05 MIU/L-ACNC: 2.35 UIU/ML (ref 0.4–4.2)
UROBILINOGEN, URINE: 0.2 EU/DL (ref 0–1)
WBC # BLD: 7.2 THOU/MM3 (ref 4.8–10.8)
WBC UA: NORMAL /HPF
YEAST: NORMAL

## 2020-06-08 RX ORDER — ROSUVASTATIN CALCIUM 10 MG/1
TABLET, COATED ORAL
Qty: 90 TABLET | Refills: 0 | Status: SHIPPED | OUTPATIENT
Start: 2020-06-08 | End: 2021-04-06

## 2020-06-10 ENCOUNTER — TELEPHONE (OUTPATIENT)
Dept: FAMILY MEDICINE CLINIC | Age: 60
End: 2020-06-10

## 2020-06-15 ENCOUNTER — OFFICE VISIT (OUTPATIENT)
Dept: FAMILY MEDICINE CLINIC | Age: 60
End: 2020-06-15
Payer: COMMERCIAL

## 2020-06-15 VITALS
SYSTOLIC BLOOD PRESSURE: 186 MMHG | WEIGHT: 289 LBS | DIASTOLIC BLOOD PRESSURE: 100 MMHG | HEART RATE: 61 BPM | BODY MASS INDEX: 39.14 KG/M2 | RESPIRATION RATE: 10 BRPM | TEMPERATURE: 97.9 F | HEIGHT: 72 IN

## 2020-06-15 PROCEDURE — G8417 CALC BMI ABV UP PARAM F/U: HCPCS | Performed by: FAMILY MEDICINE

## 2020-06-15 PROCEDURE — 1036F TOBACCO NON-USER: CPT | Performed by: FAMILY MEDICINE

## 2020-06-15 PROCEDURE — G8427 DOCREV CUR MEDS BY ELIG CLIN: HCPCS | Performed by: FAMILY MEDICINE

## 2020-06-15 PROCEDURE — 3017F COLORECTAL CA SCREEN DOC REV: CPT | Performed by: FAMILY MEDICINE

## 2020-06-15 PROCEDURE — 99214 OFFICE O/P EST MOD 30 MIN: CPT | Performed by: FAMILY MEDICINE

## 2020-06-15 NOTE — PROGRESS NOTES
400 Units by mouth daily      zinc 50 MG CAPS Take by mouth      nabumetone (RELAFEN) 750 MG tablet TAKE 1 TABLET BY MOUTH TWICE DAILY 60 tablet 3    amLODIPine (NORVASC) 5 MG tablet Take 5 mg by mouth daily      Cholecalciferol (VITAMIN D3) 2000 units CAPS Take 5,000 Units by mouth       MAGNESIUM GLYCINATE PLUS PO Take by mouth      esomeprazole Magnesium (NEXIUM) 20 MG PACK Take 20 mg by mouth as needed      metoprolol succinate (TOPROL XL) 25 MG extended release tablet TAKE 1 TABLET BY MOUTH ONCE DAILY 90 tablet 1    CPAP Machine MISC by Does not apply route Please change CPAP pressure to auto 5-15 cm H20. 1 each 0    losartan (COZAAR) 100 MG tablet Take 100 mg by mouth daily      aspirin 81 MG chewable tablet Take 81 mg by mouth daily      Coenzyme Q10 (COQ10) 200 MG CAPS Take  by mouth 2 times daily. No current facility-administered medications for this visit.         Review of systems:  Review of Systems - History obtained from chart review and the patient  General ROS: negative for - chills, fatigue or fever  Psychological ROS: negative for - anxiety, depression or sleep disturbances  ENT ROS: negative for - headaches, nasal congestion or nasal discharge  Respiratory ROS: negative for - cough,  shortness of breath or wheezing  Cardiovascular ROS: negative for - chest pain or edema  Gastrointestinal ROS: negative for - abdominal pain, constipation, diarrhea or nausea/vomiting  Musculoskeletal ROS: negative for - joint pain or muscle pain  Neurological ROS: negative for - dizziness  Dermatological ROS: negative for - rash    Physical Examination:  BP (!) 186/100 (Site: Right Upper Arm, Position: Sitting, Cuff Size: Large Adult)   Pulse 61   Temp 97.9 °F (36.6 °C) (Oral)   Resp 10   Ht 6' (1.829 m)   Wt 289 lb (131.1 kg)   BMI 39.20 kg/m²     General-  Alert and oriented x 3, NAD  HEENT: NC, AT, PERRLA, EOMI, anicteric sclerae  Ears: Normal tympanic membranes bilaterally  Nose: patent, no

## 2020-06-19 ENCOUNTER — TELEPHONE (OUTPATIENT)
Dept: FAMILY MEDICINE CLINIC | Age: 60
End: 2020-06-19

## 2020-06-19 NOTE — TELEPHONE ENCOUNTER
MRI rt elbow was denied and the reason are as follows: Soft Tissue Mass, we cannot approve this request. Your records show that you may have a tumor or growth under your skin. The reason this request cannot be approved is because: 1. Guidelines support imaging when results of a plain x-ray taken for the initial evaluation of a mass or masses did not provide all details needed. The clinical information provided does not describe these results 2. Guidelines support imaging when results of a detailed history taken for the initial evaluation of a mass or masses did not provide all details needed. The clinical information provided does not describe these results 3. A detailed physical examination is supported for the initial evaluation of a mass or masses prior to consideration of imaging. This exam should include details relevant to the mass that documents the location, size, duration, growing or stable, solid/cystic, fixed/not fixed to the bone, discrete or ill-defined, and an association with pain. The clinical information provided does not describe these details and, therefore, the request is not indicated at this time.  RT DUVALL @ [phone] case# [id number]

## 2020-06-23 ENCOUNTER — HOSPITAL ENCOUNTER (OUTPATIENT)
Age: 60
Discharge: HOME | End: 2020-06-23
Payer: COMMERCIAL

## 2020-06-23 ENCOUNTER — HOSPITAL ENCOUNTER (OUTPATIENT)
Dept: GENERAL RADIOLOGY | Age: 60
Discharge: HOME | End: 2020-06-23
Payer: COMMERCIAL

## 2020-06-23 PROCEDURE — 73070 X-RAY EXAM OF ELBOW: CPT

## 2020-06-26 ENCOUNTER — TELEPHONE (OUTPATIENT)
Dept: FAMILY MEDICINE CLINIC | Age: 60
End: 2020-06-26

## 2020-06-26 NOTE — TELEPHONE ENCOUNTER
Pt was informed of xray results and will notify Irish Cornelius to resubmit MRI to insurance.  He is requesting the Open MRI at [facility].

## 2020-06-26 NOTE — TELEPHONE ENCOUNTER
----- Message from Paula Simmons MD sent at 6/25/2020  4:54 PM EDT -----  X-ray of elbow show spurring at the humeral epicondyle, which is the bony prominence and elbow. Show soft tissue lesion. We will try to get the MRI approved.

## 2020-06-30 NOTE — TELEPHONE ENCOUNTER
The insurance states the more appropriate procedure for the pt with the diagnosis given is an MRI with and without contrast CPT code 64688. Does the provider want to change the order?

## 2020-07-01 ENCOUNTER — TELEPHONE (OUTPATIENT)
Dept: FAMILY MEDICINE CLINIC | Age: 60
End: 2020-07-01

## 2020-07-01 NOTE — TELEPHONE ENCOUNTER
Patient called LM stating [facility] called him stating his MRI needs done at [facility] open MRI. His order is also wrong, stating his right elbow is supposed to be the order not left. MRI ordered.

## 2020-07-02 RX ORDER — TADALAFIL 5 MG/1
TABLET ORAL
Qty: 30 TABLET | Refills: 0 | Status: SHIPPED | OUTPATIENT
Start: 2020-07-02 | End: 2020-08-05

## 2020-07-02 NOTE — TELEPHONE ENCOUNTER
Last office visit was 12/19    No new follow up on file may be due to [address]

## 2020-07-06 LAB
CREAT SERPL-MCNC: 0.7 MG/DL (ref 0.6–1.3)
CREATININE CLEARANCE: 60

## 2020-07-08 ENCOUNTER — HOSPITAL ENCOUNTER (OUTPATIENT)
Dept: MRI IMAGING | Age: 60
Discharge: HOME | End: 2020-07-08
Payer: COMMERCIAL

## 2020-07-08 PROCEDURE — 3209999900 MRI COMPARISON OF OUTSIDE FILMS

## 2020-07-13 ENCOUNTER — OFFICE VISIT (OUTPATIENT)
Dept: SURGERY | Age: 60
End: 2020-07-13
Payer: COMMERCIAL

## 2020-07-13 ENCOUNTER — TELEPHONE (OUTPATIENT)
Dept: FAMILY MEDICINE CLINIC | Age: 60
End: 2020-07-13

## 2020-07-13 VITALS
DIASTOLIC BLOOD PRESSURE: 78 MMHG | HEART RATE: 61 BPM | TEMPERATURE: 97.1 F | HEIGHT: 72 IN | SYSTOLIC BLOOD PRESSURE: 115 MMHG | OXYGEN SATURATION: 97 % | WEIGHT: 289.3 LBS | BODY MASS INDEX: 39.18 KG/M2 | RESPIRATION RATE: 14 BRPM

## 2020-07-13 PROCEDURE — 24075 EXC ARM/ELBOW LES SC < 3 CM: CPT | Performed by: SURGERY

## 2020-07-15 ENCOUNTER — OFFICE VISIT (OUTPATIENT)
Dept: FAMILY MEDICINE CLINIC | Age: 60
End: 2020-07-15
Payer: COMMERCIAL

## 2020-07-15 VITALS
BODY MASS INDEX: 38.87 KG/M2 | WEIGHT: 287 LBS | DIASTOLIC BLOOD PRESSURE: 86 MMHG | TEMPERATURE: 97.6 F | SYSTOLIC BLOOD PRESSURE: 145 MMHG | HEIGHT: 72 IN | RESPIRATION RATE: 12 BRPM | HEART RATE: 54 BPM

## 2020-07-15 PROCEDURE — G8417 CALC BMI ABV UP PARAM F/U: HCPCS | Performed by: FAMILY MEDICINE

## 2020-07-15 PROCEDURE — G8427 DOCREV CUR MEDS BY ELIG CLIN: HCPCS | Performed by: FAMILY MEDICINE

## 2020-07-15 PROCEDURE — 99213 OFFICE O/P EST LOW 20 MIN: CPT | Performed by: FAMILY MEDICINE

## 2020-07-15 PROCEDURE — 3017F COLORECTAL CA SCREEN DOC REV: CPT | Performed by: FAMILY MEDICINE

## 2020-07-15 PROCEDURE — 1036F TOBACCO NON-USER: CPT | Performed by: FAMILY MEDICINE

## 2020-07-15 PROCEDURE — 96372 THER/PROPH/DIAG INJ SC/IM: CPT | Performed by: FAMILY MEDICINE

## 2020-07-15 RX ORDER — METHYLPREDNISOLONE ACETATE 40 MG/ML
40 INJECTION, SUSPENSION INTRA-ARTICULAR; INTRALESIONAL; INTRAMUSCULAR; SOFT TISSUE ONCE
Status: COMPLETED | OUTPATIENT
Start: 2020-07-15 | End: 2020-07-15

## 2020-07-15 RX ORDER — CIPROFLOXACIN 500 MG/1
500 TABLET, FILM COATED ORAL 2 TIMES DAILY
COMMUNITY
End: 2020-10-09

## 2020-07-15 RX ORDER — CETIRIZINE HYDROCHLORIDE 10 MG/1
10 TABLET ORAL DAILY
Qty: 90 TABLET | Refills: 1 | Status: SHIPPED | OUTPATIENT
Start: 2020-07-15 | End: 2021-04-06

## 2020-07-15 RX ORDER — BETAMETHASONE SODIUM PHOSPHATE AND BETAMETHASONE ACETATE 3; 3 MG/ML; MG/ML
6 INJECTION, SUSPENSION INTRA-ARTICULAR; INTRALESIONAL; INTRAMUSCULAR; SOFT TISSUE ONCE
Status: COMPLETED | OUTPATIENT
Start: 2020-07-15 | End: 2020-07-15

## 2020-07-15 RX ADMIN — BETAMETHASONE SODIUM PHOSPHATE AND BETAMETHASONE ACETATE 6 MG: 3; 3 INJECTION, SUSPENSION INTRA-ARTICULAR; INTRALESIONAL; INTRAMUSCULAR; SOFT TISSUE at 13:03

## 2020-07-15 RX ADMIN — METHYLPREDNISOLONE ACETATE 40 MG: 40 INJECTION, SUSPENSION INTRA-ARTICULAR; INTRALESIONAL; INTRAMUSCULAR; SOFT TISSUE at 13:04

## 2020-07-15 ASSESSMENT — ENCOUNTER SYMPTOMS
EYE ITCHING: 0
EYE REDNESS: 0
SINUS PRESSURE: 0
CHEST TIGHTNESS: 0
COLOR CHANGE: 0
EYES NEGATIVE: 1
PHOTOPHOBIA: 0
CHOKING: 0
WHEEZING: 0
STRIDOR: 0
RHINORRHEA: 0
GASTROINTESTINAL NEGATIVE: 1
TROUBLE SWALLOWING: 0
APNEA: 0
VOICE CHANGE: 0
BACK PAIN: 0
ALLERGIC/IMMUNOLOGIC NEGATIVE: 1
SHORTNESS OF BREATH: 0
SINUS PAIN: 0
COUGH: 0
EYE DISCHARGE: 0
SORE THROAT: 0
RESPIRATORY NEGATIVE: 1
FACIAL SWELLING: 0
EYE PAIN: 0

## 2020-07-15 NOTE — PROGRESS NOTES
vitamin E 400 UNIT capsule Take 400 Units by mouth daily      zinc 50 MG CAPS Take by mouth      nabumetone (RELAFEN) 750 MG tablet TAKE 1 TABLET BY MOUTH TWICE DAILY 60 tablet 3    amLODIPine (NORVASC) 5 MG tablet Take 5 mg by mouth daily      Cholecalciferol (VITAMIN D3) 2000 units CAPS Take 5,000 Units by mouth       MAGNESIUM GLYCINATE PLUS PO Take by mouth      esomeprazole Magnesium (NEXIUM) 20 MG PACK Take 20 mg by mouth as needed      metoprolol succinate (TOPROL XL) 25 MG extended release tablet TAKE 1 TABLET BY MOUTH ONCE DAILY 90 tablet 1    CPAP Machine MISC by Does not apply route Please change CPAP pressure to auto 5-15 cm H20. 1 each 0    losartan (COZAAR) 100 MG tablet Take 100 mg by mouth daily      aspirin 81 MG chewable tablet Take 81 mg by mouth daily      Coenzyme Q10 (COQ10) 200 MG CAPS Take  by mouth 2 times daily. No current facility-administered medications for this visit. Review of systems:  Review of Systems - History obtained from chart review and the patient  General ROS: negative for - chills, fatigue or fever  ENT ROS: positive for - nasal congestion, postnasal drip and tinnitus    Physical Examination:  BP (!) 145/86 (Site: Left Upper Arm, Position: Sitting, Cuff Size: Large Adult)   Pulse 54   Temp 97.6 °F (36.4 °C) (Oral)   Resp 12   Ht 6' (1.829 m)   Wt 287 lb (130.2 kg)   BMI 38.92 kg/m²     General-  Alert and oriented x 3, NAD  HEENT: NC, AT,  EOMI, anicteric sclerae  Ears: Normal tympanic membranes bilaterally  Nose: Swollen turbinates thick drainage on the left nostril  Mouth: no lesions, moist mucosas  Neck - supple, no significant adenopathy  Chest - clear to auscultation, no wheezes, rales or rhonchi, symmetric air entry  Heart - normal rate, regular rhythm  Extremities -no pedal edema, no clubbing or cyanosis    Impression:   Diagnosis Orders   1. Acute maxillary sinusitis, recurrence not specified     2.  Bilateral tinnitus

## 2020-07-15 NOTE — PROGRESS NOTES
[facility]  [address] [facility] [address]  Dept: [phone]  Dept Fax: [phone]  Loc: [phone]    Visit Date: 7/13/2020    Selin Hicks is a 63 y.o. male who presentstoday for:  Chief Complaint   Patient presents with    Surgical Consult     established pt--self ref for right elbow mass-MRI @ [facility] 7/7       HPI:     HPI 72-year-old white male who is had a 10-year or so history of a small lump in the lateral aspect of his right elbow it recently has been causing him some discomfort going down his arm he also has a wife who was recently diagnosed with uterine sarcoma but because of the discomfort he had an MRI performed that shows this to be a subcutaneous nodule likely a lipoma he is here for evaluation possible excision of same    Past Medical History:   Diagnosis Date    CAD (coronary artery disease) 2014    GERD (gastroesophageal reflux disease)     Hyperlipidemia 2010    Hypertension 2008    CELINA (obstructive sleep apnea) 2014    Dr. Ernie Mckeon      Past Surgical History:   Procedure Laterality Date    CARDIAC CATHETERIZATION  2/21/15    03/2018    CHOLECYSTECTOMY  2008    laparoscopic, Dysfunctional gallbladder    COLONOSCOPY  2013    normal    ELBOW SURGERY      Lyphoma removal 7- Dr. Lucila Pickett  07/13/2020    s/p excision of right elbow lipoma done in the office Dr Beatrice Mccurdy N/A 4/30/2018    SEPTOPLASTY SUBMUCOUS RESECTION TURBINATES, PARTIAL RIGHT INFERIOR, NASAL ENDOSCOPY WITH LONNY BULLOSA performed by Elio Bethea MD at [address] [place] [address] Rd Left 3/1/2018    SIGMOIDOSCOPY BIOPSY FLEXIBLE performed by Nora Varela MD at [facility]    TONSILLECTOMY          Family History   Problem Relation Age of Onset    Diabetes Mother 48    Heart Disease Mother 67        CAD    Heart Disease Father 50        CAD, CVA    Stroke not apply route Please change CPAP pressure to auto 5-15 cm H20. 1 each 0    losartan (COZAAR) 100 MG tablet Take 100 mg by mouth daily      aspirin 81 MG chewable tablet Take 81 mg by mouth daily      Coenzyme Q10 (COQ10) 200 MG CAPS Take  by mouth 2 times daily.  ciprofloxacin (CIPRO) 500 MG tablet Take 500 mg by mouth 2 times daily      cetirizine (ZYRTEC) 10 MG tablet Take 1 tablet by mouth daily 90 tablet 1     No current facility-administered medications for this visit. Allergies   Allergen Reactions    Tramadol Other (See Comments)     Made me feel edgy    Bactrim [Sulfamethoxazole-Trimethoprim] Hives    Zocor [Simvastatin] Other (See Comments)     Body aches       Subjective:      Review of Systems   Constitutional: Negative. Negative for activity change, appetite change, chills, diaphoresis, fatigue, fever and unexpected weight change. HENT: Negative. Negative for congestion, dental problem, drooling, ear discharge, ear pain, facial swelling, hearing loss, mouth sores, nosebleeds, postnasal drip, rhinorrhea, sinus pressure, sinus pain, sneezing, sore throat, tinnitus, trouble swallowing and voice change. Eyes: Negative. Negative for photophobia, pain, discharge, redness, itching and visual disturbance. Respiratory: Negative. Negative for apnea, cough, choking, chest tightness, shortness of breath, wheezing and stridor. Cardiovascular: Negative. Negative for chest pain, palpitations and leg swelling. Gastrointestinal: Negative. Endocrine: Negative. Negative for cold intolerance, heat intolerance, polydipsia, polyphagia and polyuria. Genitourinary: Negative. Negative for decreased urine volume, difficulty urinating, discharge, dysuria, enuresis, flank pain, frequency, genital sores, hematuria, penile pain, penile swelling, scrotal swelling, testicular pain and urgency. Musculoskeletal: Negative.   Negative for arthralgias, back pain, gait problem, joint swelling,

## 2020-07-22 ENCOUNTER — OFFICE VISIT (OUTPATIENT)
Dept: PHYSICAL MEDICINE AND REHAB | Age: 60
End: 2020-07-22
Payer: COMMERCIAL

## 2020-07-22 VITALS
BODY MASS INDEX: 38.87 KG/M2 | SYSTOLIC BLOOD PRESSURE: 138 MMHG | TEMPERATURE: 97.4 F | WEIGHT: 287 LBS | DIASTOLIC BLOOD PRESSURE: 84 MMHG | HEIGHT: 72 IN

## 2020-07-22 PROCEDURE — G8427 DOCREV CUR MEDS BY ELIG CLIN: HCPCS | Performed by: PHYSICAL MEDICINE & REHABILITATION

## 2020-07-22 PROCEDURE — G8417 CALC BMI ABV UP PARAM F/U: HCPCS | Performed by: PHYSICAL MEDICINE & REHABILITATION

## 2020-07-22 PROCEDURE — 1036F TOBACCO NON-USER: CPT | Performed by: PHYSICAL MEDICINE & REHABILITATION

## 2020-07-22 PROCEDURE — 3017F COLORECTAL CA SCREEN DOC REV: CPT | Performed by: PHYSICAL MEDICINE & REHABILITATION

## 2020-07-22 PROCEDURE — 99204 OFFICE O/P NEW MOD 45 MIN: CPT | Performed by: PHYSICAL MEDICINE & REHABILITATION

## 2020-07-22 NOTE — PROGRESS NOTES
 Alzheimer's Disease Maternal Grandmother 21    Early Death Paternal Grandfather 36        MI    High Blood Pressure Paternal Grandfather 36    Heart Disease Sister 39    High Blood Pressure Sister 39    Depression Sister 39    Other Brother 48        ANEURYSUM, AAA       Review of Systems        /84   Temp 97.4 °F (36.3 °C)   Ht 6' (1.829 m)   Wt 287 lb (130.2 kg)   BMI 38.92 kg/m²       Physical Exam  Constitutional:       General: He is not in acute distress. Appearance: Normal appearance. HENT:      Head: Atraumatic. Cardiovascular:      Rate and Rhythm: Normal rate. Pulmonary:      Effort: Pulmonary effort is normal. No respiratory distress. Musculoskeletal:         General: No tenderness. Comments: Thoracic spine   Skin:     General: Skin is dry. Neurological:      Mental Status: He is alert. Assessment and Plan:      Diagnosis Orders   1. Chronic thoracic back pain, unspecified back pain laterality  MRI THORACIC SPINE WO CONTRAST         He tried physical therapy, medication. Pain is severe. MRI of the T spine to check for disc herniation . FF up to review the films     I have reviewed the chief complaint and HPI including the [facility] and Vital documentation by my staff and I agree with their documentation and have added where applicable. Time spent with patient was 45 minutes. More than 50% was spent counseling/coordinating the patient's care.          Naomi Braswell MD   Spine Medicine/PM&R

## 2020-07-28 ENCOUNTER — TELEPHONE (OUTPATIENT)
Dept: PHYSICAL MEDICINE AND REHAB | Age: 60
End: 2020-07-28

## 2020-07-28 ENCOUNTER — TELEPHONE (OUTPATIENT)
Dept: FAMILY MEDICINE CLINIC | Age: 60
End: 2020-07-28

## 2020-07-28 ENCOUNTER — TELEMEDICINE (OUTPATIENT)
Dept: PHYSICAL MEDICINE AND REHAB | Age: 60
End: 2020-07-28
Payer: COMMERCIAL

## 2020-07-28 ENCOUNTER — OFFICE VISIT (OUTPATIENT)
Dept: SURGERY | Age: 60
End: 2020-07-28

## 2020-07-28 VITALS
SYSTOLIC BLOOD PRESSURE: 132 MMHG | TEMPERATURE: 96.6 F | HEIGHT: 72 IN | RESPIRATION RATE: 18 BRPM | HEART RATE: 88 BPM | WEIGHT: 287.2 LBS | BODY MASS INDEX: 38.9 KG/M2 | DIASTOLIC BLOOD PRESSURE: 62 MMHG | OXYGEN SATURATION: 98 %

## 2020-07-28 PROCEDURE — 99441 PR PHYS/QHP TELEPHONE EVALUATION 5-10 MIN: CPT | Performed by: PHYSICAL MEDICINE & REHABILITATION

## 2020-07-28 PROCEDURE — 99024 POSTOP FOLLOW-UP VISIT: CPT | Performed by: SURGERY

## 2020-07-28 NOTE — PROGRESS NOTES
Palak Pena is a 63 y.o. male evaluated via telephone on 7/28/2020. Consent:  He and/or health care decision maker is aware that that he may receive a bill for this telephone service, depending on his insurance coverage, and has provided verbal consent to proceed: Yes      Documentation:  I communicated with the patient and/or health care decision maker about MRI of the thoracic spine    . Details of this discussion including any medical advice provided:    MRI showed thoracic spondylosis. Schedule for right thoracic medial branch block, 2 levels       I affirm this is a Patient Initiated Episode with a Patient who has not had a related appointment within my department in the past 7 days or scheduled within the next 24 hours.     Patient identification was verified at the start of the visit: Yes    Total Time: minutes: 5-10 minutes    Note: not billable if this call serves to triage the patient into an appointment for the relevant concern      Eli Grove

## 2020-07-28 NOTE — TELEPHONE ENCOUNTER
Referral placed for patient to see Dr. Suzette Linares. They will contact the patient directly to schedule. Informed patient. He verbalized understanding with no questions at this time.

## 2020-07-28 NOTE — PROGRESS NOTES
[address]  [facility] [address]  Dept: [phone]  Dept Fax: [phone]  Loc: [phone]    Visit Date: 7/28/2020    Jennifer Wright is a 63 y.o. male who presentstoday for:  Chief Complaint   Patient presents with    Follow Up After Procedure     s/p- Excision of 1 cm mass of the right elbow with 2 cm margin 7/13       HPI:     HPI post excision right elbow mass patient states all preoperative symptoms are gone    Past Medical History:   Diagnosis Date    CAD (coronary artery disease) 2014    GERD (gastroesophageal reflux disease)     Hyperlipidemia 2010    Hypertension 2008    CELINA (obstructive sleep apnea) 2014    Dr. Carmelita Hernandez      Past Surgical History:   Procedure Laterality Date    CARDIAC CATHETERIZATION  2/21/15    03/2018    CHOLECYSTECTOMY  2008    laparoscopic, Dysfunctional gallbladder    COLONOSCOPY  2013    normal    ELBOW SURGERY      Lyphoma removal 7- Dr. Deleon Fearing  07/13/2020    s/p excision of right elbow lipoma done in the office Dr Deleon Fearing  07/24/2020    cyst removed right testicle Dr Sebastien More N/A 4/30/2018    SEPTOPLASTY SUBMUCOUS RESECTION TURBINATES, PARTIAL RIGHT INFERIOR, NASAL ENDOSCOPY WITH LONNY BULLOSA performed by Cornelius Castro MD at [address] Left 3/1/2018    SIGMOIDOSCOPY BIOPSY FLEXIBLE performed by Joanne Buitrago MD at [facility]    TONSILLECTOMY          Family History   Problem Relation Age of Onset    Diabetes Mother 48    Heart Disease Mother 67        CAD    Heart Disease Father 50        CAD, CVA    Stroke Father 48        CVA    Diabetes Brother 72    High Blood Pressure Brother 72    Heart Disease Brother 72        CHF    Other Brother 46        Neuropathy    Obesity Brother 10    High Blood Pressure Brother 61    Obesity Brother 48 losartan (COZAAR) 100 MG tablet Take 100 mg by mouth daily      aspirin 81 MG chewable tablet Take 81 mg by mouth daily      Coenzyme Q10 (COQ10) 200 MG CAPS Take  by mouth 2 times daily. No current facility-administered medications for this visit.       Allergies   Allergen Reactions    Tramadol Other (See Comments)     Made me feel edgy    Bactrim [Sulfamethoxazole-Trimethoprim] Hives    Zocor [Simvastatin] Other (See Comments)     Body aches       Subjective:      Review of Systems    Objective:   /62 (Site: Left Upper Arm, Position: Sitting, Cuff Size: Medium Adult)   Pulse 88   Temp 96.6 °F (35.9 °C) (Temporal)   Resp 18   Ht 6' (1.829 m)   Wt 287 lb 3.2 oz (130.3 kg)   SpO2 98%   BMI 38.95 kg/m²     Physical Exam wound good sutures  removed       Results for orders placed or performed in visit on 06/30/20   Creatinine   Result Value Ref Range    CREATININE 0.7 0.6 - 1.3 mg/dL    Creatinine Clearance 60        Assessment:     Status post excision of lipoma doing well    Plan:     Return to office as needed      Electronicallysigned by Fernando Mckeon MD on 7/28/2020 at 9:23 AM

## 2020-07-28 NOTE — TELEPHONE ENCOUNTER
Patient was last seen on 7- in office by Dr. Azucena Arthur. Patient states he has been taking Zyrtec nightly and using Flonase since appointment. However the ringing is still in his ears. Patient says the intensity has increased since last appointment. He has seen Dr. Garo Hanks int he past and wasn't sure if he should contact their office. Informed patient I would check with Dr. Azucena Arthur for recommendations at this point and call patient back.  Please advise

## 2020-07-30 ENCOUNTER — TELEPHONE (OUTPATIENT)
Dept: PHYSICAL MEDICINE AND REHAB | Age: 60
End: 2020-07-30

## 2020-07-30 ENCOUNTER — OFFICE VISIT (OUTPATIENT)
Dept: ENT CLINIC | Age: 60
End: 2020-07-30
Payer: COMMERCIAL

## 2020-07-30 ENCOUNTER — TELEPHONE (OUTPATIENT)
Dept: ENT CLINIC | Age: 60
End: 2020-07-30

## 2020-07-30 VITALS
BODY MASS INDEX: 38.38 KG/M2 | RESPIRATION RATE: 16 BRPM | HEART RATE: 80 BPM | DIASTOLIC BLOOD PRESSURE: 82 MMHG | WEIGHT: 283 LBS | SYSTOLIC BLOOD PRESSURE: 132 MMHG | TEMPERATURE: 97.6 F

## 2020-07-30 PROCEDURE — 3017F COLORECTAL CA SCREEN DOC REV: CPT | Performed by: OTOLARYNGOLOGY

## 2020-07-30 PROCEDURE — 99213 OFFICE O/P EST LOW 20 MIN: CPT | Performed by: OTOLARYNGOLOGY

## 2020-07-30 PROCEDURE — 1036F TOBACCO NON-USER: CPT | Performed by: OTOLARYNGOLOGY

## 2020-07-30 PROCEDURE — G8427 DOCREV CUR MEDS BY ELIG CLIN: HCPCS | Performed by: OTOLARYNGOLOGY

## 2020-07-30 PROCEDURE — G8417 CALC BMI ABV UP PARAM F/U: HCPCS | Performed by: OTOLARYNGOLOGY

## 2020-07-30 RX ORDER — DOCUSATE SODIUM 100 MG/1
100 CAPSULE, LIQUID FILLED ORAL 2 TIMES DAILY
COMMUNITY
Start: 2020-07-24 | End: 2021-04-06

## 2020-07-30 RX ORDER — DOCUSATE SODIUM 100 MG/1
100 CAPSULE, LIQUID FILLED ORAL 2 TIMES DAILY
COMMUNITY
Start: 2020-07-24 | End: 2020-08-08

## 2020-07-30 RX ORDER — FLUTICASONE PROPIONATE 50 MCG
1 SPRAY, SUSPENSION (ML) NASAL DAILY
Qty: 1 BOTTLE | Refills: 2 | Status: SHIPPED | OUTPATIENT
Start: 2020-07-30

## 2020-07-30 RX ORDER — HYDROCODONE BITARTRATE AND ACETAMINOPHEN 5; 325 MG/1; MG/1
1 TABLET ORAL EVERY 6 HOURS PRN
COMMUNITY
Start: 2020-07-24 | End: 2020-07-31

## 2020-07-30 ASSESSMENT — ENCOUNTER SYMPTOMS
SINUS PRESSURE: 0
VOICE CHANGE: 0
SHORTNESS OF BREATH: 0
APNEA: 0
SORE THROAT: 0
WHEEZING: 0
FACIAL SWELLING: 0
STRIDOR: 0
ABDOMINAL PAIN: 0
CHOKING: 0
RHINORRHEA: 0
CHEST TIGHTNESS: 0
DIARRHEA: 0
COLOR CHANGE: 0
NAUSEA: 0
COUGH: 0
TROUBLE SWALLOWING: 0
VOMITING: 0

## 2020-07-30 NOTE — TELEPHONE ENCOUNTER
MMO denied MRI Thoracic spine    *Patient had MRI on 7/23 without a prior auth*    Office could try for an appeal   [facility] [address], [address]   Phone: [phone] Fax: [phone]    Reason for denial:  The request has been denied:  Based on [employer] Guidelines Section: SP 4.1 Upper Back (Thoracic Spine) Pain without and with Neurological Features (Including Stenosis), we cannot approve this request. Your records show that you have upper back pain. The reason this request cannot be approved is because: 1. Guidelines may support imaging in the evaluation of suspected or known spinal disease with one of the following. -Failure to improve after a recent (within three months) six week trial of physician-guided clinical care with clinical re-evaluation. -Any signs or symptoms such as significant motor weakness, malignancy, infection, cauda equina syndrome, for which conservative treatment is not needed. The clinical information received fails to support meeting these requirements and, therefore, the request is not indicated at this time.

## 2020-07-30 NOTE — TELEPHONE ENCOUNTER
There are no ear drops that help treat whooshing sounds in ears. He can try Flonase nasal spray which could potentially help while waiting for the audiogram. 1 spray to each nostril once daily. Rx sent to the [address] [facility] for him if he would like to try.

## 2020-07-30 NOTE — TELEPHONE ENCOUNTER
Patient is wanting to know if you can prescribe some kind of ear drop to help with the swishing noise in his ear. He is scheduled on 8/20/20 for his audio/tymp which is the soonest available.     Please advise

## 2020-07-30 NOTE — PROGRESS NOTES
[facility] PROFESSIONAL [facility] EAR, NOSE AND THROAT  [address] [place] [address] [place] [address]  Dept: [phone]  Dept Fax: [phone]  Loc: [phone]    Farideh Gaspar is a 63 y.o. male who was referred Malina Whipple MD for:  Chief Complaint   Patient presents with    Follow-up     Here for bilateral tinnitis and acute maxillary sinusitis. Has ringing in ears for about 3 weeks. It feels like there is water in ears. Carlton Pee HPI:     Farideh Gaspar is a 63 y.o. male who presents today for bilateral tinnitus and acute maxillary sinusitis. He saw Dr. Cristy Ayala 2019 Also, saw [facility] 2018. He had ringing in his ears for about 3 weeks. It feels like there is water in his ears. CT Results:     1. No evidence to suggest acute sinusitis.         2. There is mucosal thickening within the ethmoid air cells. Mucosal coaptation causes narrowing of the right ostiomeatal unit.         3. The nasal septum is deviated towards the left. There is a ruthann bullosa of the left middle nasal turbinate.         4. Degenerative changes are noted involving the left temporomandibular joint.                        **This report has been created using voice recognition software. It may contain minor errors which are inherent in voice recognition technology. **         Final report electronically signed by Dr. Simi Carrera on 10/19/2018     His ears has been humming is and ringing for 3 weeks. Humming is loud. He was put on Zyrtec  from his family doctor. He was also put on Cipro due to a surgery he had last week. He has not been allergic to things. He feels like his ears are plugged. Feels like he is on an airplane. When he yawns and swallows he has jaw pain. He has had post-nasal drip. His breathing through his nose has been good. He had a tongue lesion. Dr. Cristy Ayala saw him for that. No longer present    History:      Allergies   Allergen Reactions    Tramadol Other (See Comments)     Made me feel edgy    Bactrim [Sulfamethoxazole-Trimethoprim] Hives    Zocor [Simvastatin] Other (See Comments)     Body aches     Current Outpatient Medications   Medication Sig Dispense Refill    docusate sodium (COLACE) 100 MG capsule Take 100 mg by mouth 2 times daily       MG capsule Take 100 mg by mouth 2 times daily      HYDROcodone-acetaminophen (NORCO) 5-325 MG per tablet Take 1 tablet by mouth every 6 hours as needed.       ciprofloxacin (CIPRO) 500 MG tablet Take 500 mg by mouth 2 times daily      cetirizine (ZYRTEC) 10 MG tablet Take 1 tablet by mouth daily 90 tablet 1    tadalafil (CIALIS) 5 MG tablet TAKE 1 TABLET BY MOUTH ONE TIME A DAY  30 tablet 0    rosuvastatin (CRESTOR) 10 MG tablet Take 1 tablet by mouth nightly 90 tablet 0    spironolactone (ALDACTONE) 50 MG tablet Take 1 tablet by mouth daily 90 tablet 1    budesonide (RHINOCORT AQUA) 32 MCG/ACT nasal spray 2 sprays by Each Nostril route daily 3 Bottle 1    Omega-3 Fatty Acids (OMEGA-3 FISH OIL PO) Take by mouth      Multiple Vitamins-Minerals (MULTIVITAMIN ADULT PO) Take by mouth Indications: Ultra Light 121      vitamin E 400 UNIT capsule Take 400 Units by mouth daily      zinc 50 MG CAPS Take by mouth      nabumetone (RELAFEN) 750 MG tablet TAKE 1 TABLET BY MOUTH TWICE DAILY 60 tablet 3    amLODIPine (NORVASC) 5 MG tablet Take 5 mg by mouth daily      Cholecalciferol (VITAMIN D3) 2000 units CAPS Take 5,000 Units by mouth       MAGNESIUM GLYCINATE PLUS PO Take by mouth      esomeprazole Magnesium (NEXIUM) 20 MG PACK Take 20 mg by mouth as needed      metoprolol succinate (TOPROL XL) 25 MG extended release tablet TAKE 1 TABLET BY MOUTH ONCE DAILY 90 tablet 1    CPAP Machine MISC by Does not apply route Please change CPAP pressure to auto 5-15 cm H20. 1 each 0    losartan (COZAAR) 100 MG tablet Take 100 mg by mouth daily      aspirin 81 MG chewable tablet Take 81 mg by mouth daily      Coenzyme Q10 (COQ10) 200 MG CAPS Take  by mouth 2 times daily.  fluticasone (FLONASE) 50 MCG/ACT nasal spray 1 spray by Each Nostril route daily 1 Bottle 2     No current facility-administered medications for this visit.       Past Medical History:   Diagnosis Date    CAD (coronary artery disease) 2014    GERD (gastroesophageal reflux disease)     Hyperlipidemia 2010    Hypertension 2008    CELINA (obstructive sleep apnea) 2014    Dr. Prashanth Chapin      Past Surgical History:   Procedure Laterality Date    CARDIAC CATHETERIZATION  2/21/15    03/2018    CHOLECYSTECTOMY  2008    laparoscopic, Dysfunctional gallbladder    COLONOSCOPY  2013    normal    ELBOW SURGERY      Lyphoma removal 7- Dr. Catie Rutledge  07/13/2020    s/p excision of right elbow lipoma done in the office Dr Catie Rutledge  07/24/2020    cyst removed right testicle Dr Aleena Pepper N/A 4/30/2018    SEPTOPLASTY SUBMUCOUS RESECTION TURBINATES, PARTIAL RIGHT INFERIOR, NASAL ENDOSCOPY WITH LONNY BULLOSA performed by Ratna Osorio MD at [facility] Left 3/1/2018    SIGMOIDOSCOPY BIOPSY FLEXIBLE performed by Jazmine Lennon MD at [facility]    TONSILLECTOMY       Family History   Problem Relation Age of Onset    Diabetes Mother 48    Heart Disease Mother 67        CAD    Heart Disease Father 50        CAD, CVA    Stroke Father 48        CVA    Diabetes Brother 72    High Blood Pressure Brother 72    Heart Disease Brother 72        CHF    Other Brother 46        Neuropathy    Obesity Brother 8    High Blood Pressure Brother 61    Obesity Brother 48    Other Brother 36        back pain    Obesity Brother 39    Other Sister 72        leukemia    Alzheimer's Disease Maternal Grandmother 21    Early Death Paternal Grandfather 36        MI    High Blood Pressure Paternal Grandfather 36    Heart Disease Sister 39    High Blood Pressure Sister 39    Depression Sister 39    Other Brother 48        ANEURYSUM, AAA     Social History     Tobacco Use    Smoking status: Never Smoker    Smokeless tobacco: Never Used   Substance Use Topics    Alcohol use: No       Subjective:       Review of Systems   Constitutional: Negative for activity change, appetite change, chills, diaphoresis, fatigue, fever and unexpected weight change. HENT: Negative for congestion, dental problem, ear discharge, ear pain, facial swelling, hearing loss, mouth sores, nosebleeds, postnasal drip, rhinorrhea, sinus pressure, sneezing, sore throat, tinnitus, trouble swallowing and voice change. Eyes: Negative for visual disturbance. Respiratory: Negative for apnea, cough, choking, chest tightness, shortness of breath, wheezing and stridor. Cardiovascular: Negative for chest pain, palpitations and leg swelling. Gastrointestinal: Negative for abdominal pain, diarrhea, nausea and vomiting. Endocrine: Negative for cold intolerance, heat intolerance, polydipsia and polyuria. Genitourinary: Negative for dysuria, enuresis and hematuria. Musculoskeletal: Negative for arthralgias, gait problem, neck pain and neck stiffness. Skin: Negative for color change and rash. Allergic/Immunologic: Negative for environmental allergies, food allergies and immunocompromised state. Neurological: Negative for dizziness, syncope, facial asymmetry, speech difficulty, light-headedness and headaches. Hematological: Negative for adenopathy. Does not bruise/bleed easily. Psychiatric/Behavioral: Negative for confusion and sleep disturbance. The patient is not nervous/anxious. Objective:     /82 (Site: Right Lower Arm, Position: Sitting)   Pulse 80   Temp 97.6 °F (36.4 °C) (Infrared)   Resp 16   Wt 283 lb (128.4 kg)   BMI 38.38 kg/m²     Physical Exam    Ears: Possibly retracted/couldn't make a seal.  TM clear and no erythema  Nasal fossa:looked normal    Data:   All of the past medical history, past surgical history, family history,social history, allergies and current medications were reviewed with the patient. Assessment & Plan   Diagnoses and all orders for this visit:     Diagnosis Orders   1. Bilateral tinnitus  Audiometry with tympanometry   2. Eustachian tube dysfunction, bilateral  Audiometry with tympanometry   3. TMJ syndrome         The findings were explained and his questions were answered. Options were discussed including suggestions to not sniff in after he yawns or swallows. Ordered an audiogram/tympanogram.  Consider nasopharyngoscopy pending results. His TMJs are not bothering him today but he does have some degeneration and it might cause some of the water-in-the-ear feeling. Return for Audiol review. I, Humberto Baker CMA ([facility]), am scribing for, and in the presence of Dr. Mai Darling. Electronically signed by Tony Zheng CMA ([facility]) on 7/30/20 at 10:36 AM EDT. (Please note that portions of this note were completed with a voice recognition program. Efforts were made to edit the dictations butoccasionally words are mis-transcribed.)    I agree to the above documentation placed by my scribe. I have personally evaluated this patient. Additional findings are as noted. I reviewed the scribe's note and agree with the documented findings and plan of care. Any areas of disagreement are corrected. I agree with the chief complaint, past medical history, past surgical history, allergies, medications, social and family history as documented unless otherwise noted below.      Electronically signed by Garrie Primrose, MD on 7/30/2020 at 4:22 PM

## 2020-07-30 NOTE — TELEPHONE ENCOUNTER
Spoke to insurance peer to peer needs done before an appeal    Scheduled dr Ginna Barahona for a peer to peer at 3:15 pm today  With dr Dorothy Deal

## 2020-08-03 ENCOUNTER — TELEPHONE (OUTPATIENT)
Dept: ENT CLINIC | Age: 60
End: 2020-08-03

## 2020-08-03 NOTE — TELEPHONE ENCOUNTER
Patient did not want to wait so he went to [facility] and got a hearing test.      He is still having swooshing sound in both ears. He is taking the Zyrtec. Please review his test results and advise patient if you want to see him sooner if these tests are sufficient.

## 2020-08-03 NOTE — TELEPHONE ENCOUNTER
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 He can use the results from [facility] test and compare to the repeated audiogram on 8/20/20 at [facility]. Continue the Zyrtec. Has he tried the Flonase that I prescribed? He needs to use it daily for a couple of weeks for it to potentially work.

## 2020-08-03 NOTE — TELEPHONE ENCOUNTER
Spoke with patient he has not picked up the flonase yet because of the pharmacy's new hours. The patient verbalized understanding.

## 2020-08-04 ENCOUNTER — HOSPITAL ENCOUNTER (OUTPATIENT)
Dept: AUDIOLOGY | Age: 60
Discharge: HOME | End: 2020-08-04
Payer: COMMERCIAL

## 2020-08-04 PROCEDURE — 92557 COMPREHENSIVE HEARING TEST: CPT | Performed by: AUDIOLOGIST

## 2020-08-04 PROCEDURE — 92567 TYMPANOMETRY: CPT | Performed by: AUDIOLOGIST

## 2020-08-07 ENCOUNTER — OFFICE VISIT (OUTPATIENT)
Dept: ENT CLINIC | Age: 60
End: 2020-08-07
Payer: COMMERCIAL

## 2020-08-07 VITALS
HEIGHT: 72 IN | RESPIRATION RATE: 14 BRPM | DIASTOLIC BLOOD PRESSURE: 88 MMHG | HEART RATE: 80 BPM | WEIGHT: 290 LBS | BODY MASS INDEX: 39.28 KG/M2 | SYSTOLIC BLOOD PRESSURE: 136 MMHG | TEMPERATURE: 98.4 F

## 2020-08-07 PROCEDURE — 3017F COLORECTAL CA SCREEN DOC REV: CPT | Performed by: OTOLARYNGOLOGY

## 2020-08-07 PROCEDURE — 1036F TOBACCO NON-USER: CPT | Performed by: OTOLARYNGOLOGY

## 2020-08-07 PROCEDURE — 99213 OFFICE O/P EST LOW 20 MIN: CPT | Performed by: OTOLARYNGOLOGY

## 2020-08-07 PROCEDURE — G8427 DOCREV CUR MEDS BY ELIG CLIN: HCPCS | Performed by: OTOLARYNGOLOGY

## 2020-08-07 PROCEDURE — G8417 CALC BMI ABV UP PARAM F/U: HCPCS | Performed by: OTOLARYNGOLOGY

## 2020-08-07 ASSESSMENT — ENCOUNTER SYMPTOMS
CHOKING: 0
ABDOMINAL PAIN: 0
RHINORRHEA: 0
SINUS PRESSURE: 0
FACIAL SWELLING: 0
COLOR CHANGE: 0
COUGH: 0
APNEA: 0
CHEST TIGHTNESS: 0
TROUBLE SWALLOWING: 0
VOICE CHANGE: 0
STRIDOR: 0
NAUSEA: 0
VOMITING: 0
DIARRHEA: 0
WHEEZING: 0
SORE THROAT: 0
SHORTNESS OF BREATH: 0

## 2020-08-07 NOTE — PROGRESS NOTES
[facility] PROFESSIONAL SERVS  [facility]'S EAR, NOSE AND THROAT  [facility]  Dept: [phone]  Dept Fax: [phone]  Loc: [phone]    Juan Carlos Santamaria is a 63 y.o. male who was referred byNo ref. provider found for:  Chief Complaint   Patient presents with    Follow-up     Patient is here for a follow up after audio/tymp. Vivi Green HPI:     Juan Carlos Santamaria is a 63 y.o. male who presents today for follow up audio. Reviewed Audiogram with patient. COMMENTS: Normal hearing sensitivity for both ears. The left ear is slightly worse than the right ear. There is a conductive component for the left ear and at 250Hz in the right ear. Speech discrimination ability is excellent at 100%, bilaterally. Tympanometry revealed normal peak pressure and normal middle ear compliance for the right ear. Tympanometry revealed normal middle ear compliance with negative middle ear pressure for the left ear. RECOMMENDATION(S):   1- Continue care with ENT office, as scheduled. 2- Repeat audiogram and tympanogram following any medical intervention. Study shows ETD Negative pressure in Left ear. Good word description. His ear pops. He feels like there is water in the left ear, when he lets off pressure it feels like it shuts. When he tilts his head back and swallows he gets a little relief then it comes back. When he yawns and swallows he does not sniff back to clear his ear. He has a whooshing noise in his ear but it's not as bad. He feels like he is in a barrel. He is using the Flonase. He is breathing good for the most part but feeling a little congested today. He is rinsing daily. Noticed his nose has a slimy texture. He is using a CPAP. He states he tries to keep it on at night but sometimes he just takes it off. He got 4 hours of sleep last night. He is taking aspirin. History:      Allergies   Allergen Reactions    Tramadol (COQ10) 200 MG CAPS Take  by mouth 2 times daily. No current facility-administered medications for this visit.       Past Medical History:   Diagnosis Date    CAD (coronary artery disease) 2014    GERD (gastroesophageal reflux disease)     Hyperlipidemia 2010    Hypertension 2008    CELINA (obstructive sleep apnea) 2014    Dr. Bay Thomas      Past Surgical History:   Procedure Laterality Date    CARDIAC CATHETERIZATION  2/21/15    03/2018    CHOLECYSTECTOMY  2008    laparoscopic, Dysfunctional gallbladder    COLONOSCOPY  2013    normal    ELBOW SURGERY      Lyphoma removal 7- Dr. Kesha Espitia  07/13/2020    s/p excision of right elbow lipoma done in the office Dr Kesha Espitia  07/24/2020    cyst removed right testicle Dr Nga Martinez N/A 4/30/2018    SEPTOPLASTY SUBMUCOUS RESECTION TURBINATES, PARTIAL RIGHT INFERIOR, NASAL ENDOSCOPY WITH LONNY BULLOSA performed by Jai Bocanegra MD at [address] [facility] [address] Left 3/1/2018    SIGMOIDOSCOPY BIOPSY FLEXIBLE performed by Tonia Andino MD at [address] [facility]    TONSILLECTOMY       Family History   Problem Relation Age of Onset    Diabetes Mother 48    Heart Disease Mother 67        CAD    Heart Disease Father 50        CAD, CVA    Stroke Father 48        CVA    Diabetes Brother 72    High Blood Pressure Brother 72    Heart Disease Brother 72        CHF    Other Brother 46        Neuropathy    Obesity Brother 8    High Blood Pressure Brother 61    Obesity Brother 48    Other Brother 36        back pain    Obesity Brother 39    Other Sister 72        leukemia    Alzheimer's Disease Maternal Grandmother 21    Early Death Paternal Grandfather 36        MI    High Blood Pressure Paternal Grandfather 36    Heart Disease Sister 39    High Blood Pressure Sister 39    Depression Sister 39    Other Brother 48        ANEURYSUM, AAA     Social History Tobacco Use    Smoking status: Never Smoker    Smokeless tobacco: Never Used   Substance Use Topics    Alcohol use: No       Subjective:       Review of Systems   Constitutional: Negative for activity change, appetite change, chills, diaphoresis, fatigue, fever and unexpected weight change. HENT: Negative for congestion, dental problem, ear discharge, ear pain, facial swelling, hearing loss, mouth sores, nosebleeds, postnasal drip, rhinorrhea, sinus pressure, sneezing, sore throat, tinnitus, trouble swallowing and voice change. Eyes: Negative for visual disturbance. Respiratory: Negative for apnea, cough, choking, chest tightness, shortness of breath, wheezing and stridor. Cardiovascular: Negative for chest pain, palpitations and leg swelling. Gastrointestinal: Negative for abdominal pain, diarrhea, nausea and vomiting. Endocrine: Negative for cold intolerance, heat intolerance, polydipsia and polyuria. Genitourinary: Negative for dysuria, enuresis and hematuria. Musculoskeletal: Negative for arthralgias, gait problem, neck pain and neck stiffness. Skin: Negative for color change and rash. Allergic/Immunologic: Negative for environmental allergies, food allergies and immunocompromised state. Neurological: Negative for dizziness, syncope, facial asymmetry, speech difficulty, light-headedness and headaches. Hematological: Negative for adenopathy. Does not bruise/bleed easily. Psychiatric/Behavioral: Negative for confusion and sleep disturbance. The patient is not nervous/anxious. Objective:     /88 (Site: Left Upper Arm, Position: Sitting)   Pulse 80   Temp 98.4 °F (36.9 °C) (Infrared)   Resp 14   Ht 6' (1.829 m)   Wt 290 lb (131.5 kg)   BMI 39.33 kg/m²     Physical Exam   Ears appear clear. He has some crepitus of the TMJ's.      Data:  All of the past medical history, past surgical history, family history,social history, allergies and current medications were reviewed with the patient. Assessment & Plan   Diagnoses and all orders for this visit:     Diagnosis Orders   1. Eustachian tube dysfunction, bilateral     2. TMJ syndrome         The findings were explained and his questions were answered. He has documented negative middle ear pressure on the left, so it is not all TMJ. Options were discussed including follow up in 3 months. 3 month follow up. IHoda CMA ([email or web address]), am scribing for, and in the presence of Dr. Mu Paulson. Electronically signed by Jasmyn Watson CMA ([email or web address]) on 8/7/20 at 9:51 AM EDT. (Please note that portions of this note were completed with a voice recognition program. Efforts were made to edit the dictations butoccasionally words are mis-transcribed.)    I agree to the above documentation placed by my scribe. I have personally evaluated this patient. Additional findings are as noted. I reviewed the scribe's note and agree with the documented findings and plan of care. Any areas of disagreement are corrected. I agree with the chief complaint, past medical history, past surgical history, allergies, medications, social and family history as documented unless otherwise noted below.      Electronically signed by Alessia Ruvalcaba MD on 8/30/2020 at 1:20 PM

## 2020-08-20 ENCOUNTER — HOSPITAL ENCOUNTER (OUTPATIENT)
Age: 60
Discharge: HOME | End: 2020-08-20
Payer: COMMERCIAL

## 2020-08-20 PROCEDURE — U0003 INFECTIOUS AGENT DETECTION BY NUCLEIC ACID (DNA OR RNA); SEVERE ACUTE RESPIRATORY SYNDROME CORONAVIRUS 2 (SARS-COV-2) (CORONAVIRUS DISEASE [COVID-19]), AMPLIFIED PROBE TECHNIQUE, MAKING USE OF HIGH THROUGHPUT TECHNOLOGIES AS DESCRIBED BY CMS-2020-01-R: HCPCS

## 2020-08-22 LAB — SARS-COV-2: NOT DETECTED

## 2020-08-26 ENCOUNTER — HOSPITAL ENCOUNTER (OUTPATIENT)
Age: 60
Setting detail: OUTPATIENT SURGERY
Discharge: HOME | End: 2020-08-26
Attending: PHYSICAL MEDICINE & REHABILITATION | Admitting: PHYSICAL MEDICINE & REHABILITATION
Payer: COMMERCIAL

## 2020-08-26 ENCOUNTER — APPOINTMENT (OUTPATIENT)
Dept: GENERAL RADIOLOGY | Age: 60
End: 2020-08-26
Attending: PHYSICAL MEDICINE & REHABILITATION
Payer: COMMERCIAL

## 2020-08-26 VITALS
WEIGHT: 290 LBS | DIASTOLIC BLOOD PRESSURE: 61 MMHG | TEMPERATURE: 97.1 F | BODY MASS INDEX: 39.28 KG/M2 | SYSTOLIC BLOOD PRESSURE: 109 MMHG | OXYGEN SATURATION: 93 % | HEART RATE: 61 BPM | HEIGHT: 72 IN | RESPIRATION RATE: 12 BRPM

## 2020-08-26 PROCEDURE — 3600000054 HC PAIN LEVEL 3 BASE: Performed by: PHYSICAL MEDICINE & REHABILITATION

## 2020-08-26 PROCEDURE — 99152 MOD SED SAME PHYS/QHP 5/>YRS: CPT | Performed by: PHYSICAL MEDICINE & REHABILITATION

## 2020-08-26 PROCEDURE — 7100000010 HC PHASE II RECOVERY - FIRST 15 MIN: Performed by: PHYSICAL MEDICINE & REHABILITATION

## 2020-08-26 PROCEDURE — 2709999900 HC NON-CHARGEABLE SUPPLY: Performed by: PHYSICAL MEDICINE & REHABILITATION

## 2020-08-26 PROCEDURE — 3209999900 FLUORO FOR SURGICAL PROCEDURES

## 2020-08-26 PROCEDURE — 7100000011 HC PHASE II RECOVERY - ADDTL 15 MIN: Performed by: PHYSICAL MEDICINE & REHABILITATION

## 2020-08-26 PROCEDURE — 64491 INJ PARAVERT F JNT C/T 2 LEV: CPT | Performed by: PHYSICAL MEDICINE & REHABILITATION

## 2020-08-26 PROCEDURE — 6360000002 HC RX W HCPCS: Performed by: PHYSICAL MEDICINE & REHABILITATION

## 2020-08-26 PROCEDURE — 2500000003 HC RX 250 WO HCPCS: Performed by: PHYSICAL MEDICINE & REHABILITATION

## 2020-08-26 PROCEDURE — 64490 INJ PARAVERT F JNT C/T 1 LEV: CPT | Performed by: PHYSICAL MEDICINE & REHABILITATION

## 2020-08-26 RX ORDER — TRIAMCINOLONE ACETONIDE 40 MG/ML
INJECTION, SUSPENSION INTRA-ARTICULAR; INTRAMUSCULAR PRN
Status: DISCONTINUED | OUTPATIENT
Start: 2020-08-26 | End: 2020-08-26 | Stop reason: ALTCHOICE

## 2020-08-26 RX ORDER — FENTANYL CITRATE 50 UG/ML
INJECTION, SOLUTION INTRAMUSCULAR; INTRAVENOUS PRN
Status: DISCONTINUED | OUTPATIENT
Start: 2020-08-26 | End: 2020-08-26 | Stop reason: ALTCHOICE

## 2020-08-26 RX ORDER — LIDOCAINE HYDROCHLORIDE 10 MG/ML
INJECTION, SOLUTION EPIDURAL; INFILTRATION; INTRACAUDAL; PERINEURAL PRN
Status: DISCONTINUED | OUTPATIENT
Start: 2020-08-26 | End: 2020-08-26 | Stop reason: ALTCHOICE

## 2020-08-26 RX ORDER — MIDAZOLAM HYDROCHLORIDE 1 MG/ML
INJECTION INTRAMUSCULAR; INTRAVENOUS PRN
Status: DISCONTINUED | OUTPATIENT
Start: 2020-08-26 | End: 2020-08-26 | Stop reason: ALTCHOICE

## 2020-08-26 ASSESSMENT — PAIN - FUNCTIONAL ASSESSMENT: PAIN_FUNCTIONAL_ASSESSMENT: 0-10

## 2020-08-26 ASSESSMENT — PAIN DESCRIPTION - DESCRIPTORS: DESCRIPTORS: CONSTANT

## 2020-08-26 NOTE — SEDATION DOCUMENTATION
Conscious Sedation Pre and Post Procedure Note    Indication: procedural pain management    Consent: I have discussed with the patient and/or the patient representative the indication, alternatives, and the possible risks and/or complications of the planned procedure and the anesthesia methods. The patient and/or patient representative appear to understand and agree to proceed. Physician Involvement: The attending physician was present and supervising this procedure. Pre-Sedation Documentation and Exam: I have personally completed a history, physical exam & review of systems for this patient (see notes). Airway Assessment: Mallampati Class II - (soft palate, fauces & uvula are visible)    Prior History of Anesthesia Complications: none    ASA Classification: Class 2 - A normal healthy patient with mild systemic disease    Sedation/ Anesthesia Plan: intravenous sedation    Medications Used: midazolam (Versed) intravenously and fentanyl intravenously    Monitoring and Safety: The patient was placed on a cardiac monitor and vital signs, pulse oximetry and level of consciousness were continuously evaluated throughout the procedure. The patient was closely monitored until recovery from the medications was complete and the patient had returned to baseline status. Respiratory therapy was on standby at all times during the procedure. (The following sections must be completed)    Post-Sedation Vital Signs: Vital signs were reviewed and were stable after the procedure (see flow sheet for vitals)            Post-Sedation Exam: Neurologically intact. No cardiovascular compromise post injection.      Complications: none

## 2020-08-26 NOTE — OP NOTE
Operative Note      Patient: Saba Moore  YOB: 1960  MRN: [id number]    Date of Procedure: 8/26/2020    Pre-Op Diagnosis: Chronic thoracic back pain, unspecified back pain laterality    Post-Op Diagnosis: Same       Procedure(s):  Right thoracic medial branch block, 2 levels ( T9, T10)    Surgeon(s):  Brian Whitt MD    Assistant:   * No surgical staff found *    Anesthesia: IV Sedation    Estimated Blood Loss (mL): Minimal    Complications: None    Moderate Sedation Time: > 15 minutes    Anesthesia: Moderate sedation using 2 mg IV versed & 50 mcg IV fentanyl. CONSENT:     The risks, benefits, alternatives, plan, complications, and personnel were discussed prior to the procedure. The patient understood and agreed to proceed. The patient was counseled at length about the risks of lilliana Covid-19 during their perioperative period and any recovery window from their procedure. The patient was made aware that lilliana Covid-19  may worsen their prognosis for recovering from their procedure  and lend to a higher morbidity and/or mortality risk. All material risks, benefits, and reasonable alternatives including postponing the procedure were discussed. The patient does wish to proceed with the procedure at this time. The patient was positioned prone. Sign-in and time-out was performed. Identifying the   site, in this case, right side  T9, T10 right thoracic medial branch     Sterile technique was done in the usual manner using chlorhexidine. This was followed by infiltration of a 6 ml of 1% preservative-free lidocaine. A 3.5 inch  22-gauge spinal needle(s)  were positioned under fluoroscopic guidance. A total of  2  needles were positioned. Upon confirmation that the needle was properly positioned, a solution of 40 mg of triamcinolone and  2 ml of 1% preservative-free lidocaine was injected without difficulty.     The patient tolerated the procedure well and went to the recovery room with stable vital signs. PLAN:     Home instructions were provided. The patient will follow up in 4 to 6 weeks or earlier if needed.       Electronically signed by Zhen Villanueva MD on 8/26/2020 at 10:42 AM

## 2020-08-26 NOTE — H&P
Tracy Croft MD      [address] [place] [address] Taiwo is a 63 y.o. male, who came in for a procedure,  Thoracic medial branch block 2 levels    No change since last visit. Aching pain. Worse with twisting.        Treatment done: physical therapy, anti-inflammatory medication and muscle relaxant    Allergies   Allergen Reactions    Tramadol Other (See Comments)     Made me feel edgy    Bactrim [Sulfamethoxazole-Trimethoprim] Hives    Zocor [Simvastatin] Other (See Comments)     Body aches       Social History     Socioeconomic History    Marital status:      Spouse name: Adele Morris Number of children: 2    Years of education: 15    Highest education level: Not on file   Occupational History    Occupation: [employer]     Comment: self employeed   Social Needs    Financial resource strain: Not on file    Food insecurity     Worry: Not on file     Inability: Not on file   [employer] needs     Medical: Not on file     Non-medical: Not on file   Tobacco Use    Smoking status: Never Smoker    Smokeless tobacco: Never Used   Substance and Sexual Activity    Alcohol use: No    Drug use: No    Sexual activity: Yes     Partners: Female   Lifestyle    Physical activity     Days per week: Not on file     Minutes per session: Not on file    Stress: Not on file   Relationships    Social connections     Talks on phone: Not on file     Gets together: Not on file     Attends Taoist service: Not on file     Active member of club or organization: Not on file     Attends meetings of clubs or organizations: Not on file     Relationship status: Not on file    Intimate partner violence     Fear of current or ex partner: Not on file     Emotionally abused: Not on file     Physically abused: Not on file     Forced sexual activity: Not on file   Other Topics Concern    Not on file   Social History Narrative    Not on file       Past Medical History:   Diagnosis Date    CAD (coronary artery disease) 2014    GERD (gastroesophageal reflux disease)     Hyperlipidemia 2010    Hypertension 2008    CELINA (obstructive sleep apnea) 2014    Dr. Henny Asher       Past Surgical History:   Procedure Laterality Date    CARDIAC CATHETERIZATION  2/21/15    03/2018    CHOLECYSTECTOMY  2008    laparoscopic, Dysfunctional gallbladder    COLONOSCOPY  2013    normal    ELBOW SURGERY      Lyphoma removal 7- Dr. Dieudonne Mata HISTORY  07/13/2020    s/p excision of right elbow lipoma done in the office Dr Nayeli Cross  07/24/2020    cyst removed right testicle Dr Hortencia Grey N/A 4/30/2018    SEPTOPLASTY SUBMUCOUS RESECTION TURBINATES, PARTIAL RIGHT INFERIOR, NASAL ENDOSCOPY WITH LONNY BULLOSA performed by April Moreno MD at [address] [facility] [address] Left 3/1/2018    SIGMOIDOSCOPY BIOPSY FLEXIBLE performed by Audrey Link MD at [address] [facility]         Prior to Visit Medications    Medication Sig Taking?  Authorizing Provider   fluticasone (FLONASE) 50 MCG/ACT nasal spray 1 spray by Each Nostril route daily Yes SHANNAN [place]   ciprofloxacin (CIPRO) 500 MG tablet Take 500 mg by mouth 2 times daily Yes Historical Provider, MD   cetirizine (ZYRTEC) 10 MG tablet Take 1 tablet by mouth daily Yes Clint Cornejo MD   rosuvastatin (CRESTOR) 10 MG tablet Take 1 tablet by mouth nightly Yes Laurel Trinidad APRN - CNP   spironolactone (ALDACTONE) 50 MG tablet Take 1 tablet by mouth daily Yes Suh Wilks MD   budesonide (RHINOCORT AQUA) 32 MCG/ACT nasal spray 2 sprays by Each Nostril route daily Yes Clint Cornejo MD   esomeprazole Magnesium (NEXIUM) 20 MG PACK Take 20 mg by mouth as needed Yes Historical Provider, MD   metoprolol succinate (TOPROL XL) 25 MG extended release tablet TAKE 1 TABLET BY MOUTH ONCE DAILY Yes Clint Cornejo MD   losartan (COZAAR) 100 MG tablet Take 100 mg by mouth daily Yes Historical Provider, MD injection.        Electronically signed by Salty Andino MD on 8/26/2020 at 10:11 AM

## 2020-08-26 NOTE — PROGRESS NOTES
1040: Patient arrived to Phase II recovery via cart. Awake and alert. Report received from surgical RN. 1042: VSS, patient denies pain at this time. HOB elevated and snack and drink provided. 1058: IV removed without complications. Bandid applied. Patient sat edge of bed with this RN present. Dressed independently. 1103: Patient discharge home with wife in stable condition.

## 2020-09-01 ENCOUNTER — TELEPHONE (OUTPATIENT)
Dept: ENT CLINIC | Age: 60
End: 2020-09-01

## 2020-09-01 NOTE — TELEPHONE ENCOUNTER
Patient called and stated that something needs to be done with the off balance he is having. He stated that its been going on for awhile now. He stated that some days are worse then others and today is one of the days were it is worse. He stated that he doesn't have any ear drainage or ear pain. He stated it hard to explain but its like when you get up out of a chair and sometime you are off balance and need to caught yourself but its all the time when walking or standing. Asked patient if the room is spinning and he stated he doesn't notice the room spinning. The only other symptom patient mention was some nasal drainage he had a few days back at night which was substantial amount he stated but that about all.      He stated that he wanted to schedule with Dr. Aaliyah SHELTON or get a referral to a specialized ear specialist.

## 2020-09-01 NOTE — TELEPHONE ENCOUNTER
Per Sue, she stated we can schedule patient this week with Dr. Matthew Lazaro, even the 8:30 am appointments. Attempted to call patient. Got voicemail, left message to call the office.

## 2020-09-02 ENCOUNTER — HOSPITAL ENCOUNTER (EMERGENCY)
Age: 60
Discharge: HOME | End: 2020-09-02
Attending: EMERGENCY MEDICINE
Payer: COMMERCIAL

## 2020-09-02 ENCOUNTER — APPOINTMENT (OUTPATIENT)
Dept: CT IMAGING | Age: 60
End: 2020-09-02
Payer: COMMERCIAL

## 2020-09-02 VITALS
RESPIRATION RATE: 23 BRPM | HEIGHT: 72 IN | HEART RATE: 50 BPM | BODY MASS INDEX: 39.28 KG/M2 | DIASTOLIC BLOOD PRESSURE: 84 MMHG | OXYGEN SATURATION: 97 % | SYSTOLIC BLOOD PRESSURE: 139 MMHG | TEMPERATURE: 97.8 F | WEIGHT: 290 LBS

## 2020-09-02 LAB
ANION GAP SERPL CALCULATED.3IONS-SCNC: 8 MEQ/L (ref 8–16)
BASOPHILS # BLD: 0.6 %
BASOPHILS ABSOLUTE: 0.1 THOU/MM3 (ref 0–0.1)
BUN BLDV-MCNC: 15 MG/DL (ref 7–22)
CALCIUM SERPL-MCNC: 9.4 MG/DL (ref 8.5–10.5)
CHLORIDE BLD-SCNC: 105 MEQ/L (ref 98–111)
CO2: 23 MEQ/L (ref 23–33)
CREAT SERPL-MCNC: 0.8 MG/DL (ref 0.4–1.2)
EKG ATRIAL RATE: 56 BPM
EKG P AXIS: 31 DEGREES
EKG P-R INTERVAL: 168 MS
EKG Q-T INTERVAL: 442 MS
EKG QRS DURATION: 112 MS
EKG QTC CALCULATION (BAZETT): 426 MS
EKG R AXIS: -6 DEGREES
EKG T AXIS: 31 DEGREES
EKG VENTRICULAR RATE: 56 BPM
EOSINOPHIL # BLD: 0.9 %
EOSINOPHILS ABSOLUTE: 0.1 THOU/MM3 (ref 0–0.4)
ERYTHROCYTE [DISTWIDTH] IN BLOOD BY AUTOMATED COUNT: 12.8 % (ref 11.5–14.5)
ERYTHROCYTE [DISTWIDTH] IN BLOOD BY AUTOMATED COUNT: 41.9 FL (ref 35–45)
GFR SERPL CREATININE-BSD FRML MDRD: > 90 ML/MIN/1.73M2
GLUCOSE BLD-MCNC: 96 MG/DL (ref 70–108)
HCT VFR BLD CALC: 45.8 % (ref 42–52)
HEMOGLOBIN: 16 GM/DL (ref 14–18)
IMMATURE GRANS (ABS): 0.06 THOU/MM3 (ref 0–0.07)
IMMATURE GRANULOCYTES: 0.5 %
LYMPHOCYTES # BLD: 19.2 %
LYMPHOCYTES ABSOLUTE: 2.2 THOU/MM3 (ref 1–4.8)
MCH RBC QN AUTO: 31.1 PG (ref 26–33)
MCHC RBC AUTO-ENTMCNC: 34.9 GM/DL (ref 32.2–35.5)
MCV RBC AUTO: 88.9 FL (ref 80–94)
MONOCYTES # BLD: 9.5 %
MONOCYTES ABSOLUTE: 1.1 THOU/MM3 (ref 0.4–1.3)
NUCLEATED RED BLOOD CELLS: 0 /100 WBC
OSMOLALITY CALCULATION: 272.7 MOSMOL/KG (ref 275–300)
PLATELET # BLD: 209 THOU/MM3 (ref 130–400)
PMV BLD AUTO: 8.2 FL (ref 9.4–12.4)
POTASSIUM REFLEX MAGNESIUM: 4.2 MEQ/L (ref 3.5–5.2)
RBC # BLD: 5.15 MILL/MM3 (ref 4.7–6.1)
SEG NEUTROPHILS: 69.3 %
SEGMENTED NEUTROPHILS ABSOLUTE COUNT: 7.8 THOU/MM3 (ref 1.8–7.7)
SODIUM BLD-SCNC: 136 MEQ/L (ref 135–145)
TROPONIN T: < 0.01 NG/ML
WBC # BLD: 11.2 THOU/MM3 (ref 4.8–10.8)

## 2020-09-02 PROCEDURE — 6370000000 HC RX 637 (ALT 250 FOR IP): Performed by: EMERGENCY MEDICINE

## 2020-09-02 PROCEDURE — 80048 BASIC METABOLIC PNL TOTAL CA: CPT

## 2020-09-02 PROCEDURE — 99285 EMERGENCY DEPT VISIT HI MDM: CPT

## 2020-09-02 PROCEDURE — 85025 COMPLETE CBC W/AUTO DIFF WBC: CPT

## 2020-09-02 PROCEDURE — 70450 CT HEAD/BRAIN W/O DYE: CPT

## 2020-09-02 PROCEDURE — 93010 ELECTROCARDIOGRAM REPORT: CPT | Performed by: INTERNAL MEDICINE

## 2020-09-02 PROCEDURE — 93005 ELECTROCARDIOGRAM TRACING: CPT | Performed by: EMERGENCY MEDICINE

## 2020-09-02 PROCEDURE — 2580000003 HC RX 258: Performed by: EMERGENCY MEDICINE

## 2020-09-02 PROCEDURE — 36415 COLL VENOUS BLD VENIPUNCTURE: CPT

## 2020-09-02 PROCEDURE — 84484 ASSAY OF TROPONIN QUANT: CPT

## 2020-09-02 RX ORDER — MECLIZINE HYDROCHLORIDE CHEWABLE TABLETS 25 MG/1
25 TABLET, CHEWABLE ORAL ONCE
Status: COMPLETED | OUTPATIENT
Start: 2020-09-02 | End: 2020-09-02

## 2020-09-02 RX ORDER — AMOXICILLIN AND CLAVULANATE POTASSIUM 875; 125 MG/1; MG/1
1 TABLET, FILM COATED ORAL 2 TIMES DAILY
Qty: 28 TABLET | Refills: 1 | Status: SHIPPED | OUTPATIENT
Start: 2020-09-02 | End: 2020-09-16

## 2020-09-02 RX ORDER — 0.9 % SODIUM CHLORIDE 0.9 %
1000 INTRAVENOUS SOLUTION INTRAVENOUS ONCE
Status: COMPLETED | OUTPATIENT
Start: 2020-09-02 | End: 2020-09-02

## 2020-09-02 RX ORDER — MECLIZINE HYDROCHLORIDE 25 MG/1
25 TABLET ORAL 3 TIMES DAILY PRN
Qty: 30 TABLET | Refills: 0 | Status: SHIPPED | OUTPATIENT
Start: 2020-09-02 | End: 2020-09-12

## 2020-09-02 RX ADMIN — MECLIZINE HCL 25 MG: 25 TABLET, CHEWABLE ORAL at 13:00

## 2020-09-02 RX ADMIN — SODIUM CHLORIDE 1000 ML: 9 INJECTION, SOLUTION INTRAVENOUS at 12:59

## 2020-09-02 ASSESSMENT — PAIN DESCRIPTION - DESCRIPTORS: DESCRIPTORS: ACHING

## 2020-09-02 ASSESSMENT — PAIN SCALES - GENERAL: PAINLEVEL_OUTOF10: 4

## 2020-09-02 ASSESSMENT — PAIN DESCRIPTION - LOCATION: LOCATION: HEAD

## 2020-09-02 ASSESSMENT — PAIN DESCRIPTION - PAIN TYPE: TYPE: CHRONIC PAIN

## 2020-09-02 NOTE — ED NOTES
Pt updated on poc. Will notify Dr. Cecil Burger that all results are back at this time. Call light in reach. No concerns voiced.      Nazia Snider RN  09/02/20 [id number]

## 2020-09-02 NOTE — TELEPHONE ENCOUNTER
Dr. Aaliyah Saeed is in the operating room today and does not have any office patients. He should keep the scheduled appointment and discuss with Dr. Aaliyah Saeed tomorrow. The patient should go to the ER if he develops weakness (especially one sided), changes in vision, changes in speech, or other concerns. There are different types of vertigo, but typically resolve after a short time on their own, but may come back later. Dr Aaliyah Saeed will likely do some position testing to evaluate his vertigo.

## 2020-09-02 NOTE — TELEPHONE ENCOUNTER
Patient called and is having issues with balance. He stated that the room is spinning. When he called yesterday the room was not spinning. His ears feel like they are sizzling and has muffed hearing. The patient has an appointment schedule tomorrow, but he would like to get in today if possible.

## 2020-09-02 NOTE — ED NOTES
Pt states he has been having intermittent dizziness for \"a few months now\". He has been seen in the ER and by an ENT physician for this issue. He states today it is much worse than normal. He also states his blood pressure is elevated compared to normal as well. Pt alert and oriented. No extremity weakness noted. EKG completed upon arrival. Pt hooked up to monitor and telemetry. Call light in reach.      Arminda Queen RN  09/02/20 [id number]

## 2020-09-02 NOTE — ED PROVIDER NOTES
Take by mouthHistorical Med      esomeprazole Magnesium (NEXIUM) 20 MG PACK Take 20 mg by mouth as neededHistorical Med      metoprolol succinate (TOPROL XL) 25 MG extended release tablet TAKE 1 TABLET BY MOUTH ONCE DAILY, Disp-90 tablet, R-1Normal      CPAP Machine MISC Starting 2018, Disp-1 each, R-0, PrintPlease change CPAP pressure to auto 5-15 cm H20.      losartan (COZAAR) 100 MG tablet Take 100 mg by mouth dailyHistorical Med      aspirin 81 MG chewable tablet Take 81 mg by mouth dailyHistorical Med      Coenzyme Q10 (COQ10) 200 MG CAPS Take  by mouth 2 times daily. ALLERGIES     is allergic to tramadol; bactrim [sulfamethoxazole-trimethoprim]; and zocor [simvastatin]. FAMILY HISTORY     He indicated that his mother is . He indicated that his father is . He indicated that both of his sisters are alive. He indicated that two of his five brothers are alive. He indicated that his maternal grandmother is . He indicated that his maternal grandfather is . He indicated that his paternal grandmother is . He indicated that his paternal grandfather is . family history includes Alzheimer's Disease (age of onset: 21) in his maternal grandmother; Depression (age of onset: 39) in his sister; Diabetes (age of onset: 48) in his mother; Diabetes (age of onset: 72) in his brother; Early Death (age of onset: 36) in his paternal grandfather; Heart Disease (age of onset: 39) in his sister;  Heart Disease (age of onset: 50) in his father; Heart Disease (age of onset: 72) in his brother; Heart Disease (age of onset: 67) in his mother; High Blood Pressure (age of onset: 36) in his paternal grandfather; High Blood Pressure (age of onset: 39) in his sister; High Blood Pressure (age of onset: 61) in his brother; High Blood Pressure (age of onset: 72) in his brother; Obesity (age of onset: 8) in his brother; Obesity (age of onset: 39) in his brother; Obesity (age or Co-signs this chart in the absence of a cardiologist.      RADIOLOGY: non-plain film images(s) such as CT, Ultrasound and MRI are read by the radiologist.  Plain radiographic images are visualized and preliminarily interpreted by the emergency physician unless otherwise stated below. LABS:   Labs Reviewed   CBC WITH AUTO DIFFERENTIAL - Abnormal; Notable for the following components:       Result Value    WBC 11.2 (*)     MPV 8.2 (*)     Segs Absolute 7.8 (*)     All other components within normal limits   OSMOLALITY - Abnormal; Notable for the following components:    Osmolality Calc 272.7 (*)     All other components within normal limits   BASIC METABOLIC PANEL W/ REFLEX TO MG FOR LOW K   TROPONIN   ANION GAP   GLOMERULAR FILTRATION RATE, ESTIMATED       EMERGENCY DEPARTMENT COURSE:   Vitals:    Vitals:    09/02/20 1131 09/02/20 1252 09/02/20 1400   BP: (!) 157/98 (!) 135/90 139/84   Pulse: 56 53 50   Resp: 13 24 23   Temp: 97.8 °F (36.6 °C)     TempSrc: Oral     SpO2: 97% 97% 97%   Weight: 290 lb (131.5 kg)     Height: 6' (1.829 m)       Patient presenting with vertigo, no ataxia, patient able to ambulate in the ED without hesitation. Patient has chronic vertigo/dizziness for which she sees ENT. Patient symptoms improved completely with meclizine. CT head shows mastoiditis possibly. Case discussed with Dr. Greg Lanier, wants patient to be started on antibiotics and referred to him for follow-up tomorrow. Patient agreeable with plan of care, states that he feels better. CRITICAL CARE:       CONSULTS:  None    PROCEDURES:  None    FINAL IMPRESSION      1. Dizziness    2.  Mastoid disorder, left          DISPOSITION/PLAN   Decision To Discharge    PATIENT REFERRED TO:  Cornelius Castro MD  [address] [place], [place] [address] [facility] [address]  [phone]    Go on 9/3/2020  RE-CHECK AND FURTHER TESTING AS NEEDED      DISCHARGE MEDICATIONS:  Discharge Medication List as of 9/2/2020  2:20 PM      START taking these medications    Details   meclizine (ANTIVERT) 25 MG tablet Take 1 tablet by mouth 3 times daily as needed for Dizziness, Disp-30 tablet,R-0Print      amoxicillin-clavulanate (AUGMENTIN) 875-125 MG per tablet Take 1 tablet by mouth 2 times daily for 14 days, Disp-28 tablet,R-1Print             (Please note that portions of this note were completed with a voice recognition program.  Efforts were made to edit the dictations but occasionally words are mis-transcribed.)    DO Dalila Yoo DO  09/03/20 [id number]

## 2020-09-02 NOTE — TELEPHONE ENCOUNTER
Patient notified to keep appointment for tomorrow. Informed patient he should go to the ER if he develops weakness (especially one sided), changes in vision, changes in speech, or other concerns. There are different types of vertigo, but typically resolve after a short time on their own, but may come back later. Dr Simon Andino will likely do some position testing to evaluate his vertigo. Patient verbalized understanding and thanked me.

## 2020-09-03 ENCOUNTER — OFFICE VISIT (OUTPATIENT)
Dept: ENT CLINIC | Age: 60
End: 2020-09-03
Payer: COMMERCIAL

## 2020-09-03 VITALS
BODY MASS INDEX: 38.59 KG/M2 | RESPIRATION RATE: 14 BRPM | HEIGHT: 72 IN | HEART RATE: 76 BPM | TEMPERATURE: 97.2 F | DIASTOLIC BLOOD PRESSURE: 72 MMHG | WEIGHT: 284.9 LBS | SYSTOLIC BLOOD PRESSURE: 124 MMHG

## 2020-09-03 PROCEDURE — 3017F COLORECTAL CA SCREEN DOC REV: CPT | Performed by: OTOLARYNGOLOGY

## 2020-09-03 PROCEDURE — 99213 OFFICE O/P EST LOW 20 MIN: CPT | Performed by: OTOLARYNGOLOGY

## 2020-09-03 PROCEDURE — 1036F TOBACCO NON-USER: CPT | Performed by: OTOLARYNGOLOGY

## 2020-09-03 PROCEDURE — G8417 CALC BMI ABV UP PARAM F/U: HCPCS | Performed by: OTOLARYNGOLOGY

## 2020-09-03 PROCEDURE — G8427 DOCREV CUR MEDS BY ELIG CLIN: HCPCS | Performed by: OTOLARYNGOLOGY

## 2020-09-03 ASSESSMENT — ENCOUNTER SYMPTOMS
TROUBLE SWALLOWING: 0
RHINORRHEA: 0
COLOR CHANGE: 0
SHORTNESS OF BREATH: 0
ABDOMINAL PAIN: 0
FACIAL SWELLING: 0
VOICE CHANGE: 0
WHEEZING: 0
APNEA: 0
DIARRHEA: 0
SORE THROAT: 0
VOMITING: 0
CHOKING: 0
SINUS PRESSURE: 0
STRIDOR: 0
NAUSEA: 0
CHEST TIGHTNESS: 0
COUGH: 0

## 2020-09-08 ENCOUNTER — TELEPHONE (OUTPATIENT)
Dept: FAMILY MEDICINE CLINIC | Age: 60
End: 2020-09-08

## 2020-09-08 ENCOUNTER — VIRTUAL VISIT (OUTPATIENT)
Dept: PHYSICAL MEDICINE AND REHAB | Age: 60
End: 2020-09-08
Payer: COMMERCIAL

## 2020-09-08 ENCOUNTER — TELEPHONE (OUTPATIENT)
Dept: PHYSICAL MEDICINE AND REHAB | Age: 60
End: 2020-09-08

## 2020-09-08 PROCEDURE — 99441 PR PHYS/QHP TELEPHONE EVALUATION 5-10 MIN: CPT | Performed by: PHYSICAL MEDICINE & REHABILITATION

## 2020-09-08 NOTE — TELEPHONE ENCOUNTER
I contacted this patient to schedule 3 mo fu VV as per Dr Jesi Mondragon instructions on AVS  This patient states he was told by Dr Jesi Mondragon to fu as needed  Pt voiced understanding to call this office if he needs anything

## 2020-09-08 NOTE — PROGRESS NOTES
Carey Chand is a 63 y.o. male evaluated via telephone on 9/8/2020. Consent:    He and/or health care decision maker is aware that that he may receive a bill for this telephone service, depending on his insurance coverage, and has provided verbal consent to proceed: Yes      Documentation:    I communicated with the patient and/or health care decision maker about  Treatment post injection. Had thoracic medial branch block - 8/26/2020. Had at least 60% relief from prior injection. I affirm this is a Patient Initiated Episode with a Patient who has not had a related appointment within my department in the past 7 days or scheduled within the next 24 hours.     Patient identification was verified at the start of the visit: Yes    Total Time: minutes: 5-10 minutes    Note: not billable if this call serves to triage the patient into an appointment for the relevant concern      Dino Kawasaki

## 2020-09-10 NOTE — PROGRESS NOTES
Dr. Taj Parada paged regarding patient's platelets of 328. Dr. Taj Parada gave verbal okay to give patient's 0900 Lovenox. [facility] PROFESSION[facility]  [facility]  [facility] [place]  Dept: [phone]  Dept Fax: [phone]  Loc: [phone]    Roxanna Hobson is a 63 y.o. male who was referred byNo ref. provider found for:  Chief Complaint   Patient presents with    Dizziness     Patient is here for dizziness, he stated Its not getting any better. He stated that he was in the ER yesterday, CT scan was completed    . HPI:     Roxanna Hobson is a 63 y.o. male who presents today for patient states dizziness is not getting any better. He stated he was in the ER yesterday, CT scan was completed. CT Head WO Contrast:     Opacified left mastoid air cells. Underlying infection is not excluded. No acute intracranial findings.                        **This report has been created using voice recognition software. It may contain minor errors which are inherent in voice recognition technology. **         Final report electronically signed by Dr. Symone Aguilar on 9/2/2020      Woke up yesterday and room was spinning. He was dizzy and couldn't stand up. He was seen in the ER. He was given Meclizine. He took 1 pill at the hospital and 1 at last night. He was also put on Augmentin. He has a humming, like a fan off in the distance in his ears that is intermittent. Almost like a sizzling sound. Yesterday he could here the sound in his ear that sounded like a loud locust.  After he was given the Meclizine he could at least stand to go to the bathroom. States yesterday's symptoms are better but the humming noise is not better. Feels like the way his ears hear are much different in the last 4 months. I reviewed the CT of his head. These are 5 mm slices and everything is fuzzy. The thin slice detail needed to assess the status of the semicircular canals is not available with the study.   Furthermore the mastoids are not completely opacified, and CT was only useful and intended to rule out a stroke. History:      Allergies   Allergen Reactions    Tramadol Other (See Comments)     Made me feel edgy    Bactrim [Sulfamethoxazole-Trimethoprim] Hives    Zocor [Simvastatin] Other (See Comments)     Body aches     Current Outpatient Medications   Medication Sig Dispense Refill    meclizine (ANTIVERT) 25 MG tablet Take 1 tablet by mouth 3 times daily as needed for Dizziness 30 tablet 0    amoxicillin-clavulanate (AUGMENTIN) 875-125 MG per tablet Take 1 tablet by mouth 2 times daily for 14 days 28 tablet 1    tadalafil (CIALIS) 5 MG tablet TAKE 1 TABLET BY MOUTH ONE TIME A DAY  30 tablet 3     MG capsule Take 100 mg by mouth 2 times daily      fluticasone (FLONASE) 50 MCG/ACT nasal spray 1 spray by Each Nostril route daily 1 Bottle 2    ciprofloxacin (CIPRO) 500 MG tablet Take 500 mg by mouth 2 times daily      rosuvastatin (CRESTOR) 10 MG tablet Take 1 tablet by mouth nightly 90 tablet 0    spironolactone (ALDACTONE) 50 MG tablet Take 1 tablet by mouth daily 90 tablet 1    budesonide (RHINOCORT AQUA) 32 MCG/ACT nasal spray 2 sprays by Each Nostril route daily 3 Bottle 1    Omega-3 Fatty Acids (OMEGA-3 FISH OIL PO) Take by mouth      Multiple Vitamins-Minerals (MULTIVITAMIN ADULT PO) Take by mouth Indications: Ultra Light 121      vitamin E 400 UNIT capsule Take 400 Units by mouth daily      zinc 50 MG CAPS Take by mouth      nabumetone (RELAFEN) 750 MG tablet TAKE 1 TABLET BY MOUTH TWICE DAILY 60 tablet 3    amLODIPine (NORVASC) 5 MG tablet Take 5 mg by mouth daily      Cholecalciferol (VITAMIN D3) 2000 units CAPS Take 5,000 Units by mouth       MAGNESIUM GLYCINATE PLUS PO Take by mouth      esomeprazole Magnesium (NEXIUM) 20 MG PACK Take 20 mg by mouth as needed      metoprolol succinate (TOPROL XL) 25 MG extended release tablet TAKE 1 TABLET BY MOUTH ONCE DAILY 90 tablet 1    CPAP Machine MISC by Does not apply route Please change CPAP pressure to auto 5-15 cm H20. 1 each 0    losartan (COZAAR) 100 MG tablet Take 100 mg by mouth daily      aspirin 81 MG chewable tablet Take 81 mg by mouth daily      Coenzyme Q10 (COQ10) 200 MG CAPS Take  by mouth 2 times daily.  cetirizine (ZYRTEC) 10 MG tablet Take 1 tablet by mouth daily (Patient not taking: Reported on 9/3/2020) 90 tablet 1     No current facility-administered medications for this visit.       Past Medical History:   Diagnosis Date    CAD (coronary artery disease) 2014    GERD (gastroesophageal reflux disease)     Hyperlipidemia 2010    Hypertension 2008    CELINA (obstructive sleep apnea) 2014    Dr. Abbey Huff      Past Surgical History:   Procedure Laterality Date    CARDIAC CATHETERIZATION  2/21/15    03/2018    CHOLECYSTECTOMY  2008    laparoscopic, Dysfunctional gallbladder    COLONOSCOPY  2013    normal    ELBOW SURGERY      Lyphoma removal 7- Dr. Sandra Reyes  07/13/2020    s/p excision of right elbow lipoma done in the office Dr Sandra Reyes  07/24/2020    cyst removed right testicle Dr Janell Sykes Right 8/26/2020    right thoracic medial branch block, 2 levels performed by Catie Elizondo MD at [address] N/A 4/30/2018    SEPTOPLASTY SUBMUCOUS RESECTION TURBINATES, PARTIAL RIGHT INFERIOR, NASAL ENDOSCOPY WITH LONNY BULLOSA performed by Hilaria Ferrer MD at [address] [place] [address] Left 3/1/2018    SIGMOIDOSCOPY BIOPSY FLEXIBLE performed by Clair Dancer, MD at [facility]    TONSILLECTOMY       Family History   Problem Relation Age of Onset    Diabetes Mother 48    Heart Disease Mother 67        CAD    Heart Disease Father 50        CAD, CVA    Stroke Father 48        CVA    Diabetes Brother 72    High Blood Pressure Brother 72    Heart Disease Brother 72        CHF    Other Brother 46        Neuropathy    Obesity Brother 10    High Blood Pressure Brother 61    Obesity Brother 48    Other Brother 36        back pain    Obesity Brother 39    Other Sister 72        leukemia    Alzheimer's Disease Maternal Grandmother 21    Early Death Paternal Grandfather 36        MI    High Blood Pressure Paternal Grandfather 36    Heart Disease Sister 39    High Blood Pressure Sister 39    Depression Sister 39    Other Brother 48        ANEURYSUM, AAA     Social History     Tobacco Use    Smoking status: Never Smoker    Smokeless tobacco: Never Used   Substance Use Topics    Alcohol use: No       Subjective:       Review of Systems   Constitutional: Negative for activity change, appetite change, chills, diaphoresis, fatigue, fever and unexpected weight change. HENT: Negative for congestion, dental problem, ear discharge, ear pain, facial swelling, hearing loss, mouth sores, nosebleeds, postnasal drip, rhinorrhea, sinus pressure, sneezing, sore throat, tinnitus, trouble swallowing and voice change. Eyes: Negative for visual disturbance. Respiratory: Negative for apnea, cough, choking, chest tightness, shortness of breath, wheezing and stridor. Cardiovascular: Negative for chest pain, palpitations and leg swelling. Gastrointestinal: Negative for abdominal pain, diarrhea, nausea and vomiting. Endocrine: Negative for cold intolerance, heat intolerance, polydipsia and polyuria. Genitourinary: Negative for dysuria, enuresis and hematuria. Musculoskeletal: Negative for arthralgias, gait problem, neck pain and neck stiffness. Skin: Negative for color change and rash. Allergic/Immunologic: Negative for environmental allergies, food allergies and immunocompromised state. Neurological: Negative for dizziness, syncope, facial asymmetry, speech difficulty, light-headedness and headaches. Hematological: Negative for adenopathy. Does not bruise/bleed easily.    Psychiatric/Behavioral: Negative for confusion and sleep disturbance. The patient is not nervous/anxious. Objective:     /72 (Site: Left Upper Arm, Position: Sitting)   Pulse 76   Temp 97.2 °F (36.2 °C) (Infrared)   Resp 14   Ht 6' (1.829 m)   Wt 284 lb 14.4 oz (129.2 kg)   BMI 38.64 kg/m²     Physical Exam     Ears: no inflammation     Data:  All of the past medical history, past surgical history, family history,social history, allergies and current medications were reviewed with the patient. Assessment & Plan   Diagnoses and all orders for this visit:     Diagnosis Orders   1. Episodic recurrent vertigo  Videonystagmography    CT SELLA TURCICA WO CONTRAST   2. Dysfunction of left eustachian tube  Videonystagmography    CT SELLA TURCICA WO CONTRAST   3. Bilateral tinnitus  Videonystagmography    CT SELLA TURCICA WO CONTRAST   4. TMJ syndrome     5. Chronic mastoiditis of left side  Videonystagmography    CT SELLA TURCICA WO CONTRAST       The findings were explained and his questions were answered. Further work-up is needed. CT of his head was 5 mm slices and a more detailed look at the abnormalities in the mastoid is indicated, as well as detailed analysis of the semicircular canals. Better characterization of his vertigo also suggests a VNG would be helpful. Options were discussed including ordering VNG and a Temporal bone CT with Thin sections. He agreed. Return for Imaging Results Review, Audiol review. Chary WYNN CMA (SUKHI), ke scribing for, and in the presence of Dr. Justus Zavala. Electronically signed by OMERO GuerreroMA) on 9/3/20 at 9:12 AM EDT. (Please note that portions of this note were completed with a voice recognition program. Efforts were made to edit the dictations butoccasionally words are mis-transcribed.)    I agree to the above documentation placed by my scribe. I have personally evaluated this patient. Additional findings are as noted.   I reviewed the scribe's note and agree with the documented findings and plan of care. Any areas of disagreement are corrected. I agree with the chief complaint, past medical history, past surgical history, allergies, medications, social and family history as documented unless otherwise noted below.      Electronically signed by Rendell Meckel, MD on 9/8/2020 at 7:33 PM

## 2020-09-16 ENCOUNTER — HOSPITAL ENCOUNTER (OUTPATIENT)
Dept: CT IMAGING | Age: 60
Discharge: HOME | End: 2020-09-16
Payer: COMMERCIAL

## 2020-09-16 PROCEDURE — 70480 CT ORBIT/EAR/FOSSA W/O DYE: CPT

## 2020-09-23 ENCOUNTER — TELEPHONE (OUTPATIENT)
Dept: ENT CLINIC | Age: 60
End: 2020-09-23

## 2020-09-23 NOTE — TELEPHONE ENCOUNTER
[facility] [address] [facility] [facility] requesting sooner appointment with Dr. Bryson Lange. Patient is currently scheduled 10/9/20 for a follow up; VNG scheduled 10/8/20. Patient c/o headache for the past few days, dizziness. Patient stated he has been taking motrin for headache, at base of skull. Patient would like a call from audio, if they have sooner availability.

## 2020-09-23 NOTE — TELEPHONE ENCOUNTER
Dr. Zac Leonard will want the VNG completed before following up. If audiology has an earlier appointment for him then we can move up the follow up.

## 2020-09-24 NOTE — TELEPHONE ENCOUNTER
Called audiology and they don't have anything earlier for the VNG at this time. They will keep an eye out if there is any cancellation. Called patient and left a message to call the office.

## 2020-09-29 ENCOUNTER — TELEPHONE (OUTPATIENT)
Dept: ENT CLINIC | Age: 60
End: 2020-09-29

## 2020-09-29 NOTE — TELEPHONE ENCOUNTER
----- Message from Margo Schaffer sent at 9/29/2020 10:55 AM EDT -----  Patient called yesterday regarding his VNG testing appt. He was questioning how this testing is going to help his severe headaches that he is experiencing at the base of his skull. I told him the testing was for dizziness but he stated that he is not having issues with dizziness at this time. He is quite troubled by his headaches and is questioning why he has to wait to see Dr. Miramontes Service until after testing that is not addressing his headaches. He is on a wait list for a sooner VNG but we do not have a sooner appt available at this time.

## 2020-09-29 NOTE — TELEPHONE ENCOUNTER
Called and informed patient of Dr Johan Gan recommendations. Patient states he will keep the VNG appointment at this point since it would be another 2 month wait to be placed back on if the dizziness returns. I advised him to call at least a couple days in advance to cancel the appointment if he wishes. Informed patient I also looked at Dr Johan Gan schedule for an opening to move his appointment up. There is no openings at this time, advised patient to keep appointment as scheduled. Patient then asked what the next step would be and where he needs to go next in figuring out his symptoms. Told patient that would be a discussion to have at his appointment with Dr Johnathan Hartman. Patient verbalized understanding and thanked me.

## 2020-09-29 NOTE — TELEPHONE ENCOUNTER
I spoke to Dr Maile Nelson in regards to patient. Dr Maile Nelson reviewed the [address]. He does not see anything of concern on the imaging. Dr Maile Nelson said if the patient has not had any further dizziness issues he may cancel the VNG if he wishes, with the understanding that if he cancels and then has dizziness again it will be 2 months before they can get another appointment scheduled. Dr Maile Nelson also stated if patient cancels the VNG and wishes to be seen sooner, we can look at the schedule to move him if , if possible.

## 2020-09-29 NOTE — TELEPHONE ENCOUNTER
I called patient to see exactly what symptoms he is having. Patient stated he has not had any dizziness since he was in the [facility] ER on 09/02/20. His main issues are the severe headaches at the base of his skull, his tinnitus and the sharp, stabbing pain behind his left ear. Again, I verbally confirmed he is not having any dizziness. Patient again stated no, not since the ER. He would like to know what the most recent CT scan shows. Patient also asked since he is no longer having the dizziness, is the VNG test necessary? Informed patient I will speak to Dr Damián Richard in regards to the results of his CT scan and the upcoming VNG. Patient verbalized understanding and thanked me. Patient currently scheduled for VNG on 10/09/20 and f/u for results with Dr Damián Richard on 10/10/20.

## 2020-10-08 ENCOUNTER — HOSPITAL ENCOUNTER (OUTPATIENT)
Dept: AUDIOLOGY | Age: 60
Discharge: HOME | End: 2020-10-08
Payer: COMMERCIAL

## 2020-10-08 PROCEDURE — 92567 TYMPANOMETRY: CPT | Performed by: AUDIOLOGIST

## 2020-10-08 PROCEDURE — 92537 CALORIC VSTBLR TEST W/REC: CPT | Performed by: AUDIOLOGIST

## 2020-10-08 PROCEDURE — 92540 BASIC VESTIBULAR EVALUATION: CPT | Performed by: AUDIOLOGIST

## 2020-10-08 NOTE — PROGRESS NOTES
ACCOUNT #: [de-identified]      VNG REPORT      CHIEF COMPLAINT: Patient reports an episode of vertigo on 9/2/20. He woke up and fell backwards on the bed. He explains that the room was spinning. It lasted for several hours and he went to the Emergency Department. He is very bothered by constant bilateral whooshing tinnitus that is worse for the left ear. He feels pressure in both ears, especially the left ear. He says it feels like there is cotton in his ear. The tinnitus started approximately six months ago. He had an audiogram here on 8/4/2020, which revealed ormal hearing sensitivity for both ears. The left ear was slightly worse than the right ear. There was a conductive component for the left ear and at 250Hz in the right ear. Speech discrimination ability was excellent at 100%, bilaterally. Tympanometry revealed normal peak pressure and normal middle ear compliance for the right ear. Tympanometry revealed normal middle ear compliance with negative middle ear pressure for the left ear. CT sella Turcica 9/3/2020    CT Head without contrast 9/2/2020        MEDICATIONS REVIEWED: Patient has held appropriate medications for VNG testing. OTOSCOPY: WNL for both ears. TYMPANOMETRY:    VNG RESULTS:    SACCADES: WNL  TRACKING: WNL  OPTOKINETICS:  WNL  GAZE: WNL  HALLPIKE MANEUVER:  WNL   POSITIONAL: WNL  HEADSHAKE TEST: WNL  CALORIC TESTING: WNL  BROWN' SOT: WNL    IMPRESSIONS/RECOMMENDATIONS:    1- Normal VNG. 2- Follow up with Dr. Heidi Olivares tomorrow as scheduled. 3- EcoG testing could be considered due to the reported pressure sensation and episode of vertigo.

## 2020-10-09 ENCOUNTER — OFFICE VISIT (OUTPATIENT)
Dept: ENT CLINIC | Age: 60
End: 2020-10-09
Payer: COMMERCIAL

## 2020-10-09 VITALS
SYSTOLIC BLOOD PRESSURE: 118 MMHG | WEIGHT: 286 LBS | TEMPERATURE: 97.8 F | BODY MASS INDEX: 38.79 KG/M2 | RESPIRATION RATE: 12 BRPM | HEART RATE: 76 BPM | DIASTOLIC BLOOD PRESSURE: 80 MMHG

## 2020-10-09 PROCEDURE — 3017F COLORECTAL CA SCREEN DOC REV: CPT | Performed by: OTOLARYNGOLOGY

## 2020-10-09 PROCEDURE — 1036F TOBACCO NON-USER: CPT | Performed by: OTOLARYNGOLOGY

## 2020-10-09 PROCEDURE — G8417 CALC BMI ABV UP PARAM F/U: HCPCS | Performed by: OTOLARYNGOLOGY

## 2020-10-09 PROCEDURE — G8427 DOCREV CUR MEDS BY ELIG CLIN: HCPCS | Performed by: OTOLARYNGOLOGY

## 2020-10-09 PROCEDURE — G8484 FLU IMMUNIZE NO ADMIN: HCPCS | Performed by: OTOLARYNGOLOGY

## 2020-10-09 PROCEDURE — 99213 OFFICE O/P EST LOW 20 MIN: CPT | Performed by: OTOLARYNGOLOGY

## 2020-10-09 ASSESSMENT — ENCOUNTER SYMPTOMS
TROUBLE SWALLOWING: 0
WHEEZING: 0
NAUSEA: 0
COLOR CHANGE: 0
DIARRHEA: 0
VOMITING: 0
SINUS PRESSURE: 0
CHOKING: 0
ABDOMINAL PAIN: 0
RHINORRHEA: 0
SORE THROAT: 0
CHEST TIGHTNESS: 0
APNEA: 0
VOICE CHANGE: 0
FACIAL SWELLING: 0
COUGH: 0
STRIDOR: 0
SHORTNESS OF BREATH: 0

## 2020-10-09 NOTE — PROGRESS NOTES
[facility] PROFESSIONAL SERVS  [facility]  [facility] [place]  Dept: [phone]  Dept Fax: [phone]  Loc: [phone]    Ananth Guzman is a 63 y.o. male who was referred byNo ref. provider found for:  Chief Complaint   Patient presents with    Results     Patient here for results of his VNG and CT scan. Ivone Patrick HPI:     Ananth Guzman is a 63 y.o. male who presents today for follow-up in his VNG and CT scan results. VNG was normal.  His hearing tests have been normal.  CT is reviewed with the radiologist.  There are no bony dehiscences of or reasons for him to have any vestibular issues. There was partial opacification of the left mastoid. Middle ear was well-aerated. He states the main problem is the tinnitus in his ears, especially a constant whooshing sound louder on the left. It is not pulsatile at all. Sometimes it makes it difficult for him to hear the television, despite his normal hearing. History:      Allergies   Allergen Reactions    Tramadol Other (See Comments)     Made me feel edgy    Bactrim [Sulfamethoxazole-Trimethoprim] Hives    Zocor [Simvastatin] Other (See Comments)     Body aches     Current Outpatient Medications   Medication Sig Dispense Refill    tadalafil (CIALIS) 5 MG tablet TAKE 1 TABLET BY MOUTH ONE TIME A DAY  30 tablet 3     MG capsule Take 100 mg by mouth 2 times daily      fluticasone (FLONASE) 50 MCG/ACT nasal spray 1 spray by Each Nostril route daily 1 Bottle 2    cetirizine (ZYRTEC) 10 MG tablet Take 1 tablet by mouth daily (Patient not taking: Reported on 9/3/2020) 90 tablet 1    rosuvastatin (CRESTOR) 10 MG tablet Take 1 tablet by mouth nightly 90 tablet 0    spironolactone (ALDACTONE) 50 MG tablet Take 1 tablet by mouth daily 90 tablet 1    budesonide (RHINOCORT AQUA) 32 MCG/ACT nasal spray 2 sprays by Each Nostril route daily 3 Bottle 1    Omega-3 Fatty Acids (OMEGA-3 FISH OIL PO) Take by mouth      Multiple Vitamins-Minerals (MULTIVITAMIN ADULT PO) Take by mouth Indications: Ultra Light 121      vitamin E 400 UNIT capsule Take 400 Units by mouth daily      zinc 50 MG CAPS Take by mouth      nabumetone (RELAFEN) 750 MG tablet TAKE 1 TABLET BY MOUTH TWICE DAILY 60 tablet 3    amLODIPine (NORVASC) 5 MG tablet Take 5 mg by mouth daily      Cholecalciferol (VITAMIN D3) 2000 units CAPS Take 5,000 Units by mouth       MAGNESIUM GLYCINATE PLUS PO Take by mouth      esomeprazole Magnesium (NEXIUM) 20 MG PACK Take 20 mg by mouth as needed      metoprolol succinate (TOPROL XL) 25 MG extended release tablet TAKE 1 TABLET BY MOUTH ONCE DAILY 90 tablet 1    CPAP Machine MISC by Does not apply route Please change CPAP pressure to auto 5-15 cm H20. 1 each 0    losartan (COZAAR) 100 MG tablet Take 100 mg by mouth daily      aspirin 81 MG chewable tablet Take 81 mg by mouth daily      Coenzyme Q10 (COQ10) 200 MG CAPS Take  by mouth 2 times daily. No current facility-administered medications for this visit.       Past Medical History:   Diagnosis Date    CAD (coronary artery disease) 2014    GERD (gastroesophageal reflux disease)     Hyperlipidemia 2010    Hypertension 2008    CELINA (obstructive sleep apnea) 2014    Dr. Stas Fisher      Past Surgical History:   Procedure Laterality Date    CARDIAC CATHETERIZATION  2/21/15    03/2018    CHOLECYSTECTOMY  2008    laparoscopic, Dysfunctional gallbladder    COLONOSCOPY  2013    normal    ELBOW SURGERY      Lyphoma removal 7- Dr. Glendora Dance HISTORY  07/13/2020    s/p excision of right elbow lipoma done in the office Dr Yandy Lamb  07/24/2020    cyst removed right testicle Dr Nadeem Marin Right 8/26/2020    right thoracic medial branch block, 2 levels performed by Jame Ortiz MD at [address] [place] [address] N/A 4/30/2018 SEPTOPLASTY SUBMUCOUS RESECTION TURBINATES, PARTIAL RIGHT INFERIOR, NASAL ENDOSCOPY WITH LONNY BULLOSA performed by Caleb Solorzano MD at [address] Left 3/1/2018    SIGMOIDOSCOPY BIOPSY FLEXIBLE performed by Lela Valdez MD at [facility]    TONSILLECTOMY       Family History   Problem Relation Age of Onset    Diabetes Mother 48    Heart Disease Mother 67        CAD    Heart Disease Father 50        CAD, CVA    Stroke Father 48        CVA    Diabetes Brother 72    High Blood Pressure Brother 72    Heart Disease Brother 72        CHF    Other Brother 46        Neuropathy    Obesity Brother 8    High Blood Pressure Brother 61    Obesity Brother 48    Other Brother 36        back pain    Obesity Brother 39    Other Sister 72        leukemia    Alzheimer's Disease Maternal Grandmother 21    Early Death Paternal Grandfather 36        MI    High Blood Pressure Paternal Grandfather 36    Heart Disease Sister 39    High Blood Pressure Sister 39    Depression Sister 39    Other Brother 48        ANEURYSUM, AAA     Social History     Tobacco Use    Smoking status: Never Smoker    Smokeless tobacco: Never Used   Substance Use Topics    Alcohol use: No       Subjective:      Review of Systems   Constitutional: Negative for activity change, appetite change, chills, diaphoresis, fatigue, fever and unexpected weight change. HENT: Negative for congestion, dental problem, ear discharge, ear pain, facial swelling, hearing loss, mouth sores, nosebleeds, postnasal drip, rhinorrhea, sinus pressure, sneezing, sore throat, tinnitus, trouble swallowing and voice change. Eyes: Negative for visual disturbance. Respiratory: Negative for apnea, cough, choking, chest tightness, shortness of breath, wheezing and stridor. Cardiovascular: Negative for chest pain, palpitations and leg swelling. Gastrointestinal: Negative for abdominal pain, diarrhea, nausea and vomiting. Endocrine: Negative for cold intolerance, heat intolerance, polydipsia and polyuria. Genitourinary: Negative for dysuria, enuresis and hematuria. Musculoskeletal: Negative for arthralgias, gait problem, neck pain and neck stiffness. Skin: Negative for color change and rash. Allergic/Immunologic: Negative for environmental allergies, food allergies and immunocompromised state. Neurological: Negative for dizziness, syncope, facial asymmetry, speech difficulty, light-headedness and headaches. Hematological: Negative for adenopathy. Does not bruise/bleed easily. Psychiatric/Behavioral: Negative for confusion and sleep disturbance. The patient is not nervous/anxious. Objective:   /80 (Site: Left Upper Arm, Position: Sitting)   Pulse 76   Temp 97.8 °F (36.6 °C) (Infrared)   Resp 12   Wt 286 lb (129.7 kg)   BMI 38.79 kg/m²     Physical Exam   Ears: TMs have normal appearance. Data:  All of the past medical history, past surgical history, family history,social history, allergies and current medications were reviewed with the patient. Assessment & Plan   Diagnoses and all orders for this visit:     Diagnosis Orders   1. Bilateral tinnitus  Audiometry with tympanometry   2. Dysfunction of left eustachian tube  Audiometry with tympanometry   3. Chronic mastoiditis of left side  Audiometry with tympanometry       The findings were explained and his questions were answered. Patient will double up on his water pill for 5 days to see if that assists with the noise. Suggested he start on antioxidants, and information is provided. An ECOG is ordered along with tympanometry since he had negative middle ear pressure on  the left side previously. Return for Audiol review. Megan Reddy. Yonatan Duarte MD    **This report has been created using voice recognition software. It may contain minor errors which are inherent in voicerecognition technology. **

## 2020-10-21 ENCOUNTER — HOSPITAL ENCOUNTER (OUTPATIENT)
Dept: AUDIOLOGY | Age: 60
Discharge: HOME | End: 2020-10-21
Payer: COMMERCIAL

## 2020-10-21 PROCEDURE — 92584 ELECTROCOCHLEOGRAPHY: CPT | Performed by: AUDIOLOGIST

## 2020-10-21 PROCEDURE — 92567 TYMPANOMETRY: CPT | Performed by: AUDIOLOGIST

## 2020-10-29 ENCOUNTER — OFFICE VISIT (OUTPATIENT)
Dept: ENT CLINIC | Age: 60
End: 2020-10-29
Payer: COMMERCIAL

## 2020-10-29 VITALS
BODY MASS INDEX: 39.31 KG/M2 | WEIGHT: 290.2 LBS | OXYGEN SATURATION: 96 % | TEMPERATURE: 97.7 F | HEART RATE: 63 BPM | SYSTOLIC BLOOD PRESSURE: 134 MMHG | HEIGHT: 72 IN | DIASTOLIC BLOOD PRESSURE: 78 MMHG

## 2020-10-29 PROCEDURE — G8484 FLU IMMUNIZE NO ADMIN: HCPCS | Performed by: OTOLARYNGOLOGY

## 2020-10-29 PROCEDURE — 3017F COLORECTAL CA SCREEN DOC REV: CPT | Performed by: OTOLARYNGOLOGY

## 2020-10-29 PROCEDURE — 99212 OFFICE O/P EST SF 10 MIN: CPT | Performed by: OTOLARYNGOLOGY

## 2020-10-29 PROCEDURE — G8427 DOCREV CUR MEDS BY ELIG CLIN: HCPCS | Performed by: OTOLARYNGOLOGY

## 2020-10-29 PROCEDURE — G8417 CALC BMI ABV UP PARAM F/U: HCPCS | Performed by: OTOLARYNGOLOGY

## 2020-10-29 PROCEDURE — 1036F TOBACCO NON-USER: CPT | Performed by: OTOLARYNGOLOGY

## 2020-10-29 ASSESSMENT — ENCOUNTER SYMPTOMS
VOICE CHANGE: 0
CHEST TIGHTNESS: 0
NAUSEA: 0
COLOR CHANGE: 0
DIARRHEA: 0
ABDOMINAL PAIN: 0
SORE THROAT: 0
TROUBLE SWALLOWING: 0
STRIDOR: 0
WHEEZING: 0
APNEA: 0
SINUS PRESSURE: 0
FACIAL SWELLING: 0
SHORTNESS OF BREATH: 0
RHINORRHEA: 0
CHOKING: 0
VOMITING: 0
COUGH: 0

## 2020-10-29 NOTE — PROGRESS NOTES
[facility] PROFESSIONAL SERVS  [facility]'S EAR, NOSE AND THROAT  [facility]  Dept: [phone]  Dept Fax: [phone]  Loc: [phone]    Festus Brar is a 62 y.o. male who was referred byNo ref. provider found for:  Chief Complaint   Patient presents with    Follow-up     Follow up, audio review 10/21/2020   . HPI:     Festus Brar is a 62 y.o. male who presents today for follow-up on his recent audiologic testing. ECOG did not show any suspicion of increased intracochlear pressure, so there was no suggestion the patient had Ménière's disease. The patient states that the ringing has subsided somewhat and is more tolerable. His balance is better. Mary Oquendo History:      Allergies   Allergen Reactions    Tramadol Other (See Comments)     Made me feel edgy    Bactrim [Sulfamethoxazole-Trimethoprim] Hives    Zocor [Simvastatin] Other (See Comments)     Body aches     Current Outpatient Medications   Medication Sig Dispense Refill    tadalafil (CIALIS) 5 MG tablet TAKE 1 TABLET BY MOUTH ONE TIME A DAY  30 tablet 3     MG capsule Take 100 mg by mouth 2 times daily      fluticasone (FLONASE) 50 MCG/ACT nasal spray 1 spray by Each Nostril route daily 1 Bottle 2    cetirizine (ZYRTEC) 10 MG tablet Take 1 tablet by mouth daily 90 tablet 1    rosuvastatin (CRESTOR) 10 MG tablet Take 1 tablet by mouth nightly 90 tablet 0    spironolactone (ALDACTONE) 50 MG tablet Take 1 tablet by mouth daily 90 tablet 1    budesonide (RHINOCORT AQUA) 32 MCG/ACT nasal spray 2 sprays by Each Nostril route daily 3 Bottle 1    Omega-3 Fatty Acids (OMEGA-3 FISH OIL PO) Take by mouth      Multiple Vitamins-Minerals (MULTIVITAMIN ADULT PO) Take by mouth Indications: Ultra Light 121      vitamin E 400 UNIT capsule Take 400 Units by mouth daily      zinc 50 MG CAPS Take by mouth      nabumetone (RELAFEN) 750 MG tablet TAKE 1 TABLET BY MOUTH TWICE DAILY 60 tablet 3    amLODIPine (NORVASC) 5 MG tablet Take 5 mg by mouth daily      Cholecalciferol (VITAMIN D3) 2000 units CAPS Take 5,000 Units by mouth       MAGNESIUM GLYCINATE PLUS PO Take by mouth      esomeprazole Magnesium (NEXIUM) 20 MG PACK Take 20 mg by mouth as needed      metoprolol succinate (TOPROL XL) 25 MG extended release tablet TAKE 1 TABLET BY MOUTH ONCE DAILY 90 tablet 1    CPAP Machine MISC by Does not apply route Please change CPAP pressure to auto 5-15 cm H20. 1 each 0    losartan (COZAAR) 100 MG tablet Take 100 mg by mouth daily      aspirin 81 MG chewable tablet Take 81 mg by mouth daily      Coenzyme Q10 (COQ10) 200 MG CAPS Take  by mouth 2 times daily. No current facility-administered medications for this visit.       Past Medical History:   Diagnosis Date    CAD (coronary artery disease) 2014    GERD (gastroesophageal reflux disease)     Hyperlipidemia 2010    Hypertension 2008    CELINA (obstructive sleep apnea) 2014    Dr. Pelon Barnes      Past Surgical History:   Procedure Laterality Date    CARDIAC CATHETERIZATION  2/21/15    03/2018    CHOLECYSTECTOMY  2008    laparoscopic, Dysfunctional gallbladder    COLONOSCOPY  2013    normal    ELBOW SURGERY      Lyphoma removal 7- Dr. Luz Pritchard HISTORY  07/13/2020    s/p excision of right elbow lipoma done in the office Dr Shamar Gonzalez  07/24/2020    cyst removed right testicle Dr Carrillo Mass Right 8/26/2020    right thoracic medial branch block, 2 levels performed by Triston Ingram MD at [address] N/A 4/30/2018    SEPTOPLASTY SUBMUCOUS RESECTION TURBINATES, PARTIAL RIGHT INFERIOR, NASAL ENDOSCOPY WITH LONNY BULLOSA performed by Juan Alberto MD at [address] Left 3/1/2018    SIGMOIDOSCOPY BIOPSY FLEXIBLE performed by Hillary Monterroso MD at [address] [facility]    TONSILLECTOMY       Family History Problem Relation Age of Onset    Diabetes Mother 48    Heart Disease Mother 67        CAD    Heart Disease Father 50        CAD, CVA    Stroke Father 48        CVA    Diabetes Brother 72    High Blood Pressure Brother 72    Heart Disease Brother 72        CHF    Other Brother 46        Neuropathy    Obesity Brother 8    High Blood Pressure Brother 61    Obesity Brother 48    Other Brother 36        back pain    Obesity Brother 39    Other Sister 72        leukemia    Alzheimer's Disease Maternal Grandmother 21    Early Death Paternal Grandfather 36        MI    High Blood Pressure Paternal Grandfather 36    Heart Disease Sister 39    High Blood Pressure Sister 39    Depression Sister 39    Other Brother 48        ANEURYSUM, AAA     Social History     Tobacco Use    Smoking status: Never Smoker    Smokeless tobacco: Never Used   Substance Use Topics    Alcohol use: No       Subjective:      Review of Systems   Constitutional: Negative for activity change, appetite change, chills, diaphoresis, fatigue, fever and unexpected weight change. HENT: Negative for congestion, dental problem, ear discharge, ear pain, facial swelling, hearing loss, mouth sores, nosebleeds, postnasal drip, rhinorrhea, sinus pressure, sneezing, sore throat, tinnitus, trouble swallowing and voice change. Eyes: Negative for visual disturbance. Respiratory: Negative for apnea, cough, choking, chest tightness, shortness of breath, wheezing and stridor. Cardiovascular: Negative for chest pain, palpitations and leg swelling. Gastrointestinal: Negative for abdominal pain, diarrhea, nausea and vomiting. Endocrine: Negative for cold intolerance, heat intolerance, polydipsia and polyuria. Genitourinary: Negative for difficulty urinating, discharge, dysuria, enuresis, hematuria, penile pain, penile swelling, scrotal swelling, testicular pain and urgency.    Musculoskeletal: Negative for arthralgias, gait problem, neck pain and neck stiffness. Skin: Negative for color change, rash and wound. Allergic/Immunologic: Negative for environmental allergies, food allergies and immunocompromised state. Neurological: Negative for dizziness, seizures, syncope, facial asymmetry, speech difficulty, light-headedness and headaches. Hematological: Negative for adenopathy. Does not bruise/bleed easily. Psychiatric/Behavioral: Negative for confusion, sleep disturbance and suicidal ideas. The patient is not nervous/anxious. Objective:   /78 (Site: Right Upper Arm, Position: Sitting, Cuff Size: Large Adult)   Pulse 63   Temp 97.7 °F (36.5 °C)   Ht 6' (1.829 m)   Wt 290 lb 3.2 oz (131.6 kg)   SpO2 96% Comment: on room air at rest  BMI 39.36 kg/m²     Physical Exam   Ears: Normal appearance    Data:  All of the past medical history, past surgical history, family history,social history, allergies and current medications were reviewed with the patient. Assessment & Plan   Diagnoses and all orders for this visit:     Diagnosis Orders   1. Bilateral tinnitus     2. Dysfunction of left eustachian tube         The findings were explained and his questions were answered. He decided to simply observe for now. He will call us if he has a flareup of his vertigo or tinnitus. He does have partial opacification of the left mastoid although none of it is near the otic capsule. .    Return as needed       Tacos Villanueva. Vikas Flores MD    **This report has been created using voice recognition software. It may contain minor errors which are inherent in voicerecognition technology. **

## 2021-02-15 ENCOUNTER — OFFICE VISIT (OUTPATIENT)
Dept: FAMILY MEDICINE CLINIC | Age: 61
End: 2021-02-15
Payer: COMMERCIAL

## 2021-02-15 VITALS
HEART RATE: 95 BPM | BODY MASS INDEX: 39.6 KG/M2 | DIASTOLIC BLOOD PRESSURE: 95 MMHG | RESPIRATION RATE: 18 BRPM | SYSTOLIC BLOOD PRESSURE: 150 MMHG | WEIGHT: 292 LBS | TEMPERATURE: 98.1 F

## 2021-02-15 DIAGNOSIS — Z23 NEED FOR INFLUENZA VACCINATION: ICD-10-CM

## 2021-02-15 DIAGNOSIS — H93.12 TINNITUS OF LEFT EAR: Primary | ICD-10-CM

## 2021-02-15 DIAGNOSIS — Z13.31 POSITIVE DEPRESSION SCREENING: ICD-10-CM

## 2021-02-15 DIAGNOSIS — F43.9 STRESS AT HOME: ICD-10-CM

## 2021-02-15 DIAGNOSIS — R51.9 UNILATERAL OCCIPITAL HEADACHE: ICD-10-CM

## 2021-02-15 DIAGNOSIS — I10 ESSENTIAL HYPERTENSION: ICD-10-CM

## 2021-02-15 PROCEDURE — 99214 OFFICE O/P EST MOD 30 MIN: CPT | Performed by: FAMILY MEDICINE

## 2021-02-15 PROCEDURE — 1036F TOBACCO NON-USER: CPT | Performed by: FAMILY MEDICINE

## 2021-02-15 PROCEDURE — 90471 IMMUNIZATION ADMIN: CPT | Performed by: FAMILY MEDICINE

## 2021-02-15 PROCEDURE — G8427 DOCREV CUR MEDS BY ELIG CLIN: HCPCS | Performed by: FAMILY MEDICINE

## 2021-02-15 PROCEDURE — G8417 CALC BMI ABV UP PARAM F/U: HCPCS | Performed by: FAMILY MEDICINE

## 2021-02-15 PROCEDURE — G8484 FLU IMMUNIZE NO ADMIN: HCPCS | Performed by: FAMILY MEDICINE

## 2021-02-15 PROCEDURE — 90686 IIV4 VACC NO PRSV 0.5 ML IM: CPT | Performed by: FAMILY MEDICINE

## 2021-02-15 PROCEDURE — 3017F COLORECTAL CA SCREEN DOC REV: CPT | Performed by: FAMILY MEDICINE

## 2021-02-15 PROCEDURE — G8431 POS CLIN DEPRES SCRN F/U DOC: HCPCS | Performed by: FAMILY MEDICINE

## 2021-02-15 ASSESSMENT — PATIENT HEALTH QUESTIONNAIRE - PHQ9
7. TROUBLE CONCENTRATING ON THINGS, SUCH AS READING THE NEWSPAPER OR WATCHING TELEVISION: 2
SUM OF ALL RESPONSES TO PHQ QUESTIONS 1-9: 10
SUM OF ALL RESPONSES TO PHQ9 QUESTIONS 1 & 2: 3
8. MOVING OR SPEAKING SO SLOWLY THAT OTHER PEOPLE COULD HAVE NOTICED. OR THE OPPOSITE, BEING SO FIGETY OR RESTLESS THAT YOU HAVE BEEN MOVING AROUND A LOT MORE THAN USUAL: 0
2. FEELING DOWN, DEPRESSED OR HOPELESS: 2
9. THOUGHTS THAT YOU WOULD BE BETTER OFF DEAD, OR OF HURTING YOURSELF: 0
SUM OF ALL RESPONSES TO PHQ QUESTIONS 1-9: 10
SUM OF ALL RESPONSES TO PHQ QUESTIONS 1-9: 10
3. TROUBLE FALLING OR STAYING ASLEEP: 2
10. IF YOU CHECKED OFF ANY PROBLEMS, HOW DIFFICULT HAVE THESE PROBLEMS MADE IT FOR YOU TO DO YOUR WORK, TAKE CARE OF THINGS AT HOME, OR GET ALONG WITH OTHER PEOPLE: 0

## 2021-02-15 NOTE — PROGRESS NOTES
Immunization(s) given during visit:    Immunizations Administered     Name Date Dose Route    Influenza, Quadv, IM, PF (6 mo and older Fluzone, Flulaval, Fluarix, and 3 yrs and older Afluria) 2/15/2021 0.5 mL Intramuscular    Site: Deltoid- Right    Lot: [id number]    NDC: 10231-175-35          Most recent Vaccine Information Sheet dated 8/15/19 given to seymour

## 2021-02-15 NOTE — PROGRESS NOTES
[address]  [address], [address]  Ph:   [phone]  Fax: [phone]    Provider:  Dr. Yong Jaquez    Patient:  Soumya Leon  YOB: 1960      Visit Date:  2/15/2021     Reason For Visit:   Chief Complaint   Patient presents with    Tinnitus     ears ringing on and off x1yrs, would like MRI    Other     dbl watere pill to decrease edema - did contact xiomara Valenzuela is a 62 y.o. male who comes today to the office because of persistent \"noise in the ears\", which is getting worse. He has been having these symptoms since July 2020. Saw Neo Perez who ordered a CT head wo contrast and CT sella turcica wo contrast which showed:          1. Partial opacification of the mastoid air cells on the left. 2. Soft tissue opacification in the oval window and Prussak's space on the left without associated bone destruction. This can represent some nonspecific fluid. An early cholesteatoma cannot be excluded. 3. Normal right temporal bone. Dr. Neo Paulino ordered a VNG and saw him for follow up in October 9, 2020, diagnosed with bilateral tinnitus, dysfunction of the left eustachian tube and chronic mastoiditis of the left side     His symptoms are getting worse, and the headache in the left occipital area is more consistent. The tinnitus gets better when he puts his finger in his ear canal and put pressure on the walls of the canal.  The tinnitus returns in about 10-15 second after he removes his finger. He has stopped the beta blocker because he felt it made his tinnitus worse. He also stopped the Amlodipine and the Crestor for the same reason. His BP has been high. Today it was 150/95. He is very stress at home with wife with cancer, the tinnitus, stress over the pandemia, but he does not believe he want medication at this point. He also declined counseling at this time.         Significant Past Medical History:    Past Medical History: Diagnosis Date    CAD (coronary artery disease) 2014    GERD (gastroesophageal reflux disease)     Hyperlipidemia 2010    Hypertension 2008    CELINA (obstructive sleep apnea) 2014    Dr. Plummer [facility]           Allergies:  is allergic to tramadol; bactrim [sulfamethoxazole-trimethoprim]; and zocor [simvastatin]. Medications:   Current Outpatient Medications   Medication Sig Dispense Refill    tadalafil (CIALIS) 5 MG tablet TAKE 1 TABLET BY MOUTH ONE TIME A DAY  30 tablet 3     MG capsule Take 100 mg by mouth 2 times daily      fluticasone (FLONASE) 50 MCG/ACT nasal spray 1 spray by Each Nostril route daily 1 Bottle 2    rosuvastatin (CRESTOR) 10 MG tablet Take 1 tablet by mouth nightly 90 tablet 0    spironolactone (ALDACTONE) 50 MG tablet Take 1 tablet by mouth daily 90 tablet 1    Omega-3 Fatty Acids (OMEGA-3 FISH OIL PO) Take by mouth      Multiple Vitamins-Minerals (MULTIVITAMIN ADULT PO) Take by mouth Indications: Ultra Light 121      vitamin E 400 UNIT capsule Take 400 Units by mouth daily      zinc 50 MG CAPS Take by mouth      nabumetone (RELAFEN) 750 MG tablet TAKE 1 TABLET BY MOUTH TWICE DAILY 60 tablet 3    amLODIPine (NORVASC) 5 MG tablet Take 5 mg by mouth daily      Cholecalciferol (VITAMIN D3) 2000 units CAPS Take 5,000 Units by mouth       MAGNESIUM GLYCINATE PLUS PO Take by mouth      esomeprazole Magnesium (NEXIUM) 20 MG PACK Take 20 mg by mouth as needed      metoprolol succinate (TOPROL XL) 25 MG extended release tablet TAKE 1 TABLET BY MOUTH ONCE DAILY 90 tablet 1    CPAP Machine MISC by Does not apply route Please change CPAP pressure to auto 5-15 cm H20. 1 each 0    losartan (COZAAR) 100 MG tablet Take 100 mg by mouth daily      aspirin 81 MG chewable tablet Take 81 mg by mouth daily      Coenzyme Q10 (COQ10) 200 MG CAPS Take  by mouth 2 times daily.  cetirizine (ZYRTEC) 10 MG tablet Take 1 tablet by mouth daily (Patient not taking: Reported on 2/15/2021) 90 tablet 1    budesonide (RHINOCORT AQUA) 32 MCG/ACT nasal spray 2 sprays by Each Nostril route daily (Patient not taking: Reported on 2/15/2021) 3 Bottle 1     No current facility-administered medications for this visit. Review of systems:  Review of Systems - History obtained from chart review and the patient  General ROS: negative for - chills, fatigue or fever  Psychological ROS: negative for - anxiety, depression or sleep disturbances. Positive for stress  ENT ROS: positive for - headaches, and tinnitus. Respiratory ROS: negative for - cough,   or wheezing  Cardiovascular ROS: negative for - chest pain. Positive for edema  Gastrointestinal ROS: negative for - abdominal pain, constipation, diarrhea or nausea/vomiting  Neurological ROS: negative for - dizziness. Positive for tinnutus      Physical Examination:  BP (!) 150/95   Pulse 95   Temp 98.1 °F (36.7 °C)   Resp 18   Wt 292 lb (132.5 kg)   BMI 39.60 kg/m²     General-  Alert and oriented x 3, NAD  HEENT: NC, AT, PERRLA, EOMI, anicteric sclerae  Ears: Normal tympanic membranes bilaterally  Nose: patent, no lesions  Mouth: no lesions, moist mucosas  Neck - supple, no significant adenopathy  Chest - clear to auscultation, no wheezes, rales or rhonchi, symmetric air entry  Heart - normal rate, regular rhythm, normal S1, S2, no murmurs, rubs, clicks or gallops  Abdomen - soft, nontender, nondistended, no masses or organomegaly  Extremities -+ pedal edema, no clubbing or cyanosis    Impression:   Diagnosis Orders   1. Tinnitus of left ear  MRI BRAIN W CONTRAST    MRI INNER AUDITORY CANALS / POSTERIOR FOSSA W CONTRAST   2. Unilateral occipital headache  MRI BRAIN W CONTRAST    MRI INNER AUDITORY CANALS / POSTERIOR FOSSA W CONTRAST   3.  Essential hypertension  Lipid Panel    CBC Auto Differential    Comprehensive Metabolic Panel Urinalysis with Microscopic   4. Positive depression screening  Positive Screen for Clinical Depression with a Documented Follow-up Plan    5. Stress at home         Plan:  Schedule MRI  Appointment with Dr. Fay Duque this coming Monday. Continue Losartan 50 mg 1 tablet PO BID  Continue Spironolactone 50 mg 1 PO daily to BID    No orders of the defined types were placed in this encounter. Follow Up:  Return in about 3 months (around 5/15/2021).     Lio Diaz MD

## 2021-02-17 ENCOUNTER — NURSE ONLY (OUTPATIENT)
Dept: LAB | Age: 61
End: 2021-02-17

## 2021-02-17 ENCOUNTER — TELEPHONE (OUTPATIENT)
Dept: FAMILY MEDICINE CLINIC | Age: 61
End: 2021-02-17

## 2021-02-17 DIAGNOSIS — I10 ESSENTIAL HYPERTENSION: ICD-10-CM

## 2021-02-17 LAB
ALBUMIN SERPL-MCNC: 4.2 G/DL (ref 3.5–5.1)
ALP BLD-CCNC: 93 U/L (ref 38–126)
ALT SERPL-CCNC: 31 U/L (ref 11–66)
ANION GAP SERPL CALCULATED.3IONS-SCNC: 10 MEQ/L (ref 8–16)
AST SERPL-CCNC: 26 U/L (ref 5–40)
BACTERIA: NORMAL
BASOPHILS # BLD: 0.7 %
BASOPHILS ABSOLUTE: 0.1 THOU/MM3 (ref 0–0.1)
BILIRUB SERPL-MCNC: 0.5 MG/DL (ref 0.3–1.2)
BILIRUBIN DIRECT: < 0.2 MG/DL (ref 0–0.3)
BILIRUBIN URINE: NEGATIVE
BLOOD, URINE: NEGATIVE
BUN BLDV-MCNC: 11 MG/DL (ref 7–22)
CALCIUM SERPL-MCNC: 9.5 MG/DL (ref 8.5–10.5)
CASTS: NORMAL /LPF
CASTS: NORMAL /LPF
CHARACTER, URINE: CLEAR
CHLORIDE BLD-SCNC: 106 MEQ/L (ref 98–111)
CHOLESTEROL, TOTAL: 172 MG/DL (ref 100–199)
CO2: 27 MEQ/L (ref 23–33)
COLOR: YELLOW
CREAT SERPL-MCNC: 0.9 MG/DL (ref 0.4–1.2)
CRYSTALS: NORMAL
EOSINOPHIL # BLD: 2.4 %
EOSINOPHILS ABSOLUTE: 0.2 THOU/MM3 (ref 0–0.4)
EPITHELIAL CELLS, UA: NORMAL /HPF
ERYTHROCYTE [DISTWIDTH] IN BLOOD BY AUTOMATED COUNT: 13.1 % (ref 11.5–14.5)
ERYTHROCYTE [DISTWIDTH] IN BLOOD BY AUTOMATED COUNT: 43.1 FL (ref 35–45)
GFR SERPL CREATININE-BSD FRML MDRD: 86 ML/MIN/1.73M2
GLUCOSE BLD-MCNC: 101 MG/DL (ref 70–108)
GLUCOSE, URINE: NEGATIVE MG/DL
HCT VFR BLD CALC: 46.8 % (ref 42–52)
HDLC SERPL-MCNC: 37 MG/DL
HEMOGLOBIN: 15.4 GM/DL (ref 14–18)
IMMATURE GRANS (ABS): 0.03 THOU/MM3 (ref 0–0.07)
IMMATURE GRANULOCYTES: 0.3 %
KETONES, URINE: NEGATIVE
LDL CHOLESTEROL CALCULATED: 100 MG/DL
LEUKOCYTE ESTERASE, URINE: NEGATIVE
LYMPHOCYTES # BLD: 22.1 %
LYMPHOCYTES ABSOLUTE: 2 THOU/MM3 (ref 1–4.8)
MCH RBC QN AUTO: 29.5 PG (ref 26–33)
MCHC RBC AUTO-ENTMCNC: 32.9 GM/DL (ref 32.2–35.5)
MCV RBC AUTO: 89.7 FL (ref 80–94)
MISCELLANEOUS LAB TEST RESULT: NORMAL
MONOCYTES # BLD: 10.1 %
MONOCYTES ABSOLUTE: 0.9 THOU/MM3 (ref 0.4–1.3)
NITRITE, URINE: NEGATIVE
NUCLEATED RED BLOOD CELLS: 0 /100 WBC
PH UA: 5 (ref 5–9)
PLATELET # BLD: 221 THOU/MM3 (ref 130–400)
PMV BLD AUTO: 8.9 FL (ref 9.4–12.4)
POTASSIUM SERPL-SCNC: 4.4 MEQ/L (ref 3.5–5.2)
PROTEIN UA: NEGATIVE MG/DL
RBC # BLD: 5.22 MILL/MM3 (ref 4.7–6.1)
RBC URINE: NORMAL /HPF
RENAL EPITHELIAL, UA: NORMAL
SEG NEUTROPHILS: 64.4 %
SEGMENTED NEUTROPHILS ABSOLUTE COUNT: 5.9 THOU/MM3 (ref 1.8–7.7)
SODIUM BLD-SCNC: 143 MEQ/L (ref 135–145)
SPECIFIC GRAVITY UA: 1.02 (ref 1–1.03)
TOTAL PROTEIN: 7.6 G/DL (ref 6.1–8)
TRIGL SERPL-MCNC: 177 MG/DL (ref 0–199)
UROBILINOGEN, URINE: 0.2 EU/DL (ref 0–1)
WBC # BLD: 9.1 THOU/MM3 (ref 4.8–10.8)
WBC UA: NORMAL /HPF
YEAST: NORMAL

## 2021-02-17 NOTE — TELEPHONE ENCOUNTER
According to the clinical provided, medical necessity is met for [phone] MRI Brain without Contrast, or [id number] MRI Brain without and with Contrast. Does the provider want to change the order?

## 2021-02-18 ENCOUNTER — HOSPITAL ENCOUNTER (EMERGENCY)
Age: 61
Discharge: HOME | End: 2021-02-18
Attending: EMERGENCY MEDICINE
Payer: COMMERCIAL

## 2021-02-18 ENCOUNTER — APPOINTMENT (OUTPATIENT)
Dept: MRI IMAGING | Age: 61
End: 2021-02-18
Payer: COMMERCIAL

## 2021-02-18 ENCOUNTER — TELEPHONE (OUTPATIENT)
Dept: FAMILY MEDICINE CLINIC | Age: 61
End: 2021-02-18

## 2021-02-18 ENCOUNTER — APPOINTMENT (OUTPATIENT)
Dept: CT IMAGING | Age: 61
End: 2021-02-18
Payer: COMMERCIAL

## 2021-02-18 VITALS
HEIGHT: 72 IN | WEIGHT: 292 LBS | BODY MASS INDEX: 39.55 KG/M2 | RESPIRATION RATE: 18 BRPM | SYSTOLIC BLOOD PRESSURE: 168 MMHG | TEMPERATURE: 97.5 F | HEART RATE: 75 BPM | DIASTOLIC BLOOD PRESSURE: 89 MMHG | OXYGEN SATURATION: 97 %

## 2021-02-18 DIAGNOSIS — H81.90 ACUTE VESTIBULAR SYNDROME: Primary | ICD-10-CM

## 2021-02-18 LAB
ALBUMIN SERPL-MCNC: 4.4 G/DL (ref 3.5–5.1)
ALP BLD-CCNC: 95 U/L (ref 38–126)
ALT SERPL-CCNC: 33 U/L (ref 11–66)
ANION GAP SERPL CALCULATED.3IONS-SCNC: 7 MEQ/L (ref 8–16)
AST SERPL-CCNC: 30 U/L (ref 5–40)
BASOPHILS # BLD: 0.7 %
BASOPHILS ABSOLUTE: 0.1 THOU/MM3 (ref 0–0.1)
BILIRUB SERPL-MCNC: 0.6 MG/DL (ref 0.3–1.2)
BUN BLDV-MCNC: 12 MG/DL (ref 7–22)
CALCIUM SERPL-MCNC: 9.3 MG/DL (ref 8.5–10.5)
CHLORIDE BLD-SCNC: 103 MEQ/L (ref 98–111)
CO2: 27 MEQ/L (ref 23–33)
CREAT SERPL-MCNC: 0.9 MG/DL (ref 0.4–1.2)
EKG ATRIAL RATE: 55 BPM
EKG P AXIS: 42 DEGREES
EKG P-R INTERVAL: 180 MS
EKG Q-T INTERVAL: 452 MS
EKG QRS DURATION: 100 MS
EKG QTC CALCULATION (BAZETT): 432 MS
EKG R AXIS: -12 DEGREES
EKG T AXIS: 41 DEGREES
EKG VENTRICULAR RATE: 55 BPM
EOSINOPHIL # BLD: 1.6 %
EOSINOPHILS ABSOLUTE: 0.1 THOU/MM3 (ref 0–0.4)
ERYTHROCYTE [DISTWIDTH] IN BLOOD BY AUTOMATED COUNT: 13 % (ref 11.5–14.5)
ERYTHROCYTE [DISTWIDTH] IN BLOOD BY AUTOMATED COUNT: 40.8 FL (ref 35–45)
GFR SERPL CREATININE-BSD FRML MDRD: 86 ML/MIN/1.73M2
GLUCOSE BLD-MCNC: 90 MG/DL (ref 70–108)
HCT VFR BLD CALC: 45.3 % (ref 42–52)
HEMOGLOBIN: 15.6 GM/DL (ref 14–18)
IMMATURE GRANS (ABS): 0.05 THOU/MM3 (ref 0–0.07)
IMMATURE GRANULOCYTES: 0.5 %
LYMPHOCYTES # BLD: 13.9 %
LYMPHOCYTES ABSOLUTE: 1.3 THOU/MM3 (ref 1–4.8)
MCH RBC QN AUTO: 29.9 PG (ref 26–33)
MCHC RBC AUTO-ENTMCNC: 34.4 GM/DL (ref 32.2–35.5)
MCV RBC AUTO: 86.9 FL (ref 80–94)
MONOCYTES # BLD: 9.5 %
MONOCYTES ABSOLUTE: 0.9 THOU/MM3 (ref 0.4–1.3)
NUCLEATED RED BLOOD CELLS: 0 /100 WBC
OSMOLALITY CALCULATION: 273.1 MOSMOL/KG (ref 275–300)
PLATELET # BLD: 192 THOU/MM3 (ref 130–400)
PMV BLD AUTO: 8.5 FL (ref 9.4–12.4)
POTASSIUM SERPL-SCNC: 4.5 MEQ/L (ref 3.5–5.2)
RBC # BLD: 5.21 MILL/MM3 (ref 4.7–6.1)
SEG NEUTROPHILS: 73.8 %
SEGMENTED NEUTROPHILS ABSOLUTE COUNT: 6.7 THOU/MM3 (ref 1.8–7.7)
SODIUM BLD-SCNC: 137 MEQ/L (ref 135–145)
TOTAL PROTEIN: 7.8 G/DL (ref 6.1–8)
TROPONIN T: < 0.01 NG/ML
WBC # BLD: 9.1 THOU/MM3 (ref 4.8–10.8)

## 2021-02-18 PROCEDURE — 70551 MRI BRAIN STEM W/O DYE: CPT

## 2021-02-18 PROCEDURE — 36415 COLL VENOUS BLD VENIPUNCTURE: CPT

## 2021-02-18 PROCEDURE — 80053 COMPREHEN METABOLIC PANEL: CPT

## 2021-02-18 PROCEDURE — 99285 EMERGENCY DEPT VISIT HI MDM: CPT

## 2021-02-18 PROCEDURE — 6360000004 HC RX CONTRAST MEDICATION: Performed by: STUDENT IN AN ORGANIZED HEALTH CARE EDUCATION/TRAINING PROGRAM

## 2021-02-18 PROCEDURE — 84484 ASSAY OF TROPONIN QUANT: CPT

## 2021-02-18 PROCEDURE — 85025 COMPLETE CBC W/AUTO DIFF WBC: CPT

## 2021-02-18 PROCEDURE — 70498 CT ANGIOGRAPHY NECK: CPT

## 2021-02-18 PROCEDURE — 70496 CT ANGIOGRAPHY HEAD: CPT

## 2021-02-18 PROCEDURE — 70450 CT HEAD/BRAIN W/O DYE: CPT

## 2021-02-18 PROCEDURE — 2580000003 HC RX 258: Performed by: STUDENT IN AN ORGANIZED HEALTH CARE EDUCATION/TRAINING PROGRAM

## 2021-02-18 PROCEDURE — G0426 INPT/ED TELECONSULT50: HCPCS | Performed by: PSYCHIATRY & NEUROLOGY

## 2021-02-18 PROCEDURE — 93005 ELECTROCARDIOGRAM TRACING: CPT | Performed by: EMERGENCY MEDICINE

## 2021-02-18 PROCEDURE — 6370000000 HC RX 637 (ALT 250 FOR IP): Performed by: STUDENT IN AN ORGANIZED HEALTH CARE EDUCATION/TRAINING PROGRAM

## 2021-02-18 RX ORDER — 0.9 % SODIUM CHLORIDE 0.9 %
1000 INTRAVENOUS SOLUTION INTRAVENOUS ONCE
Status: COMPLETED | OUTPATIENT
Start: 2021-02-18 | End: 2021-02-18

## 2021-02-18 RX ORDER — MECLIZINE HYDROCHLORIDE 25 MG/1
25 TABLET ORAL 3 TIMES DAILY PRN
Qty: 30 TABLET | Refills: 0 | Status: SHIPPED | OUTPATIENT
Start: 2021-02-18 | End: 2021-02-28

## 2021-02-18 RX ORDER — ACETAMINOPHEN 500 MG
1000 TABLET ORAL ONCE
Status: COMPLETED | OUTPATIENT
Start: 2021-02-18 | End: 2021-02-18

## 2021-02-18 RX ORDER — MECLIZINE HYDROCHLORIDE CHEWABLE TABLETS 25 MG/1
25 TABLET, CHEWABLE ORAL ONCE
Status: COMPLETED | OUTPATIENT
Start: 2021-02-18 | End: 2021-02-18

## 2021-02-18 RX ADMIN — SODIUM CHLORIDE 1000 ML: 9 INJECTION, SOLUTION INTRAVENOUS at 15:30

## 2021-02-18 RX ADMIN — MECLIZINE HCL 25 MG: 25 TABLET, CHEWABLE ORAL at 16:50

## 2021-02-18 RX ADMIN — IOPAMIDOL 80 ML: 755 INJECTION, SOLUTION INTRAVENOUS at 18:16

## 2021-02-18 RX ADMIN — ACETAMINOPHEN 1000 MG: 500 TABLET ORAL at 16:50

## 2021-02-18 ASSESSMENT — ENCOUNTER SYMPTOMS
ABDOMINAL PAIN: 0
SINUS PRESSURE: 0
EYE ITCHING: 0
RHINORRHEA: 0
CONSTIPATION: 0
NAUSEA: 0
EYE REDNESS: 0
EYE PAIN: 0
EYE DISCHARGE: 0
ABDOMINAL DISTENTION: 0
SHORTNESS OF BREATH: 0
DIARRHEA: 0
COLOR CHANGE: 0
CHEST TIGHTNESS: 0
VOMITING: 0
SINUS PAIN: 0

## 2021-02-18 ASSESSMENT — PAIN SCALES - GENERAL
PAINLEVEL_OUTOF10: 8
PAINLEVEL_OUTOF10: 3

## 2021-02-18 ASSESSMENT — PAIN DESCRIPTION - LOCATION: LOCATION: HEAD

## 2021-02-18 ASSESSMENT — PAIN DESCRIPTION - PAIN TYPE: TYPE: ACUTE PAIN

## 2021-02-18 ASSESSMENT — PAIN DESCRIPTION - ORIENTATION: ORIENTATION: LEFT

## 2021-02-18 NOTE — ED TRIAGE NOTES
Patient presents to ED for dizziness and left arm numbness. States this morning he woke up around 1100hrs and noticed he was dizzy. Patient reports he took meclizine with little relief. States approximately 1 hour ago he noticed the left arm numbness. Patient states, \"I had to take Nyquil last night to settle down because I felt so anxious and claustrophobic. \" Patient notes that this has been happening intermittently \"for weeks\" and that he is scheduled for an MRI. Patient in CT scan at this time. Shows no signs of distress. Skin warm and dry. Respirations easy and unlabored.

## 2021-02-18 NOTE — ED PROVIDER NOTES
[place] ENCOUNTER          Pt Name: Mell Walker  MRN: [id number]  [place] 1960  Date of evaluation: 2/18/2021  Treating Resident Physician: Peace Del Valle DO  Supervising Physician: Mariely Vanegas MD    [address] [place] [address]       Chief Complaint   Patient presents with    Dizziness    Numbness     left arm    Headache     History obtained from the patient. HISTORY OF PRESENT ILLNESS    Nicolas Irizarry is a 62 y.o. male who presents to the emergency department for evaluation of headache and dizziness. The patient states that he \"always has headaches \"and always has \"noise \"in his left ear. He reports that his current headache has been present for \"months. \"  He reports that he has seen Dr. Evan Anderson with the ENT service regarding this complaint and on chart review he carries a diagnosis of chronic mastoiditis of the left side, bilateral tinnitus, and had a normal VNG and CT of the sella turcica. He reports that his headache today acutely worsened after waking at approximately 11 AM today and was associated with dizziness which she characterizes as \"the room spinning. \"  The patient additionally stated that he had some numbness and tingling on his left head and left arm, which he states has not happened before which prompted him to go to the emergency department for further evaluation. He states that he feels like his symptoms are sometimes worsened after taking his home antihypertensive medications, and currently has a follow-up appointment with Dr. Milagros Ramos to evaluate his antihypertensive regimen. He endorses nausea with his symptoms, and denies chest pain. He reports that he is currently scheduled to receive an MRI of his brain as an outpatient. The patient has no other acute complaints at this time. REVIEW OF SYSTEMS   Review of Systems   Constitutional: Negative for fever. HENT: Negative for ear pain, rhinorrhea, sinus pressure and sinus pain. Eyes: Negative for pain, discharge, redness and itching. Respiratory: Negative for chest tightness and shortness of breath. Cardiovascular: Negative for chest pain and palpitations. Gastrointestinal: Negative for abdominal distention, abdominal pain, constipation, diarrhea, nausea and vomiting. Genitourinary: Negative for dysuria, frequency, hematuria and urgency. Musculoskeletal: Negative for arthralgias. Skin: Negative for color change. Neurological: Positive for dizziness, numbness and headaches. Negative for syncope and light-headedness.          PAST MEDICAL AND SURGICAL HISTORY     Past Medical History:   Diagnosis Date    CAD (coronary artery disease) 2014    GERD (gastroesophageal reflux disease)     Hyperlipidemia 2010    Hypertension 2008    CELINA (obstructive sleep apnea) 2014    Dr. Joshua Mcmillan     Past Surgical History:   Procedure Laterality Date    CARDIAC CATHETERIZATION  2/21/15    03/2018    CHOLECYSTECTOMY  2008    laparoscopic, Dysfunctional gallbladder    COLONOSCOPY  2013    normal    ELBOW SURGERY      Lyphoma removal 7- Dr. Angel Valle  07/13/2020    s/p excision of right elbow lipoma done in the office Dr Angel Valle  07/24/2020    cyst removed right testicle Dr Tianna Edwards Right 8/26/2020    right thoracic medial branch block, 2 levels performed by Chaparro Damon MD at [address] N/A 4/30/2018    SEPTOPLASTY SUBMUCOUS RESECTION TURBINATES, PARTIAL RIGHT INFERIOR, NASAL ENDOSCOPY WITH LONNY BULLOSA performed by Daisha Tucker MD at [address] Left 3/1/2018    SIGMOIDOSCOPY BIOPSY FLEXIBLE performed by Leanne Mcclendon MD at [address] [facility]   CPAP Machine MISC, by Does not apply route Please change CPAP pressure to auto 5-15 cm H20., Disp: 1 each, Rfl: 0    losartan (COZAAR) 100 MG tablet, Take 100 mg by mouth daily, Disp: , Rfl:     aspirin 81 MG chewable tablet, Take 81 mg by mouth daily, Disp: , Rfl:     Coenzyme Q10 (COQ10) 200 MG CAPS, Take  by mouth 2 times daily. , Disp: , Rfl:       SOCIAL HISTORY     Social History     Social History Narrative    Not on file     Social History     Tobacco Use    Smoking status: Never Smoker    Smokeless tobacco: Never Used   Substance Use Topics    Alcohol use: No    Drug use: No         ALLERGIES     Allergies   Allergen Reactions    Tramadol Other (See Comments)     Made me feel edgy    Bactrim [Sulfamethoxazole-Trimethoprim] Hives    Zocor [Simvastatin] Other (See Comments)     Body aches         FAMILY HISTORY     Family History   Problem Relation Age of Onset    Diabetes Mother 48    Heart Disease Mother 67        CAD   Pavel Nuñez Heart Disease Father 50        CAD, CVA    Stroke Father 48        CVA    Diabetes Brother 72    High Blood Pressure Brother 72    Heart Disease Brother 72        CHF    Other Brother 46        Neuropathy    Obesity Brother 8    High Blood Pressure Brother 61    Obesity Brother 48    Other Brother 36        back pain    Obesity Brother 39    Other Sister 72        leukemia    Alzheimer's Disease Maternal Grandmother 21    Early Death Paternal Grandfather 36        MI    High Blood Pressure Paternal Grandfather 36    Heart Disease Sister 39    High Blood Pressure Sister 39    Depression Sister 39    Other Brother 48        ANEURYSUM, AAA         PREVIOUS RECORDS   Previous records reviewed: as noted in HPI.         PHYSICAL EXAM     ED Triage Vitals [02/18/21 1503]   BP Temp Temp Source Pulse Resp SpO2 Height Weight   (!) 164/93 97.5 °F (36.4 °C) Oral 58 18 99 % 6' (1.829 m) 292 lb (132.5 kg) Initial vital signs and nursing assessment reviewed and abnormal from HTN. Body mass index is 39.6 kg/m². Pulsoximetry is normal per my interpretation. Additional Vital Signs:  Vitals:    02/18/21 1830   BP: (!) 168/89   Pulse: 75   Resp: 18   Temp:    SpO2: 97%       Physical Exam  Constitutional:       General: He is not in acute distress. Appearance: Normal appearance. He is ill-appearing. HENT:      Head: Normocephalic and atraumatic. Right Ear: Tympanic membrane, ear canal and external ear normal.      Left Ear: Tympanic membrane, ear canal and external ear normal.      Nose: Nose normal. No congestion or rhinorrhea. Mouth/Throat:      Mouth: Mucous membranes are moist.      Pharynx: Oropharynx is clear. No oropharyngeal exudate or posterior oropharyngeal erythema. Eyes:      Pupils: Pupils are equal, round, and reactive to light. Comments: Patient with left beating nystagmus. Neck:      Musculoskeletal: Normal range of motion and neck supple. Cardiovascular:      Rate and Rhythm: Normal rate and regular rhythm. Pulses: Normal pulses. Heart sounds: Normal heart sounds. Pulmonary:      Effort: Pulmonary effort is normal. No respiratory distress. Breath sounds: Normal breath sounds. Abdominal:      General: Abdomen is flat. There is no distension. Palpations: Abdomen is soft. Tenderness: There is no abdominal tenderness. Musculoskeletal: Normal range of motion. General: No swelling or tenderness. Right lower leg: No edema. Left lower leg: No edema. Skin:     General: Skin is warm and dry. Capillary Refill: Capillary refill takes less than 2 seconds. Neurological:      Mental Status: He is alert and oriented to person, place, and time. Cranial Nerves: No cranial nerve deficit. 2. Antegrade flow in the right and left vertebral arteries. 3. Cervical spondylosis. 4. Otherwise negative CTA of the neck. **This report has been created using voice recognition software. It may contain minor errors which are inherent in voice recognition technology. **      Final report electronically signed by DR Geoff Cuevas on 2/18/2021 7:04 PM      CTA HEAD W WO CONTRAST   Final Result          1. Calcification in the cavernous segments of both internal carotid arteries. No evidence of superior stenosis on either side. 2. Otherwise negative CTA of the brain. 3. Retention cysts or polyps in the right and left maxillary sinuses. .               **This report has been created using voice recognition software. It may contain minor errors which are inherent in voice recognition technology. **      Final report electronically signed by DR Geoff Cuevas on 2/18/2021 7:09 PM      MRI BRAIN WO CONTRAST   Final Result      1. No evidence for an acute infarct. 2. Probable ischemic changes in the white matter. 3. Inflammatory changes mastoid air cells and mastoid air cells bilaterally and in the left maxillary sinus. 4. Otherwise negative MRI scan of the brain. **This report has been created using voice recognition software. It may contain minor errors which are inherent in voice recognition technology. **      Final report electronically signed by DR Geoff Cuevas on 2/18/2021 6:09 PM      CT HEAD WO CONTRAST (CODE STROKE)   Final Result    No evidence of acute intracranial abnormality. **This report has been created using voice recognition software. It may contain minor errors which are inherent in voice recognition technology. **      Final report electronically signed by Dr. Jw Gandara MD on 2/18/2021 3:12 PM          ED Medications administered this visit:   Medications   meclizine (ANTIVERT) chewable tablet 25 mg (25 mg Oral Given 2/18/21 4470) acetaminophen (TYLENOL) tablet 1,000 mg (1,000 mg Oral Given 2/18/21 1650)   0.9 % sodium chloride bolus (0 mLs Intravenous Stopped 2/18/21 1630)   iopamidol (ISOVUE-370) 76 % injection 80 mL (80 mLs Intravenous Given 2/18/21 1816)         ED COURSE     ED Course as of Feb 18 2235   Thu Feb 18, 2021   1626 Consulted with Dr. Cally Laboy, who recommended obtaining CTA head and neck, MRI brain, and to call stroke alert. Stroke alert called. [DO]   6072 Consulted with Dr. Candelaria Meyer with telestroke. He evaluated the patient via stroke robot. Recommends no TPA. Will proceed with CTA head and neck and MRI brain, and to proceed with discharge if the patient is able to ambulate in the ED and there are non-emergent imaging findings. [DO]   200 Followed-up with Dr. Candelaria Meyer regarding this case after all imaging had resulted. Upon reassessment prior to discharge the patient was observed walking with a normal and steady gait unassisted in the emergency department. Reviewed imaging findings with Dr. Candelaria Meyer, who agreed with discharge home and recommended ENT follow-up. [DO]      ED Course User Index  [DO] Sanjuanita Neal DO       Strict return precautions and follow up instructions were discussed with the patient prior to discharge, with which the patient agrees.       MEDICATION CHANGES     Discharge Medication List as of 2/18/2021  9:03 PM      START taking these medications    Details   meclizine (ANTIVERT) 25 MG tablet Take 1 tablet by mouth 3 times daily as needed for Dizziness, Disp-30 tablet, R-0Normal               FINAL DISPOSITION Work-up in the emergency department was remarkable for a CBC and CMP which were grossly within normal limits, and EKG and troponin which were not suggestive of ACS, and a CT head showing no acute intracranial abnormality, CTA head and neck negative for LVO, and MRI brain negative for acute infarct and demonstrate inflammatory changes in the mastoid air cells bilaterally and in the left maxillary sinus which is consistent with the patient's prior diagnosis of chronic left mastoiditis. Dr. Candelaria Meyer with telemetry stroke and Dr. Cally Labyo was consulted as above. The patient was given meclizine and a 1 L IV fluid bolus and reported improvement in his vertigo prior to discharge. Upon reassessment at the time of discharge she was observed to be resting comfortably in bed in no acute distress and was able to ambulate in the emergency department unassisted with a normal and steady gait. The patient was provided with a prescription for meclizine. The patient is instructed to follow-up with his primary care provider and with the ENT service regarding his emergency department visit today. The patient is instructed to return to the emergency department if experiencing any new or worsening symptoms. The patient verbalized understanding and all questions were answered. The patient was subsequently discharged home from the emergency department. Final diagnoses:   Acute vestibular syndrome     Condition: condition: good  Dispo: Discharge to home      This transcription was electronically signed. Parts of this transcriptions may have been dictated by use of voice recognition software and electronically transcribed, and parts may have been transcribed with the assistance of an ED scribe. The transcription may contain errors not detected in proofreading. Please refer to my supervising physician's documentation if my documentation differs.     Electronically Signed: Sanjuanita Neal, 02/18/21, 10:35 PM Estefania Paul DO  Resident  02/18/21 [facility],   Resident  02/18/21 7201

## 2021-02-18 NOTE — VIRTUAL HEALTH
Consults  Patient Location:  [address] [place] [address] EMERGENCY DEPT    Provider Location (City/State): [place], [place]  This virtual visit was conducted via interactive/real-time audio/video. [facility] Stroke and Vascular Neurology Consult for  [facility] Stroke Alert through [address] @ 16:28  2/18/2021 4:48 PM  Pt Name: Rios Gaines  MRN: [id number]  YOB: 1960  Date of evaluation: 2/18/2021  Primary Care Physician: Natalie Powers MD  Reason for Evaluation: Stroke Evaluation with Discussion with Ed or primary team with Telemedicine and stroke evaluation with Review of imaging and labs    Rios Gaines is a 62 y.o. male man with chronic left mastoiditis,  Had sinus surgery 3 years ago, today around 11 am he suddenly c/o vertigo sensation, increased loud hearing on his left ear, pt has chronic tinnitus, and headache, he also c/o increase sensitivity on the left face. Since coming to the ED he feels better. Allergies  is allergic to tramadol; bactrim [sulfamethoxazole-trimethoprim]; and zocor [simvastatin]. Medications  Prior to Admission medications    Medication Sig Start Date End Date Taking?  Authorizing Provider   tadalafil (CIALIS) 5 MG tablet TAKE 1 TABLET BY MOUTH ONE TIME A DAY  8/5/20   CARLOS Lanza CNP    MG capsule Take 100 mg by mouth 2 times daily 7/24/20   Historical Provider, MD   fluticasone (FLONASE) 50 MCG/ACT nasal spray 1 spray by Each Nostril route daily 7/30/20   SHANNAN King   cetirizine (ZYRTEC) 10 MG tablet Take 1 tablet by mouth daily  Patient not taking: Reported on 2/15/2021 7/15/20   Natalie Powers MD   rosuvastatin (CRESTOR) 10 MG tablet Take 1 tablet by mouth nightly 6/8/20   CARLOS Main CNP   spironolactone (ALDACTONE) 50 MG tablet Take 1 tablet by mouth daily 4/27/20   Yuli Gardinerr, MD budesonide (RHINOCORT AQUA) 32 MCG/ACT nasal spray 2 sprays by Each Nostril route daily  Patient not taking: Reported on 2/15/2021 3/12/20   Sherry Webb MD   Omega-3 Fatty Acids (OMEGA-3 FISH OIL PO) Take by mouth    Historical Provider, MD   Multiple Vitamins-Minerals (MULTIVITAMIN ADULT PO) Take by mouth Indications: Ultra Light 121    Historical Provider, MD   vitamin E 400 UNIT capsule Take 400 Units by mouth daily    Historical Provider, MD   zinc 50 MG CAPS Take by mouth    Historical Provider, MD   nabumetone (RELAFEN) 750 MG tablet TAKE 1 TABLET BY MOUTH TWICE DAILY 5/20/19   Sherry Webb MD   amLODIPine (NORVASC) 5 MG tablet Take 5 mg by mouth daily    Historical Provider, MD   Cholecalciferol (VITAMIN D3) 2000 units CAPS Take 5,000 Units by mouth     Historical Provider, MD   MAGNESIUM GLYCINATE PLUS PO Take by mouth    Historical Provider, MD   esomeprazole Magnesium (NEXIUM) 20 MG PACK Take 20 mg by mouth as needed    Historical Provider, MD   metoprolol succinate (TOPROL XL) 25 MG extended release tablet TAKE 1 TABLET BY MOUTH ONCE DAILY 10/12/18   Sherry Webb MD   CPAP Machine MISC by Does not apply route Please change CPAP pressure to auto 5-15 cm H20. 6/12/18   Aaron Dougherty PA-C   losartan (COZAAR) 100 MG tablet Take 100 mg by mouth daily    Historical Provider, MD   aspirin 81 MG chewable tablet Take 81 mg by mouth daily    Historical Provider, MD   Coenzyme Q10 (COQ10) 200 MG CAPS Take  by mouth 2 times daily. Historical Provider, MD    Scheduled Meds:   meclizine  25 mg Oral Once    acetaminophen  1,000 mg Oral Once     Continuous Infusions:  PRN Meds:.  Past Medical History   has a past medical history of CAD (coronary artery disease), GERD (gastroesophageal reflux disease), Hyperlipidemia, Hypertension, and CELINA (obstructive sleep apnea).   Social History  Social History     Socioeconomic History    Marital status:      Spouse name: Rosmery Franks Number of children: 2  Years of education: 15    Highest education level: Not on file   Occupational History    Occupation: [employer]     Comment: self employeed   Social Needs    Financial resource strain: Not on file    Food insecurity     Worry: Not on file     Inability: Not on file    Transportation needs     Medical: Not on file     Non-medical: Not on file   Tobacco Use    Smoking status: Never Smoker    Smokeless tobacco: Never Used   Substance and Sexual Activity    Alcohol use: No    Drug use: No    Sexual activity: Yes     Partners: Female   Lifestyle    Physical activity     Days per week: Not on file     Minutes per session: Not on file    Stress: Not on file   Relationships    Social connections     Talks on phone: Not on file     Gets together: Not on file     Attends [employer] service: Not on file     Active member of club or organization: Not on file     Attends meetings of clubs or organizations: Not on file     Relationship status: Not on file    Intimate partner violence     Fear of current or ex partner: Not on file     Emotionally abused: Not on file     Physically abused: Not on file     Forced sexual activity: Not on file   Other Topics Concern    Not on file   Social History Narrative    Not on file     Family History      Problem Relation Age of Onset    Diabetes Mother 48    Heart Disease Mother 67        CAD    Heart Disease Father 50        CAD, CVA    Stroke Father 48        CVA    Diabetes Brother 72    High Blood Pressure Brother 72    Heart Disease Brother 72        CHF    Other Brother 46        Neuropathy    Obesity Brother 10    High Blood Pressure Brother 61    Obesity Brother 48    Other Brother 36        back pain    Obesity Brother 39    Other Sister 72        leukemia    Alzheimer's Disease Maternal Grandmother 21    Early Death Paternal Grandfather 36        MI    High Blood Pressure Paternal Grandfather 36    Heart Disease Sister 39  High Blood Pressure Sister 39    Depression Sister 39    Other Brother 48        ANEURYSUM, AAA       OBJECTIVE  BP (!) 164/93   Pulse 58   Temp 97.5 °F (36.4 °C) (Oral)   Resp 18   Ht 6' (1.829 m)   Wt 292 lb (132.5 kg)   SpO2 99%   BMI 39.60 kg/m²     NIH Stroke Scale  Interval: Baseline  Level of Consciousness (1a. ): Alert  LOC Questions (1b. ): Answers both correctly  LOC Commands (1c. ): Performs both tasks correctly  Best Gaze (2. ): Normal  Visual (3. ): No visual loss  Facial Palsy (4. ): Normal symmetrical movement  Motor Arm, Left (5a. ): No drift  Motor Arm, Right (5b. ): No drift  Motor Leg, Left (6a. ): No drift  Motor Leg, Right (6b. ): No drift  Limb Ataxia (7. ): Absent  Sensory (8. ): Normal(states left cheek feels \"more sensitive. \")  Best Language (9. ): No aphasia  Dysarthria (10. ): Normal  Extinction and Inattention (11): No abnormality  Total: 0         Pre-Morbid mRS: 0    Neuro exam : AAO x 4    No nystagmus, no skew deviation  + head thrust test with 2 beats lag on each direction  No ataxia  C/o increase sensitivity to the left face fred left ear area  All other sensation exam normal  No drift      Imaging:  Images were personally reviewed with PACS used to review images including:  CT brain without contrast: nothing acute  CTA imaging: not done yet    Assessment    62year old man with chronic sinusitis, mastoiditis, c/o worsening baseline tinnitus, vertigo and increase left face sensitivity. Recommendations:  1. NIH 0  2. Recommend Inpatient Neurology and ENT Consult for further assessment and evaluation, recommend pt to be admitted, as he feels unsteady to walk   3.  [facility]. This is not a stroke in my opinion based on clinical exam and history, his nystagmus and skew are both negative, with a positive head thrust test, this points toward a peripheral vestibular problem, given his history of sinusitis and tinnitus, I believe patient most likely has some kind of sinusitis around the ear area affecting or causing his vestibular nerve to be hyperstimulated. 5. Meclizine should work well on this. Discussed with ED Physician Dr Garrett Gutierrez    At least 54 min of Telemedicine and time in conversation directly with ED staff and physician for the patient who is in imminent and life threatening deterioration without further treatment and evaluation. This Virtual Visit was conducted with patient's (and/or legal guardian's) consent, to provide telestroke consultation and necessary medical care.   Time spent examining patient, reviewing the images personally, reviewing the chart, perform high complexity decision making and speaking with the nursing staff regarding recommendations    Hannah Delvalle MD  Attending Neurologist/Neurointensivist      Alok Vargas MD   Stroke, Neurocritical Care And/or [address] [address] Stroke [address]  Electronically signed 2/18/2021 at 4:48 PM

## 2021-02-18 NOTE — ED NOTES
Patient resting quietly in cot showing no signs of distress at this time. Rates pain 8/10. Medicated per MAR. Updated on POC. Will continue to monitor.       Payam Lopez RN  02/18/21 7207

## 2021-02-18 NOTE — ED NOTES
Patient resting quietly in cot showing no signs of distress at this time. Rates pain 3/10. Updated on POC. Given ice chips. Will continue to monitor.       Zac Dunbar RN  02/18/21 3222

## 2021-02-18 NOTE — TELEPHONE ENCOUNTER
Blood test withn acceptable ranges, except for HDL which is low again and triglycerides that are high again.  Restart statin

## 2021-02-19 ENCOUNTER — TELEPHONE (OUTPATIENT)
Dept: FAMILY MEDICINE CLINIC | Age: 61
End: 2021-02-19

## 2021-02-19 PROCEDURE — 93010 ELECTROCARDIOGRAM REPORT: CPT | Performed by: INTERNAL MEDICINE

## 2021-02-19 NOTE — ED NOTES
Bedside report received by Jamal Teresa RN at this time. No distress noted, no further needs stated by pt. Family at bedside.        [facility]  02/18/21 1919

## 2021-02-23 LAB
CREAT SERPL-MCNC: 1 MG/DL (ref 0.6–1.3)
CREATININE CLEARANCE: 60

## 2021-03-01 DIAGNOSIS — H70.13 CHRONIC MASTOIDITIS, BILATERAL: Primary | ICD-10-CM

## 2021-03-02 ENCOUNTER — TELEPHONE (OUTPATIENT)
Dept: FAMILY MEDICINE CLINIC | Age: 61
End: 2021-03-02

## 2021-03-02 NOTE — TELEPHONE ENCOUNTER
Called pt about results, explained to pt that we sent a referral and he should be hearing from the [facility] ENT soon. Pt verbalized understanding with no questions.

## 2021-04-06 ENCOUNTER — VIRTUAL VISIT (OUTPATIENT)
Dept: FAMILY MEDICINE CLINIC | Age: 61
End: 2021-04-06

## 2021-04-06 ENCOUNTER — TELEPHONE (OUTPATIENT)
Dept: FAMILY MEDICINE CLINIC | Age: 61
End: 2021-04-06

## 2021-04-06 DIAGNOSIS — F43.9 STRESS AT HOME: ICD-10-CM

## 2021-04-06 DIAGNOSIS — G44.86 CERVICOGENIC HEADACHE: Primary | ICD-10-CM

## 2021-04-06 DIAGNOSIS — F41.9 ANXIETY: ICD-10-CM

## 2021-04-06 PROCEDURE — 3017F COLORECTAL CA SCREEN DOC REV: CPT | Performed by: FAMILY MEDICINE

## 2021-04-06 PROCEDURE — G8428 CUR MEDS NOT DOCUMENT: HCPCS | Performed by: FAMILY MEDICINE

## 2021-04-06 PROCEDURE — 99213 OFFICE O/P EST LOW 20 MIN: CPT | Performed by: FAMILY MEDICINE

## 2021-04-06 NOTE — PROGRESS NOTES
2021    TELEHEALTH EVALUATION -- Audio/Visual (During SNBTR-18 public health emergency)    HPI:    Kimber Fontenot (:  1960) has requested an audio/video evaluation for the following concern(s):  Headaches    Woke up with headache yesterday. Stress with all its happening with his health and his wife. Evaluated at [facility] neurology department few days ago via telemedicine. Diagnosed with cervicogenic headache and refered to physical therapy. Not sure where to go. He asked couple of people and they did not know about Lonnie exercises. Admit a lot of anxiety and stress. Work up so far (CTA of the head and neck, MRI of the brain with and without contrast, laboratory evaluation, audiogram, VNG, and ENT consultation) has been unrevealing, except for mastoiditis which has been treated. He is taking a lot of NSAIs and Tylenol. He may be developing rebound headaches from OTC analgesics    Prior to Visit Medications    Medication Sig Taking? Authorizing Provider   sertraline (ZOLOFT) 50 MG tablet Take 1 tablet by mouth daily Yes Susi Oquendo MD   tadalafil (CIALIS) 5 MG tablet TAKE 1 TABLET BY MOUTH ONE TIME A DAY   Amy Cope, APRN - CNP   fluticasone (FLONASE) 50 MCG/ACT nasal spray 1 spray by Each Nostril route daily  SHANNAN Boswell   Omega-3 Fatty Acids (OMEGA-3 FISH OIL PO) Take by mouth  Historical Provider, MD   Multiple Vitamins-Minerals (MULTIVITAMIN ADULT PO) Take by mouth Indications: Ultra Light 121  Historical Provider, MD   vitamin E 400 UNIT capsule Take 400 Units by mouth daily  Historical Provider, MD   zinc 50 MG CAPS Take by mouth  Historical Provider, MD   Cholecalciferol (VITAMIN D3) 2000 units CAPS Take 5,000 Units by mouth   Historical Provider, MD   MAGNESIUM GLYCINATE PLUS PO Take by mouth  Historical Provider, MD   CPAP Machine MISC by Does not apply route Please change CPAP pressure to auto 5-15 cm H20.   Mable Samaniego PA-C   aspirin 81 MG chewable credentialed to provide care. --Mellissa Sheldon MD on 4/6/2021 at 5:49 PM    An electronic signature was used to authenticate this note.

## 2021-04-06 NOTE — TELEPHONE ENCOUNTER
Patient stopped by the office regarding symptoms of daily headache. Headache is mostly behind left ear/ sharp pain. Patient states he discussed this at last appointment. Currently Tylenol and Mortin otc with no relief. Patient denies large caffeine intake. Patient is not able to be seen in the office until Thursday but is requesting to have something called into the pharmacy for relief prior to appointment. Please advise.
Pt seen via telemedicine
Detail Level: Detailed

## 2021-04-08 ENCOUNTER — NURSE ONLY (OUTPATIENT)
Dept: LAB | Age: 61
End: 2021-04-08

## 2021-04-08 DIAGNOSIS — N40.0 BENIGN PROSTATIC HYPERPLASIA, UNSPECIFIED WHETHER LOWER URINARY TRACT SYMPTOMS PRESENT: ICD-10-CM

## 2021-04-08 LAB — PROSTATE SPECIFIC ANTIGEN: 0.49 NG/ML (ref 0–1)

## 2021-04-09 ENCOUNTER — OFFICE VISIT (OUTPATIENT)
Dept: UROLOGY | Age: 61
End: 2021-04-09
Payer: COMMERCIAL

## 2021-04-09 VITALS
WEIGHT: 286.4 LBS | HEIGHT: 72 IN | BODY MASS INDEX: 38.79 KG/M2 | DIASTOLIC BLOOD PRESSURE: 84 MMHG | SYSTOLIC BLOOD PRESSURE: 138 MMHG

## 2021-04-09 DIAGNOSIS — N40.0 BENIGN PROSTATIC HYPERPLASIA, UNSPECIFIED WHETHER LOWER URINARY TRACT SYMPTOMS PRESENT: Primary | ICD-10-CM

## 2021-04-09 LAB
BILIRUBIN URINE: NEGATIVE
BLOOD URINE, POC: NEGATIVE
CHARACTER, URINE: CLEAR
COLOR, URINE: YELLOW
GLUCOSE URINE: NEGATIVE MG/DL
KETONES, URINE: NEGATIVE
LEUKOCYTE CLUMPS, URINE: NEGATIVE
NITRITE, URINE: NEGATIVE
PH, URINE: 6.5 (ref 5–9)
POST VOID RESIDUAL (PVR): 78 ML
PROTEIN, URINE: NEGATIVE MG/DL
SPECIFIC GRAVITY, URINE: 1.01 (ref 1–1.03)
UROBILINOGEN, URINE: 0.2 EU/DL (ref 0–1)

## 2021-04-09 PROCEDURE — 99213 OFFICE O/P EST LOW 20 MIN: CPT | Performed by: NURSE PRACTITIONER

## 2021-04-09 PROCEDURE — 1036F TOBACCO NON-USER: CPT | Performed by: NURSE PRACTITIONER

## 2021-04-09 PROCEDURE — G8427 DOCREV CUR MEDS BY ELIG CLIN: HCPCS | Performed by: NURSE PRACTITIONER

## 2021-04-09 PROCEDURE — 81003 URINALYSIS AUTO W/O SCOPE: CPT | Performed by: NURSE PRACTITIONER

## 2021-04-09 PROCEDURE — G8417 CALC BMI ABV UP PARAM F/U: HCPCS | Performed by: NURSE PRACTITIONER

## 2021-04-09 PROCEDURE — 3017F COLORECTAL CA SCREEN DOC REV: CPT | Performed by: NURSE PRACTITIONER

## 2021-04-09 PROCEDURE — 51798 US URINE CAPACITY MEASURE: CPT | Performed by: NURSE PRACTITIONER

## 2021-04-09 RX ORDER — BUMETANIDE 2 MG/1
2 TABLET ORAL DAILY
COMMUNITY
End: 2021-11-04 | Stop reason: SDUPTHER

## 2021-04-09 RX ORDER — TADALAFIL 5 MG/1
TABLET ORAL
Qty: 90 TABLET | Refills: 3 | Status: SHIPPED | OUTPATIENT
Start: 2021-04-09 | End: 2022-05-02

## 2021-04-09 ASSESSMENT — ENCOUNTER SYMPTOMS
ABDOMINAL PAIN: 0
NAUSEA: 0
VOMITING: 0
BACK PAIN: 0

## 2021-04-09 NOTE — PROGRESS NOTES
[address] [address]  Dept: [phone]  Loc: [phone]    Visit Date: 4/9/2021        HPI:     Alley Orosco is a 62 y.o. male who presents today for:  Chief Complaint   Patient presents with    Benign Prostatic Hypertrophy    6 Month Follow-Up     PSA prior       HPI   Pt seen in follow up for BPH. Pt has a hx of BPH and ED for which he was prescribed Cialis 5 mg daily. Reports he can tell a difference in his urinary stream if he doesn't take the Cialis every day. It helps with ED but still has some trouble with sustaining erection. He would like to hold off on any med change at this time though. Has some urinary frequency exacerbated by starting bumex recently. Pt had R spermatocelectomy and hydrocelectomy  7/24/2020 by Dr. Tan Jensen at [facility] system. Reports pain has resolved since surgery. Denies hematuria, dysuria. Current Outpatient Medications   Medication Sig Dispense Refill    sertraline (ZOLOFT) 50 MG tablet Take 1 tablet by mouth daily 30 tablet 5    tadalafil (CIALIS) 5 MG tablet TAKE 1 TABLET BY MOUTH ONE TIME A DAY  30 tablet 3    fluticasone (FLONASE) 50 MCG/ACT nasal spray 1 spray by Each Nostril route daily 1 Bottle 2    Omega-3 Fatty Acids (OMEGA-3 FISH OIL PO) Take by mouth      Multiple Vitamins-Minerals (MULTIVITAMIN ADULT PO) Take by mouth Indications: Ultra Light 121      vitamin E 400 UNIT capsule Take 400 Units by mouth daily      zinc 50 MG CAPS Take by mouth      Cholecalciferol (VITAMIN D3) 2000 units CAPS Take 5,000 Units by mouth       MAGNESIUM GLYCINATE PLUS PO Take by mouth      CPAP Machine MISC by Does not apply route Please change CPAP pressure to auto 5-15 cm H20. 1 each 0    aspirin 81 MG chewable tablet Take 81 mg by mouth daily      Coenzyme Q10 (COQ10) 200 MG CAPS Take  by mouth 2 times daily.        No current facility-administered medications for this visit. Past Medical History  Nicolas  has a past medical history of CAD (coronary artery disease), GERD (gastroesophageal reflux disease), Hyperlipidemia, Hypertension, and CELINA (obstructive sleep apnea). Past Surgical History  The patient  has a past surgical history that includes Cholecystectomy (2008); Colonoscopy (2013); Tonsillectomy; pr sigmoidoscopy,biopsy (Left, 3/1/2018); Cardiac catheterization (2/21/15); pr repair of nasal septum (N/A, 4/30/2018); other surgical history (07/13/2020); Elbow surgery; other surgical history (07/24/2020); and Pain management procedure (Right, 8/26/2020). Family History  This patient's family history includes Alzheimer's Disease (age of onset: 21) in his maternal grandmother; Depression (age of onset: 39) in his sister; Diabetes (age of onset: 48) in his mother; Diabetes (age of onset: 72) in his brother; Early Death (age of onset: 36) in his paternal grandfather; Heart Disease (age of onset: 39) in his sister; Heart Disease (age of onset: 50) in his father; Heart Disease (age of onset: 72) in his brother; Heart Disease (age of onset: 67) in his mother; High Blood Pressure (age of onset: 36) in his paternal grandfather; High Blood Pressure (age of onset: 39) in his sister; High Blood Pressure (age of onset: 61) in his brother; High Blood Pressure (age of onset: 72) in his brother; Obesity (age of onset: 8) in his brother; Obesity (age of onset: 39) in his brother; Obesity (age of onset: 48) in his brother; Other (age of onset: 36) in his brother; Other (age of onset: 48) in his brother; Other (age of onset: 46) in his brother; Other (age of onset: 72) in his sister; Stroke (age of onset: 48) in his father. Social History  Nicolas  reports that he has never smoked. He has never used smokeless tobacco. He reports that he does not drink alcohol or use drugs.       Subjective:      Review of Systems   Constitutional: Negative for activity change, appetite change, chills, diaphoresis, fatigue, fever and unexpected weight change. Gastrointestinal: Negative for abdominal pain, nausea and vomiting. Genitourinary: Negative for decreased urine volume, difficulty urinating, dysuria, flank pain, frequency, hematuria and urgency. Musculoskeletal: Negative for back pain. Objective:   Ht 6' (1.829 m)   Wt 286 lb 6.4 oz (129.9 kg)   BMI 38.84 kg/m²     Physical Exam  Vitals signs reviewed. Constitutional:       General: He is not in acute distress. Appearance: Normal appearance. He is well-developed. He is not ill-appearing or diaphoretic. HENT:      Head: Normocephalic and atraumatic. Right Ear: External ear normal.      Left Ear: External ear normal.      Nose: Nose normal.      Mouth/Throat:      Mouth: Mucous membranes are moist.   Eyes:      General: No scleral icterus. Right eye: No discharge. Left eye: No discharge. Neck:      Vascular: No JVD. Trachea: No tracheal deviation. Pulmonary:      Effort: Pulmonary effort is normal. No respiratory distress. Abdominal:      General: There is no distension. Tenderness: There is no abdominal tenderness. There is no right CVA tenderness or left CVA tenderness. Genitourinary:     Comments: Normal circumcised penis. No scrotal swelling. Testicles normal to palpation without tenderness. Fullness to bilateral upper scrotum. Prostate mildly enlarged without nodule or induration  Musculoskeletal: Normal range of motion. Neurological:      Mental Status: He is alert and oriented to person, place, and time. Mental status is at baseline. Psychiatric:         Mood and Affect: Mood normal.         Behavior: Behavior normal.         Thought Content: Thought content normal.         POC  No results found for this visit on 04/09/21.       Patients recent PSA values are as follows  Lab Results   Component Value Date    PSA 0.49 04/08/2021    PSA 0.43

## 2021-04-30 ENCOUNTER — HOSPITAL ENCOUNTER (OUTPATIENT)
Dept: PHYSICAL THERAPY | Age: 61
Setting detail: THERAPIES SERIES
Discharge: HOME | End: 2021-04-30
Payer: COMMERCIAL

## 2021-04-30 PROCEDURE — 97110 THERAPEUTIC EXERCISES: CPT

## 2021-04-30 PROCEDURE — 97162 PT EVAL MOD COMPLEX 30 MIN: CPT

## 2021-04-30 NOTE — FLOWSHEET NOTE
** PLEASE SIGN, DATE AND TIME CERTIFICATION BELOW AND RETURN TO [facility] OUTPATIENT REHABILITATION (FAX #: [phone]). ATTEST/CO-SIGN IF ACCESSING VIA [employer]. THANK YOU.**    I certify that I have examined the patient below and determined that Physical Medicine and Rehabilitation service is necessary and that I approve the established plan of care for up to 90 days or as specifically noted. Attestation, signature or co-signature of physician indicates approval of certification requirements.    ________________________ ____________ __________  Physician Signature   Date   Time  [facility]  [facility]  [x] EVALUATION  [] DAILY NOTE (LAND) [] DAILY NOTE (AQUATIC ) [] PROGRESS NOTE [] DISCHARGE NOTE    [x] [facility]   [] [facility] 90    [] [facility] [facility]   [] [facility]    Date: 2021  Patient Name:  Say Parks  : 1960  MRN: [id number]  CSN: [id number]    Referring Practitioner Devon Jones MD, Monica Fisher PA-C    Diagnosis Headache, unspecified [R51.9]    Treatment Diagnosis Cervicalgia,  headaches with upper cervical dysfunction,    Date of Evaluation 21    Additional Pertinent History CAD, HTN, history of thoracic blocks 2020, left shoulder microtears, mild sublexation of left shoulder,       Functional Outcome Measure Used Neck Disability Index   Functional Outcome Score 42% (21)       Insurance: Primary: Payor: Lee Jenkins [id number] /  /  / ,   Secondary:    Authorization Information: Allowed 20 PT visits per calendar year. None of these visits have been used. Aquatic yes, modalities yes, massage yes   Visit # 1, 1/10 for progress note   Visits Allowed: 20   Recertification Date:    Physician Follow-Up:    Physician Orders: Evaluate and treat regarding cervicalgia/cephalgia. Consider Lonnie techniques and AVOID TRACTION. 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 Palpation Note tenderness at left suboccipital region, mastoid process, C2 transverse process. Observation Note forward neck and shoulders. Mild swelling/tenderness left lateral upper and mid cervical region. Posture Note moderate forward neck and shoulders. Note tenderness and inflammation/swelling at left lateral cervical upper cervical region. Note reversed cervical curve. Flattened cervical curve. Range of Motion Neck rotation: 65 degrees right/left, lateral flexion right 33 degrees, left 30 degrees, extension 45 degrees, cervical retraction 20% restricted, retracts to neutral, cervical protraction WNLS. Strength Not assessed at UEs       Sensation Intact    Bed Mobility independent   Transfers independent   Ambulation independent           Special Tests C2 SNAG for headache no change with headache. Supine upper cervical flexion with chin tuck no change with headache. TREATMENT   Precautions: Headache management, upper cervical, left. Pain: 8/10 left suboccipital and occipital ridge, left mastoid region posterior/parietal head region. Noise in left ear. X in shaded column indicates activity completed today   Modalities Parameters/  Location  Notes                     Manual Therapy Time/Technique  Notes   C2 headache SNAG  seated  x No change. Exercise/Intervention   Notes   Supine suboccipital release    x Increased headache/pain so had to stop/hold on. Upper cervical flexion  5-7x  Sustained for 10-15 seconds. x No change but did not make it worse. Modified pillow in supinelying with towel rolled lengthwise and placed inside pillow case    x    Sit with lumbar roll for posture/position awareness.     x      Specific Interventions Next Treatment: soft tissue mobilization bilateral cervical, upper trap/levator. Headache management, upper cervical decreased ROM rotation,. Lower cervical dysfunction. Posture awareness. NO TRACTION per physician. Activity/Treatment Tolerance:8/10 at end of session. [x]  Patient tolerated treatment well  []  Patient limited by fatigue  [x]  Patient limited by pain   []  Patient limited by medical complications  []  Other:     Assessment: Patient referred to PT for headache management and cervicalgia. Feels that some of his headache could be related to upper cervical region with limited ROM and alignment. Noted also tenderness at suboccipital region, mastoid process, C2 transverse process left >right. Note forward neck/head and shoulder posture. Tightness at pec muscles and cervical muscles upper trap, levator scap also. Will follow 2x per week to address headache management, upper cervical mechanical alignment, dysfunction, lower cervical dysfunction as well. Body Structures/Functions/Activity Limitations: edema, impaired activity tolerance, impaired ROM and pain  Prognosis: good    GOALS:  Patient Goal: Patient would like to have less frequent headaches, less intense and not have noise in his left ear. Short Term Goals:  Time Frame: 4 weeks  1. Patient to have decreased pain levels at upper cervical region and decreased headaches from constant/daily headaches to intermittent headaches and less frequency to 5x per week instead of 7x per week. Pain level from 8/10 to no greater than 4-5/10.  2. Patient to demonstrate increased cervical ROM: rotation 65 degrees to 70 degrees with increased ease scanning to left and right, cervical retraction from 20% restriction to 10% to no restriction. 3. Patient to demonstrate improved posture/position awareness to decreased stress at upper and lower cervical region with use of lumbar roll when sitting and modified pillow with towel roll lengthwise at lower border of pillow.        Long Term Goals:  Time Frame: 8 weeks  1. Neck Disability Index 42% to 25%  2. Patient independent in HEP for headache management, cervical ROM, posture awareness, therap ex for pain management and ROM/scap shoulder girdle region strengthening      Patient Education:   [x]  HEP/Education Completed: Plan of Care, Goals, supine upper cervical flexion 5 reps sustained for 10-15 sec for 2x per day.  [employer] Access Code:  []  No new Education completed  []  Reviewed Prior HEP      []  Patient verbalized and/or demonstrated understanding of education provided. []  Patient unable to verbalize and/or demonstrate understanding of education provided. Will continue education. [x]  Barriers to learning: none    PLAN:  Treatment Recommendations: Strengthening, Range of Motion, Manual Therapy - Soft Tissue Mobilization, Pain Management, Home Exercise Program, Patient Education and Modalities    [x]  Plan of care initiated. Plan to see patient 2 times per week for 8 weeks to address the treatment planned outlined above. []  Continue with current plan of care  []  Modify plan of care as follows:    []  Hold pending physician visit  []  Discharge    Time In 1605   Time Out 1655   Timed Code Minutes: 15 min   Total Treatment Time: 50 min       Electronically Signed by: Daisy Dean, [address],  [employer] SWENSON, Cristela Kulkarni

## 2021-05-10 ENCOUNTER — HOSPITAL ENCOUNTER (OUTPATIENT)
Dept: PHYSICAL THERAPY | Age: 61
Setting detail: THERAPIES SERIES
Discharge: HOME | End: 2021-05-10
Payer: COMMERCIAL

## 2021-05-10 PROCEDURE — 97140 MANUAL THERAPY 1/> REGIONS: CPT

## 2021-05-10 NOTE — PROGRESS NOTES
[address] [facility]  PHYSICAL THERAPY  [] EVALUATION  [x] DAILY NOTE (LAND) [] DAILY NOTE (AQUATIC ) [] PROGRESS NOTE [] DISCHARGE NOTE    [x] OUTPATIENT REHABILITATION CENTER [facility]   [] [place] [address]    [] [facility]   [] Jessica Monae    Date: 5/10/2021  Patient Name:  Hardeep Yepez  : 1960  MRN: [id number]  CSN: [id number]    Referring Practitioner Isidro Ahumada MD, Brian Baldwin PA-C    Diagnosis Headache, unspecified [R51.9]    Treatment Diagnosis Cervicalgia,  headaches with upper cervical dysfunction,    Date of Evaluation 21    Additional Pertinent History CAD, HTN, history of thoracic blocks 2020, left shoulder microtears, mild sublexation of left shoulder,       Functional Outcome Measure Used Neck Disability Index   Functional Outcome Score 42% (21)       Insurance: Primary: Payor:  ,   Secondary:    Authorization Information: Allowed 20 PT visits per calendar year. None of these visits have been used. Aquatic yes, modalities yes, massage yes   Visit # 2, 2/10 for progress note   Visits Allowed:    Recertification Date:    Physician Follow-Up:    Physician Orders: Evaluate and treat regarding cervicalgia/cephalgia. Consider Lonnie techniques and AVOID TRACTION. History of Present Illness: See below. SUBJECTIVE: Patient report that Saturday was pretty good after last PT session. This weekend noting was bad with headache and noise in his ear. Also noting right ear even muffled. Headache today is at base of skull and radiating to top of head. 7-8/10 headache and neck pain. Did not take any pain medication today. OBJECTIVE:      TREATMENT   Precautions: Headache management, upper cervical, left. Pain: 8/10 left suboccipital and occipital ridge, left mastoid region posterior/parietal head region. Noise in left ear.      X in shaded column indicates activity completed today Modalities Parameters/  Location  Notes                     Manual Therapy Time/Technique  Notes   C2 headache SNAG  seated 2x x Better following headache SNAG    Manual therapy: suboccipital release  8 minutes x    Manual therapy with soft tissue mobilization bilateral cervical left >right region concentrated on, suboccipital, upper traps,  15 minutes x                            Upper cervical flexion with gentle forearm pressure base of skull  5x hold 10 seconds x    Exercise/Intervention   Notes          Upper cervical flexion  5-7x  Sustained for 10-15 seconds. x                                                     Modified pillow in supinelying with towel rolled lengthwise and placed inside pillow case    x    Sit with lumbar roll for posture/position awareness. x      Specific Interventions Next Treatment: soft tissue mobilization bilateral cervical, upper trap/levator. Headache management, upper cervical decreased ROM rotation,. Lower cervical dysfunction. Posture awareness. NO TRACTION per physician. Activity/Treatment Tolerance:3-4/10 at end of session. [x]  Patient tolerated treatment well  []  Patient limited by fatigue  [x]  Patient limited by pain   []  Patient limited by medical complications  []  Other:     Assessment: Patient reports of decreased pain at suboccipital region and decreased headache at end of session. 3-4/10 pain level from 8/10 pain level by end of session. Notes improved hearing at left ear with less muffling. Cervical ROM increased in rotation to right/left 60-70 degrees. Body Structures/Functions/Activity Limitations: edema, impaired activity tolerance, impaired ROM and pain  Prognosis: good    GOALS:  Patient Goal: Patient would like to have less frequent headaches, less intense and not have noise in his left ear. Short Term Goals:  Time Frame: 4 weeks  1.  Patient to have decreased pain levels at upper cervical region and decreased headaches from constant/daily headaches to intermittent headaches and less frequency to 5x per week instead of 7x per week. Pain level from 8/10 to no greater than 4-5/10.  2. Patient to demonstrate increased cervical ROM: rotation 65 degrees to 70 degrees with increased ease scanning to left and right, cervical retraction from 20% restriction to 10% to no restriction. 3. Patient to demonstrate improved posture/position awareness to decreased stress at upper and lower cervical region with use of lumbar roll when sitting and modified pillow with towel roll lengthwise at lower border of pillow. Long Term Goals:  Time Frame: 8 weeks  1. Neck Disability Index 42% to 25%  2. Patient independent in HEP for headache management, cervical ROM, posture awareness, therap ex for pain management and ROM/scap shoulder girdle region strengthening      Patient Education: Posture awareness with use of lumbar roll when sitting. Modified pillow with support to keep good neck alignment when supinelying. Chin tucks supine and seated. [x]  HEP/Education Completed: Plan of Care, Goals, supine upper cervical flexion 5 reps sustained for 10-15 sec for 2x per day.  [employer] Access Code:  []  No new Education completed  []  Reviewed Prior HEP      []  Patient verbalized and/or demonstrated understanding of education provided. []  Patient unable to verbalize and/or demonstrate understanding of education provided. Will continue education. [x]  Barriers to learning: none    PLAN:  Treatment Recommendations: Strengthening, Range of Motion, Manual Therapy - Soft Tissue Mobilization, Pain Management, Home Exercise Program, Patient Education and Modalities    []  Plan of care initiated. Plan to see patient 2 times per week for 8 weeks to address the treatment planned outlined above.   [x]  Continue with current plan of care  []  Modify plan of care as follows:    []  Hold pending physician visit  []  Discharge    Time In 1617   Time Out 1700   Timed Code Minutes: 30   Total Treatment Time: 37       Electronically Signed by: Cleo Abrams, [address], Skyler Rivero

## 2021-05-12 ENCOUNTER — APPOINTMENT (OUTPATIENT)
Dept: PHYSICAL THERAPY | Age: 61
End: 2021-05-12
Payer: COMMERCIAL

## 2021-05-18 ENCOUNTER — APPOINTMENT (OUTPATIENT)
Dept: PHYSICAL THERAPY | Age: 61
End: 2021-05-18
Payer: COMMERCIAL

## 2021-05-24 ENCOUNTER — HOSPITAL ENCOUNTER (OUTPATIENT)
Dept: PHYSICAL THERAPY | Age: 61
Setting detail: THERAPIES SERIES
Discharge: HOME | End: 2021-05-24
Payer: COMMERCIAL

## 2021-05-24 PROCEDURE — 97140 MANUAL THERAPY 1/> REGIONS: CPT

## 2021-05-24 PROCEDURE — 97110 THERAPEUTIC EXERCISES: CPT

## 2021-05-24 NOTE — PROGRESS NOTES
[address] [facility]  PHYSICAL THERAPY  [] EVALUATION  [x] DAILY NOTE (LAND) [] DAILY NOTE (AQUATIC ) [] PROGRESS NOTE [] DISCHARGE NOTE    [x] OUTPATIENT REHABILITATION [facility]   [] [place] [address]    [] [facility]   [] Deven Helton    Date: 2021  Patient Name:  Rosa Brown  : 1960  MRN: [id number]  CSN: [id number]    Referring Practitioner Topher Lopez MD, Jorge A Franklin PA-C    Diagnosis Headache, unspecified [R51.9]    Treatment Diagnosis Cervicalgia,  headaches with upper cervical dysfunction,    Date of Evaluation 21    Additional Pertinent History CAD, HTN, history of thoracic blocks 2020, left shoulder microtears, mild sublexation of left shoulder,       Functional Outcome Measure Used Neck Disability Index   Functional Outcome Score 42% (21)       Insurance: Primary: Payor: Lee Jenkins [id number] /  /  / ,   Secondary:    Authorization Information: Allowed 20 PT visits per calendar year. None of these visits have been used. Aquatic yes, modalities yes, massage yes   Visit # 3, 3/10 for progress note   Visits Allowed:    Recertification Date:    Physician Follow-Up:    Physician Orders: Evaluate and treat regarding cervicalgia/cephalgia. Consider Lonnie techniques and AVOID TRACTION. History of Present Illness: See below. SUBJECTIVE: Patient reports that he had a whistling in his head on left side \"like tea kettle going off\" when he went traveling to [facility]. States he came home and is better in the last 2 days. Notes did use a cold pack and symptoms have been tolerable with less noise in his head on left side, less headaches. At 200 pm noting aching at left occiput and back of his head. Severe headaches make the noise louder on the left side of his head. Notes following second session felt better but did take 2 Motrin. Has not taken any pain medication today yet.        OBJECTIVE:      TREATMENT Precautions: Headache management, upper cervical, left. Pain: 8/10 left suboccipital and occipital ridge, left mastoid region posterior/parietal head region. Noise in left ear. X in shaded column indicates activity completed today   Modalities Parameters/  Location  Notes                     Manual Therapy Time/Technique  Notes   C2 headache SNAG  seated 2x x Better following headache SNAG    Manual therapy: suboccipital release  8 minutes x    Manual therapy with soft tissue mobilization bilateral cervical left >right region concentrated on, suboccipital, upper traps,  15 minutes x                            Upper cervical flexion with gentle forearm pressure base of skull  5x hold 10 seconds x    Exercise/Intervention   Notes          Upper cervical flexion  5-7x  Sustained for 10-15 seconds. x           Scapular retraction , retro shoulder rolls  X10.   x    Neck rotation 5x right/left. 5x  x    pec stretch in corner  3x 10count x                         Modified pillow in supinelying with towel rolled lengthwise and placed inside pillow case    x    Sit with lumbar roll for posture/position awareness. x      Specific Interventions Next Treatment: soft tissue mobilization bilateral cervical, upper trap/levator. Headache management, upper cervical decreased ROM rotation,. Lower cervical dysfunction. Posture awareness. NO TRACTION per physician. Activity/Treatment Tolerance:3-4/10 at end of session. Very minimal noise in right ear following session. (distant sound) . No muffling with sound going in his ear. [x]  Patient tolerated treatment well  []  Patient limited by fatigue  [x]  Patient limited by pain   []  Patient limited by medical complications  []  Other:     Assessment: Patient reports of decreased pain at suboccipital region and decreased headache at end of session. 3-4/10 pain level at end of session. Minimal headache. Progressed patient with scap exercises and neck rotation, pec stretch today. Good tolerance with minimal pain and headache. Body Structures/Functions/Activity Limitations: edema, impaired activity tolerance, impaired ROM and pain  Prognosis: good    GOALS:  Patient Goal: Patient would like to have less frequent headaches, less intense and not have noise in his left ear. Short Term Goals:  Time Frame: 4 weeks  1. Patient to have decreased pain levels at upper cervical region and decreased headaches from constant/daily headaches to intermittent headaches and less frequency to 5x per week instead of 7x per week. Pain level from 8/10 to no greater than 4-5/10.  2. Patient to demonstrate increased cervical ROM: rotation 65 degrees to 70 degrees with increased ease scanning to left and right, cervical retraction from 20% restriction to 10% to no restriction. 3. Patient to demonstrate improved posture/position awareness to decreased stress at upper and lower cervical region with use of lumbar roll when sitting and modified pillow with towel roll lengthwise at lower border of pillow. Long Term Goals:  Time Frame: 8 weeks  1. Neck Disability Index 42% to 25%  2. Patient independent in HEP for headache management, cervical ROM, posture awareness, therap ex for pain management and ROM/scap shoulder girdle region strengthening      Patient Education: Posture awareness with use of lumbar roll when sitting. Modified pillow with support to keep good neck alignment when supinelying. Chin tucks supine and seated. [x]  HEP/Education Completed: Plan of Care, Goals, supine upper cervical flexion 5 reps sustained for 10-15 sec for 2x per day.  [employer] Access Code:  []  No new Education completed  []  Reviewed Prior HEP      []  Patient verbalized and/or demonstrated understanding of education provided. []  Patient unable to verbalize and/or demonstrate understanding of education provided. Will continue education.   [x]  Barriers to learning: none    PLAN:  Treatment Recommendations: Strengthening, Range of Motion, Manual Therapy - Soft Tissue Mobilization, Pain Management, Home Exercise Program, Patient Education and Modalities    []  Plan of care initiated. Plan to see patient 2 times per week for 8 weeks to address the treatment planned outlined above. [x]  Continue with current plan of care  []  Modify plan of care as follows:    []  Hold pending physician visit  []  Discharge    Time In 1645   Time Out 1725   Timed Code Minutes: 38   Total Treatment Time: 40       Electronically Signed by: Najma Keating, [address] [facility], [address] [facility] [address].  MDT, Dm Matthew

## 2021-05-28 ENCOUNTER — HOSPITAL ENCOUNTER (OUTPATIENT)
Dept: PHYSICAL THERAPY | Age: 61
Setting detail: THERAPIES SERIES
Discharge: HOME | End: 2021-05-28
Payer: COMMERCIAL

## 2021-05-28 PROCEDURE — 97110 THERAPEUTIC EXERCISES: CPT

## 2021-05-28 PROCEDURE — 97140 MANUAL THERAPY 1/> REGIONS: CPT

## 2021-05-28 NOTE — PROGRESS NOTES
posterior/parietal head region. Noise in left ear. X in shaded column indicates activity completed today   Modalities Parameters/  Location  Notes                     Manual Therapy Time/Technique  Notes   C2 headache SNAG  seated 2x  Better following headache SNAG    Manual therapy: suboccipital release  8 minutes     Manual therapy with soft tissue mobilization bilateral cervical left >right region concentrated on, suboccipital, upper traps,  15 minutes                             Upper cervical flexion with gentle forearm pressure base of skull  5x hold 10 seconds     Exercise/Intervention   Notes          Upper cervical flexion  5-7x  Sustained for 10-15 seconds. x           Scapular retraction , retro shoulder rolls  X10.   x    Neck rotation 5x right/left. 5x  x    pec stretch in corner  3x 10count x                         Modified pillow in supinelying with towel rolled lengthwise and placed inside pillow case    x    Sit with lumbar roll for posture/position awareness. x      Specific Interventions Next Treatment: soft tissue mobilization bilateral cervical, upper trap/levator. Headache management, upper cervical decreased ROM rotation,. Lower cervical dysfunction. Posture awareness. NO TRACTION per physician. Activity/Treatment Tolerance:5-6/10 at end of session. Headache pain left side of head base of skull 2/10     [x]  Patient tolerated treatment well  []  Patient limited by fatigue  [x]  Patient limited by pain   []  Patient limited by medical complications  []  Other:     Assessment: Reassessment today. Noting minimal to no progress in sessions. NDI increased to 44% from 42%. ROM 65 degrees to left 70 degrees to right. With rotation at neck Noting continued tension left upper cervical region and suboccipital region. Decreased tension following session with improved rotation to right 72 degrees and left 70 degrees. Headache less following session.  Will follow for 2-3 more weeks 2x per week to address headaches, pain suboccipital region left. Body Structures/Functions/Activity Limitations: edema, impaired activity tolerance, impaired ROM and pain  Prognosis: good    GOALS:  Patient Goal: Patient would like to have less frequent headaches, less intense and not have noise in his left ear. Short Term Goals:  Time Frame: 4 weeks  1. Patient to have decreased pain levels at upper cervical region and decreased headaches from constant/daily headaches to intermittent headaches and less frequency to 5x per week instead of 7x per week. Pain level from 8/10 to no greater than 4-5/10. GOAL NOT MET Pain level can range from 3-8/10. Does take Tylenol and pain is lessened. 2. Patient to demonstrate increased cervical ROM: rotation 65 degrees to 70 degrees with increased ease scanning to left and right, cervical retraction from 20% restriction to 10% to no restriction. GOAL NOT MET rotation right 70 degrees left 65 degrees, cervical retraction restriction minimal 20%. 3. Patient to demonstrate improved posture/position awareness to decreased stress at upper and lower cervical region with use of lumbar roll when sitting and modified pillow with towel roll lengthwise at lower border of pillow. GOAL Being met: Patient is more aware of his posture and position. Long Term Goals:  Time Frame: 8 weeks  1. Neck Disability Index 42% to 25%  GOAL NOT MET NDI 44%   2. Patient independent in HEP for headache management, cervical ROM, posture awareness, therap ex for pain management and ROM/scap shoulder girdle region strengthening  ONGOING. Patient Education: Posture awareness with use of lumbar roll when sitting. Modified pillow with support to keep good neck alignment when supinelying. Chin tucks supine and seated. [x]  HEP/Education Completed: Plan of Care, Goals, supine upper cervical flexion 5 reps sustained for 10-15 sec for 2x per day.     [employer] Access Code:  []  No new Education completed  []  Reviewed Prior HEP      []  Patient verbalized and/or demonstrated understanding of education provided. []  Patient unable to verbalize and/or demonstrate understanding of education provided. Will continue education. [x]  Barriers to learning: none    PLAN:  Treatment Recommendations: Strengthening, Range of Motion, Manual Therapy - Soft Tissue Mobilization, Pain Management, Home Exercise Program, Patient Education and Modalities    []  Plan of care initiated. Plan to see patient 2 times per week for 8 weeks to address the treatment planned outlined above. [x]  Continue with current plan of care  []  Modify plan of care as follows:    []  Hold pending physician visit  []  Discharge    Time In 1422   Time Out 1501   Timed Code Minutes: 39   Total Treatment Time: 39       Electronically Signed by: Adiel Hudson, [address] [facility], [place] [id number],  Cert.  MDT, Elliott Brewer

## 2021-06-02 ENCOUNTER — APPOINTMENT (OUTPATIENT)
Dept: PHYSICAL THERAPY | Age: 61
End: 2021-06-02
Payer: COMMERCIAL

## 2021-06-04 ENCOUNTER — HOSPITAL ENCOUNTER (OUTPATIENT)
Dept: PHYSICAL THERAPY | Age: 61
Setting detail: THERAPIES SERIES
Discharge: HOME | End: 2021-06-04
Payer: COMMERCIAL

## 2021-06-04 PROCEDURE — 97140 MANUAL THERAPY 1/> REGIONS: CPT

## 2021-06-04 NOTE — PROGRESS NOTES
Time/Technique  Notes   C2 headache SNAG  seated 2x  Better following headache SNAG    Manual therapy: suboccipital release mid cervical massage 12 minutes x    Manual therapy with soft tissue mobilization bilateral cervical left >right region concentrated on, suboccipital, upper traps,  15 minutes                             Upper cervical flexion with gentle forearm pressure base of skull  5x hold 10 seconds     Exercise/Intervention   Notes          Upper cervical flexion  5-7x  Sustained for 10-15 seconds. x           Scapular retraction , retro shoulder rolls  X10.   x    Neck rotation 5x right/left. 5x  x    pec stretch in corner  3x 10count x                         Modified pillow in supinelying with towel rolled lengthwise and placed inside pillow case        Sit with lumbar roll for posture/position awareness. Specific Interventions Next Treatment: soft tissue mobilization bilateral cervical, upper trap/levator. Headache management, upper cervical decreased ROM rotation,. Lower cervical dysfunction. Posture awareness. NO TRACTION per physician. Activity/Treatment Tolerance:0/10 at end of session. Headache pain left side of head base of skull 0/10     [x]  Patient tolerated treatment well  []  Patient limited by fatigue  []  Patient limited by pain   []  Patient limited by medical complications  []  Other:     Assessment: Patient having 0/10 headache and minimal sounds in his left ear/head today. Will decrease frequency to 1x per week for next 2 weeks. Neck rotation WFLs to 70 degrees right/left. Will reassess in 2 weeks and consider possible discharge at that time. Not sure of what is causing his symptoms of noise in his head around his right ear and headaches. He did have less tension at right lateral cervical region and suboccipital region today.      Body Structures/Functions/Activity Limitations: edema, impaired activity tolerance, impaired ROM and pain  Prognosis: good    GOALS:  Patient Goal: Patient would like to have less frequent headaches, less intense and not have noise in his left ear. Short Term Goals:  Time Frame: 4 weeks  1. Patient to have decreased pain levels at upper cervical region and decreased headaches from constant/daily headaches to intermittent headaches and less frequency to 5x per week instead of 7x per week. Pain level from 8/10 to no greater than 4-5/10. GOAL NOT MET Pain level can range from 3-8/10. Does take Tylenol and pain is lessened. 2. Patient to demonstrate increased cervical ROM: rotation 65 degrees to 70 degrees with increased ease scanning to left and right, cervical retraction from 20% restriction to 10% to no restriction. GOAL NOT MET rotation right 70 degrees left 65 degrees, cervical retraction restriction minimal 20%. 3. Patient to demonstrate improved posture/position awareness to decreased stress at upper and lower cervical region with use of lumbar roll when sitting and modified pillow with towel roll lengthwise at lower border of pillow. GOAL Being met: Patient is more aware of his posture and position. Long Term Goals:  Time Frame: 8 weeks  1. Neck Disability Index 42% to 25%  GOAL NOT MET NDI 44%   2. Patient independent in HEP for headache management, cervical ROM, posture awareness, therap ex for pain management and ROM/scap shoulder girdle region strengthening  ONGOING. Patient Education: Continue HEP. [x]  HEP/Education Completed: Plan of Care, Goals, supine upper cervical flexion 5 reps sustained for 10-15 sec for 2x per day.  [employer] Access Code:  []  No new Education completed  []  Reviewed Prior HEP      []  Patient verbalized and/or demonstrated understanding of education provided. []  Patient unable to verbalize and/or demonstrate understanding of education provided. Will continue education.   [x]  Barriers to learning: none    PLAN:  Treatment Recommendations: Strengthening, Range of Motion, Manual Therapy - Soft Tissue Mobilization, Pain Management, Home Exercise Program, Patient Education and Modalities    []  Plan of care initiated. Plan to see patient 2 times per week for 8 weeks to address the treatment planned outlined above. [x]  Continue with current plan of care  []  Modify plan of care as follows:    []  Hold pending physician visit  []  Discharge    Time In 1635   Time Out 1658   Timed Code Minutes: 23   Total Treatment Time: 23       Electronically Signed by: Cleo Abrams, [address] [facility], [place] [id number],  John Amez[place] [address].  MDT, Skyler Rievro

## 2021-06-11 ENCOUNTER — HOSPITAL ENCOUNTER (OUTPATIENT)
Dept: PHYSICAL THERAPY | Age: 61
Setting detail: THERAPIES SERIES
Discharge: HOME | End: 2021-06-11
Payer: COMMERCIAL

## 2021-06-11 PROCEDURE — 97110 THERAPEUTIC EXERCISES: CPT

## 2021-06-11 PROCEDURE — 97124 MASSAGE THERAPY: CPT

## 2021-06-11 NOTE — PROGRESS NOTES
[address] [facility]  PHYSICAL THERAPY  [] EVALUATION  [x] DAILY NOTE (LAND) [] DAILY NOTE (AQUATIC ) [] PROGRESS NOTE [] DISCHARGE NOTE    [x] OUTPATIENT REHABILITATION [facility]   [] [place] [address]    [] [facility]   [] Calli Khan    Date: 2021  Patient Name:  David Hodges  : 1960  MRN: [id number]  CSN: [id number]    Referring Practitioner Leticia Werner MD, Ted Real PA-C    Diagnosis Headache, unspecified [R51.9]    Treatment Diagnosis Cervicalgia,  headaches with upper cervical dysfunction,    Date of Evaluation 21    Additional Pertinent History CAD, HTN, history of thoracic blocks 2020, left shoulder microtears, mild sublexation of left shoulder,       Functional Outcome Measure Used Neck Disability Index   Functional Outcome Score 42% (21)   44% 21       Insurance: Primary: Payor: Jilda Guard /  /  / ,   Secondary:    Authorization Information: Allowed 20 PT visits per calendar year. None of these visits have been used. Aquatic yes, modalities yes, massage yes   Visit # 6, 3/6  for progress note   Visits Allowed: 20   Recertification Date: [id number]   Physician Follow-Up: Needs appointment rescheduled. Physician Orders: Evaluate and treat regarding cervicalgia/cephalgia. Consider Lonnie techniques and AVOID TRACTION. History of Present Illness: See below. SUBJECTIVE: Patient took 1 Tylenol and 1 Motrin today for headache coming on. Feels overall that frequency of headaches somewhat less. Continues to feel that the pain behind ear at base of skull has something to do with headaches and noises in his head/ear region. Today has a high distant pitch at the left side of his head/ear. Has been waxing car this afternoon. 4/10 pain level. OBJECTIVE:      TREATMENT   Precautions: Headache management, upper cervical, left.      Pain: 4/10  left suboccipital and occipital ridge, left mastoid region posterior/parietal head region. Noise in left ear. X in shaded column indicates activity completed today   Modalities Parameters/  Location  Notes                     Manual Therapy Time/Technique  Notes   C2 headache SNAG  seated 2x  Better following headache SNAG    Manual therapy: suboccipital release mid cervical massage 12 minutes x    Manual therapy with soft tissue mobilization bilateral cervical left >right region concentrated on, suboccipital, upper traps,  15 minutes                             Upper cervical flexion with gentle forearm pressure base of skull  5x hold 10 seconds     Exercise/Intervention   Notes          Upper cervical flexion  5-7x  Sustained for 10-15 seconds. x    Upper trap stretch/ levator scap stretch  3x Hold 10 count x    Scapular retraction , retro shoulder rolls  X10.   x    Neck rotation 5x right/left. 5x  x    pec stretch in corner 90 degrees, 120 degrees 3x 10count x    Posterior shoulder stretch 3x 10 count x                  Modified pillow in supinelying with towel rolled lengthwise and placed inside pillow case        Sit with lumbar roll for posture/position awareness. x      Specific Interventions Next Treatment: soft tissue mobilization bilateral cervical, upper trap/levator. Headache management, upper cervical decreased ROM rotation,. Lower cervical dysfunction. Posture awareness. NO TRACTION per physician. Activity/Treatment Tolerance:0/10 at end of session. Headache pain left side of head base of skull 0/10     [x]  Patient tolerated treatment well  []  Patient limited by fatigue  []  Patient limited by pain   []  Patient limited by medical complications  []  Other:     Assessment: Patient reports that pain headache increased 5/10 at end of session. Sound in ear remained the same with high pitch noise. ROM WNLs with rotation right /left upper trap and levator scap slightly tighter on left as compared to right.  Patient progressed in stretches with pec stretch at 120 degrees, upper trap/levator scap stretch. Patient following through with exercises well. Body Structures/Functions/Activity Limitations: edema, impaired activity tolerance, impaired ROM and pain  Prognosis: good    GOALS:  Patient Goal: Patient would like to have less frequent headaches, less intense and not have noise in his left ear. Short Term Goals:  Time Frame: 4 weeks  1. Patient to have decreased pain levels at upper cervical region and decreased headaches from constant/daily headaches to intermittent headaches and less frequency to 5x per week instead of 7x per week. Pain level from 8/10 to no greater than 4-5/10. GOAL NOT MET Pain level can range from 3-8/10. Does take Tylenol and pain is lessened. 2. Patient to demonstrate increased cervical ROM: rotation 65 degrees to 70 degrees with increased ease scanning to left and right, cervical retraction from 20% restriction to 10% to no restriction. GOAL NOT MET rotation right 70 degrees left 65 degrees, cervical retraction restriction minimal 20%. 3. Patient to demonstrate improved posture/position awareness to decreased stress at upper and lower cervical region with use of lumbar roll when sitting and modified pillow with towel roll lengthwise at lower border of pillow. GOAL Being met: Patient is more aware of his posture and position. Long Term Goals:  Time Frame: 8 weeks  1. Neck Disability Index 42% to 25%  GOAL NOT MET NDI 44%   2. Patient independent in HEP for headache management, cervical ROM, posture awareness, therap ex for pain management and ROM/scap shoulder girdle region strengthening  ONGOING. Patient Education: Continue HEP. [x]  HEP/Education Completed: Plan of Care, Goals, supine upper cervical flexion 5 reps sustained for 10-15 sec for 2x per day.     [employer] Access Code:  []  No new Education completed  []  Reviewed Prior HEP      []  Patient verbalized and/or demonstrated understanding of education provided. []  Patient unable to verbalize and/or demonstrate understanding of education provided. Will continue education. [x]  Barriers to learning: none    PLAN:  Treatment Recommendations: Strengthening, Range of Motion, Manual Therapy - Soft Tissue Mobilization, Pain Management, Home Exercise Program, Patient Education and Modalities    []  Plan of care initiated. Plan to see patient 2 times per week for 8 weeks to address the treatment planned outlined above. [x]  Continue with current plan of care  []  Modify plan of care as follows:    []  Hold pending physician visit  []  Discharge    Time In 1450   Time Out 1523   Timed Code Minutes: 33   Total Treatment Time: 33       Electronically Signed by: Dhaval Gabriel, [address] [facility], [place] [id number],  [facility].  LEELA, Elizabeth Linda

## 2021-07-13 ENCOUNTER — OFFICE VISIT (OUTPATIENT)
Dept: FAMILY MEDICINE CLINIC | Age: 61
End: 2021-07-13
Payer: COMMERCIAL

## 2021-07-13 ENCOUNTER — TELEPHONE (OUTPATIENT)
Dept: FAMILY MEDICINE CLINIC | Age: 61
End: 2021-07-13

## 2021-07-13 VITALS
HEART RATE: 76 BPM | DIASTOLIC BLOOD PRESSURE: 84 MMHG | TEMPERATURE: 97.8 F | SYSTOLIC BLOOD PRESSURE: 136 MMHG | BODY MASS INDEX: 36.75 KG/M2 | WEIGHT: 271 LBS

## 2021-07-13 DIAGNOSIS — J06.9 VIRAL URI WITH COUGH: Primary | ICD-10-CM

## 2021-07-13 DIAGNOSIS — F41.9 ANXIETY: ICD-10-CM

## 2021-07-13 PROCEDURE — G8417 CALC BMI ABV UP PARAM F/U: HCPCS | Performed by: NURSE PRACTITIONER

## 2021-07-13 PROCEDURE — 99213 OFFICE O/P EST LOW 20 MIN: CPT | Performed by: NURSE PRACTITIONER

## 2021-07-13 PROCEDURE — 3017F COLORECTAL CA SCREEN DOC REV: CPT | Performed by: NURSE PRACTITIONER

## 2021-07-13 PROCEDURE — 1036F TOBACCO NON-USER: CPT | Performed by: NURSE PRACTITIONER

## 2021-07-13 PROCEDURE — G8427 DOCREV CUR MEDS BY ELIG CLIN: HCPCS | Performed by: NURSE PRACTITIONER

## 2021-07-13 RX ORDER — GUAIFENESIN AND DEXTROMETHORPHAN HYDROBROMIDE 1200; 60 MG/1; MG/1
1 TABLET, EXTENDED RELEASE ORAL 2 TIMES DAILY
Qty: 14 TABLET | Refills: 0 | Status: SHIPPED | OUTPATIENT
Start: 2021-07-13 | End: 2021-12-28

## 2021-07-13 RX ORDER — BENZONATATE 200 MG/1
200 CAPSULE ORAL 3 TIMES DAILY PRN
Qty: 30 CAPSULE | Refills: 0 | Status: SHIPPED | OUTPATIENT
Start: 2021-07-13 | End: 2021-07-20

## 2021-07-13 SDOH — ECONOMIC STABILITY: FOOD INSECURITY: WITHIN THE PAST 12 MONTHS, THE FOOD YOU BOUGHT JUST DIDN'T LAST AND YOU DIDN'T HAVE MONEY TO GET MORE.: NEVER TRUE

## 2021-07-13 SDOH — ECONOMIC STABILITY: FOOD INSECURITY: WITHIN THE PAST 12 MONTHS, YOU WORRIED THAT YOUR FOOD WOULD RUN OUT BEFORE YOU GOT MONEY TO BUY MORE.: NEVER TRUE

## 2021-07-13 ASSESSMENT — SOCIAL DETERMINANTS OF HEALTH (SDOH): HOW HARD IS IT FOR YOU TO PAY FOR THE VERY BASICS LIKE FOOD, HOUSING, MEDICAL CARE, AND HEATING?: NOT HARD AT ALL

## 2021-07-13 NOTE — PATIENT INSTRUCTIONS
Tessalon 200 mg, 1 cap 3 times daily as needed for cough  Mucinex DM 1 tablet twice daily with a full glass of water  Zyrtec daily  Flonase daily  OK for Aleve-D. Symptoms are most likely viral in origin  · Viral infections can take 7-14 days to resolve. Antibiotics will not help. · Lots of fluids - half of you body weight in ounces daily   Will give  Tessalon for the cough, can take 3 times daily as needed, may cause drowsiness   Get plenty of rest   Avoid secondhand smoke   Can use the Kayla Pot to rinse the sinuses as needed 1-2 times per day   Cool-mist humidifier at night, or just a pot of water near a heat register to increase moisture in the air   Use Tylenol or Ibuprofen (motrin) OTC as directed for fever   Mucinex  take with lots of water   OTC decongestant for 3-4 days to relieve congestion if appropriate. Can use Coricidin HBP if you have high blood pressure.  Return to office if symptoms do not start to improve over the 7-10 days  · Call for appointment if symptoms persist or if develops new symptoms such as wheezing, shortness of breath or recurring fever.

## 2021-07-13 NOTE — TELEPHONE ENCOUNTER
pt called c/o congested head, nasal drip, deep coughing, sore throat would like something so he doesnt pass onto his wife

## 2021-07-13 NOTE — PROGRESS NOTES
[address]  [address] [place], [address]  Ph:   [phone]  Fax: [phone]    Provider:  CARLOS Benedict CNP    Patient:  Elen Flores  YOB: 1960      Visit Date:  7/13/2021     Reason For Visit:   Chief Complaint   Patient presents with   Velia Baldwin is a 62 y.o. male who comes today to the office for acute cold symptoms. He states he has been doing well, taking his medications as prescribed. He denies any side effects from his medications. URI: 4 days ago, started with stuffy head and scratchy throat. 3 days ago developed cough that has gotten worse over the last 2 days. Sputum Is \"dark red\", thick phlegm. Headaches, frontal last few days. No fevers. No N/V/D. Does have some difficulty urinating at times (follows with urology for BPH. Taking Cialis 5 mg most every day) Mild body aches. Stephanie Nielson has similar symptoms. Has not received COVID vaccine. Taking Aleve-D, Vit C,D,E, and zinc. Dayquil and Nyquil. Biggest concern is his wife's health and that she has an important meeting with the oncologist Thurs and if he is still sick he knows they will not let him in. Was started on Zoloft 50 mg in April by Dr Fiona Gray for increased stress and anxiety related to his wife's health. He did not follow up in the recommended 4 weeks, but reports today that he is taking it as prescribed, and  the medication is helping. Significant Past Medical History:    Past Medical History:   Diagnosis Date    CAD (coronary artery disease) 2014    GERD (gastroesophageal reflux disease)     Hyperlipidemia 2010    Hypertension 2008    CELINA (obstructive sleep apnea) 2014    Dr. Adrianne Lopez           Allergies:  is allergic to tramadol, bactrim [sulfamethoxazole-trimethoprim], and zocor [simvastatin].     Medications:   Current Outpatient Medications   Medication Sig Dispense Refill    benzonatate (TESSALON) 200 MG capsule Take 1 capsule by mouth 3 times daily as needed for Cough 30 capsule 0    Dextromethorphan-guaiFENesin  MG TB12 Take 1 tablet by mouth 2 times daily 14 tablet 0    tadalafil (CIALIS) 5 MG tablet TAKE 1 TABLET BY MOUTH ONE TIME A DAY 90 tablet 3    bumetanide (BUMEX) 2 MG tablet Take 2 mg by mouth daily      sertraline (ZOLOFT) 50 MG tablet Take 1 tablet by mouth daily 30 tablet 5    fluticasone (FLONASE) 50 MCG/ACT nasal spray 1 spray by Each Nostril route daily 1 Bottle 2    Omega-3 Fatty Acids (OMEGA-3 FISH OIL PO) Take by mouth      Multiple Vitamins-Minerals (MULTIVITAMIN ADULT PO) Take by mouth Indications: Ultra Light 121      vitamin E 400 UNIT capsule Take 400 Units by mouth daily      zinc 50 MG CAPS Take by mouth      Cholecalciferol (VITAMIN D3) 2000 units CAPS Take 5,000 Units by mouth       MAGNESIUM GLYCINATE PLUS PO Take by mouth      CPAP Machine MISC by Does not apply route Please change CPAP pressure to auto 5-15 cm H20. 1 each 0    aspirin 81 MG chewable tablet Take 81 mg by mouth daily      Coenzyme Q10 (COQ10) 200 MG CAPS Take  by mouth 2 times daily. No current facility-administered medications for this visit.        Review of systems:  Review of Systems - History obtained from the patient  General ROS: negative for - chills, fatigue or fever  Psychological ROS: negative for - anxiety, depression or sleep disturbances  ENT ROS: congestion, pharyngitis, frontal headaches/pressure  Allergy and Immunology ROS: negative for - seasonal allergies  Hematological and Lymphatic ROS: negative for - bruising  Endocrine ROS: negative for - malaise/lethargy, polydipsia/polyuria or temperature intolerance  Respiratory ROS: cough,  No shortness of breath or wheezing  Cardiovascular ROS: negative for - chest pain or edema  Gastrointestinal ROS: negative for - abdominal pain, constipation, diarrhea or nausea/vomiting  Musculoskeletal ROS: negative for - joint pain or muscle pain  Neurological ROS: negative for - dizziness  Dermatological ROS: negative for - rash    Physical Examination:  /84 (Site: Right Upper Arm, Position: Sitting, Cuff Size: Large Adult)   Pulse 76   Temp 97.8 °F (36.6 °C) (Temporal)   Wt 271 lb (122.9 kg)   BMI 36.75 kg/m²     General-  Alert and oriented x 3, NAD  HEENT: NC, AT, PERRLA, EOMI, anicteric sclerae  Ears: Normal tympanic membranes bilaterally  Nose: patent, no lesions  Mouth: no lesions, moist mucosas  Neck - supple, no significant adenopathy  Chest - clear to auscultation, no wheezes, rales or rhonchi, symmetric air entry  Heart - normal rate, regular rhythm, normal S1, S2, no murmurs, rubs, clicks or gallops  Extremities - peripheral pulses normal, no pedal edema, no clubbing or cyanosis    Impression:   Diagnosis Orders   1. Viral URI with cough  benzonatate (TESSALON) 200 MG capsule    Dextromethorphan-guaiFENesin  MG TB12   2. Anxiety         Plan:  No orders of the defined types were placed in this encounter. Orders Placed This Encounter   Medications    benzonatate (TESSALON) 200 MG capsule     Sig: Take 1 capsule by mouth 3 times daily as needed for Cough     Dispense:  30 capsule     Refill:  0    Dextromethorphan-guaiFENesin  MG TB12     Sig: Take 1 tablet by mouth 2 times daily     Dispense:  14 tablet     Refill:  0     Tessalon 200 mg, 1 cap 3 times daily as needed for cough  Mucinex DM 1 tablet twice daily with a full glass of water  Zyrtec daily  Flonase daily  OK for Aleve-D. Symptoms are most likely viral in origin  · Viral infections can take 7-14 days to resolve. Antibiotics will not help.    · Lots of fluids - half of you body weight in ounces daily   Will give  Tessalon for the cough, can take 3 times daily as needed, may cause drowsiness   Get plenty of rest   Avoid secondhand smoke   Can use the Kayla Pot to rinse the sinuses as needed 1-2 times per day   Cool-mist humidifier at night, or just a pot of water near a heat register to increase moisture in the air   Use Tylenol or Ibuprofen (motrin) OTC as directed for fever   Mucinex  take with lots of water   OTC decongestant for 3-4 days to relieve congestion if appropriate. Can use Coricidin HBP if you have high blood pressure.  Return to office if symptoms do not start to improve over the 7-10 days  · Call for appointment if symptoms persist or if develops new symptoms such as wheezing, shortness of breath or recurring fever. Follow Up:  Return if symptoms worsen or fail to improve.     Kimber Lane, APRN - CNP

## 2021-08-27 NOTE — PROGRESS NOTES
Roberto Morrison is a 65 y.o. male seen for testicular pain and swelling. Pt follows with our office for BPH. PSA 1/2018 0.73. He reports he uses Cialis 5 mg on Monday and 5 mg on Thursday that he obtains off the internet to improve urinary symptoms as well as aid in erectile dysfunction. Pt reports he can tell a difference when he takes the medication routinely. Pt hasn't wanted to start flomax or finasteride previously. Pt presented to ER on 8/25/18 secondary to complaints of left sided scrotal pain and swelling. Pt was started on Levaquin by the on call provider and had scrotal US while in ER that noted bilateral mild debris-containing hydroceles. Probable tiny appendix epididymis adjacent to the right epididymal head. 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 Nicolas reports he continues with intermittent pain in left scrotum and feels like the fullness in the upper part of his left scrotum may be slightly increased. He denies dysuria, gross hematuria, flank pain, fever, chills, suprapubic pain, and feeling of incomplete emptying. Current Outpatient Prescriptions   Medication Sig Dispense Refill    ipratropium (ATROVENT) 0.06 % nasal spray 2 sprays by Nasal route 3 times daily 1 Bottle 3    levofloxacin (LEVAQUIN) 500 MG tablet Take 1 tablet by mouth daily for 10 days 10 tablet 0    CPAP Machine MISC by Does not apply route Please change CPAP pressure to auto 5-15 cm H20. 1 each 0    losartan (COZAAR) 100 MG tablet Take 100 mg by mouth daily      aspirin 81 MG chewable tablet Take 81 mg by mouth daily      metoprolol succinate (TOPROL XL) 25 MG extended release tablet TAKE ONE TABLET BY MOUTH ONCE DAILY 90 tablet 1    Coenzyme Q10 (COQ10) 200 MG CAPS Take  by mouth 2 times daily.       nabumetone (RELAFEN) 750 MG tablet TAKE ONE TABLET BY MOUTH TWICE DAILY 60 tablet 3    spironolactone (ALDACTONE) 25 MG tablet Take 25 mg by mouth daily      tiZANidine (ZANAFLEX) 4 MG tablet Take 4 mg by mouth nightly as needed      rosuvastatin (CRESTOR) 10 MG tablet Take 1 tablet by mouth nightly 90 tablet 1     No current facility-administered medications for this visit. Past Medical History  Nicolas  has a past medical history of CAD (coronary artery disease); GERD (gastroesophageal reflux disease); Hyperlipidemia; Hypertension; and CELINA (obstructive sleep apnea). Past Surgical History  The patient  has a past surgical history that includes Cholecystectomy (2008); Colonoscopy (2013); Tonsillectomy; pr sigmoidoscopy,biopsy (Left, 3/1/2018); Cardiac catheterization (2/21/15); and pr repair of nasal septum (N/A, 4/30/2018). Family History  This patient's family history includes Alzheimer's Disease (age of onset: 21) in his maternal grandmother; Depression (age of onset: 39) in his sister; Diabetes (age of onset: 48) in his mother; Diabetes (age of onset: 72) in his brother; Early Death (age of onset: 36) in his paternal grandfather; Heart Disease (age of onset: 39) in his sister; Heart Disease (age of onset: 50) in his father; Heart Disease (age of onset: 72) in his brother; Heart Disease (age of onset: 67) in his mother; High Blood Pressure (age of onset: 36) in his paternal grandfather; High Blood Pressure (age of onset: 39) in his sister; High Blood Pressure (age of onset: 61) in his brother; High Blood Pressure (age of onset: 72) in his brother; Obesity (age of onset: 8) in his brother; Obesity (age of onset: 39) in his brother; Obesity (age of onset: 48) in his brother; Other (age of onset: 36) in his brother; Other (age of onset: 48) in his brother; Other (age of onset: 46) in his brother; Other (age of onset: 72) in his sister; Stroke (age of onset: 48) in his father. Social History  Nicolas  reports that he has never smoked. He has never used smokeless tobacco. He reports that he does not drink alcohol or use drugs. Review of Systems  No problems with ears, nose or throat. cysts, largest measuring 1 x 0.7 x 0.6 cm. . There is a 0.3 x 0.2 x 0.4 cm ovoid echogenic mass adjacent to the right epididymal head, cephalad to the    upper pole of the testicle, probably an appendix epididymis.     LEFT: Unremarkable.       Varicocele: Prominent serpiginous hypoechoic tubular structures are seen posterior to the both testicles, more prominent on the right, without flow noted by color flow ultrasound, possibly representing a chronically thrombosed varicoceles.       Inguinal hernia: There is no evidence of an inguinal hernia.           Impression    No evidence of testicular torsion. Bilateral mild debris-containing hydroceles. Small right epididymal head cysts or spermatoceles. Probable tiny appendix epididymis adjacent to the right epididymal head. Probably chronically thrombosed varicoceles. No mass. Assessment & Plan  Left Scrotal pain  Possibly chronically thrombosed varicoceles  Bilateral hydroceles  Small right epididymal head cysts  BPH with LUTs  Erectile dysfunction    Pt continues with left scrotal pain and intermittent left lower abdominal pain. Recommend to elevate scrotum and wear firm fitting underwear. Trial NSAIDS. Continue antibiotics. Check CT abd/pelvis with images to include the scrotum to further evaluate varicoceles and to r/o abdominal pathology. Call pt with results. No

## 2021-10-04 RX ORDER — CETIRIZINE HYDROCHLORIDE 10 MG/1
10 TABLET ORAL DAILY
Qty: 90 TABLET | Refills: 1 | Status: SHIPPED | OUTPATIENT
Start: 2021-10-04 | End: 2022-09-22

## 2021-10-04 NOTE — TELEPHONE ENCOUNTER
Please approve or deny     Last Visit Date:  7/13/2021       Next Visit Date:    Visit date not found

## 2021-10-05 ENCOUNTER — TELEPHONE (OUTPATIENT)
Dept: FAMILY MEDICINE CLINIC | Age: 61
End: 2021-10-05

## 2021-10-05 NOTE — TELEPHONE ENCOUNTER
Patient would like to know if you could send a referral to [facility] for the issues that he is having in his head. Today the pain is worse, the headache is causing lots of pain. He says he can feel his heartbeat in his head.  Please advise

## 2021-10-06 DIAGNOSIS — G44.86 CERVICOGENIC HEADACHE: Primary | ICD-10-CM

## 2021-10-06 NOTE — TELEPHONE ENCOUNTER
Patient called and referral sent to [facility]. They will contact patient to schedule.

## 2021-10-29 ENCOUNTER — NURSE ONLY (OUTPATIENT)
Dept: LAB | Age: 61
End: 2021-10-29

## 2021-10-29 LAB
ANION GAP SERPL CALCULATED.3IONS-SCNC: 11 MEQ/L (ref 8–16)
BUN BLDV-MCNC: 16 MG/DL (ref 7–22)
CALCIUM SERPL-MCNC: 8.8 MG/DL (ref 8.5–10.5)
CHLORIDE BLD-SCNC: 105 MEQ/L (ref 98–111)
CO2: 26 MEQ/L (ref 23–33)
CREAT SERPL-MCNC: 0.8 MG/DL (ref 0.4–1.2)
ERYTHROCYTE [DISTWIDTH] IN BLOOD BY AUTOMATED COUNT: 13.2 % (ref 11.5–14.5)
ERYTHROCYTE [DISTWIDTH] IN BLOOD BY AUTOMATED COUNT: 41.9 FL (ref 35–45)
GFR SERPL CREATININE-BSD FRML MDRD: > 90 ML/MIN/1.73M2
GLUCOSE BLD-MCNC: 101 MG/DL (ref 70–108)
HCT VFR BLD CALC: 42.6 % (ref 42–52)
HEMOGLOBIN: 14.3 GM/DL (ref 14–18)
MCH RBC QN AUTO: 29.4 PG (ref 26–33)
MCHC RBC AUTO-ENTMCNC: 33.6 GM/DL (ref 32.2–35.5)
MCV RBC AUTO: 87.7 FL (ref 80–94)
PLATELET # BLD: 196 THOU/MM3 (ref 130–400)
PMV BLD AUTO: 8.4 FL (ref 9.4–12.4)
POTASSIUM SERPL-SCNC: 3.6 MEQ/L (ref 3.5–5.2)
RBC # BLD: 4.86 MILL/MM3 (ref 4.7–6.1)
SODIUM BLD-SCNC: 142 MEQ/L (ref 135–145)
WBC # BLD: 8.6 THOU/MM3 (ref 4.8–10.8)

## 2021-11-04 RX ORDER — BUMETANIDE 2 MG/1
2 TABLET ORAL DAILY
Qty: 30 TABLET | Refills: 1 | Status: SHIPPED | OUTPATIENT
Start: 2021-11-04 | End: 2022-01-06 | Stop reason: SDUPTHER

## 2021-11-04 NOTE — TELEPHONE ENCOUNTER
Patient called office very upset regarding his phone conversation with his cardiologist's office. Cardiologist - Tahmina Erickson. Patient states his pharmacy contacted their office on Tuesday requesting a refill. He went to pharmacy assuming he had a prescription ready. No prescription was at the pharmacy. Patient contacted their office directly. Patient and wife are headed to [facility] for her chemo treatment. They will be down there for a week. Patient was asking for the prescription to be called in \"urgently\" due to the office not responding to the pharmacy on Tuesday. Patient states the  was very rude indicating she may be able to call a prescription sometime later that day. And told the patient to contact his pcp and hung up.    Patient requesting to have a refill of his Bumex from [address], [facility]/s  along with a referral to a new cardiologist. Medication pended for approval.   Please advise

## 2021-11-05 NOTE — TELEPHONE ENCOUNTER
Patient aware of prescription. [employer] message sent per patient request with Cardiology options. Patient to notify office on which provider he would like a referral placed for.

## 2021-11-30 ENCOUNTER — HOSPITAL ENCOUNTER (OUTPATIENT)
Dept: PHYSICAL THERAPY | Age: 61
Setting detail: THERAPIES SERIES
Discharge: HOME | End: 2021-11-30
Payer: COMMERCIAL

## 2021-11-30 PROCEDURE — 97161 PT EVAL LOW COMPLEX 20 MIN: CPT

## 2021-11-30 PROCEDURE — 97140 MANUAL THERAPY 1/> REGIONS: CPT
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
## 2021-12-09 ENCOUNTER — HOSPITAL ENCOUNTER (OUTPATIENT)
Dept: PHYSICAL THERAPY | Age: 61
Setting detail: THERAPIES SERIES
End: 2021-12-09
Payer: COMMERCIAL

## 2021-12-13 ENCOUNTER — HOSPITAL ENCOUNTER (OUTPATIENT)
Dept: PHYSICAL THERAPY | Age: 61
Setting detail: THERAPIES SERIES
End: 2021-12-13
Payer: COMMERCIAL

## 2021-12-16 ENCOUNTER — APPOINTMENT (OUTPATIENT)
Dept: PHYSICAL THERAPY | Age: 61
End: 2021-12-16
Payer: COMMERCIAL

## 2021-12-20 ENCOUNTER — HOSPITAL ENCOUNTER (OUTPATIENT)
Dept: PHYSICAL THERAPY | Age: 61
Setting detail: THERAPIES SERIES
Discharge: HOME | End: 2021-12-20
Payer: COMMERCIAL

## 2021-12-20 PROCEDURE — 97035 APP MDLTY 1+ULTRASOUND EA 15: CPT

## 2021-12-20 PROCEDURE — 97110 THERAPEUTIC EXERCISES: CPT

## 2021-12-20 PROCEDURE — 97140 MANUAL THERAPY 1/> REGIONS: CPT
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
## 2021-12-27 ENCOUNTER — HOSPITAL ENCOUNTER (OUTPATIENT)
Dept: PHYSICAL THERAPY | Age: 61
Setting detail: THERAPIES SERIES
End: 2021-12-27
Payer: COMMERCIAL

## 2021-12-28 ENCOUNTER — HOSPITAL ENCOUNTER (EMERGENCY)
Age: 61
Discharge: HOME | End: 2021-12-28
Payer: COMMERCIAL

## 2021-12-28 VITALS
TEMPERATURE: 98 F | SYSTOLIC BLOOD PRESSURE: 125 MMHG | WEIGHT: 280 LBS | RESPIRATION RATE: 16 BRPM | HEIGHT: 72 IN | BODY MASS INDEX: 37.93 KG/M2 | DIASTOLIC BLOOD PRESSURE: 82 MMHG | HEART RATE: 90 BPM | OXYGEN SATURATION: 97 %

## 2021-12-28 DIAGNOSIS — J01.90 ACUTE NON-RECURRENT SINUSITIS, UNSPECIFIED LOCATION: Primary | ICD-10-CM

## 2021-12-28 LAB
FLU A ANTIGEN: NEGATIVE
FLU B ANTIGEN: NEGATIVE
SARS-COV-2, NAA: NOT  DETECTED

## 2021-12-28 PROCEDURE — 99213 OFFICE O/P EST LOW 20 MIN: CPT

## 2021-12-28 PROCEDURE — 87635 SARS-COV-2 COVID-19 AMP PRB: CPT

## 2021-12-28 PROCEDURE — 87804 INFLUENZA ASSAY W/OPTIC: CPT

## 2021-12-28 PROCEDURE — 99213 OFFICE O/P EST LOW 20 MIN: CPT | Performed by: EMERGENCY MEDICINE

## 2021-12-28 RX ORDER — AMOXICILLIN AND CLAVULANATE POTASSIUM 875; 125 MG/1; MG/1
1 TABLET, FILM COATED ORAL 2 TIMES DAILY
Qty: 20 TABLET | Refills: 0 | Status: SHIPPED | OUTPATIENT
Start: 2021-12-28 | End: 2022-01-07

## 2021-12-28 RX ORDER — FLUTICASONE PROPIONATE 50 MCG
1 SPRAY, SUSPENSION (ML) NASAL DAILY
Qty: 16 G | Refills: 0 | Status: SHIPPED | OUTPATIENT
Start: 2021-12-28 | End: 2022-03-31

## 2021-12-28 ASSESSMENT — ENCOUNTER SYMPTOMS
RHINORRHEA: 1
VOMITING: 0
NAUSEA: 0
ABDOMINAL PAIN: 0
DIARRHEA: 0
SHORTNESS OF BREATH: 0
SINUS PRESSURE: 1
SINUS PAIN: 1
SORE THROAT: 1
COUGH: 0

## 2021-12-28 NOTE — ED PROVIDER NOTES
[facility] Encounter       CHIEF COMPLAINT     No chief complaint on file. Nurses Notes reviewed and I agree except as noted in the HPI. HISTORY OF PRESENT ILLNESS   Nicolas Peters is a 62 y.o. male who presents for complaints of sinus pain, pressure, congestion, postnasal drip, headache. Symptoms for 3 days. No cough or shortness of breath. Mild sore throat. No ear pain. Patient reports he has not been vaccinated against COVID-19. He has not had previous Covid illness. No known exposures to COVID-19. He has not had a flu shot this year. Patient lives with his wife who is receiving in-home chemotherapy. Patient is here for evaluation and treatment as he does not want to pass anything on to his wife. HPI    REVIEW OF SYSTEMS     Review of Systems   Constitutional: Positive for fatigue. Negative for diaphoresis and fever. HENT: Positive for congestion, rhinorrhea, sinus pressure, sinus pain and sore throat. Respiratory: Negative for cough and shortness of breath. Cardiovascular: Negative for chest pain. Gastrointestinal: Negative for abdominal pain, diarrhea, nausea and vomiting. Neurological: Positive for headaches. Negative for dizziness and light-headedness. Psychiatric/Behavioral: Negative for behavioral problems. PAST MEDICAL HISTORY         Diagnosis Date    CAD (coronary artery disease) 2014    GERD (gastroesophageal reflux disease)     Hyperlipidemia 2010    Hypertension 2008    Occipital neuralgia     CELINA (obstructive sleep apnea) 2014    Dr. Lashanda Stanley     Patient  has a past surgical history that includes Cholecystectomy (2008); Colonoscopy (2013); Tonsillectomy; pr sigmoidoscopy,biopsy (Left, 3/1/2018); Cardiac catheterization (2/21/15); pr repair of nasal septum (N/A, 4/30/2018); other surgical history (07/13/2020);  Elbow surgery; other surgical history (07/24/2020); and Pain management procedure (Right, 8/26/2020). CURRENT MEDICATIONS       Previous Medications    BUMETANIDE (BUMEX) 2 MG TABLET    Take 1 tablet by mouth daily    CETIRIZINE (ZYRTEC) 10 MG TABLET    Take 1 tablet by mouth daily    CHOLECALCIFEROL (VITAMIN D3) 2000 UNITS CAPS    Take 5,000 Units by mouth     COENZYME Q10 (COQ10) 200 MG CAPS    Take  by mouth 2 times daily. CPAP MACHINE MISC    by Does not apply route Please change CPAP pressure to auto 5-15 cm H20.    FLUTICASONE (FLONASE) 50 MCG/ACT NASAL SPRAY    1 spray by Each Nostril route daily    MAGNESIUM GLYCINATE PLUS PO    Take by mouth    MULTIPLE VITAMINS-MINERALS (MULTIVITAMIN ADULT PO)    Take by mouth Indications: Ultra Light 121    OMEGA-3 FATTY ACIDS (OMEGA-3 FISH OIL PO)    Take by mouth    SERTRALINE (ZOLOFT) 50 MG TABLET    TAKE 1 TABLET BY MOUTH DAILY    TADALAFIL (CIALIS) 5 MG TABLET    TAKE 1 TABLET BY MOUTH ONE TIME A DAY    VITAMIN E 400 UNIT CAPSULE    Take 400 Units by mouth daily    ZINC 50 MG CAPS    Take by mouth       ALLERGIES     Patient is is allergic to tramadol, bactrim [sulfamethoxazole-trimethoprim], and zocor [simvastatin]. Patients   Immunization History   Administered Date(s) Administered    Influenza Virus Vaccine 10/01/2015    Influenza, Camille Badillo, IM, (6 mo and older Fluzone, Flulaval, Fluarix and 3 yrs and older Afluria) 09/24/2018, 10/08/2019    Influenza, Camille Badillo, IM, PF (6 mo and older Fluzone, Flulaval, Fluarix, and 3 yrs and older Afluria) 09/01/2016, 01/15/2018, 02/15/2021    Pneumococcal Polysaccharide ([id number]) 01/15/2018    Tdap (Boostrix, Adacel) 03/30/2015       FAMILY HISTORY     Patient's family history includes Alzheimer's Disease (age of onset: [address]) in his maternal grandmother; Depression (age of onset: 39) in his sister; Diabetes (age of onset: 48) in his mother; Diabetes (age of onset: 72) in his brother; Early Death (age of onset: 36) in his paternal grandfather; Heart Disease (age of onset: 39) in his sister;  Heart Disease (age of onset: 50) in his father; Heart Disease (age of onset: 72) in his brother; Heart Disease (age of onset: 67) in his mother; High Blood Pressure (age of onset: 36) in his paternal grandfather; High Blood Pressure (age of onset: 39) in his sister; High Blood Pressure (age of onset: 61) in his brother; High Blood Pressure (age of onset: 72) in his brother; Obesity (age of onset: 8) in his brother; Obesity (age of onset: 39) in his brother; Obesity (age of onset: 48) in his brother; Other (age of onset: 36) in his brother; Other (age of onset: 48) in his brother; Other (age of onset: 46) in his brother; Other (age of onset: 72) in his sister; Stroke (age of onset: 48) in his father. SOCIAL HISTORY     Patient  reports that he has never smoked. He has never used smokeless tobacco. He reports that he does not drink alcohol and does not use drugs. PHYSICAL EXAM     ED TRIAGE VITALS  BP: 125/82, Temp: 98 °F (36.7 °C), Pulse: 90, Resp: 16, SpO2: 97 %,Estimated body mass index is 37.97 kg/m² as calculated from the following:    Height as of this encounter: 6' (1.829 m). Weight as of this encounter: 280 lb (127 kg). ,No LMP for male patient. Physical Exam  Constitutional:       Appearance: He is ill-appearing. He is not toxic-appearing. HENT:      Head: Normocephalic and atraumatic. Nose: Congestion and rhinorrhea present. Mouth/Throat:      Mouth: Mucous membranes are moist.   Cardiovascular:      Rate and Rhythm: Normal rate and regular rhythm. Pulses: Normal pulses. Heart sounds: Normal heart sounds. Pulmonary:      Effort: Pulmonary effort is normal.      Breath sounds: Normal breath sounds. Skin:     General: Skin is warm and dry. Capillary Refill: Capillary refill takes less than 2 seconds. Neurological:      General: No focal deficit present. Mental Status: He is alert.    Psychiatric:         Behavior: Behavior normal.         DIAGNOSTIC RESULTS Labs:  Results for orders placed or performed during the hospital encounter of 12/28/21   COVID-19, Rapid   Result Value Ref Range    SARS-CoV-2, CINDY NOT  DETECTED NOT DETECTED   Rapid influenza A/B antigens   Result Value Ref Range    Flu A Antigen Negative NEGATIVE    Flu B Antigen Negative NEGATIVE       IMAGING:    No orders to display         EKG:      URGENT CARE COURSE:     Vitals:    12/28/21 1638   BP: 125/82   Pulse: 90   Resp: 16   Temp: 98 °F (36.7 °C)   TempSrc: Temporal   SpO2: 97%   Weight: 280 lb (127 kg)   Height: 6' (1.829 m)       Medications - No data to display         PROCEDURES:  None    FINAL IMPRESSION      1. Acute non-recurrent sinusitis, unspecified location          DISPOSITION/ PLAN     Patient presents for was likely acute sinusitis. Covid and influenza testing are negative. Patient will be placed on Augmentin for treatment of symptoms. Advised to drink plenty of fluids. Return for worsening cough, shortness of breath, temperature 100.5 or greater, new concerns.     PATIENT REFERRED TO:  Mehreen Pepper MD  [address] [facility] / [place] [id number]      DISCHARGE MEDICATIONS:  New Prescriptions    AMOXICILLIN-CLAVULANATE (AUGMENTIN) 875-125 MG PER TABLET    Take 1 tablet by mouth 2 times daily for 10 days    FLUTICASONE (FLONASE) 50 MCG/ACT NASAL SPRAY    1 spray by Each Nostril route daily       Discontinued Medications    ASPIRIN 81 MG CHEWABLE TABLET    Take 81 mg by mouth daily    DEXTROMETHORPHAN-GUAIFENESIN  MG TB12    Take 1 tablet by mouth 2 times daily       Current Discharge Medication List          CARLOS Tovar CNP    (Please note that portions of this note were completed with a voice recognition program. Efforts were made to edit the dictations but occasionally words are mis-transcribed.)          CARLOS Tovar CNP  12/28/21 [facility] APRN - CNP  12/28/21 3353

## 2021-12-28 NOTE — Clinical Note
Jessica Cross was seen and treated in our emergency department on 12/28/2021. He may return to work on 12/30/2021. If you have any questions or concerns, please don't hesitate to call.       Estefania Hendrickson, CARLOS - CNP

## 2021-12-30 ENCOUNTER — APPOINTMENT (OUTPATIENT)
Dept: PHYSICAL THERAPY | Age: 61
End: 2021-12-30
Payer: COMMERCIAL

## 2021-12-30 ENCOUNTER — TELEPHONE (OUTPATIENT)
Dept: FAMILY MEDICINE CLINIC | Age: 61
End: 2021-12-30

## 2021-12-30 NOTE — TELEPHONE ENCOUNTER
Patient seen at [facility] on 12/28/2021. Mailing follow up letter to patient.

## 2021-12-30 NOTE — LETTER
[address]  [address] [facility] , [address] [place]  Phone: [phone]  Fax: [phone]    December 30, 2021    Pravin Shashankdon  [address]      Dear Elias Alfredo,    This letter is regarding your Emergency Department (ED) visit at [facility] on 12/28/21. Eleanor Delgado wanted to make sure that you understand your discharge instructions and that you were able to fill any prescriptions that may have been ordered for you. Please contact the office at the above phone number if the ED advised to you follow up with Eleanor Delgado, or if you have any further questions or needs. Also did you know -   *Visiting the ED for a non-emergency could result in higher co-pays than you would normally be subject to paying? *You can call your doctor even after hours so they can direct you to the most appropriate care. [facility]) practices can often offer you an appointment on the same day that you call. *We have some [facility] offices that offer Walk-in appointments; check our website for availability in your community, [email or web address].      *Evisits are now available for patients for $36 through [employer] for certain conditions:  * Sinus, cold and or cough       * Diarrhea            * Headache  * Heartburn                                * Poison [place]          * Back pain     * Urinary problems                         If you do not have [facility] and are interested, please contact the office and a staff member may assist you or go to [email or web address].     Sincerely,   Shannan Corea MD and your [facility]

## 2022-01-06 RX ORDER — BUMETANIDE 2 MG/1
2 TABLET ORAL DAILY
Qty: 30 TABLET | Refills: 1 | Status: SHIPPED | OUTPATIENT
Start: 2022-01-06

## 2022-02-21 ENCOUNTER — HOSPITAL ENCOUNTER (OUTPATIENT)
Dept: INPATIENT UNIT | Age: 62
Discharge: HOME | End: 2022-02-21
Attending: INTERNAL MEDICINE | Admitting: INTERNAL MEDICINE
Payer: COMMERCIAL

## 2022-02-21 VITALS
BODY MASS INDEX: 38.6 KG/M2 | RESPIRATION RATE: 17 BRPM | DIASTOLIC BLOOD PRESSURE: 62 MMHG | HEIGHT: 72 IN | TEMPERATURE: 98.2 F | WEIGHT: 285 LBS | OXYGEN SATURATION: 94 % | SYSTOLIC BLOOD PRESSURE: 112 MMHG | HEART RATE: 56 BPM

## 2022-02-21 LAB
ABO: NORMAL
ALBUMIN SERPL-MCNC: 4 G/DL (ref 3.5–5.1)
ALP BLD-CCNC: 97 U/L (ref 38–126)
ALT SERPL-CCNC: 27 U/L (ref 11–66)
ANION GAP SERPL CALCULATED.3IONS-SCNC: 11 MEQ/L (ref 8–16)
ANTIBODY SCREEN: NORMAL
AST SERPL-CCNC: 23 U/L (ref 5–40)
BILIRUB SERPL-MCNC: 0.4 MG/DL (ref 0.3–1.2)
BUN BLDV-MCNC: 20 MG/DL (ref 7–22)
CALCIUM SERPL-MCNC: 9.3 MG/DL (ref 8.5–10.5)
CHLORIDE BLD-SCNC: 104 MEQ/L (ref 98–111)
CHOLESTEROL, TOTAL: 182 MG/DL (ref 100–199)
CO2: 26 MEQ/L (ref 23–33)
CREAT SERPL-MCNC: 1 MG/DL (ref 0.4–1.2)
EKG ATRIAL RATE: 80 BPM
EKG P AXIS: 40 DEGREES
EKG P-R INTERVAL: 184 MS
EKG Q-T INTERVAL: 410 MS
EKG QRS DURATION: 112 MS
EKG QTC CALCULATION (BAZETT): 472 MS
EKG R AXIS: 6 DEGREES
EKG T AXIS: 17 DEGREES
EKG VENTRICULAR RATE: 80 BPM
ERYTHROCYTE [DISTWIDTH] IN BLOOD BY AUTOMATED COUNT: 14.1 % (ref 11.5–14.5)
ERYTHROCYTE [DISTWIDTH] IN BLOOD BY AUTOMATED COUNT: 43.8 FL (ref 35–45)
GFR SERPL CREATININE-BSD FRML MDRD: 76 ML/MIN/1.73M2
GLUCOSE BLD-MCNC: 103 MG/DL (ref 70–108)
HCT VFR BLD CALC: 45.3 % (ref 42–52)
HDLC SERPL-MCNC: 36 MG/DL
HEMOGLOBIN: 15.2 GM/DL (ref 14–18)
INR BLD: 0.93 (ref 0.85–1.13)
LDL CHOLESTEROL CALCULATED: 112 MG/DL
MAGNESIUM: 2.2 MG/DL (ref 1.6–2.4)
MCH RBC QN AUTO: 29 PG (ref 26–33)
MCHC RBC AUTO-ENTMCNC: 33.6 GM/DL (ref 32.2–35.5)
MCV RBC AUTO: 86.5 FL (ref 80–94)
PLATELET # BLD: 201 THOU/MM3 (ref 130–400)
PMV BLD AUTO: 8.5 FL (ref 9.4–12.4)
POTASSIUM REFLEX MAGNESIUM: 3.3 MEQ/L (ref 3.5–5.2)
RBC # BLD: 5.24 MILL/MM3 (ref 4.7–6.1)
RH FACTOR: NORMAL
SODIUM BLD-SCNC: 141 MEQ/L (ref 135–145)
TOTAL PROTEIN: 7.4 G/DL (ref 6.1–8)
TRIGL SERPL-MCNC: 169 MG/DL (ref 0–199)
WBC # BLD: 8.3 THOU/MM3 (ref 4.8–10.8)

## 2022-02-21 PROCEDURE — 85027 COMPLETE CBC AUTOMATED: CPT

## 2022-02-21 PROCEDURE — 93005 ELECTROCARDIOGRAM TRACING: CPT | Performed by: INTERNAL MEDICINE

## 2022-02-21 PROCEDURE — 2500000003 HC RX 250 WO HCPCS

## 2022-02-21 PROCEDURE — 93571 IV DOP VEL&/PRESS C FLO 1ST: CPT

## 2022-02-21 PROCEDURE — 36415 COLL VENOUS BLD VENIPUNCTURE: CPT

## 2022-02-21 PROCEDURE — 93010 ELECTROCARDIOGRAM REPORT: CPT | Performed by: INTERNAL MEDICINE

## 2022-02-21 PROCEDURE — 86850 RBC ANTIBODY SCREEN: CPT

## 2022-02-21 PROCEDURE — 83735 ASSAY OF MAGNESIUM: CPT

## 2022-02-21 PROCEDURE — 80053 COMPREHEN METABOLIC PANEL: CPT

## 2022-02-21 PROCEDURE — C1894 INTRO/SHEATH, NON-LASER: HCPCS

## 2022-02-21 PROCEDURE — 6370000000 HC RX 637 (ALT 250 FOR IP)

## 2022-02-21 PROCEDURE — 2580000003 HC RX 258: Performed by: INTERNAL MEDICINE

## 2022-02-21 PROCEDURE — 80061 LIPID PANEL: CPT

## 2022-02-21 PROCEDURE — 6360000004 HC RX CONTRAST MEDICATION: Performed by: INTERNAL MEDICINE

## 2022-02-21 PROCEDURE — 6360000002 HC RX W HCPCS

## 2022-02-21 PROCEDURE — 86900 BLOOD TYPING SEROLOGIC ABO: CPT

## 2022-02-21 PROCEDURE — 85610 PROTHROMBIN TIME: CPT

## 2022-02-21 PROCEDURE — C1769 GUIDE WIRE: HCPCS

## 2022-02-21 PROCEDURE — 6370000000 HC RX 637 (ALT 250 FOR IP): Performed by: INTERNAL MEDICINE

## 2022-02-21 PROCEDURE — 93458 L HRT ARTERY/VENTRICLE ANGIO: CPT

## 2022-02-21 PROCEDURE — C1887 CATHETER, GUIDING: HCPCS

## 2022-02-21 PROCEDURE — 86901 BLOOD TYPING SEROLOGIC RH(D): CPT

## 2022-02-21 RX ORDER — DIPHENHYDRAMINE HCL 25 MG
50 TABLET ORAL ONCE
Status: DISCONTINUED | OUTPATIENT
Start: 2022-02-21 | End: 2022-02-21

## 2022-02-21 RX ORDER — ATROPINE SULFATE 0.4 MG/ML
0.5 AMPUL (ML) INJECTION
Status: DISCONTINUED | OUTPATIENT
Start: 2022-02-21 | End: 2022-02-21 | Stop reason: HOSPADM

## 2022-02-21 RX ORDER — SODIUM CHLORIDE 9 MG/ML
100 INJECTION, SOLUTION INTRAVENOUS CONTINUOUS
Status: DISCONTINUED | OUTPATIENT
Start: 2022-02-21 | End: 2022-02-21 | Stop reason: HOSPADM

## 2022-02-21 RX ORDER — HYDRALAZINE HYDROCHLORIDE 25 MG/1
25 TABLET, FILM COATED ORAL 2 TIMES DAILY
Status: ON HOLD | COMMUNITY
End: 2022-02-21 | Stop reason: HOSPADM

## 2022-02-21 RX ORDER — HYDROCHLOROTHIAZIDE 25 MG/1
25 TABLET ORAL DAILY
Status: ON HOLD | COMMUNITY
End: 2022-02-21 | Stop reason: HOSPADM

## 2022-02-21 RX ORDER — ASPIRIN 325 MG
325 TABLET ORAL ONCE
Status: DISCONTINUED | OUTPATIENT
Start: 2022-02-21 | End: 2022-02-21 | Stop reason: HOSPADM

## 2022-02-21 RX ORDER — ONDANSETRON 2 MG/ML
4 INJECTION INTRAMUSCULAR; INTRAVENOUS EVERY 6 HOURS PRN
Status: DISCONTINUED | OUTPATIENT
Start: 2022-02-21 | End: 2022-02-21 | Stop reason: HOSPADM

## 2022-02-21 RX ORDER — SODIUM CHLORIDE 9 MG/ML
INJECTION, SOLUTION INTRAVENOUS CONTINUOUS
Status: DISCONTINUED | OUTPATIENT
Start: 2022-02-21 | End: 2022-02-21 | Stop reason: SDUPTHER

## 2022-02-21 RX ORDER — ASPIRIN 81 MG/1
81 TABLET ORAL DAILY
COMMUNITY

## 2022-02-21 RX ORDER — ACETAMINOPHEN 325 MG/1
650 TABLET ORAL EVERY 4 HOURS PRN
Status: DISCONTINUED | OUTPATIENT
Start: 2022-02-21 | End: 2022-02-21 | Stop reason: HOSPADM

## 2022-02-21 RX ORDER — GABAPENTIN 300 MG/1
300 CAPSULE ORAL
COMMUNITY
End: 2022-04-28 | Stop reason: DRUGHIGH

## 2022-02-21 RX ORDER — POTASSIUM CHLORIDE 20 MEQ/1
TABLET, EXTENDED RELEASE ORAL
Status: COMPLETED
Start: 2022-02-21 | End: 2022-02-21

## 2022-02-21 RX ORDER — AMLODIPINE BESYLATE 5 MG/1
5 TABLET ORAL DAILY
Qty: 30 TABLET | Refills: 3 | Status: SHIPPED | OUTPATIENT
Start: 2022-02-21 | End: 2022-04-28 | Stop reason: SINTOL

## 2022-02-21 RX ORDER — SODIUM CHLORIDE 0.9 % (FLUSH) 0.9 %
5-40 SYRINGE (ML) INJECTION EVERY 12 HOURS SCHEDULED
Status: DISCONTINUED | OUTPATIENT
Start: 2022-02-21 | End: 2022-02-21 | Stop reason: HOSPADM

## 2022-02-21 RX ORDER — METOPROLOL SUCCINATE 25 MG/1
25 TABLET, EXTENDED RELEASE ORAL DAILY
Qty: 30 TABLET | Refills: 3 | Status: SHIPPED | OUTPATIENT
Start: 2022-02-21

## 2022-02-21 RX ORDER — SODIUM CHLORIDE 9 MG/ML
25 INJECTION, SOLUTION INTRAVENOUS PRN
Status: DISCONTINUED | OUTPATIENT
Start: 2022-02-21 | End: 2022-02-21 | Stop reason: HOSPADM

## 2022-02-21 RX ORDER — SODIUM CHLORIDE 0.9 % (FLUSH) 0.9 %
5-40 SYRINGE (ML) INJECTION PRN
Status: DISCONTINUED | OUTPATIENT
Start: 2022-02-21 | End: 2022-02-21 | Stop reason: HOSPADM

## 2022-02-21 RX ORDER — POTASSIUM CHLORIDE 20 MEQ/1
40 TABLET, EXTENDED RELEASE ORAL ONCE
Status: COMPLETED | OUTPATIENT
Start: 2022-02-21 | End: 2022-02-21

## 2022-02-21 RX ORDER — ROSUVASTATIN CALCIUM 20 MG/1
20 TABLET, COATED ORAL NIGHTLY
Qty: 30 TABLET | Refills: 3 | Status: SHIPPED | OUTPATIENT
Start: 2022-02-21

## 2022-02-21 RX ADMIN — IOPAMIDOL 190 ML: 755 INJECTION, SOLUTION INTRAVENOUS at 08:20

## 2022-02-21 RX ADMIN — SODIUM CHLORIDE: 9 INJECTION, SOLUTION INTRAVENOUS at 06:43

## 2022-02-21 RX ADMIN — POTASSIUM CHLORIDE 40 MEQ: 20 TABLET, EXTENDED RELEASE ORAL at 06:42

## 2022-02-21 RX ADMIN — ACETAMINOPHEN 650 MG: 325 TABLET ORAL at 11:33

## 2022-02-21 ASSESSMENT — PAIN SCALES - GENERAL: PAINLEVEL_OUTOF10: 7

## 2022-02-21 NOTE — PROGRESS NOTES
Discharge teaching and instructions  completed with patient using teachback method. AVS reviewed. Printed prescriptions given to patient. Patient voiced understanding regarding prescriptions, follow up appointments, and care of self at home. Discharged in a wheelchair to home with support per wife.

## 2022-02-21 NOTE — OP NOTE
Operative Note      Patient: Pablo Holliday  YOB: 1960 [facility]  Sedation/Analgesia Post Sedation Record      Pt Name: Pablo Holliday  MRN: [id number]  YOB: 1960  Procedure Performed By: Johnathon Andrew MD, MD  Primary Care Physician: Ruy Sultana MD    POST-PROCEDURE    Physician: Johnathon Andrew MD, MD    Procedure Performed:  Left Heart CathFFR OF LAD    Sedation/Anesthesia:  Local Anesthesia and IV Conscious Sedation with continuous O2 monitoring    Estimated Blood Loss:  Minimal    Specimens Removed:  None    Complications:  None     Post Procedure Diagnosis/Findings:  Coronary Artery Disease    Recommendations:  Medical treatment and review films.        Johnathon Andrew MD, MD  Electronically signed 2/21/2022 at 9:19 AM

## 2022-02-21 NOTE — H&P
[address] [place] [address]   Sedation/Analgesia History and Physical    Pt Name: Rick Rosales  MRN: [id number]  YOB: 1960  Provider Performing Procedure: Dutch Taylor MD, MD  Primary Care Physician: Tyson Stokes MD      Pre-Procedure: Chest pain, possible coronary artery disease, abnormal stress test    Consent: I have discussed with the patient and/or the patient representative the indication, alternatives, and the possible risks and/or complications of the planned procedure and the anesthesia methods. The patient and/or representative appear to understand and agree to proceed. Medical History:   has a past medical history of CAD (coronary artery disease), GERD (gastroesophageal reflux disease), Hyperlipidemia, Hypertension, Occipital neuralgia, and CELINA (obstructive sleep apnea). .    Surgical History:     has a past surgical history that includes Cholecystectomy (2008); Colonoscopy (2013); Tonsillectomy; pr sigmoidoscopy,biopsy (Left, 3/1/2018); Cardiac catheterization (2/21/15); pr repair of nasal septum (N/A, 4/30/2018); other surgical history (07/13/2020); Elbow surgery; other surgical history (07/24/2020); and Pain management procedure (Right, 8/26/2020). Allergies: Allergies as of 02/21/2022 - Fully Reviewed 02/21/2022   Allergen Reaction Noted    Tramadol Other (See Comments) 06/24/2016    Bactrim [sulfamethoxazole-trimethoprim] Hives 06/12/2018    Zocor [simvastatin] Other (See Comments) 12/10/2013       Medications:   Coumadin use last 5 days:  No  Antiplatelet drug therapy use last 5 days:  Yes  Other anticoagulant use last 5 days:  No   aspirin  325 mg Oral Once     Prior to Admission medications    Medication Sig Start Date End Date Taking?  Authorizing Provider   hydrALAZINE (APRESOLINE) 25 MG tablet Take 25 mg by mouth 2 times daily   Yes Historical Provider, MD   hydroCHLOROthiazide (HYDRODIURIL) 25 MG tablet Take 25 mg by mouth daily   Yes Historical Provider, MD gabapentin (NEURONTIN) 300 MG capsule Take 300 mg by mouth 3 times daily. Yes Historical Provider, MD   aspirin 81 MG EC tablet Take 81 mg by mouth daily   Yes Historical Provider, MD   bumetanide (BUMEX) 2 MG tablet Take 1 tablet by mouth daily 1/6/22  Yes Rudy Acuna MD   sertraline (ZOLOFT) 50 MG tablet TAKE 1 TABLET BY MOUTH DAILY 10/12/21  Yes Rudy Acuna MD   vitamin E 400 UNIT capsule Take 400 Units by mouth daily   Yes Historical Provider, MD   zinc 50 MG CAPS Take by mouth   Yes Historical Provider, MD   Cholecalciferol (VITAMIN D3) 2000 units CAPS Take 5,000 Units by mouth    Yes Historical Provider, MD   CPAP Machine MISC by Does not apply route Please change CPAP pressure to auto 5-15 cm H20. 6/12/18  Yes Pranav Shaw PA-C   fluticasone [facility]) 50 MCG/ACT nasal spray 1 spray by Each Nostril route daily 12/28/21   CARLOS Medel CNP   cetirizine (ZYRTEC) 10 MG tablet Take 1 tablet by mouth daily 10/4/21   Rudy Acuna MD   tadalafil (CIALIS) 5 MG tablet TAKE 1 TABLET BY MOUTH ONE TIME A DAY 4/9/21   CARLOS Lanza CNP   fluticasone (FLONASE) 50 MCG/ACT nasal spray 1 spray by Each Nostril route daily 7/30/20   SHANNAN Ray   Omega-3 Fatty Acids (OMEGA-3 FISH OIL PO) Take by mouth    Historical Provider, MD   Multiple Vitamins-Minerals (MULTIVITAMIN ADULT PO) Take by mouth Indications: Ultra Light 121    Historical Provider, MD   MAGNESIUM GLYCINATE PLUS PO Take by mouth    Historical Provider, MD   Coenzyme Q10 (COQ10) 200 MG CAPS Take  by mouth 2 times daily.     Historical Provider, MD       Vital Signs  Vitals:    02/21/22 0526   BP: (!) 135/94   Pulse: 82   Resp: 23   Temp: 98.2 °F (36.8 °C)   SpO2: 93%       Physical:  Heart:  regular rate and rhythm  Lungs:  Clear  Abdomen:  Soft  Mental Status:  Alert and Oriented    Planned Procedure:  Left Heart Cath and Possible Percutaneous Coronary Intervention    Sedation/ Anesthesia Plan: Midazolam and Sublimaze    ASA Classification: Class 3 - A patient with severe systemic disease that limits activity but is not incapacitating    Mallampati Airway Classification: II (soft palate, uvula, fauces visible)    · Pre-procedure diagnostic studies complete and results available. · Previous sedation/anesthesia experiences assessed. · The patient is an appropriate candidate to undergo the planned procedure sedation and anesthesia. (Refer to nursing sedation/analgesia documentation record)  · Formulation and discussion of sedation/procedure plan, risks, and expectations with patient and/or responsible adult completed. · Patient examined immediately prior to the procedure.  (Refer to nursing sedation/analgesia documentation record)    Billie Alexander MD, MD  Electronically signed 2/21/2022 at 7:11 AM  Patient Name: Vaishali Bailon Record Number: [id number]  Date: 2/21/2022   Time: 7:11 AM   Room/Bed: 2E-17/017-A

## 2022-02-21 NOTE — PROCEDURES
[address] [id number]                            CARDIAC CATHETERIZATION    PATIENT NAME: Golden Carvalho                   :        1960  MED REC NO:   [id number]                           ROOM:       0017  ACCOUNT NO:   [de-identified]                           ADMIT DATE: 2022  PROVIDER:     Porsche Clemens. Sanjuana Peterson M.D.    Shawn Balderasara:  2022    INDICATION FOR PROCEDURE:  This is a patient who is a 71-year-old  gentleman, who has been complaining of shortness of breath, chest pain. He did have a stress Cardiolite test, it was abnormal, and the patient  was treated medically. The patient has intolerance to lot of  medication, was referred for a heart cath, possible intervention. The  patient understands the procedure, the benefits, the risks, alternative  methods of treatment, possible complications, and wants it to be done. PROCEDURE PERFORMED:  1.  IV conscious sedation:  The patient was given  IV conscious sedation  in incremental dosages by the circulating cath lab RN, monitored by the  cath lab monitor tech under my supervision. Procedure started at 07:20  and finished at 08:05. No acute complication from the procedure. Estimated blood loss was about 10 mL. 2.  Left heart cath:  Right radial artery was cannulated with a 6-Nepali  sheath. The subclavian artery is a very tortuous artery made  cannulation of the coronary artery somewhat difficult. The coronary angiogram showed the RCA is a dominant artery. There is at  least moderate disease, 60 to 70% narrowing of the mid RCA. Distally,  the RCA was patent. Left main was cannulated with the Tiger catheter. Multiple catheters  have been utilized. The LAD is a dominant artery, nonobstructive disease of the proximal LAD  after the origin of the diagonal artery. Secondary diagonal branch  moderate size artery.   It had about 90% stenosis at the ostium of the  point of bifurcation with the diagonal artery. The circumflex is nondominant artery and has about 50% narrowing in its  mid course, is about 2 mm artery. The left ventriculogram showed ejection fraction about 50%. Left  ventricular end-diastolic pressure 25 consistent with diastolic  dysfunction of the left ventricle. 3.  FFR of the RCA:  Because of the ambiguity of the RCA lesion and  possible need for intervention, we performed an FFR of the RCA. Again  cannulation of the RCA was somewhat difficult. Multiple catheters had  to be applied. Finally, we were able to cannulate with the right  Ludwig 6 x 4 guide catheter. To be able to cannulate probably Whisper  wire was utilized, placed at the distal end of the RCA. The catheter  was then pushed over the Whisper wire to the proximal portion of the  RCA. After the appropriate calibration, the FFR wire was placed at the  distal end of the RCA. Adenosine was given over 4 minutes. The ratio was 0.84. We did not feel that we need to intervene at this point on to the RCA,  but would be monitored carefully. IMPRESSION:  1. Preserved systolic function of the left ventricle. Ejection  fraction about 50%. No mitral regurg. No gradient across the aortic  valve. 2.  Left ventricular end-diastolic pressure was 25. Diastolic  dysfunction of the left ventricle. 3.  Dominant RCA with moderate disease about 60% narrowing of the mid  dominant RCA. 4.  FFR of the RCA showed the ratio 0.84. We decided to continue  medical treatment for the RCA. 5.  Patent left main. 6.  LAD with nonobstructive disease proximally. 7.  Diagonal artery almost like a dual LAD system with secondary branch  of the diagonal artery does exhibit about 90% stenosis at the ostium  about 2 mm in diameter. 8.  Nonobstructive disease of the circumflex nondominant small caliber  artery. The patient tolerated the procedure well.     RECOMMENDATIONS:  At this point, we will continue with aggressive  medical treatment, risk factor modification, periodic followup. His  medication will be adjusted again. He will be asked to take medication  that has been prescribed and will start him on Toprol, will start him on  Crestor and Norvasc. We will continue with Bumex. Discontinue the  hydralazine and the hydrochlorothiazide. The patient will follow up in  the office. Advised about exercise and risk factor modifications.         Maryanne Robles M.D.    D: 02/21/2022 9:31:08       T: 02/21/2022 10:58:39     [email or web address]  Job#: [id number]     Doc#: [id number]    CC:

## 2022-03-18 ENCOUNTER — NURSE ONLY (OUTPATIENT)
Dept: LAB | Age: 62
End: 2022-03-18

## 2022-03-18 DIAGNOSIS — N40.0 BENIGN PROSTATIC HYPERPLASIA, UNSPECIFIED WHETHER LOWER URINARY TRACT SYMPTOMS PRESENT: ICD-10-CM

## 2022-03-18 LAB — PROSTATE SPECIFIC ANTIGEN: 0.28 NG/ML (ref 0–1)

## 2022-03-31 ENCOUNTER — OFFICE VISIT (OUTPATIENT)
Dept: FAMILY MEDICINE CLINIC | Age: 62
End: 2022-03-31
Payer: COMMERCIAL

## 2022-03-31 VITALS
BODY MASS INDEX: 40.82 KG/M2 | WEIGHT: 301.4 LBS | RESPIRATION RATE: 12 BRPM | TEMPERATURE: 98.2 F | HEART RATE: 66 BPM | SYSTOLIC BLOOD PRESSURE: 134 MMHG | HEIGHT: 72 IN | DIASTOLIC BLOOD PRESSURE: 65 MMHG

## 2022-03-31 DIAGNOSIS — R60.0 EDEMA OF BOTH LOWER EXTREMITIES: ICD-10-CM

## 2022-03-31 DIAGNOSIS — M54.81 OCCIPITAL NEURALGIA OF LEFT SIDE: ICD-10-CM

## 2022-03-31 DIAGNOSIS — I25.10 CORONARY ARTERY DISEASE INVOLVING NATIVE CORONARY ARTERY OF NATIVE HEART WITHOUT ANGINA PECTORIS: ICD-10-CM

## 2022-03-31 DIAGNOSIS — T42.6X5A ADVERSE EFFECT OF GABAPENTIN, INITIAL ENCOUNTER: Primary | ICD-10-CM

## 2022-03-31 PROCEDURE — G8484 FLU IMMUNIZE NO ADMIN: HCPCS | Performed by: FAMILY MEDICINE

## 2022-03-31 PROCEDURE — G8427 DOCREV CUR MEDS BY ELIG CLIN: HCPCS | Performed by: FAMILY MEDICINE

## 2022-03-31 PROCEDURE — 1036F TOBACCO NON-USER: CPT | Performed by: FAMILY MEDICINE

## 2022-03-31 PROCEDURE — 99214 OFFICE O/P EST MOD 30 MIN: CPT | Performed by: FAMILY MEDICINE

## 2022-03-31 PROCEDURE — G8417 CALC BMI ABV UP PARAM F/U: HCPCS | Performed by: FAMILY MEDICINE

## 2022-03-31 PROCEDURE — 3017F COLORECTAL CA SCREEN DOC REV: CPT | Performed by: FAMILY MEDICINE

## 2022-03-31 RX ORDER — METHOCARBAMOL 500 MG/1
500 TABLET, FILM COATED ORAL 2 TIMES DAILY
COMMUNITY
End: 2022-06-28 | Stop reason: ALTCHOICE

## 2022-03-31 NOTE — PROGRESS NOTES
[address]  [address] [place], [address]  Ph:   [phone]  Fax: [phone]    Provider:  Dr. Karon Lantigua    Patient:  Estefany Earron  YOB: 1960      Visit Date:  3/31/2022     Reason For Visit:   Chief Complaint   Patient presents with    Follow-up    Discuss Medications     Cardiology made changes     Swelling     Complaining of swelling through body x 3 wks         Nicolas is a 62 y.o. male who comes today to the office for follow up     Abnormal stress test in February. Cardiac cath done 2022                              CARDIAC CATHETERIZATION     PATIENT NAME: Charles Davis                   :        1960  MED REC NO:   [id number]                           ROOM:       0017  ACCOUNT NO:   [de-identified]                           ADMIT DATE: 2022  PROVIDER:     Augustine Pitts. MARY Hernandez Havers:  2022     INDICATION FOR PROCEDURE:  This is a patient who is a 52-year-old gentleman, who has been complaining of shortness of breath, chest pain. He did have a stress Cardiolite test, it was abnormal, and the patientwas treated medically. The patient has intolerance to lot of medication, was referred for a heart cath, possible intervention. The patient understands the procedure, the benefits, the risks, alternativemethods of treatment, possible complications, and wants it to be done.     PROCEDURE PERFORMED:  1.  IV conscious sedation:  The patient was given  IV conscious sedation in incremental dosages by the circulating cath lab RN, monitored by the cath lab monitor tech under my supervision. Procedure started at 07:20 and finished at 08:05. No acute complication from the procedure. Estimated blood loss was about 10 mL.     2.  Left heart cath:  Right radial artery was cannulated with a 6-Sudanese sheath.   The subclavian artery is a very tortuous artery made cannulation of the coronary artery somewhat difficult.     The coronary angiogram showed the RCA is a dominant artery. There is at least moderate disease, 60 to 70% narrowing of the mid RCA. Distally, the RCA was patent.     Left main was cannulated with the Tiger catheter. Multiple catheters have been utilized.     The LAD is a dominant artery, nonobstructive disease of the proximal LAD after the origin of the diagonal artery. Secondary diagonal branch moderate size artery. It had about 90% stenosis at the ostium of the point of bifurcation with the diagonal artery.     The circumflex is nondominant artery and has about 50% narrowing in its mid course, is about 2 mm artery.     The left ventriculogram showed ejection fraction about 50%. Left ventricular end-diastolic pressure 25 consistent with diastolic dysfunction of the left ventricle.     3. FFR of the RCA:  Because of the ambiguity of the RCA lesion and possible need for intervention, we performed an FFR of the RCA. Again cannulation of the RCA was somewhat difficult. Multiple catheters had to be applied. Finally, we were able to cannulate with the right  Ludwig 6 x 4 guide catheter. To be able to cannulate probably Whisper wire was utilized, placed at the distal end of the RCA. The catheter was then pushed over the Whisper wire to the proximal portion of the RCA. After the appropriate calibration, the FFR wire was placed at the istal end of the RCA. Adenosine was given over 4 minutes.     The ratio was 0.84.     We did not feel that we need to intervene at this point on to the RCA, but would be monitored carefully.     IMPRESSION:  1. Preserved systolic function of the left ventricle. Ejection fraction about 50%. No mitral regurg. No gradient across the aortic valve. 2.  Left ventricular end-diastolic pressure was 25. Diastolic dysfunction of the left ventricle. 3.  Dominant RCA with moderate disease about 60% narrowing of the mid dominant RCA. 4.  FFR of the RCA showed the ratio 0.84.   We decided to continue medical treatment for the RCA. 5.  Patent left main. 6.  LAD with nonobstructive disease proximally. 7.  Diagonal artery almost like a dual LAD system with secondary branch of the diagonal artery does exhibit about 90% stenosis at the ostium about 2 mm in diameter. 8.  Nonobstructive disease of the circumflex nondominant small caliber artery.     The patient tolerated the procedure well.     RECOMMENDATIONS:  At this point, we will continue with aggressive  medical treatment, risk factor modification, periodic followup. His  medication will be adjusted again. He will be asked to take medication  that has been prescribed and will start him on Toprol, will start him on  Crestor and Norvasc. We will continue with Bumex. Discontinue the  hydralazine and the hydrochlorothiazide. The patient will follow up in  the office. Advised about exercise and risk factor modifications.  Benson Peters M.D.     D: 02/21/2022 9:31:08       T: 02/21/2022 10:58:39     [email or web address]  Job#: [id number]     Doc#: [id number]     CC:               Last signed by: Kishore Son MD at 3/19/2022  3:53 PM     After the cardiac cath he saw Dr. Erin García and he changed his medications. He stopped Hydralazine and HCTZ and restarted Bumex 2 mg PO daily, Norvasc 5 mg PO daily and Rosuvastatin (which he stopped by mistake)     In October 2021 he was diagnosed with Occipital neuralgia in [facility]. He was started on Gabapentin BID and Tizanidine. In January visit he got a steroid/lidocaine injection. He also had tinnitus from so much ASA and tylenol which got better once he stopped them. Now he is taking Gabapentin 600 mg PO TID. Since the dose was increased to 600 TID he has been swelling more and gaining weight (10 pounds since then).       Significant Past Medical History:    Past Medical History:   Diagnosis Date    CAD (coronary artery disease) 2014    GERD (gastroesophageal reflux disease)     Hyperlipidemia 2010    Hypertension 2008    Occipital neuralgia     CELINA (obstructive sleep apnea) 2014    Dr. Consuelo Victoria           Allergies:  is allergic to tramadol, bactrim [sulfamethoxazole-trimethoprim], and zocor [simvastatin]. Medications:   Current Outpatient Medications   Medication Sig Dispense Refill    methocarbamol (ROBAXIN) 500 MG tablet Take 500 mg by mouth 2 times daily Take one tablet by mouth twice daily as needed.  gabapentin (NEURONTIN) 300 MG capsule Take 300 mg by mouth. Take 2 capsules x e daily      aspirin 81 MG EC tablet Take 81 mg by mouth daily      metoprolol succinate (TOPROL XL) 25 MG extended release tablet Take 1 tablet by mouth daily 30 tablet 3    rosuvastatin (CRESTOR) 20 MG tablet Take 1 tablet by mouth nightly 30 tablet 3    amLODIPine (NORVASC) 5 MG tablet Take 1 tablet by mouth daily 30 tablet 3    bumetanide (BUMEX) 2 MG tablet Take 1 tablet by mouth daily 30 tablet 1    cetirizine (ZYRTEC) 10 MG tablet Take 1 tablet by mouth daily 90 tablet 1    tadalafil (CIALIS) 5 MG tablet TAKE 1 TABLET BY MOUTH ONE TIME A DAY 90 tablet 3    fluticasone (FLONASE) 50 MCG/ACT nasal spray 1 spray by Each Nostril route daily 1 Bottle 2    Omega-3 Fatty Acids (OMEGA-3 FISH OIL PO) Take by mouth      Multiple Vitamins-Minerals (MULTIVITAMIN ADULT PO) Take by mouth Indications: Ultra Light 121      vitamin E 400 UNIT capsule Take 400 Units by mouth daily      zinc 50 MG CAPS Take by mouth      Cholecalciferol (VITAMIN D3) 2000 units CAPS Take 5,000 Units by mouth       MAGNESIUM GLYCINATE PLUS PO Take by mouth      CPAP Machine MISC by Does not apply route Please change CPAP pressure to auto 5-15 cm H20. 1 each 0    Coenzyme Q10 (COQ10) 200 MG CAPS Take  by mouth 2 times daily. No current facility-administered medications for this visit.        Review of systems:  Review of Systems - History obtained from chart review and the patient  General ROS: negative for - chills, fatigue or fever  Psychological ROS: negative for - anxiety, depression or sleep disturbances  ENT ROS: negative for - headaches, nasal congestion or nasal discharge  Allergy and Immunology ROS: negative for - seasonal allergies  Hematological and Lymphatic ROS: negative for - bruising  Endocrine ROS: negative for - malaise/lethargy, polydipsia/polyuria or temperature intolerance  Respiratory ROS: negative for - cough,  shortness of breath or wheezing  Cardiovascular ROS: negative for - chest pain or edema  Gastrointestinal ROS: negative for - abdominal pain, constipation, diarrhea or nausea/vomiting  Musculoskeletal ROS: negative for - joint pain or muscle pain  Neurological ROS: negative for - dizziness  Dermatological ROS: negative for - rash    Physical Examination:  /65 (Site: Left Upper Arm, Position: Sitting, Cuff Size: Large Adult)   Pulse 66   Temp 98.2 °F (36.8 °C) (Temporal)   Resp 12   Ht 6' (1.829 m)   Wt (!) 301 lb 6.4 oz (136.7 kg)   BMI 40.88 kg/m²     General-  Alert and oriented x 3, NAD  HEENT: NC, AT, PERRLA, EOMI, anicteric sclerae  Ears: Normal tympanic membranes bilaterally  Nose: patent, no lesions  Mouth: no lesions, moist mucosas  Neck - supple, no significant adenopathy  Chest - clear to auscultation, no wheezes, rales or rhonchi, symmetric air entry  Heart - normal rate, regular rhythm, normal S1, S2, no murmurs, rubs, clicks or gallops  Abdomen - soft, nontender, nondistended, no masses or organomegaly  Extremities -  pedal edema 2 +    Impression:   Diagnosis Orders   1. Adverse effect of gabapentin, initial encounter     2. Edema of both lower extremities     3. Coronary artery disease involving native coronary artery of native heart without angina pectoris     4. Occipital neuralgia of left side         Plan:  He is going back to [facility] in 1 month. I advise him to decrease the dose of Gabapentin  To  Minimal dose that control his symptoms, starting with the mid day dose.   Will call  Jadon  Consider ACE like Lisinopril 2.5-5 mg  Continue Norvasc 5 mg daily  Continue Bumex 2 mg PO daily  Continue Toprol Xl 25 mg PO daily  Continue Rosuvastatin 20 mg PO daily      No orders of the defined types were placed in this encounter. No orders of the defined types were placed in this encounter. Follow Up:  Return in about 4 weeks (around 4/28/2022).     Ana Laura Wagner MD

## 2022-04-16 RX ORDER — TRIAMTERENE AND HYDROCHLOROTHIAZIDE 37.5; 25 MG/1; MG/1
1 TABLET ORAL DAILY
COMMUNITY
End: 2022-04-28 | Stop reason: ALTCHOICE

## 2022-04-16 RX ORDER — MECLIZINE HYDROCHLORIDE CHEWABLE TABLETS 25 MG/1
25 TABLET, CHEWABLE ORAL 3 TIMES DAILY PRN
COMMUNITY

## 2022-04-16 RX ORDER — POTASSIUM CHLORIDE 20 MEQ/1
20 TABLET, EXTENDED RELEASE ORAL 2 TIMES DAILY
COMMUNITY

## 2022-04-16 RX ORDER — HYDRALAZINE HYDROCHLORIDE 25 MG/1
25 TABLET, FILM COATED ORAL 3 TIMES DAILY
COMMUNITY
End: 2022-04-28 | Stop reason: ALTCHOICE

## 2022-04-16 RX ORDER — CLONIDINE HYDROCHLORIDE 0.1 MG/1
0.1 TABLET ORAL 2 TIMES DAILY
COMMUNITY
End: 2022-04-28 | Stop reason: ALTCHOICE

## 2022-04-28 ENCOUNTER — OFFICE VISIT (OUTPATIENT)
Dept: CARDIOLOGY CLINIC | Age: 62
End: 2022-04-28
Payer: COMMERCIAL

## 2022-04-28 VITALS
SYSTOLIC BLOOD PRESSURE: 160 MMHG | BODY MASS INDEX: 39.6 KG/M2 | WEIGHT: 292 LBS | DIASTOLIC BLOOD PRESSURE: 88 MMHG | HEART RATE: 72 BPM

## 2022-04-28 DIAGNOSIS — Z03.89 CORONARY ARTERY DISEASE (CAD) EXCLUDED: Primary | ICD-10-CM

## 2022-04-28 DIAGNOSIS — E78.5 DYSLIPIDEMIA (HIGH LDL; LOW HDL): ICD-10-CM

## 2022-04-28 PROCEDURE — 93000 ELECTROCARDIOGRAM COMPLETE: CPT | Performed by: INTERNAL MEDICINE

## 2022-04-28 PROCEDURE — 99213 OFFICE O/P EST LOW 20 MIN: CPT | Performed by: INTERNAL MEDICINE

## 2022-04-28 PROCEDURE — 3017F COLORECTAL CA SCREEN DOC REV: CPT | Performed by: INTERNAL MEDICINE

## 2022-04-28 PROCEDURE — G8417 CALC BMI ABV UP PARAM F/U: HCPCS | Performed by: INTERNAL MEDICINE

## 2022-04-28 PROCEDURE — G8427 DOCREV CUR MEDS BY ELIG CLIN: HCPCS | Performed by: INTERNAL MEDICINE

## 2022-04-28 PROCEDURE — 1036F TOBACCO NON-USER: CPT | Performed by: INTERNAL MEDICINE

## 2022-04-28 RX ORDER — GABAPENTIN 100 MG/1
100 CAPSULE ORAL 2 TIMES DAILY
COMMUNITY

## 2022-04-28 ASSESSMENT — ENCOUNTER SYMPTOMS
ANAL BLEEDING: 0
BLOOD IN STOOL: 0
WHEEZING: 0
VOICE CHANGE: 0
COUGH: 0
SHORTNESS OF BREATH: 0
ABDOMINAL PAIN: 0
TROUBLE SWALLOWING: 0
ABDOMINAL DISTENTION: 0
STRIDOR: 0
CHEST TIGHTNESS: 0
COLOR CHANGE: 0
VOMITING: 0
CHOKING: 0
NAUSEA: 0
APNEA: 0

## 2022-04-28 NOTE — PROGRESS NOTES
[address] [facility]  Dept: [address]: [phone]     4/28/2022       Puma Bedoya is here today for   Chief Complaint   Patient presents with    Follow-Up from [facility] 2/21/22  [facility]           Referring Physician:  No ref. provider found     Patient Active Problem List   Diagnosis    Benign prostatic hyperplasia    CELINA on CPAP    Obesity    Nasal septal deviation    Hypertrophy of inferior nasal turbinate    Melissa bullosa    Rhinitis medicamentosa    Difficulty with CPAP use    Postoperative pain       Review of Systems   Constitutional: Negative for activity change, appetite change, fatigue, fever and unexpected weight change. HENT: Negative for congestion, trouble swallowing and voice change. Eyes: Negative for visual disturbance. Respiratory: Negative for apnea, cough, choking, chest tightness, shortness of breath, wheezing and stridor. Cardiovascular: Negative for chest pain, palpitations and leg swelling. Gastrointestinal: Negative for abdominal distention, abdominal pain, anal bleeding, blood in stool, nausea and vomiting. Endocrine: Negative for cold intolerance and heat intolerance. Genitourinary: Negative for hematuria. Musculoskeletal: Negative for arthralgias, gait problem, joint swelling and myalgias. Skin: Negative for color change and rash. Allergic/Immunologic: Negative for environmental allergies and food allergies. Neurological: Negative for dizziness, tremors, syncope, facial asymmetry, weakness, light-headedness, numbness and headaches. Hematological: Does not bruise/bleed easily. Psychiatric/Behavioral: Negative for agitation, behavioral problems and sleep disturbance.         Past Medical History:   Diagnosis Date    CAD (coronary artery disease) 2014    GERD (gastroesophageal reflux disease)     Hyperlipidemia 2010    Hypertension 2008    Occipital neuralgia     CELINA (obstructive sleep apnea) 2014    Dr. Tracy Thomas       Allergies   Allergen Reactions    Tramadol Other (See Comments)     Made me feel edgy    Bactrim [Sulfamethoxazole-Trimethoprim] Hives    Zocor [Simvastatin] Other (See Comments)     Body aches       Current Outpatient Medications   Medication Sig Dispense Refill    gabapentin (NEURONTIN) 100 MG capsule Take 100 mg by mouth in the morning and at bedtime.  meclizine (ANTIVERT) 25 MG CHEW Take 25 mg by mouth 3 times daily as needed      potassium chloride (KLOR-CON M) 20 MEQ extended release tablet Take 20 mEq by mouth 2 times daily PT TAKES EVERY OTHER DAY      methocarbamol (ROBAXIN) 500 MG tablet Take 500 mg by mouth 2 times daily Take one tablet by mouth twice daily as needed.       aspirin 81 MG EC tablet Take 81 mg by mouth daily      metoprolol succinate (TOPROL XL) 25 MG extended release tablet Take 1 tablet by mouth daily 30 tablet 3    rosuvastatin (CRESTOR) 20 MG tablet Take 1 tablet by mouth nightly 30 tablet 3    bumetanide (BUMEX) 2 MG tablet Take 1 tablet by mouth daily 30 tablet 1    cetirizine (ZYRTEC) 10 MG tablet Take 1 tablet by mouth daily (Patient taking differently: Take 10 mg by mouth as needed ) 90 tablet 1    tadalafil (CIALIS) 5 MG tablet TAKE 1 TABLET BY MOUTH ONE TIME A DAY 90 tablet 3    fluticasone (FLONASE) 50 MCG/ACT nasal spray 1 spray by Each Nostril route daily (Patient taking differently: 1 spray by Each Nostril route as needed ) 1 Bottle 2    Omega-3 Fatty Acids (OMEGA-3 FISH OIL PO) Take by mouth      Multiple Vitamins-Minerals (MULTIVITAMIN ADULT PO) Take by mouth Indications: Ultra Light 121      vitamin E 400 UNIT capsule Take 400 Units by mouth daily      zinc 50 MG CAPS Take by mouth      Cholecalciferol (VITAMIN D3) 2000 units CAPS Take 5,000 Units by mouth       MAGNESIUM GLYCINATE PLUS PO Take by mouth      CPAP Machine MISC by Does not apply route Please change CPAP pressure to auto 5-15 cm H20. 1 each 0    Coenzyme Q10 (COQ10) 200 MG CAPS Take  by mouth 2 times daily. No current facility-administered medications for this visit. Social History     Socioeconomic History    Marital status:      Spouse name: Neri Paul Number of children: 2    Years of education: 15    Highest education level: None   Occupational History    Occupation: [employer]     Comment: self employeed   Tobacco Use    Smoking status: Never Smoker    Smokeless tobacco: Never Used   Vaping Use    Vaping Use: Never used   Substance and Sexual Activity    Alcohol use: No    Drug use: No    Sexual activity: Yes     Partners: Female   Other Topics Concern    None   Social History Narrative    None     Social Determinants of Health     Financial Resource Strain: Low Risk     Difficulty of Paying Living Expenses: Not hard at all   Food Insecurity: No Food Insecurity    Worried About Running Out of Food in the Last Year: Never true    [address] in the Last Year: Never true   Transportation Needs:     Lack of Transportation (Medical): Not on file    Lack of Transportation (Non-Medical):  Not on file   Physical Activity:     Days of Exercise per Week: Not on file    Minutes of Exercise per Session: Not on file   Stress:     Feeling of Stress : Not on file   Social Connections:     Frequency of Communication with Friends and Family: Not on file    Frequency of Social Gatherings with Friends and Family: Not on file    Attends Congregation Services: Not on file    Active Member of Clubs or Organizations: Not on file    Attends Club or Organization Meetings: Not on file    Marital Status: Not on file   Intimate Partner Violence:     Fear of Current or Ex-Partner: Not on file    Emotionally Abused: Not on file    Physically Abused: Not on file    Sexually Abused: Not on file   Housing Stability:     Unable to Pay for Housing in the Last Year: Not on file    Number of Places Lived in the Last Year: Not on file    Unstable Housing in the Last Year: Not on file       Family History   Problem Relation Age of Onset    Diabetes Mother 48    Heart Disease Mother 67        CAD    Heart Disease Father 50        CAD, CVA    Stroke Father 48        CVA    Diabetes Brother 72    High Blood Pressure Brother 72    Heart Disease Brother 72        CHF    Other Brother 46        Neuropathy    Obesity Brother 10    High Blood Pressure Brother 61    Obesity Brother 48    Other Brother 36        back pain    Obesity Brother 39    Other Sister 72        leukemia    Alzheimer's Disease Maternal Grandmother 21    Early Death Paternal Grandfather 36        MI    High Blood Pressure Paternal Grandfather 36    Heart Disease Sister 39    High Blood Pressure Sister 39    Depression Sister 39    Other Brother 48        ANEURYSUM, AAA       Blood pressure (!) 160/88, pulse 72, weight 292 lb (132.5 kg).     Physical Exam:    General Appearance: alert and oriented to person, place and time, in no acute distress  Cardiovascular: normal rate, regular rhythm, normal S1 and S2, no murmurs, rubs, clicks, or gallops, distal pulses intact, no carotid bruits, no JVD  Pulmonary/Chest: clear to auscultation bilaterally- no wheezes, rales or rhonchi, normal air movement, no respiratory distress  Abdomen: soft, non-tender, non-distended, normal bowel sounds, no masses   Extremities: no cyanosis, clubbing or edema, pulse   Skin: warm and dry, no rash or erythema  Head: normocephalic and atraumatic  Eyes: pupils equal, round, and reactive to light  Neck: supple and non-tender without mass, no thyromegaly   Musculoskeletal: normal range of motion, no joint swelling, deformity or tenderness  Neurological: alert, oriented, normal speech, no focal findings or movement disorder noted    Lab Data:    Cardiac Enzymes:  No results for input(s): CKTOTAL, CKMB, CKMBINDEX, TROPONINI in the last 72 hours. CBC:   Lab Results   Component Value Date    WBC 8.3 02/21/2022    RBC 5.24 02/21/2022    RBC 0 01/11/2018    HGB 15.2 02/21/2022    HCT 45.3 02/21/2022     02/21/2022       CMP:    Lab Results   Component Value Date     02/21/2022    K 3.3 02/21/2022     02/21/2022    CO2 26 02/21/2022    BUN 20 02/21/2022    CREATININE 1.0 02/21/2022    LABGLOM 76 02/21/2022    GLUCOSE 103 02/21/2022    GLUCOSE 90 07/24/2017    CALCIUM 9.3 02/21/2022       Hepatic Function Panel:    Lab Results   Component Value Date    ALKPHOS 97 02/21/2022    ALT 27 02/21/2022    AST 23 02/21/2022    PROT 7.4 02/21/2022    BILITOT 0.4 02/21/2022    BILITOT NEGATIVE 01/11/2018    BILIDIR <0.2 02/17/2021    LABALBU 4.0 02/21/2022       Magnesium:    Lab Results   Component Value Date    MG 2.2 02/21/2022       PT/INR:    Lab Results   Component Value Date    INR 0.93 02/21/2022       HgBA1c:    Lab Results   Component Value Date    LABA1C 5.4 03/22/2017       FLP:    Lab Results   Component Value Date    TRIG 169 02/21/2022    HDL 36 02/21/2022    LDLCALC 112 02/21/2022    LABVLDL 17 01/11/2018       TSH:    Lab Results   Component Value Date    TSH 2.350 05/11/2020        Diagnosis Orders   1. Coronary artery disease (CAD) excluded  EKG 12 lead    CBC    Basic Metabolic Panel    Lipid Panel    Hepatic Function Panel   2. Dyslipidemia (high LDL; low HDL)  Lipid Panel    Hepatic Function Panel        Assessment/Plan    Is 77years old gentleman who is known to have a history of coronary artery disease recently has a heart cath which showed severe stenosis of secondary branch of diagonal artery nonobstructive coronary artery disease of his native coronary arteries the patient has a hypertension and he had sometimes he forgets to take his medication. He has been under significant stress now as he is taking care of his wife who has cancer of the uterus goes to higher state.   This 20 inconsistent in the blood

## 2022-04-29 NOTE — TELEPHONE ENCOUNTER
Nciolas Walsh called requesting a refill on the following medications:  Requested Prescriptions     Pending Prescriptions Disp Refills    tadalafil (CIALIS) 5 MG tablet [Pharmacy Med Name: Tadalafil Oral Tablet 5 MG] 90 tablet 0     Sig: TAKE 1 TABLET BY [id number] STANLEY verified:  .pv      Date of last visit:   Date of next visit (if applicable): 7/1/6556

## 2022-05-02 RX ORDER — TADALAFIL 5 MG/1
TABLET ORAL
Qty: 90 TABLET | Refills: 0 | Status: SHIPPED | OUTPATIENT
Start: 2022-05-02 | End: 2022-05-03

## 2022-05-03 ENCOUNTER — OFFICE VISIT (OUTPATIENT)
Dept: UROLOGY | Age: 62
End: 2022-05-03
Payer: COMMERCIAL

## 2022-05-03 VITALS
WEIGHT: 297.6 LBS | BODY MASS INDEX: 40.31 KG/M2 | DIASTOLIC BLOOD PRESSURE: 60 MMHG | SYSTOLIC BLOOD PRESSURE: 108 MMHG | HEIGHT: 72 IN

## 2022-05-03 DIAGNOSIS — N40.1 BENIGN PROSTATIC HYPERPLASIA WITH LOWER URINARY TRACT SYMPTOMS, SYMPTOM DETAILS UNSPECIFIED: ICD-10-CM

## 2022-05-03 DIAGNOSIS — N40.0 BENIGN PROSTATIC HYPERPLASIA, UNSPECIFIED WHETHER LOWER URINARY TRACT SYMPTOMS PRESENT: Primary | ICD-10-CM

## 2022-05-03 LAB
BILIRUBIN URINE: NEGATIVE
BLOOD URINE, POC: NEGATIVE
CHARACTER, URINE: CLEAR
COLOR, URINE: YELLOW
GLUCOSE URINE: NEGATIVE MG/DL
KETONES, URINE: NEGATIVE
LEUKOCYTE CLUMPS, URINE: NEGATIVE
NITRITE, URINE: NEGATIVE
PH, URINE: 5.5 (ref 5–9)
POST VOID RESIDUAL (PVR): 69 ML
PROTEIN, URINE: NEGATIVE MG/DL
SPECIFIC GRAVITY, URINE: >= 1.03 (ref 1–1.03)
UROBILINOGEN, URINE: 0.2 EU/DL (ref 0–1)

## 2022-05-03 PROCEDURE — 3017F COLORECTAL CA SCREEN DOC REV: CPT | Performed by: NURSE PRACTITIONER

## 2022-05-03 PROCEDURE — 1036F TOBACCO NON-USER: CPT | Performed by: NURSE PRACTITIONER

## 2022-05-03 PROCEDURE — 51798 US URINE CAPACITY MEASURE: CPT | Performed by: NURSE PRACTITIONER

## 2022-05-03 PROCEDURE — 81003 URINALYSIS AUTO W/O SCOPE: CPT | Performed by: NURSE PRACTITIONER

## 2022-05-03 PROCEDURE — G8427 DOCREV CUR MEDS BY ELIG CLIN: HCPCS | Performed by: NURSE PRACTITIONER

## 2022-05-03 PROCEDURE — G8417 CALC BMI ABV UP PARAM F/U: HCPCS | Performed by: NURSE PRACTITIONER

## 2022-05-03 PROCEDURE — 99214 OFFICE O/P EST MOD 30 MIN: CPT | Performed by: NURSE PRACTITIONER

## 2022-05-03 RX ORDER — TADALAFIL 20 MG/1
10 TABLET ORAL DAILY
Qty: 90 TABLET | Refills: 3 | Status: SHIPPED | OUTPATIENT
Start: 2022-05-03

## 2022-05-03 ASSESSMENT — ENCOUNTER SYMPTOMS
VOMITING: 0
NAUSEA: 0
ABDOMINAL PAIN: 0
BACK PAIN: 0

## 2022-05-03 NOTE — PROGRESS NOTES
[address] [facility] [address] [place] [id number]  Dept: [phone]  Loc: [phone]    Visit Date: 5/3/2022        HPI:     Griselda Carter is a 62 y.o. male who presents today for:  Chief Complaint   Patient presents with    Benign Prostatic Hypertrophy    Erectile Dysfunction       HPI   Pt seen in follow up for BPH. Pt has a hx of BPH and ED for which he is taking cialis 5 mg daily. Pt had R spermatocelectomy and hydrocelectomy  7/24/2020 by Dr. Jessa Lynch at [facility]. Reports symptoms of incomplete emptying, frequency, intermittency, urgency, weakened stream, straining to urinate, and nocturia 2 x per night. IPSS score today in office 23/35. Current Outpatient Medications   Medication Sig Dispense Refill    tadalafil (CIALIS) 5 MG tablet TAKE 1 TABLET BY MOUTH EVERY DAY 90 tablet 0    gabapentin (NEURONTIN) 100 MG capsule Take 100 mg by mouth in the morning and at bedtime.  meclizine (ANTIVERT) 25 MG CHEW Take 25 mg by mouth 3 times daily as needed      potassium chloride (KLOR-CON M) 20 MEQ extended release tablet Take 20 mEq by mouth 2 times daily PT TAKES EVERY OTHER DAY      methocarbamol (ROBAXIN) 500 MG tablet Take 500 mg by mouth 2 times daily Take one tablet by mouth twice daily as needed.       aspirin 81 MG EC tablet Take 81 mg by mouth daily      metoprolol succinate (TOPROL XL) 25 MG extended release tablet Take 1 tablet by mouth daily 30 tablet 3    rosuvastatin (CRESTOR) 20 MG tablet Take 1 tablet by mouth nightly 30 tablet 3    bumetanide (BUMEX) 2 MG tablet Take 1 tablet by mouth daily 30 tablet 1    cetirizine (ZYRTEC) 10 MG tablet Take 1 tablet by mouth daily (Patient taking differently: Take 10 mg by mouth as needed ) 90 tablet 1    fluticasone (FLONASE) 50 MCG/ACT nasal spray 1 spray by Each Nostril route daily (Patient taking differently: 1 spray by Each Nostril route as needed ) 1 Bottle 2    Omega-3 Fatty Acids (OMEGA-3 FISH OIL PO) Take by mouth      Multiple Vitamins-Minerals (MULTIVITAMIN ADULT PO) Take by mouth Indications: Ultra Light 121      vitamin E 400 UNIT capsule Take 400 Units by mouth daily      zinc 50 MG CAPS Take by mouth      Cholecalciferol (VITAMIN D3) 2000 units CAPS Take 5,000 Units by mouth       MAGNESIUM GLYCINATE PLUS PO Take by mouth      CPAP Machine MISC by Does not apply route Please change CPAP pressure to auto 5-15 cm H20. 1 each 0    Coenzyme Q10 (COQ10) 200 MG CAPS Take  by mouth 2 times daily. No current facility-administered medications for this visit. Past Medical History  Nicolas  has a past medical history of CAD (coronary artery disease), GERD (gastroesophageal reflux disease), Hyperlipidemia, Hypertension, Occipital neuralgia, and CELINA (obstructive sleep apnea). Past Surgical History  The patient  has a past surgical history that includes Cholecystectomy (2008); Colonoscopy (2013); Tonsillectomy; pr sigmoidoscopy,biopsy (Left, 3/1/2018); Cardiac catheterization (2/21/15); pr repair of nasal septum (N/A, 4/30/2018); other surgical history (07/13/2020); Elbow surgery; other surgical history (07/24/2020); and Pain management procedure (Right, 8/26/2020). Family History  This patient's family history includes Alzheimer's Disease (age of onset: 21) in his maternal grandmother; Depression (age of onset: 39) in his sister; Diabetes (age of onset: 48) in his mother; Diabetes (age of onset: 72) in his brother; Early Death (age of onset: 36) in his paternal grandfather; Heart Disease (age of onset: 39) in his sister;  Heart Disease (age of onset: 50) in his father; Heart Disease (age of onset: 72) in his brother; Heart Disease (age of onset: 67) in his mother; High Blood Pressure (age of onset: 36) in his paternal grandfather; High Blood Pressure (age of onset: 39) in his sister; High Blood Pressure (age of onset: 61) in his brother; High Blood Pressure (age of onset: 72) in his brother; Obesity (age of onset: 8) in his brother; Obesity (age of onset: 39) in his brother; Obesity (age of onset: 48) in his brother; Other (age of onset: 36) in his brother; Other (age of onset: 48) in his brother; Other (age of onset: 46) in his brother; Other (age of onset: 72) in his sister; Stroke (age of onset: 48) in his father. Social History  Nicolas  reports that he has never smoked. He has never used smokeless tobacco. He reports that he does not drink alcohol and does not use drugs. Subjective:      Review of Systems   Constitutional: Negative for activity change, appetite change, chills, diaphoresis, fatigue, fever and unexpected weight change. Gastrointestinal: Negative for abdominal pain, nausea and vomiting. Genitourinary: Positive for frequency and urgency. Negative for decreased urine volume, difficulty urinating, dysuria, flank pain and hematuria. Musculoskeletal: Negative for back pain. Objective:   /60   Ht 6' (1.829 m)   Wt 297 lb 9.6 oz (135 kg)   BMI 40.36 kg/m²     Physical Exam  Vitals reviewed. Constitutional:       General: He is not in acute distress. Appearance: Normal appearance. He is well-developed. He is not ill-appearing or diaphoretic. HENT:      Head: Normocephalic and atraumatic. Right Ear: External ear normal.      Left Ear: External ear normal.      Nose: Nose normal.      Mouth/Throat:      Mouth: Mucous membranes are moist.   Eyes:      General: No scleral icterus. Right eye: No discharge. Left eye: No discharge. Neck:      Vascular: No JVD. Trachea: No tracheal deviation. Cardiovascular:      Rate and Rhythm: Normal rate and regular rhythm. Pulmonary:      Effort: Pulmonary effort is normal. No respiratory distress. Abdominal:      General: There is no distension. Tenderness: There is no abdominal tenderness.  There is no right CVA tenderness or left CVA tenderness. Genitourinary:     Comments: Prostate mildly enlarged but smooth without nodule or induration. Musculoskeletal:         General: Normal range of motion. Skin:     General: Skin is warm and dry. Neurological:      Mental Status: He is alert and oriented to person, place, and time. Mental status is at baseline. Psychiatric:         Mood and Affect: Mood normal.         Behavior: Behavior normal.         Thought Content: Thought content normal.         POC  Results for POC orders placed in visit on 05/03/22   POCT Urinalysis No Micro (Auto)   Result Value Ref Range    Glucose, Ur Negative NEGATIVE mg/dl    Bilirubin Urine Negative     Ketones, Urine Negative NEGATIVE    Specific Gravity, Urine >= 1.030 1.002 - 1.030    Blood, UA POC Negative NEGATIVE    pH, Urine 5.50 5.0 - 9.0    Protein, Urine Negative NEGATIVE mg/dl    Urobilinogen, Urine 0.20 0.0 - 1.0 eu/dl    Nitrite, Urine Negative NEGATIVE    Leukocyte Clumps, Urine Negative NEGATIVE    Color, Urine Yellow [id number]    Character, Urine Clear [id number]   poct post void residual   Result Value Ref Range    post void residual 69 ml         Patients recent PSA values are as follows  Lab Results   Component Value Date    PSA 0.28 03/18/2022    PSA 0.51 06/02/2021    PSA 0.49 04/08/2021        Recent BUN/Creatinine:  Lab Results   Component Value Date    BUN 20 02/21/2022    CREATININE 1.0 02/21/2022         Assessment:   BPH  ED  Bilateral inguinal hernias  Hx of thrombosis of varicoceles    Plan:     Trial increasing dose of cialis to 10 mg daily. Pt provided with Goodrx coupon. SHERICE without nodule or induration. PSA trending down to 0.28. F/u in 1 year with PVR. PSA a few days prior.

## 2022-06-27 RX ORDER — ROSUVASTATIN CALCIUM 20 MG/1
TABLET, COATED ORAL
Qty: 30 TABLET | Refills: 3 | OUTPATIENT
Start: 2022-06-27

## 2022-06-27 RX ORDER — METOPROLOL SUCCINATE 25 MG/1
25 TABLET, EXTENDED RELEASE ORAL DAILY
Qty: 30 TABLET | Refills: 3 | OUTPATIENT
Start: 2022-06-27

## 2022-06-28 ENCOUNTER — HOSPITAL ENCOUNTER (EMERGENCY)
Age: 62
Discharge: HOME | End: 2022-06-28
Payer: COMMERCIAL

## 2022-06-28 VITALS
HEART RATE: 52 BPM | RESPIRATION RATE: 16 BRPM | DIASTOLIC BLOOD PRESSURE: 74 MMHG | OXYGEN SATURATION: 96 % | TEMPERATURE: 97.1 F | SYSTOLIC BLOOD PRESSURE: 159 MMHG

## 2022-06-28 DIAGNOSIS — J32.0 LEFT MAXILLARY SINUSITIS: Primary | ICD-10-CM

## 2022-06-28 PROCEDURE — 99213 OFFICE O/P EST LOW 20 MIN: CPT | Performed by: EMERGENCY MEDICINE

## 2022-06-28 PROCEDURE — 99213 OFFICE O/P EST LOW 20 MIN: CPT

## 2022-06-28 RX ORDER — AMOXICILLIN AND CLAVULANATE POTASSIUM 875; 125 MG/1; MG/1
1 TABLET, FILM COATED ORAL 2 TIMES DAILY
Qty: 20 TABLET | Refills: 0 | Status: SHIPPED | OUTPATIENT
Start: 2022-06-28 | End: 2022-07-08

## 2022-06-28 RX ORDER — GREEN TEA/HOODIA GORDONII 315-12.5MG
1 CAPSULE ORAL 2 TIMES DAILY
Qty: 60 TABLET | Refills: 0 | Status: SHIPPED | OUTPATIENT
Start: 2022-06-28 | End: 2022-07-28

## 2022-06-28 ASSESSMENT — ENCOUNTER SYMPTOMS
EYE PAIN: 0
EYE REDNESS: 0
SHORTNESS OF BREATH: 0
SINUS PRESSURE: 1
COUGH: 0
PHOTOPHOBIA: 0

## 2022-06-28 ASSESSMENT — PAIN DESCRIPTION - ORIENTATION: ORIENTATION: LEFT

## 2022-06-28 ASSESSMENT — PAIN SCALES - GENERAL: PAINLEVEL_OUTOF10: 7

## 2022-06-28 ASSESSMENT — PAIN - FUNCTIONAL ASSESSMENT: PAIN_FUNCTIONAL_ASSESSMENT: 0-10

## 2022-06-28 ASSESSMENT — PAIN DESCRIPTION - LOCATION: LOCATION: EYE

## 2022-06-28 NOTE — ED TRIAGE NOTES
Has pressure on left side of face/behind left eye, started this last Sunday, has been use zyrtec, left eye is watering

## 2022-06-28 NOTE — ED NOTES
advised to call back for any additional questions or concerns     Pt discharged in stable condition, ambulated to vehicle home by himself.          Morgan Dean LPN  53/06/62 [id number]

## 2022-06-28 NOTE — ED PROVIDER NOTES
[place]  [facility]gent [facility] Encounter       CHIEF COMPLAINT       Chief Complaint   Patient presents with    Sinus Problem       Nurses Notes reviewed and I agree except as noted in the HPI. HISTORY OF PRESENT ILLNESS   Nicolas Ryan is a 62 y.o. male who presents for complaints of pressure that is mainly behind the left eye. He has a fullness in his left ear with some mildly muffled hearing. Patient states he has chronic history of sinus issues. He had septoplasty performed 4 to 5 years ago and reports since the surgery, he has had increased problems with the left side. He has chronic tinnitus of the left ear. He has hearing loss of the left ear. He states it has been more than a year since he has had a sinus infection, however. No blurred vision. No loss of vision. States his eye does water a little more than normal but there has been no drainage or redness. He uses Afrin at night before applying his CPAP. He has been taking his Zyrtec but has not been taking his Flonase. HPI    REVIEW OF SYSTEMS     Review of Systems   Constitutional: Negative for chills, fatigue and fever. HENT: Positive for congestion, hearing loss, sinus pressure and tinnitus. Eyes: Negative for photophobia, pain and redness. Respiratory: Negative for cough and shortness of breath. Neurological: Negative for dizziness and headaches. Psychiatric/Behavioral: Negative for behavioral problems. PAST MEDICAL HISTORY         Diagnosis Date    CAD (coronary artery disease) 2014    GERD (gastroesophageal reflux disease)     Hyperlipidemia 2010    Hypertension 2008    Occipital neuralgia     CELINA (obstructive sleep apnea) 2014    Dr. Khurram Hernandez     Patient  has a past surgical history that includes Cholecystectomy (2008); Colonoscopy (2013); Tonsillectomy; pr sigmoidoscopy,biopsy (Left, 3/1/2018);  Cardiac catheterization (2/21/15); pr repair of nasal septum (N/A, 4/30/2018); other surgical history (07/13/2020); Elbow surgery; other surgical history (07/24/2020); and Pain management procedure (Right, 8/26/2020). CURRENT MEDICATIONS       Discharge Medication List as of 6/28/2022 11:20 AM      CONTINUE these medications which have NOT CHANGED    Details   tadalafil (CIALIS) 20 MG tablet Take 0.5 tablets by mouth daily, Disp-90 tablet, R-3Normal      gabapentin (NEURONTIN) 100 MG capsule Take 100 mg by mouth in the morning and at bedtime. Historical Med      meclizine (ANTIVERT) 25 MG CHEW Take 25 mg by mouth 3 times daily as neededHistorical Med      potassium chloride (KLOR-CON M) 20 MEQ extended release tablet Take 20 mEq by mouth 2 times daily PT TAKES EVERY OTHER DAYHistorical Med      aspirin 81 MG EC tablet Take 81 mg by mouth dailyHistorical Med      metoprolol succinate (TOPROL XL) 25 MG extended release tablet Take 1 tablet by mouth daily, Disp-30 tablet, R-3At hsNormal      rosuvastatin (CRESTOR) 20 MG tablet Take 1 tablet by mouth nightly, Disp-30 tablet, R-3Print      bumetanide (BUMEX) 2 MG tablet Take 1 tablet by mouth daily, Disp-30 tablet, R-1Normal      cetirizine (ZYRTEC) 10 MG tablet Take 1 tablet by mouth daily, Disp-90 tablet, R-1Normal      fluticasone (FLONASE) 50 MCG/ACT nasal spray 1 spray by Each Nostril route daily, Disp-1 Bottle,R-2Normal      Omega-3 Fatty Acids (OMEGA-3 FISH OIL PO) Take by mouthHistorical Med      Multiple Vitamins-Minerals (MULTIVITAMIN ADULT PO) Take by mouth Indications: Ultra Light 121Historical Med      vitamin E 400 UNIT capsule Take 400 Units by mouth dailyHistorical Med      zinc 50 MG CAPS Take by mouthHistorical Med      Cholecalciferol (VITAMIN D3) 2000 units CAPS Take 5,000 Units by mouth Historical Med      MAGNESIUM GLYCINATE PLUS PO Take by mouthHistorical Med      CPAP Machine MISC Starting Tue 6/12/2018, Disp-1 each, R-0, PrintPlease change CPAP pressure to auto 5-15 cm H20.       Coenzyme Q10 (COQ10) 200 MG CAPS Take  by mouth 2 times daily. ALLERGIES     Patient is is allergic to tramadol, bactrim [sulfamethoxazole-trimethoprim], and zocor [simvastatin]. Patients   Immunization History   Administered Date(s) Administered    Influenza Virus Vaccine 10/01/2015    Influenza, Deepika Memory, IM, (6 mo and older Fluzone, Flulaval, Fluarix and 3 yrs and older Afluria) 09/24/2018, 10/08/2019    Influenza, Deepika Memory, IM, PF (6 mo and older Fluzone, Flulaval, Fluarix, and 3 yrs and older Afluria) 09/01/2016, 01/15/2018, 02/15/2021    Pneumococcal Polysaccharide ([id number]) 01/15/2018    Tdap (Boostrix, Adacel) 03/30/2015       FAMILY HISTORY     Patient's family history includes Alzheimer's Disease (age of onset: 21) in his maternal grandmother; Depression (age of onset: 39) in his sister; Diabetes (age of onset: 48) in his mother; Diabetes (age of onset: 72) in his brother; Early Death (age of onset: 36) in his paternal grandfather; Heart Disease (age of onset: 39) in his sister; Heart Disease (age of onset: 50) in his father; Heart Disease (age of onset: 72) in his brother; Heart Disease (age of onset: 67) in his mother; High Blood Pressure (age of onset: 36) in his paternal grandfather; High Blood Pressure (age of onset: 39) in his sister; High Blood Pressure (age of onset: 61) in his brother; High Blood Pressure (age of onset: 72) in his brother; Obesity (age of onset: 8) in his brother; Obesity (age of onset: 39) in his brother; Obesity (age of onset: 48) in his brother; Other (age of onset: 36) in his brother; Other (age of onset: 48) in his brother; Other (age of onset: 46) in his brother; Other (age of onset: 72) in his sister; Stroke (age of onset: 48) in his father. SOCIAL HISTORY     Patient  reports that he has never smoked. He has never used smokeless tobacco. He reports that he does not drink alcohol and does not use drugs.     PHYSICAL EXAM     ED TRIAGE VITALS  BP: (!) 159/74, Temp: 97.1 °F (36.2 °C), Heart Rate: 52, Resp: 16, SpO2: 96 %,Estimated body mass index is 40.36 kg/m² as calculated from the following:    Height as of 5/3/22: 6' (1.829 m). Weight as of 5/3/22: 297 lb 9.6 oz (135 kg). ,No LMP for male patient. Physical Exam  Constitutional:       Appearance: He is not ill-appearing. HENT:      Head: Normocephalic. Right Ear: Tympanic membrane, ear canal and external ear normal.      Left Ear: Tympanic membrane, ear canal and external ear normal.      Nose: Congestion present. Left Sinus: Maxillary sinus tenderness present. Cardiovascular:      Rate and Rhythm: Normal rate and regular rhythm. Pulses: Normal pulses. Heart sounds: Normal heart sounds. Pulmonary:      Effort: Pulmonary effort is normal. No respiratory distress. Breath sounds: Normal breath sounds. No wheezing. Skin:     General: Skin is warm and dry. Neurological:      General: No focal deficit present. Mental Status: He is alert and oriented to person, place, and time. Psychiatric:         Mood and Affect: Mood normal.         Behavior: Behavior normal.         DIAGNOSTIC RESULTS     Labs:No results found for this visit on 06/28/22. IMAGING:    No orders to display         EKG:      URGENT CARE COURSE:     Vitals:    06/28/22 1101   BP: (!) 159/74   Pulse: 52   Resp: 16   Temp: 97.1 °F (36.2 °C)   TempSrc: Infrared   SpO2: 96%       Medications - No data to display         PROCEDURES:  None    FINAL IMPRESSION      1. Left maxillary sinusitis          DISPOSITION/ PLAN     Presents for what is likely a left-sided maxillary sinusitis. I advised the patient to use his Flonase daily in addition to his current medications. Augmentin as directed until gone. Drink plenty of water. Follow-up with primary care provider or return here if no improvement 3 to 5 days. Sooner if worse.       PATIENT REFERRED TO:  Dutch Gill MD  [address] [facility] / [address] [facility] [address]      DISCHARGE MEDICATIONS:  Discharge Medication List as of 6/28/2022 11:20 AM      START taking these medications    Details   amoxicillin-clavulanate (AUGMENTIN) 875-125 MG per tablet Take 1 tablet by mouth 2 times daily for 10 days, Disp-20 tablet, R-0Normal      Probiotic Acidophilus (FLORANEX) TABS Take 1 tablet by mouth 2 times daily, Disp-60 tablet, R-0Normal             Discharge Medication List as of 6/28/2022 11:20 AM          Discharge Medication List as of 6/28/2022 11:20 AM          Seble Vanegas, APRN - CNP    (Please note that portions of this note were completed with a voice recognition program. Efforts were made to edit the dictations but occasionally words are mis-transcribed.)          CARLOS Maher - SYLVIA  06/28/22 1121

## 2022-07-01 ENCOUNTER — TELEPHONE (OUTPATIENT)
Dept: FAMILY MEDICINE CLINIC | Age: 62
End: 2022-07-01

## 2022-07-01 NOTE — LETTER
[address]  [address] [facility] , [address]  Phone: [phone]  Fax: [phone]    July 1, 2022    Reilly Slice  [address]      Dear Naeem Kirby,    This letter is regarding your Emergency Department (ED) visit at [address] [address] on 6/28/22. Gautam Parks wanted to make sure that you understand your discharge instructions and that you were able to fill any prescriptions that may have been ordered for you. Please contact the office at the above phone number if the ED advised to you follow up with Gautam Parks, or if you have any further questions or needs. Also did you know -   *Visiting the ED for a non-emergency could result in higher co-pays than you would normally be subject to paying? *You can call your doctor even after hours so they can direct you to the most appropriate care. [facility]) practices can often offer you an appointment on the same day that you call. *We have some [address] offices that offer Walk-in appointments; check our website for availability in your community, [email or web address].      *Evisits are now available for patients for $36 through [employer] for certain conditions:  * Sinus, cold and or cough       * Diarrhea            * Headache  * Heartburn                                * Poison [place]          * Back pain     * Urinary problems                         If you do not have [employer] and are interested, please contact the office and a staff member may assist you or go to [email or web address].     Sincerely,   Meagan Henderson MD and your [facility]

## 2022-09-22 RX ORDER — CETIRIZINE HYDROCHLORIDE 10 MG/1
10 TABLET ORAL DAILY
Qty: 90 TABLET | Refills: 1 | Status: SHIPPED | OUTPATIENT
Start: 2022-09-22

## 2022-09-22 RX ORDER — CETIRIZINE HYDROCHLORIDE 10 MG/1
10 TABLET ORAL DAILY
Qty: 90 TABLET | Refills: 1 | Status: CANCELLED | OUTPATIENT
Start: 2022-09-22

## 2022-09-22 NOTE — TELEPHONE ENCOUNTER
Please approve or deny     Last Visit Date:  3/31/2022  AP      Next Visit Date:    Visit date not found     Refuse medication due to being 6 month since last seen. Please advise.

## 2022-10-12 ENCOUNTER — OFFICE VISIT (OUTPATIENT)
Dept: FAMILY MEDICINE CLINIC | Age: 62
End: 2022-10-12
Payer: COMMERCIAL

## 2022-10-12 VITALS
HEIGHT: 72 IN | SYSTOLIC BLOOD PRESSURE: 152 MMHG | RESPIRATION RATE: 14 BRPM | DIASTOLIC BLOOD PRESSURE: 80 MMHG | HEART RATE: 54 BPM | TEMPERATURE: 98 F | BODY MASS INDEX: 42.53 KG/M2 | WEIGHT: 314 LBS

## 2022-10-12 DIAGNOSIS — H65.93 MEE (MIDDLE EAR EFFUSION), BILATERAL: Primary | ICD-10-CM

## 2022-10-12 DIAGNOSIS — H93.12 TINNITUS OF LEFT EAR: ICD-10-CM

## 2022-10-12 DIAGNOSIS — R42 DIZZINESS: ICD-10-CM

## 2022-10-12 DIAGNOSIS — M54.81 OCCIPITAL NEURALGIA OF LEFT SIDE: ICD-10-CM

## 2022-10-12 PROCEDURE — 99213 OFFICE O/P EST LOW 20 MIN: CPT | Performed by: NURSE PRACTITIONER

## 2022-10-12 PROCEDURE — G8417 CALC BMI ABV UP PARAM F/U: HCPCS | Performed by: NURSE PRACTITIONER

## 2022-10-12 PROCEDURE — G8427 DOCREV CUR MEDS BY ELIG CLIN: HCPCS | Performed by: NURSE PRACTITIONER

## 2022-10-12 PROCEDURE — G8484 FLU IMMUNIZE NO ADMIN: HCPCS | Performed by: NURSE PRACTITIONER

## 2022-10-12 PROCEDURE — 3017F COLORECTAL CA SCREEN DOC REV: CPT | Performed by: NURSE PRACTITIONER

## 2022-10-12 PROCEDURE — 1036F TOBACCO NON-USER: CPT | Performed by: NURSE PRACTITIONER

## 2022-10-12 SDOH — ECONOMIC STABILITY: FOOD INSECURITY: WITHIN THE PAST 12 MONTHS, YOU WORRIED THAT YOUR FOOD WOULD RUN OUT BEFORE YOU GOT MONEY TO BUY MORE.: NEVER TRUE

## 2022-10-12 SDOH — ECONOMIC STABILITY: FOOD INSECURITY: WITHIN THE PAST 12 MONTHS, THE FOOD YOU BOUGHT JUST DIDN'T LAST AND YOU DIDN'T HAVE MONEY TO GET MORE.: NEVER TRUE

## 2022-10-12 ASSESSMENT — ANXIETY QUESTIONNAIRES
7. FEELING AFRAID AS IF SOMETHING AWFUL MIGHT HAPPEN: 0
5. BEING SO RESTLESS THAT IT IS HARD TO SIT STILL: 0
GAD7 TOTAL SCORE: 0
6. BECOMING EASILY ANNOYED OR IRRITABLE: 0
4. TROUBLE RELAXING: 0
2. NOT BEING ABLE TO STOP OR CONTROL WORRYING: 0
3. WORRYING TOO MUCH ABOUT DIFFERENT THINGS: 0
1. FEELING NERVOUS, ANXIOUS, OR ON EDGE: 0

## 2022-10-12 ASSESSMENT — PATIENT HEALTH QUESTIONNAIRE - PHQ9
SUM OF ALL RESPONSES TO PHQ QUESTIONS 1-9: 0
SUM OF ALL RESPONSES TO PHQ QUESTIONS 1-9: 0
2. FEELING DOWN, DEPRESSED OR HOPELESS: 0
SUM OF ALL RESPONSES TO PHQ QUESTIONS 1-9: 0
SUM OF ALL RESPONSES TO PHQ QUESTIONS 1-9: 0
SUM OF ALL RESPONSES TO PHQ9 QUESTIONS 1 & 2: 0
1. LITTLE INTEREST OR PLEASURE IN DOING THINGS: 0

## 2022-10-12 ASSESSMENT — SOCIAL DETERMINANTS OF HEALTH (SDOH): HOW HARD IS IT FOR YOU TO PAY FOR THE VERY BASICS LIKE FOOD, HOUSING, MEDICAL CARE, AND HEATING?: NOT HARD AT ALL

## 2022-10-12 NOTE — PROGRESS NOTES
Nicolas Serna (:  1960) is a 62 y.o. male,Established patient, here for evaluation of the following chief complaint(s):  Dizziness (Dizziness with lightheaded / pressure behind eyes ) and Drainage (Mild drainage down throat -- clearing throat x2-3 days )         ASSESSMENT/PLAN:  1. PEEWEE (middle ear effusion), bilateral  2. Occipital neuralgia of left side  3. Tinnitus of left ear  4. Dizziness      Suspect dizziness related to middle ear effusion and encouraged to keep follow-up with ENT next month. No signs of otitis media  Patient states he has meclizine at home to use for the dizziness and will try that. Encouraged aggressive hydration. Patient has lab work to get done, and encouraged him to get that tomorrow. Discussed symptoms that should prompt evaluation in the emergency department. Return if symptoms worsen or fail to improve. Subjective   SUBJECTIVE/OBJECTIVE:  HPI    Has daily headaches due to occipital neuralgia, goes to [facility]. Has gotten injections in the past, but the last time it didn't work. This morning he woke up with severe pressure, pain behind his eyes, feels like he is \"talking in an oil can\". Today he went to lunch with some friends and became very dizzy and had to sit down. It has improved some, but he decided to call our office and be seen. Having some PND. No cough, fevers or chills. He takes daily Zyrtec for the last week or so, and Flonase as needed. Also some Afrin. He reports chronic fatigue and has had weight gain. Follows with cardiology and has blood work pending for upcoming appt. Had septoplasty , but has had \"trouble with sinuses\" ever since. Chronic left tinnitus. Has appt Nov 2 with ENT for further evaluation. Has had issues with dizziness in the past. Was in ER in 2021 and had Code Stroke workup. Diagnosed with acute vestibular syndrome. Review of Systems   Constitutional:  Positive for fatigue.  Negative for fever.   Neurological:  Positive for dizziness and headaches. All other systems reviewed and are negative. Objective   Physical Exam  Vitals and nursing note reviewed. HENT:      Head: Normocephalic. Right Ear: External ear normal. A middle ear effusion is present. Left Ear: External ear normal. A middle ear effusion is present. Nose:      Comments: Left turbinates erythematous. Eyes:      Pupils: Pupils are equal, round, and reactive to light. Neck:      Trachea: No tracheal deviation. Cardiovascular:      Rate and Rhythm: Normal rate and regular rhythm. Heart sounds: Normal heart sounds. No murmur heard. No friction rub. No gallop. Pulmonary:      Effort: Pulmonary effort is normal. No respiratory distress. Breath sounds: Normal breath sounds. No wheezing or rales. Chest:      Chest wall: No tenderness. Genitourinary:     Penis: Normal.    Musculoskeletal:         General: Normal range of motion. Cervical back: Full passive range of motion without pain, normal range of motion and neck supple. Lymphadenopathy:      Cervical: No cervical adenopathy. Skin:     General: Skin is warm and dry. Findings: No rash. Neurological:      Mental Status: He is alert and oriented to person, place, and time. Psychiatric:         Judgment: Judgment normal.                An electronic signature was used to authenticate this note.     --Alyson Young, APRN - CNP

## 2022-12-16 RX ORDER — METOPROLOL SUCCINATE 25 MG/1
TABLET, EXTENDED RELEASE ORAL
Qty: 90 TABLET | Refills: 0 | Status: SHIPPED | OUTPATIENT
Start: 2022-12-16

## 2022-12-27 RX ORDER — ROSUVASTATIN CALCIUM 20 MG/1
TABLET, COATED ORAL
Qty: 90 TABLET | Refills: 3 | Status: SHIPPED | OUTPATIENT
Start: 2022-12-27

## 2022-12-27 RX ORDER — POTASSIUM CHLORIDE 20 MEQ/1
TABLET, EXTENDED RELEASE ORAL
Qty: 180 TABLET | Refills: 3 | Status: SHIPPED | OUTPATIENT
Start: 2022-12-27

## 2023-02-20 ENCOUNTER — TELEPHONE (OUTPATIENT)
Dept: FAMILY MEDICINE CLINIC | Age: 63
End: 2023-02-20

## 2023-02-20 NOTE — TELEPHONE ENCOUNTER
[facility] [place] [facility] on [address] is where patient would like to be referred to in relation to Occipital neuralgia. Their phone number is [phone]. His insurance has changed and is having issues with [facility] due to this. Their fax number is [phone].

## 2023-02-21 DIAGNOSIS — M54.81 OCCIPITAL NEURALGIA OF LEFT SIDE: Primary | ICD-10-CM

## 2023-03-10 RX ORDER — METOPROLOL SUCCINATE 25 MG/1
TABLET, EXTENDED RELEASE ORAL
Qty: 90 TABLET | Refills: 3 | Status: SHIPPED | OUTPATIENT
Start: 2023-03-10

## 2023-03-20 ENCOUNTER — TELEPHONE (OUTPATIENT)
Dept: UROLOGY | Age: 63
End: 2023-03-20

## 2023-03-20 ENCOUNTER — OFFICE VISIT (OUTPATIENT)
Dept: FAMILY MEDICINE CLINIC | Age: 63
End: 2023-03-20
Payer: COMMERCIAL

## 2023-03-20 VITALS
WEIGHT: 280.2 LBS | RESPIRATION RATE: 12 BRPM | HEIGHT: 72 IN | SYSTOLIC BLOOD PRESSURE: 136 MMHG | HEART RATE: 98 BPM | TEMPERATURE: 99 F | BODY MASS INDEX: 37.95 KG/M2 | DIASTOLIC BLOOD PRESSURE: 82 MMHG | OXYGEN SATURATION: 99 %

## 2023-03-20 DIAGNOSIS — N50.9 LESION OF SCROTUM: Primary | ICD-10-CM

## 2023-03-20 PROCEDURE — 99213 OFFICE O/P EST LOW 20 MIN: CPT | Performed by: STUDENT IN AN ORGANIZED HEALTH CARE EDUCATION/TRAINING PROGRAM

## 2023-03-20 RX ORDER — LIDOCAINE 50 MG/G
OINTMENT TOPICAL
Qty: 30 G | Refills: 0 | Status: SHIPPED | OUTPATIENT
Start: 2023-03-20

## 2023-03-20 ASSESSMENT — ENCOUNTER SYMPTOMS
ABDOMINAL PAIN: 0
SHORTNESS OF BREATH: 1

## 2023-03-20 NOTE — TELEPHONE ENCOUNTER
Spoke to PA and advised patient to f/u with his appt on 3/28/23, does not seem to be an emergent issue and needs to be seen by the physician to assess. Called patient and advised.

## 2023-03-20 NOTE — TELEPHONE ENCOUNTER
Patient called into the office complaining about having black spreading spots on his scrotum. Has increased/spread since Saturday. States it's like a strawberry, the seeds would be the black spots, he is having itching and it's very sensitive. ..are there any recommendations for a cream or anything that he could use prior to his appointment on 3/28/22. Please advise and I will call patient back.

## 2023-03-20 NOTE — PROGRESS NOTES
Health Maintenance Due   Topic Date Due    COVID-19 Vaccine (1) Never done    Shingles vaccine (1 of 2) Never done    Diabetes screen  10/03/2021    Flu vaccine (1) 08/01/2022
S: 65 y.o. male with   Chief Complaint   Patient presents with    Testicle Pain     Black spots size of a quarter       HPI: please see resident note for HPI and ROS. BP Readings from Last 3 Encounters:   03/20/23 136/82   10/12/22 (!) 152/80   06/28/22 (!) 159/74     Wt Readings from Last 3 Encounters:   03/20/23 280 lb 3.2 oz (127.1 kg)   10/12/22 (!) 314 lb (142.4 kg)   05/03/22 297 lb 9.6 oz (135 kg)       O: VS:  height is 6' (1.829 m) and weight is 280 lb 3.2 oz (127.1 kg). His temporal temperature is 99 °F (37.2 °C). His blood pressure is 136/82 and his pulse is 98. His respiration is 12 and oxygen saturation is 99%. AAO/NAD, appropriate affect for mood  CV:  RRR, no murmur  Resp: CTAB  : Patient has well-circumscribed indurated area on the left lateral aspect of scrotum and multiple punctated lesions. No active drainage or discharge. No ulceration or bleeding. Discomfort on palpation. Lesion seems to be located to skin and is not involved testicle. Penile exam within normal limits. Diagnosis Orders   1. Lesion of scrotum  lidocaine (XYLOCAINE) 5 % ointment          Plan:  Please refer to resident note for full plan. 20-year-old male presents the office for scrotal pain. Patient has a well-circumscribed indurated area on the left lateral aspect of scrotum with multiple punctate lesions present. Approximately size of a quarter and induration. No surrounding erythema, drainage, warmth. Unclear etiology at this time. The rest of the exam benign including penile and testicle exam.  May be related to friction but unclear. We will plan to use a petroleum based bacitracin to assist with lubrication/chafing, sparingly use lidocaine topical ointment to assist with pain relief. Patient is getting ready to leave out of town for wife's chemotherapy treatments we will plan to follow-up with urology once he is back in town. Patient is agreeable to this plan.     Health Maintenance Due   Topic
10/13/2022    BILITOT 0.5 10/13/2022    ALKPHOS 129 (H) 10/13/2022    AST 23 10/13/2022    ALT 20 10/13/2022    LABGLOM 86 (A) 10/13/2022     Lab Results   Component Value Date    TSH 2.350 05/11/2020    T4FREE 1.00 07/11/2019     Lab Results   Component Value Date    LABA1C 5.4 03/22/2017     No results found for: EAG  Lab Results   Component Value Date    CHOL 182 10/13/2022    CHOL 182 02/21/2022    CHOL 172 02/17/2021     Lab Results   Component Value Date    TRIG 231 (H) 10/13/2022    TRIG 169 02/21/2022    TRIG 177 02/17/2021     Lab Results   Component Value Date    HDL 34 10/13/2022    HDL 36 02/21/2022    HDL 37 02/17/2021     Lab Results   Component Value Date    LDLCALC 102 10/13/2022    LDLCALC 112 02/21/2022    LDLCALC 100 02/17/2021     Lab Results   Component Value Date    PSA 0.28 03/18/2022    PSA 0.51 06/02/2021    PSA 0.49 04/08/2021     Lab Results   Component Value Date    MWUPNNCJ37 728 01/11/2018     Lab Results   Component Value Date/Time    VITD25 30 10/03/2018 11:32 AM          No results found for: LABMICR, [id number]    Review of Systems   Constitutional:  Negative for chills and fever. Respiratory:  Positive for shortness of breath (with stairs). Cardiovascular:  Positive for palpitations (with stairs). Negative for chest pain. Gastrointestinal:  Negative for abdominal pain. Genitourinary:  Positive for genital sores. Negative for decreased urine volume, difficulty urinating, dysuria, hematuria, penile discharge, penile pain, penile swelling, scrotal swelling, testicular pain and urgency. Physical Exam  Constitutional:       Appearance: Normal appearance. HENT:      Right Ear: External ear normal.      Left Ear: External ear normal.      Mouth/Throat:      Mouth: Mucous membranes are moist.   Eyes:      Extraocular Movements: Extraocular movements intact. Conjunctiva/sclera: Conjunctivae normal.      Pupils: Pupils are equal, round, and reactive to light.

## 2023-03-28 ENCOUNTER — OFFICE VISIT (OUTPATIENT)
Dept: UROLOGY | Age: 63
End: 2023-03-28

## 2023-03-28 VITALS
HEIGHT: 72 IN | SYSTOLIC BLOOD PRESSURE: 190 MMHG | DIASTOLIC BLOOD PRESSURE: 120 MMHG | BODY MASS INDEX: 37.93 KG/M2 | WEIGHT: 280 LBS

## 2023-03-28 DIAGNOSIS — N40.1 BENIGN PROSTATIC HYPERPLASIA WITH LOWER URINARY TRACT SYMPTOMS, SYMPTOM DETAILS UNSPECIFIED: Primary | ICD-10-CM

## 2023-03-28 LAB
BILIRUBIN URINE: NEGATIVE
BLOOD URINE, POC: NEGATIVE
CHARACTER, URINE: CLEAR
COLOR, URINE: YELLOW
GLUCOSE URINE: NEGATIVE MG/DL
KETONES, URINE: NEGATIVE
LEUKOCYTE CLUMPS, URINE: NEGATIVE
NITRITE, URINE: NEGATIVE
PH, URINE: 5.5 (ref 5–9)
POST VOID RESIDUAL (PVR): 47 ML
PROTEIN, URINE: NEGATIVE MG/DL
SPECIFIC GRAVITY, URINE: 1.02 (ref 1–1.03)
UROBILINOGEN, URINE: 0.2 EU/DL (ref 0–1)

## 2023-03-28 PROCEDURE — 99214 OFFICE O/P EST MOD 30 MIN: CPT | Performed by: NURSE PRACTITIONER

## 2023-03-28 PROCEDURE — 81003 URINALYSIS AUTO W/O SCOPE: CPT | Performed by: NURSE PRACTITIONER

## 2023-03-28 PROCEDURE — 51798 US URINE CAPACITY MEASURE: CPT | Performed by: NURSE PRACTITIONER

## 2023-03-28 RX ORDER — GABAPENTIN 300 MG/1
CAPSULE ORAL
COMMUNITY
Start: 2023-03-20

## 2023-03-28 ASSESSMENT — ENCOUNTER SYMPTOMS
ABDOMINAL PAIN: 0
NAUSEA: 0
VOMITING: 0
BACK PAIN: 0

## 2023-03-28 NOTE — PROGRESS NOTES
[address] [place] [id number]  Dept: [phone]  Loc: [phone]    Visit Date: 3/28/2023        HPI:     Lei Marin is a 65 y.o. male who presents today for:  Chief Complaint   Patient presents with    Benign Prostatic Hypertrophy     W/ LUTS    Follow-up    Other     Patch on scrotum that \"developed out of nowhere and was sore  It's about the size of a quarter with dark read or black spots\"  Towards the \"lower back\" part of the scrotum        HPI  Pt scheduled appt secondary to \"spot on scrotum\"    Pt has a hx of BPH and ED for which he is taking cialis 10 mg daily. Pt had R spermatocelectomy and hydrocelectomy  7/24/2020 by Dr. Pratima Carrero at [facility] system. At [address] 5/2022 pt noted symptoms of incomplete emptying, frequency, intermittency, urgency, weakened stream, straining to urinate, and nocturia 2 x per night. IPSS score 23/35. Cialis increased to 10 mg daily. Reports urination improved and stable. IPSS today 20/35. Pt here today because approximately 1-2 weeks ago he started having scrotal pain and \"black spots\" on the back and underside of his scrotum. Reports the area increased in size over a 24 hour period and was painful. Appt scheduled today to further evaluate. Pt reports pain has improved since he was putting lidocaine ointment on the area as directed by PCP. He has an area of infection from injection on his head for which he was started on Keflex 4 x daily for 7 days yesterday.       Current Outpatient Medications   Medication Sig Dispense Refill    gabapentin (NEURONTIN) 300 MG capsule gabapentin 300 mg capsule   TAKE 2 CAPSULES BY MOUTH THREE TIMES DAILY      lidocaine (XYLOCAINE) 5 % ointment Apply topically once daily 30 g 0    metoprolol succinate (TOPROL XL) 25 MG extended release tablet TAKE 1 TABLET BY MOUTH EVERY DAY 90 tablet 3    potassium chloride (KLOR-CON M) 20

## 2023-03-28 NOTE — PROGRESS NOTES
Prescription for bactroban faxed to [facility] per patient request. He stated he had a Good RX coupon

## 2023-05-02 RX ORDER — BUMETANIDE 2 MG/1
2 TABLET ORAL DAILY
Qty: 90 TABLET | Refills: 0 | Status: SHIPPED | OUTPATIENT
Start: 2023-05-02

## 2023-06-26 ENCOUNTER — OFFICE VISIT (OUTPATIENT)
Dept: CARDIOLOGY CLINIC | Age: 63
End: 2023-06-26

## 2023-06-26 VITALS
BODY MASS INDEX: 37.11 KG/M2 | RESPIRATION RATE: 18 BRPM | HEIGHT: 72 IN | DIASTOLIC BLOOD PRESSURE: 82 MMHG | SYSTOLIC BLOOD PRESSURE: 137 MMHG | HEART RATE: 55 BPM | WEIGHT: 274 LBS

## 2023-06-26 DIAGNOSIS — E78.5 HYPERLIPIDEMIA LDL GOAL <70: ICD-10-CM

## 2023-06-26 DIAGNOSIS — G47.33 OSA ON CPAP: ICD-10-CM

## 2023-06-26 DIAGNOSIS — I10 PRIMARY HYPERTENSION: ICD-10-CM

## 2023-06-26 DIAGNOSIS — Z03.89 CORONARY ARTERY DISEASE (CAD) EXCLUDED: Primary | ICD-10-CM

## 2023-06-26 DIAGNOSIS — Z99.89 OSA ON CPAP: ICD-10-CM

## 2023-06-26 PROCEDURE — 93000 ELECTROCARDIOGRAM COMPLETE: CPT | Performed by: INTERNAL MEDICINE

## 2023-06-26 PROCEDURE — 99214 OFFICE O/P EST MOD 30 MIN: CPT | Performed by: NURSE PRACTITIONER

## 2023-06-26 PROCEDURE — 3078F DIAST BP <80 MM HG: CPT | Performed by: NURSE PRACTITIONER

## 2023-06-26 PROCEDURE — 3074F SYST BP LT 130 MM HG: CPT | Performed by: NURSE PRACTITIONER

## 2023-06-26 RX ORDER — METOPROLOL SUCCINATE 25 MG/1
TABLET, EXTENDED RELEASE ORAL
Qty: 90 TABLET | Refills: 3 | Status: SHIPPED | OUTPATIENT
Start: 2023-06-26

## 2023-06-26 RX ORDER — BUMETANIDE 2 MG/1
2 TABLET ORAL DAILY
Qty: 90 TABLET | Refills: 3 | Status: SHIPPED | OUTPATIENT
Start: 2023-06-26

## 2023-06-29 ASSESSMENT — ENCOUNTER SYMPTOMS
RESPIRATORY NEGATIVE: 1
GASTROINTESTINAL NEGATIVE: 1

## 2023-07-20 NOTE — TELEPHONE ENCOUNTER
Ringing in both ears since last week, wants to know if he can get something to help. No other symptoms.
Spoke with patient. Scheduled appointment for Wed. 7-.
Yes

## 2023-07-25 ENCOUNTER — TELEPHONE (OUTPATIENT)
Dept: CARDIOLOGY CLINIC | Age: 63
End: 2023-07-25

## 2023-07-25 NOTE — TELEPHONE ENCOUNTER
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 Patient follows with Dr. Siri Foote. I called [facility] and talked to Trenna Ganser and he is aware paperwork will need faxed to Dr. Molly Delgado office. Paperwork shredded.

## 2023-07-28 ENCOUNTER — TELEPHONE (OUTPATIENT)
Dept: PULMONOLOGY | Age: 63
End: 2023-07-28

## 2023-07-28 NOTE — TELEPHONE ENCOUNTER
Pt called requesting a download from his PAP for his CDL/DOT PE. Pt not seen since 2018. Advised pt to call DME to obtain. Pt verbalized understanding.

## 2023-08-01 RX ORDER — TADALAFIL 20 MG/1
TABLET ORAL
Qty: 90 TABLET | Refills: 0 | Status: SHIPPED | OUTPATIENT
Start: 2023-08-01 | End: 2023-08-29

## 2023-08-16 RX ORDER — TADALAFIL 20 MG/1
TABLET ORAL
Qty: 90 TABLET | Refills: 0 | OUTPATIENT
Start: 2023-08-16

## 2023-08-16 NOTE — TELEPHONE ENCOUNTER
Nicolas Walsh called requesting a refill on the following medications:  Requested Prescriptions     Pending Prescriptions Disp Refills    tadalafil (CIALIS) 20 MG tablet 90 tablet 0     Pharmacy verified:[facility]   . pv      Date of last visit:   Date of next visit (if applicable): Visit date not found

## 2023-08-27 DIAGNOSIS — N52.9 ERECTILE DYSFUNCTION, UNSPECIFIED ERECTILE DYSFUNCTION TYPE: Primary | ICD-10-CM

## 2023-08-28 NOTE — TELEPHONE ENCOUNTER
Nicolas Walsh called requesting a refill on the following medications:  Requested Prescriptions     Pending Prescriptions Disp Refills    tadalafil (CIALIS) 20 MG tablet [Pharmacy Med Name: Tadalafil Oral Tablet 20 MG] 90 tablet 0     Sig: TAKE 1/2 TABLET BY MOUTH ONE TIME A DAY     Pharmacy verified:  .pv      Date of last visit: 03/28/2023  Date of next visit (if applicable): 97/47/7143

## 2023-08-29 RX ORDER — TADALAFIL 20 MG/1
TABLET ORAL
Qty: 90 TABLET | Refills: 0 | Status: SHIPPED | OUTPATIENT
Start: 2023-08-29

## 2023-08-29 NOTE — TELEPHONE ENCOUNTER
Cialis refilled in accordance with Amy's plan of care. Last seen in March of 2023. Med list reviewed, no nitrates for chest pain.

## 2024-01-02 ENCOUNTER — OFFICE VISIT (OUTPATIENT)
Dept: FAMILY MEDICINE CLINIC | Age: 64
End: 2024-01-02

## 2024-01-02 VITALS
RESPIRATION RATE: 20 BRPM | OXYGEN SATURATION: 98 % | WEIGHT: 296.2 LBS | DIASTOLIC BLOOD PRESSURE: 90 MMHG | SYSTOLIC BLOOD PRESSURE: 148 MMHG | HEART RATE: 58 BPM | HEIGHT: 72 IN | BODY MASS INDEX: 40.12 KG/M2 | TEMPERATURE: 98.1 F

## 2024-01-02 DIAGNOSIS — I10 PRIMARY HYPERTENSION: Primary | ICD-10-CM

## 2024-01-02 DIAGNOSIS — R42 DIZZINESS: ICD-10-CM

## 2024-01-02 DIAGNOSIS — J34.2 NASAL SEPTAL DEVIATION: ICD-10-CM

## 2024-01-02 DIAGNOSIS — R09.81 NASAL CONGESTION: ICD-10-CM

## 2024-01-02 DIAGNOSIS — H93.12 TINNITUS OF LEFT EAR: ICD-10-CM

## 2024-01-02 PROCEDURE — 93000 ELECTROCARDIOGRAM COMPLETE: CPT | Performed by: STUDENT IN AN ORGANIZED HEALTH CARE EDUCATION/TRAINING PROGRAM

## 2024-01-02 PROCEDURE — 3080F DIAST BP >= 90 MM HG: CPT | Performed by: STUDENT IN AN ORGANIZED HEALTH CARE EDUCATION/TRAINING PROGRAM

## 2024-01-02 PROCEDURE — 3077F SYST BP >= 140 MM HG: CPT | Performed by: STUDENT IN AN ORGANIZED HEALTH CARE EDUCATION/TRAINING PROGRAM

## 2024-01-02 PROCEDURE — 99214 OFFICE O/P EST MOD 30 MIN: CPT | Performed by: STUDENT IN AN ORGANIZED HEALTH CARE EDUCATION/TRAINING PROGRAM

## 2024-01-02 RX ORDER — FLUTICASONE PROPIONATE 50 MCG
1 SPRAY, SUSPENSION (ML) NASAL DAILY PRN
Qty: 1 EACH | Refills: 2 | Status: SHIPPED | OUTPATIENT
Start: 2024-01-02

## 2024-01-02 RX ORDER — LISINOPRIL 10 MG/1
10 TABLET ORAL DAILY
Qty: 30 TABLET | Refills: 5 | Status: SHIPPED | OUTPATIENT
Start: 2024-01-02

## 2024-01-02 RX ORDER — FLUTICASONE PROPIONATE 50 MCG
1 SPRAY, SUSPENSION (ML) NASAL DAILY PRN
Qty: 16 G | Refills: 0 | Status: SHIPPED | OUTPATIENT
Start: 2024-01-02 | End: 2024-01-02

## 2024-01-02 RX ORDER — FLUTICASONE PROPIONATE 50 MCG
1 SPRAY, SUSPENSION (ML) NASAL PRN
Qty: 1 EACH | Refills: 2 | Status: SHIPPED | OUTPATIENT
Start: 2024-01-02 | End: 2024-01-02

## 2024-01-02 SDOH — ECONOMIC STABILITY: FOOD INSECURITY: WITHIN THE PAST 12 MONTHS, YOU WORRIED THAT YOUR FOOD WOULD RUN OUT BEFORE YOU GOT MONEY TO BUY MORE.: NEVER TRUE

## 2024-01-02 SDOH — ECONOMIC STABILITY: HOUSING INSECURITY
IN THE LAST 12 MONTHS, WAS THERE A TIME WHEN YOU DID NOT HAVE A STEADY PLACE TO SLEEP OR SLEPT IN A SHELTER (INCLUDING NOW)?: NO

## 2024-01-02 SDOH — ECONOMIC STABILITY: FOOD INSECURITY: WITHIN THE PAST 12 MONTHS, THE FOOD YOU BOUGHT JUST DIDN'T LAST AND YOU DIDN'T HAVE MONEY TO GET MORE.: NEVER TRUE

## 2024-01-02 SDOH — ECONOMIC STABILITY: INCOME INSECURITY: HOW HARD IS IT FOR YOU TO PAY FOR THE VERY BASICS LIKE FOOD, HOUSING, MEDICAL CARE, AND HEATING?: NOT HARD AT ALL

## 2024-01-02 ASSESSMENT — PATIENT HEALTH QUESTIONNAIRE - PHQ9
1. LITTLE INTEREST OR PLEASURE IN DOING THINGS: 0
SUM OF ALL RESPONSES TO PHQ QUESTIONS 1-9: 0
SUM OF ALL RESPONSES TO PHQ QUESTIONS 1-9: 0
SUM OF ALL RESPONSES TO PHQ9 QUESTIONS 1 & 2: 0
SUM OF ALL RESPONSES TO PHQ QUESTIONS 1-9: 0
2. FEELING DOWN, DEPRESSED OR HOPELESS: 0
SUM OF ALL RESPONSES TO PHQ QUESTIONS 1-9: 0

## 2024-01-02 NOTE — TELEPHONE ENCOUNTER
Script sent to pharmacy.  Thank you,  Electronically signed by Jorge Lopez MD on 1/2/2024 at 6:13 PM

## 2024-01-02 NOTE — PROGRESS NOTES
Attending Physician Note    I saw and evaluated the patient, performing the key elements of the service.  I discussed the findings, assessment and plan with the resident and agree with the resident's findings and plan as documented in the resident's note.  GC modifier added.    Brief summary:  HTN, not controlled - add lisinopril.  Cont bumetanide and metoprolol.  ?contribution of elevated BP to sx of tinnitus and lightheadedness.  Pt monitoring BP at home.  ECG without concerning findings.

## 2024-01-02 NOTE — TELEPHONE ENCOUNTER
Pharmacy called. They need directions as to how many times per day pt is supposed to take the flonase. Please send in new Rx.

## 2024-01-02 NOTE — PROGRESS NOTES
[facility] PROFESSIONAL [facility] MEDICINE PRACTICE  [address]  [place] [id number]  Dept: [phone]  Loc: [phone]      Nicolas Walsh (:  1960) is a 63 y.o. male,Established patient, here for evaluation of the following chief complaint(s):  Head Congestion (head pressure causing dissiness. Dissiness started 3 weeks ago per patient . BP has been running higher now. Slight Nausea no Vomitting or diarrhea.)      ASSESSMENT/PLAN:  1. Primary hypertension  -     lisinopril (PRINIVIL;ZESTRIL) 10 MG tablet; Take 1 tablet by mouth daily, Disp-30 tablet, R-5Normal  -     TSH With Reflex Ft4; Future  -     Microalbumin / Creatinine Urine Ratio; Future  -     Basic Metabolic Panel; Future  -     Comprehensive Metabolic Panel; Future  -     EKG 12 Lead - Clinic Performed  2. Tinnitus of left ear  3. Dizziness  -     EKG 12 Lead - Clinic Performed  4. Nasal septal deviation  -     fluticasone (FLONASE) 50 MCG/ACT nasal spray; 1 spray by Each Nostril route as needed for Rhinitis (Congestion), Disp-1 each, R-2Normal  5. Nasal congestion    Blood pressure 148/90 in office today chronic hypertension, uncontrolled with symptoms of dizziness and headache.  Patient reports home readings averaging 190/102 at home and \"never below 170 systolic.\"  EKG in office demonstrates sinus bradycardia, has had prior echo through cardiology.  Start lisinopril 10 mg with 2 week follow up and to review labs ordered.    Has had multiple hearing tests, multiple visits with ENT, Neurology without help.  Now on different insurance.   Follows with Dr. Diop cardiology, going to Dr. Quintero.  EKG performed in office, reviewed: sinus bradycardia.  History of occipital neuralgia: Was at [facility] for injections that typically resolves the symptoms but due to insurance is not at that opportunity, has new insurance and will try to get back in.     Nasal septal deviation status post surgery

## 2024-01-08 ENCOUNTER — TELEPHONE (OUTPATIENT)
Dept: FAMILY MEDICINE CLINIC | Age: 64
End: 2024-01-08

## 2024-01-08 NOTE — TELEPHONE ENCOUNTER
Patient called and states that he would like to know his results from his labs. Labs are in the media tab.

## 2024-01-09 ENCOUNTER — TELEPHONE (OUTPATIENT)
Dept: CARDIOLOGY CLINIC | Age: 64
End: 2024-01-09

## 2024-01-09 NOTE — TELEPHONE ENCOUNTER
Nicolas called his PCP wants to start him on Lisinopril 10 mg qd his B/P was elevated 1/2/24 148/90 at their office. 12/31/23 174/93 p 74 12/30/23 171/95 12/29/23 155/85 p 78.Called to [facility] it is corie to take Lisinopril 10 mg qd. He voiced understanding.

## 2024-01-09 NOTE — TELEPHONE ENCOUNTER
Thank you for getting your blood work completed.  Electrolytes within normal limits.  Renal function within normal limits including microalbumin.  TSH and thyroid function within normal limits.  Liver function is normal.  We can discuss further at next visit on 1/16/2024.  Thank you,  Electronically signed by Jorge Lopez MD on 1/9/2024 at 6:13 PM

## 2024-01-16 ENCOUNTER — OFFICE VISIT (OUTPATIENT)
Dept: FAMILY MEDICINE CLINIC | Age: 64
End: 2024-01-16
Payer: COMMERCIAL

## 2024-01-16 VITALS
TEMPERATURE: 98.1 F | RESPIRATION RATE: 21 BRPM | BODY MASS INDEX: 40.28 KG/M2 | WEIGHT: 297.4 LBS | HEIGHT: 72 IN | DIASTOLIC BLOOD PRESSURE: 110 MMHG | OXYGEN SATURATION: 98 % | HEART RATE: 80 BPM | SYSTOLIC BLOOD PRESSURE: 182 MMHG

## 2024-01-16 DIAGNOSIS — E66.01 CLASS 3 SEVERE OBESITY DUE TO EXCESS CALORIES WITH BODY MASS INDEX (BMI) OF 40.0 TO 44.9 IN ADULT, UNSPECIFIED WHETHER SERIOUS COMORBIDITY PRESENT (HCC): ICD-10-CM

## 2024-01-16 DIAGNOSIS — I10 PRIMARY HYPERTENSION: Primary | ICD-10-CM

## 2024-01-16 DIAGNOSIS — E88.810 METABOLIC SYNDROME: ICD-10-CM

## 2024-01-16 PROCEDURE — 3077F SYST BP >= 140 MM HG: CPT | Performed by: STUDENT IN AN ORGANIZED HEALTH CARE EDUCATION/TRAINING PROGRAM

## 2024-01-16 PROCEDURE — 3080F DIAST BP >= 90 MM HG: CPT | Performed by: STUDENT IN AN ORGANIZED HEALTH CARE EDUCATION/TRAINING PROGRAM

## 2024-01-16 PROCEDURE — 99214 OFFICE O/P EST MOD 30 MIN: CPT | Performed by: STUDENT IN AN ORGANIZED HEALTH CARE EDUCATION/TRAINING PROGRAM

## 2024-01-16 RX ORDER — HYDROCHLOROTHIAZIDE 12.5 MG/1
12.5 TABLET ORAL EVERY MORNING
Qty: 30 TABLET | Refills: 1 | Status: SHIPPED | OUTPATIENT
Start: 2024-01-16

## 2024-01-16 RX ORDER — TIRZEPATIDE 5 MG/.5ML
5 INJECTION, SOLUTION SUBCUTANEOUS WEEKLY
Qty: 2 ML | Refills: 0 | Status: SHIPPED | OUTPATIENT
Start: 2024-02-16

## 2024-01-16 RX ORDER — TIRZEPATIDE 2.5 MG/.5ML
2.5 INJECTION, SOLUTION SUBCUTANEOUS WEEKLY
Qty: 2 ML | Refills: 0 | Status: SHIPPED | OUTPATIENT
Start: 2024-01-16

## 2024-01-16 RX ORDER — LISINOPRIL 20 MG/1
20 TABLET ORAL DAILY
Qty: 30 TABLET | Refills: 1 | Status: SHIPPED | OUTPATIENT
Start: 2024-01-16

## 2024-01-16 RX ORDER — TIRZEPATIDE 7.5 MG/.5ML
7.5 INJECTION, SOLUTION SUBCUTANEOUS WEEKLY
Qty: 2 ML | Refills: 0 | Status: SHIPPED | OUTPATIENT
Start: 2024-03-16

## 2024-01-16 NOTE — PROGRESS NOTES
I saw and evaluated the patient, performing the key elements of the service.  I discussed the findings, assessment and plan with the resident and agree with the resident's findings and plan as documented in the resident's note. GC modifier added.  
extremities on bumex, will start HCTZ and wait on starting amlodipine.   Continue potassium supplement.    Metabolic syndrome and obesity  Elevated Triglycerides  Low HDL  Obesity with BMI 40.33   Uncontrolled HTN on medication.  Weight 135 kilograms/197 pounds.  Start Zepbound taper up, discussed healthy lifestyle changes including diet and exercise.      Return in about 2 weeks (around 1/30/2024) for HTH.    SUBJECTIVE/OBJECTIVE:  HPI  Presents for follow up HTN and dizzy spells.  No longer experiencing dizzy spells.   BP still high, gets ringing of ears that is worse when BP is high.  Has a strong desire to lose weight, has had difficulty doing it on his own.  Would like to discuss options of GLP-1's - patient states he is willing to pay out-of-pocket    Review of Systems  Constitutional: Obese. Negative for chills, diaphoresis and fever.   HENT: Negative for congestion and sore throat.    Eyes: Negative for redness and visual disturbance.   Respiratory: Negative for cough, chest tightness and shortness of breath.    Cardiovascular: Negative for palpitations and leg swelling.   Gastrointestinal: Negative for abdominal distention, abdominal pain and nausea.   Genitourinary: Negative for difficulty urinating and dysuria.   Musculoskeletal: Negative for back pain and neck pain.   Skin: Negative for color change and pallor.   Neurological: Negative for dizziness and light-headedness.   Psychiatric/Behavioral: Negative for agitation and confusion. The patient is not nervous/anxious    Physical Exam  General appearance: Obese. No apparent distress, appears stated age and cooperative.  HEENT: Pupils equal, round, and reactive to light. Conjunctivae/corneas clear.  Neck: Supple, with full range of motion. No jugular venous distention. Trachea midline.  Respiratory:  Normal respiratory effort. Clear to auscultation.  Cardiovascular: Regular rate and rhythm with normal S1/S2 without murmurs, rubs or gallops.  Abdomen:

## 2024-01-18 ENCOUNTER — OFFICE VISIT (OUTPATIENT)
Dept: CARDIOLOGY CLINIC | Age: 64
End: 2024-01-18
Payer: COMMERCIAL

## 2024-01-18 VITALS
HEIGHT: 72 IN | SYSTOLIC BLOOD PRESSURE: 160 MMHG | DIASTOLIC BLOOD PRESSURE: 72 MMHG | WEIGHT: 291 LBS | BODY MASS INDEX: 39.42 KG/M2 | HEART RATE: 56 BPM

## 2024-01-18 DIAGNOSIS — R06.02 SHORTNESS OF BREATH: Primary | ICD-10-CM

## 2024-01-18 PROCEDURE — 99204 OFFICE O/P NEW MOD 45 MIN: CPT | Performed by: INTERNAL MEDICINE

## 2024-01-18 PROCEDURE — 3077F SYST BP >= 140 MM HG: CPT | Performed by: INTERNAL MEDICINE

## 2024-01-18 PROCEDURE — 3078F DIAST BP <80 MM HG: CPT | Performed by: INTERNAL MEDICINE

## 2024-01-18 RX ORDER — AMLODIPINE BESYLATE 5 MG/1
5 TABLET ORAL DAILY
Qty: 90 TABLET | Refills: 1 | Status: SHIPPED | OUTPATIENT
Start: 2024-01-18

## 2024-01-18 NOTE — PROGRESS NOTES
New patient here for check up former Dr. Diop pt  elevated b/p    Pt c/o headache , noise on left side with high b/p ,  increased dizziness , swelling in legs and feet,     Pt has not started HCTZ wanted to discuss before starting     Pt states med list is correct from last appt 1/16/24

## 2024-01-18 NOTE — PROGRESS NOTES
[facility] [place] [facility]  [facility] CARDIOLOGY  [address]  [address]  [facility] [address]  Dept: [phone]  Dept Fax: [phone]  Loc: [phone]    Visit Date: 1/18/2024    Mr. Walsh is a 63 y.o. male  who presented for:  Chief Complaint   Patient presents with    New Patient    Coronary Artery Disease     Former Dr. Diop     Hypertension       HPI:   64 yo M c hx of HTN, HLD, CELINA, occipital neurlagia is here to establish care. Patient used to follow up with Dr. Diop.    Had cath in 2/2022 with FFR of RCA 0.84.  He states he has gained weight and his BP has been elevated.    He overall states not feeling well due to his headaches and occipital neuralgia.  Today BP is 160/72.         Current Outpatient Medications:     amLODIPine (NORVASC) 5 MG tablet, Take 1 tablet by mouth daily, Disp: 90 tablet, Rfl: 1    Tirzepatide-Weight Management (ZEPBOUND) 2.5 MG/0.5ML SOAJ, Inject 2.5 mg into the skin once a week, Disp: 2 mL, Rfl: 0    [START ON 2/16/2024] Tirzepatide-Weight Management (ZEPBOUND) 5 MG/0.5ML SOAJ, Inject 5 mg into the skin once a week, Disp: 2 mL, Rfl: 0    lisinopril (PRINIVIL;ZESTRIL) 20 MG tablet, Take 1 tablet by mouth daily, Disp: 30 tablet, Rfl: 1    fluticasone (FLONASE) 50 MCG/ACT nasal spray, 1 spray by Each Nostril route daily as needed for Rhinitis (Congestion), Disp: 1 each, Rfl: 2    tadalafil (CIALIS) 20 MG tablet, TAKE 1/2 TABLET BY MOUTH ONE TIME A DAY, Disp: 90 tablet, Rfl: 0    bumetanide (BUMEX) 2 MG tablet, Take 1 tablet by mouth daily, Disp: 90 tablet, Rfl: 3    potassium chloride (KLOR-CON M) 20 MEQ extended release tablet, TAKE 1 TABLET BY MOUTH EVERY DAY, Disp: 180 tablet, Rfl: 3    cetirizine (ZYRTEC) 10 MG tablet, TAKE 1 TABLET BY MOUTH DAILY, Disp: 90 tablet, Rfl: 1    Multiple Vitamins-Minerals (MULTIVITAMIN ADULT PO), Take by mouth Indications: Ultra Light 121, Disp: , Rfl:     vitamin E 400 UNIT capsule, Take 1 capsule by mouth

## 2024-01-25 ENCOUNTER — TELEPHONE (OUTPATIENT)
Dept: FAMILY MEDICINE CLINIC | Age: 64
End: 2024-01-25

## 2024-01-25 ENCOUNTER — HOSPITAL ENCOUNTER (OUTPATIENT)
Age: 64
Discharge: HOME | End: 2024-01-27
Attending: INTERNAL MEDICINE
Payer: COMMERCIAL

## 2024-01-25 VITALS
BODY MASS INDEX: 39.42 KG/M2 | WEIGHT: 291 LBS | HEIGHT: 72 IN | SYSTOLIC BLOOD PRESSURE: 160 MMHG | DIASTOLIC BLOOD PRESSURE: 72 MMHG

## 2024-01-25 DIAGNOSIS — E88.810 METABOLIC SYNDROME: Primary | ICD-10-CM

## 2024-01-25 DIAGNOSIS — R06.02 SHORTNESS OF BREATH: ICD-10-CM

## 2024-01-25 DIAGNOSIS — E66.01 CLASS 3 SEVERE OBESITY DUE TO EXCESS CALORIES WITH BODY MASS INDEX (BMI) OF 40.0 TO 44.9 IN ADULT, UNSPECIFIED WHETHER SERIOUS COMORBIDITY PRESENT (HCC): ICD-10-CM

## 2024-01-25 LAB
ECHO AO ASC DIAM: 3.6 CM
ECHO AO ASCENDING AORTA INDEX: 1.44 CM/M2
ECHO AV CUSP MM: 2.4 CM
ECHO AV PEAK GRADIENT: 8 MMHG
ECHO AV PEAK VELOCITY: 1.4 M/S
ECHO AV VELOCITY RATIO: 0.71
ECHO BSA: 2.59 M2
ECHO LA AREA 2C: 16 CM2
ECHO LA AREA 4C: 18.4 CM2
ECHO LA DIAMETER INDEX: 1.32 CM/M2
ECHO LA DIAMETER: 3.3 CM
ECHO LA MAJOR AXIS: 5.6 CM
ECHO LA MINOR AXIS: 5.5 CM
ECHO LA VOL BP: 43 ML (ref 18–58)
ECHO LA VOL MOD A2C: 39 ML (ref 18–58)
ECHO LA VOL MOD A4C: 48 ML (ref 18–58)
ECHO LA VOL/BSA BIPLANE: 17 ML/M2 (ref 16–34)
ECHO LA VOLUME INDEX MOD A2C: 16 ML/M2 (ref 16–34)
ECHO LA VOLUME INDEX MOD A4C: 19 ML/M2 (ref 16–34)
ECHO LV E' LATERAL VELOCITY: 11 CM/S
ECHO LV E' SEPTAL VELOCITY: 6 CM/S
ECHO LV FRACTIONAL SHORTENING: 28 % (ref 28–44)
ECHO LV INTERNAL DIMENSION DIASTOLE INDEX: 2.12 CM/M2
ECHO LV INTERNAL DIMENSION DIASTOLIC: 5.3 CM (ref 4.2–5.9)
ECHO LV INTERNAL DIMENSION SYSTOLIC INDEX: 1.52 CM/M2
ECHO LV INTERNAL DIMENSION SYSTOLIC: 3.8 CM
ECHO LV ISOVOLUMETRIC RELAXATION TIME (IVRT): 77 MS
ECHO LV IVSD: 0.9 CM (ref 0.6–1)
ECHO LV MASS 2D: 187.3 G (ref 88–224)
ECHO LV MASS INDEX 2D: 74.9 G/M2 (ref 49–115)
ECHO LV POSTERIOR WALL DIASTOLIC: 1 CM (ref 0.6–1)
ECHO LV RELATIVE WALL THICKNESS RATIO: 0.38
ECHO LVOT PEAK GRADIENT: 4 MMHG
ECHO LVOT PEAK VELOCITY: 1 M/S
ECHO MV A VELOCITY: 0.85 M/S
ECHO MV E DECELERATION TIME (DT): 250 MS
ECHO MV E VELOCITY: 0.95 M/S
ECHO MV E/A RATIO: 1.12
ECHO MV E/E' LATERAL: 8.64
ECHO MV E/E' RATIO (AVERAGED): 12.23
ECHO PULMONARY ARTERY END DIASTOLIC PRESSURE: 2 MMHG
ECHO PV MAX VELOCITY: 0.8 M/S
ECHO PV PEAK GRADIENT: 2 MMHG
ECHO PV REGURGITANT MAX VELOCITY: 0.6 M/S
ECHO RV INTERNAL DIMENSION: 3.4 CM
ECHO RV TAPSE: 2.2 CM (ref 1.7–?)
ECHO TV E WAVE: 0.6 M/S
ECHO TV REGURGITANT MAX VELOCITY: 2.61 M/S
ECHO TV REGURGITANT PEAK GRADIENT: 27 MMHG

## 2024-01-25 PROCEDURE — 93306 TTE W/DOPPLER COMPLETE: CPT | Performed by: INTERNAL MEDICINE

## 2024-01-25 PROCEDURE — 93306 TTE W/DOPPLER COMPLETE: CPT

## 2024-01-25 NOTE — TELEPHONE ENCOUNTER
Called and spoke to pt, he is adamant on trying to get Zepbound approved. Pt states his insurance told him to have us resubmit a new prior auth for Zepbound with detail of why pt needs this medication. Pt states pharmacy told him that Contrave is not comparable to Zepbound, so pt does not want to try Contrave. Please advise.

## 2024-01-25 NOTE — TELEPHONE ENCOUNTER
Received call from pt stating he spoke to his insurance, and they informed him that a new PA would need sent in stating why this medication is necessary for him instead of alternatives. Pt states insurance offered him Contrabe and Popiramape as alternatives, but pharmacy told him these medications are not as effective as Zepbound. For me to start a new PA, what is your opinion on why Zepbound is the only option for this pt. Please advise.

## 2024-01-25 NOTE — TELEPHONE ENCOUNTER
They are very likely to decline without trying another option as discussed with the patient during the visit. I thinks it's a good idea and worth a try to start Contrave. I will put the order in to the pharmacy.  Thank you,  Electronically signed by Jorge Lopez MD on 1/25/2024 at 12:46 PM

## 2024-01-26 NOTE — TELEPHONE ENCOUNTER
We can try with the appeal at patient request:    Following information is our reason of attempting tirzepatide as first-line treatment:    Metabolic syndrome and obesity  Elevated Triglycerides  Low HDL  Obesity with BMI of 40.33   Uncontrolled HTN on medication.  Weight 135 kilograms/197 pounds.  Start Zepbound taper up, discussed healthy lifestyle changes including diet and exercise.    Weight loss will help with the above problems and help in his long term health in hopes of improving overall health and decrease lifelong health costs.    Thank you,  Electronically signed by Jorge Lopez MD on 1/26/2024 at 7:58 AM

## 2024-01-29 LAB
B-TYPE NATRIURETIC PEPTIDE: 11.7 PG/ML
BUN BLDV-MCNC: 13 MG/DL
CALCIUM SERPL-MCNC: 9.2 MG/DL
CHLORIDE BLD-SCNC: 102 MMOL/L
CO2: 24 MMOL/L
CREAT SERPL-MCNC: 1.08 MG/DL
EGFR: 78
GLUCOSE BLD-MCNC: NORMAL MG/DL
POTASSIUM SERPL-SCNC: 4.2 MMOL/L
SODIUM BLD-SCNC: 140 MMOL/L

## 2024-01-30 ENCOUNTER — TELEPHONE (OUTPATIENT)
Dept: FAMILY MEDICINE CLINIC | Age: 64
End: 2024-01-30

## 2024-01-30 NOTE — TELEPHONE ENCOUNTER
Spoke with patient. Says he will discuss with Dr. Lopez at his appointment on Thursday. States he'd like to see what Dr. Lopez put in his notes for it to still be denied.

## 2024-01-30 NOTE — TELEPHONE ENCOUNTER
----- Message from Jorge Lopez MD sent at 1/30/2024  9:40 AM EST -----  Glucose slightly elevated otherwise normal BMP.  Thank you,  Electronically signed by Jorge Lopez MD on 1/30/2024 at 9:40 AM

## 2024-02-01 ENCOUNTER — OFFICE VISIT (OUTPATIENT)
Dept: FAMILY MEDICINE CLINIC | Age: 64
End: 2024-02-01
Payer: COMMERCIAL

## 2024-02-01 VITALS
HEART RATE: 56 BPM | HEIGHT: 72 IN | WEIGHT: 299.4 LBS | SYSTOLIC BLOOD PRESSURE: 138 MMHG | RESPIRATION RATE: 12 BRPM | BODY MASS INDEX: 40.55 KG/M2 | TEMPERATURE: 97.8 F | DIASTOLIC BLOOD PRESSURE: 86 MMHG | OXYGEN SATURATION: 97 %

## 2024-02-01 DIAGNOSIS — E88.810 METABOLIC SYNDROME: ICD-10-CM

## 2024-02-01 DIAGNOSIS — I10 PRIMARY HYPERTENSION: Primary | ICD-10-CM

## 2024-02-01 PROCEDURE — 3079F DIAST BP 80-89 MM HG: CPT | Performed by: STUDENT IN AN ORGANIZED HEALTH CARE EDUCATION/TRAINING PROGRAM

## 2024-02-01 PROCEDURE — 99213 OFFICE O/P EST LOW 20 MIN: CPT | Performed by: STUDENT IN AN ORGANIZED HEALTH CARE EDUCATION/TRAINING PROGRAM

## 2024-02-01 PROCEDURE — 3075F SYST BP GE 130 - 139MM HG: CPT | Performed by: STUDENT IN AN ORGANIZED HEALTH CARE EDUCATION/TRAINING PROGRAM

## 2024-02-01 NOTE — PATIENT INSTRUCTIONS
Thank you   Thank you for trusting us with your healthcare needs. You may receive a survey regarding today's visit. It would help us out if you would take a few moments to provide your feedback. We value your input.  Please bring in ALL medications BOTTLES, including inhalers, herbal supplements, over the counter, prescribed & non-prescribed medicine. The office would like actual medication bottles and a list.   Please note our OFFICE POLICIES:   A.  While Dr. Valencia is in [place], you may have a Video Visit with him, as long as you are in the state of [place] or [place].  You must have a PCP listed on your account.  If you do not,you will not be able to have an appointment virtually with him.  B.  Dr. Valencia is only licensed in [place] and [place].  You must be in one of these states to do a video visit.  C.  We are unable to change a diagnosis AFTER the test has been performed.          4.  Prior to getting your labs drawn, check with your insurance company for benefits and eligibility of lab services.  Often, insurance companies cover certain tests for preventative visits only.  It is patient's responsibility to    see what is covered.    5.  If the list below has been completed, PLEASE FAX RECORDS TO OUR OFFICE @ [phone]. Once the records have been received we will update your records at our office:  Health Maintenance Due   Topic Date Due    COVID-19 Vaccine (1) Never done    Shingles vaccine (1 of 2) Never done    Diabetes screen  10/03/2021    Respiratory Syncytial Virus (RSV) Pregnant or age 60 yrs+ (1 - 1-dose 60+ series) Never done    Flu vaccine (1) 08/01/2023    Colorectal Cancer Screen  12/02/2023

## 2024-02-01 NOTE — PROGRESS NOTES
Health Maintenance Due   Topic Date Due    COVID-19 Vaccine (1) Never done    Shingles vaccine (1 of 2) Never done    Diabetes screen  10/03/2021    Respiratory Syncytial Virus (RSV) Pregnant or age 60 yrs+ (1 - 1-dose 60+ series) Never done    Flu vaccine (1) 08/01/2023    Colorectal Cancer Screen  12/02/2023

## 2024-02-01 NOTE — PROGRESS NOTES
[facility] PROFESSIONAL [facility] MEDICINE PRACTICE  [address]  [place] [id number]  Dept: [phone]  Loc: [phone]      Nicolas Walsh (:  1960) is a 63 y.o. male,Established patient, here for evaluation of the following chief complaint(s):  2 Week Follow-Up (HTN)      ASSESSMENT/PLAN:  1. Primary hypertension  2. Metabolic syndrome    Blood pressure 138/86 in office today.  Continue lisinopril 20 mg.  Continue amlodipine 5mg.  Continue potassium supplement, BMP ordered 2 weeks ago.    Metabolic syndrome and obesity  Elevated Triglycerides  Low HDL  Obesity with BMI 40.33   Uncontrolled HTN on medication.  Weight 135 kilograms/299 pounds.  Start Zepbound. Discussed healthy lifestyle changes including diet and exercise.    Return in about 3 months (around 2024) for HTN and metabolic syndrome.    SUBJECTIVE/OBJECTIVE:  HPI  Patient presents to clinic for follow-up of hypertension.  Dizziness improved, saw cardiology.  Doing well. Got discount card through zepbound.   Working on diet    Review of Systems  Constitutional: Negative for chills, diaphoresis and fever.   HENT: Negative for congestion and sore throat.    Eyes: Negative for redness and visual disturbance.  Respiratory: Negative for cough, chest tightness and shortness of breath.    Cardiovascular: Negative for palpitations and leg swelling.   Gastrointestinal: Negative for abdominal distention, abdominal pain and nausea.   Musculoskeletal: Negative for back pain and neck pain.   Skin: Negative for color change and pallor.   Neurological: Negative for dizziness and light-headedness.   Psychiatric/Behavioral: Negative for agitation and confusion. The patient is not nervous/anxious    Physical Exam  General appearance: No apparent distress, appears stated age and cooperative.  HEENT: Pupils equal, round, and reactive to light. Conjunctivae/corneas clear.  Neck: Supple, with full range of motion.

## 2024-02-05 PROBLEM — I25.10 CORONARY ARTERY DISEASE INVOLVING NATIVE CORONARY ARTERY OF NATIVE HEART WITHOUT ANGINA PECTORIS: Status: ACTIVE | Noted: 2024-02-05

## 2024-02-06 ENCOUNTER — OFFICE VISIT (OUTPATIENT)
Dept: ENT CLINIC | Age: 64
End: 2024-02-06
Payer: COMMERCIAL

## 2024-02-06 VITALS
RESPIRATION RATE: 16 BRPM | TEMPERATURE: 97.5 F | OXYGEN SATURATION: 97 % | BODY MASS INDEX: 40.78 KG/M2 | DIASTOLIC BLOOD PRESSURE: 78 MMHG | HEIGHT: 72 IN | WEIGHT: 301.1 LBS | HEART RATE: 74 BPM | SYSTOLIC BLOOD PRESSURE: 130 MMHG

## 2024-02-06 DIAGNOSIS — H93.8X2 PRESSURE SENSATION IN LEFT EAR: Primary | ICD-10-CM

## 2024-02-06 DIAGNOSIS — H70.12 CHRONIC MASTOIDITIS OF LEFT SIDE: ICD-10-CM

## 2024-02-06 DIAGNOSIS — H91.92 DECREASED HEARING, LEFT: ICD-10-CM

## 2024-02-06 DIAGNOSIS — R42 DYSEQUILIBRIUM: ICD-10-CM

## 2024-02-06 DIAGNOSIS — H93.12 LEFT-SIDED TINNITUS: ICD-10-CM

## 2024-02-06 DIAGNOSIS — H69.92 ETD (EUSTACHIAN TUBE DYSFUNCTION), LEFT: ICD-10-CM

## 2024-02-06 PROCEDURE — 3075F SYST BP GE 130 - 139MM HG: CPT | Performed by: PHYSICIAN ASSISTANT

## 2024-02-06 PROCEDURE — 3078F DIAST BP <80 MM HG: CPT | Performed by: PHYSICIAN ASSISTANT

## 2024-02-06 PROCEDURE — 99214 OFFICE O/P EST MOD 30 MIN: CPT | Performed by: PHYSICIAN ASSISTANT

## 2024-02-06 NOTE — PROGRESS NOTES
W WO CONTRAST      6. Dysequilibrium  R42 Audiometry with tympanometry     CT IAC POSTERIOR FOSSA WO CONTRAST     CANCELED: MRI IAC POSTERIOR FOSSA W WO CONTRAST           Patient is able to auto inflate his left ear and reports temporary improvement in his symptoms before his TM returns to a retracted position.  Will have patient continue Flonase for time being, but has not seem to make a significant improvement in his symptoms whatsoever.  Created new auto inflation attempts.  Will have patient return with same-day audiogram for consideration of myringotomy tube placement with Dr. Garrett.  Some of patient's descriptions would be consistent with Ménière's disease, but previous ECOG was negative. May consider course of dyazide to see effects on symptoms if he finds possible myringotomy tube to be of benefit to his symptoms.   I originally ordered an MRI IACs with plans for him to get this completed at the same time as his MRI of the brain.  Unfortunately, this cannot be coordinated by the time of his MRI next week.  Patient did not want to delay his scheduled MRI on 2/13/2024.  Thus, changed the order to CT IACs without contrast as that should be appropriate enough imaging to assess the chronic mastoid effusion/soft tissue density previously noted on CT IAC  Patient expressed understanding of the plan and thanked me.  He will contact the office in the meantime with new/worsening symptoms or other ENT concerns    (Please note that portions of this note may have been completed with a voice recognition program.  Efforts were made to edit the dictation but occasionally words are mis-transcribed.)    Electronically signed by SHANNAN See on 2/6/2024 at 11:20 AM

## 2024-02-09 ENCOUNTER — TELEPHONE (OUTPATIENT)
Dept: ENT CLINIC | Age: 64
End: 2024-02-09

## 2024-02-09 DIAGNOSIS — H69.92 ETD (EUSTACHIAN TUBE DYSFUNCTION), LEFT: ICD-10-CM

## 2024-02-09 DIAGNOSIS — R42 DYSEQUILIBRIUM: ICD-10-CM

## 2024-02-09 DIAGNOSIS — H93.8X2 PRESSURE SENSATION IN LEFT EAR: Primary | ICD-10-CM

## 2024-02-09 DIAGNOSIS — H93.12 LEFT-SIDED TINNITUS: ICD-10-CM

## 2024-02-09 DIAGNOSIS — H70.12 CHRONIC MASTOIDITIS OF LEFT SIDE: ICD-10-CM

## 2024-02-09 DIAGNOSIS — H91.92 DECREASED HEARING, LEFT: ICD-10-CM

## 2024-02-09 NOTE — TELEPHONE ENCOUNTER
I called patient to inform him of the CT denial. Asked if he would be willing to reconsider the MRI as originally ordered. He asked why, told him we would not be able to see what we need to with the MRI that is currently scheduled. He verbalized understanding and agreed to the MRI. Please place new order. I will fax to [facility] at [phone] be added to current MRI already scheduled.

## 2024-02-23 ENCOUNTER — HOSPITAL ENCOUNTER (EMERGENCY)
Age: 64
Discharge: HOME | End: 2024-02-23
Payer: COMMERCIAL

## 2024-02-23 ENCOUNTER — APPOINTMENT (OUTPATIENT)
Dept: CT IMAGING | Age: 64
End: 2024-02-23
Payer: COMMERCIAL

## 2024-02-23 VITALS
OXYGEN SATURATION: 96 % | BODY MASS INDEX: 38.64 KG/M2 | DIASTOLIC BLOOD PRESSURE: 73 MMHG | HEART RATE: 61 BPM | WEIGHT: 285 LBS | TEMPERATURE: 97.5 F | RESPIRATION RATE: 19 BRPM | SYSTOLIC BLOOD PRESSURE: 157 MMHG

## 2024-02-23 DIAGNOSIS — I71.43 INFRARENAL ABDOMINAL AORTIC ANEURYSM (AAA) WITHOUT RUPTURE (HCC): ICD-10-CM

## 2024-02-23 DIAGNOSIS — K52.9 COLITIS: ICD-10-CM

## 2024-02-23 DIAGNOSIS — K92.2 LOWER GI BLEED: Primary | ICD-10-CM

## 2024-02-23 LAB
ABO: NORMAL
ALBUMIN SERPL BCG-MCNC: 4.1 G/DL (ref 3.5–5.1)
ALP SERPL-CCNC: 97 U/L (ref 38–126)
ALT SERPL W/O P-5'-P-CCNC: 28 U/L (ref 11–66)
ANION GAP SERPL CALC-SCNC: 13 MEQ/L (ref 8–16)
ANTIBODY SCREEN: NORMAL
AST SERPL-CCNC: 23 U/L (ref 5–40)
BASOPHILS ABSOLUTE: 0.1 THOU/MM3 (ref 0–0.1)
BASOPHILS NFR BLD AUTO: 0.5 %
BILIRUB CONJ SERPL-MCNC: < 0.2 MG/DL (ref 0–0.3)
BILIRUB SERPL-MCNC: 0.5 MG/DL (ref 0.3–1.2)
BUN SERPL-MCNC: 13 MG/DL (ref 7–22)
CALCIUM SERPL-MCNC: 9.2 MG/DL (ref 8.5–10.5)
CHLORIDE SERPL-SCNC: 104 MEQ/L (ref 98–111)
CO2 SERPL-SCNC: 22 MEQ/L (ref 23–33)
CREAT SERPL-MCNC: 0.8 MG/DL (ref 0.4–1.2)
DEPRECATED RDW RBC AUTO: 43.1 FL (ref 35–45)
EOSINOPHIL NFR BLD AUTO: 1 %
EOSINOPHILS ABSOLUTE: 0.1 THOU/MM3 (ref 0–0.4)
ERYTHROCYTE [DISTWIDTH] IN BLOOD BY AUTOMATED COUNT: 14.2 % (ref 11.5–14.5)
GFR SERPL CREATININE-BSD FRML MDRD: > 60 ML/MIN/1.73M2
GLUCOSE SERPL-MCNC: 108 MG/DL (ref 70–108)
HCT VFR BLD AUTO: 45.4 % (ref 42–52)
HGB BLD-MCNC: 15.9 GM/DL (ref 14–18)
IMM GRANULOCYTES # BLD AUTO: 0.04 THOU/MM3 (ref 0–0.07)
IMM GRANULOCYTES NFR BLD AUTO: 0.3 %
LACTATE SERPL-SCNC: 1.3 MMOL/L (ref 0.5–2)
LIPASE SERPL-CCNC: 23.9 U/L (ref 5.6–51.3)
LYMPHOCYTES ABSOLUTE: 1.6 THOU/MM3 (ref 1–4.8)
LYMPHOCYTES NFR BLD AUTO: 12 %
MCH RBC QN AUTO: 29.4 PG (ref 26–33)
MCHC RBC AUTO-ENTMCNC: 35 GM/DL (ref 32.2–35.5)
MCV RBC AUTO: 83.9 FL (ref 80–94)
MONOCYTES ABSOLUTE: 1 THOU/MM3 (ref 0.4–1.3)
MONOCYTES NFR BLD AUTO: 7.6 %
NEUTROPHILS NFR BLD AUTO: 78.6 %
NRBC BLD AUTO-RTO: 0 /100 WBC
OSMOLALITY SERPL CALC.SUM OF ELEC: 278.2 MOSMOL/KG (ref 275–300)
PLATELET # BLD AUTO: 159 THOU/MM3 (ref 130–400)
PMV BLD AUTO: 8.9 FL (ref 9.4–12.4)
POTASSIUM SERPL-SCNC: 3.6 MEQ/L (ref 3.5–5.2)
PROT SERPL-MCNC: 7 G/DL (ref 6.1–8)
RBC # BLD AUTO: 5.41 MILL/MM3 (ref 4.7–6.1)
RH FACTOR: NORMAL
SEGMENTED NEUTROPHILS ABSOLUTE COUNT: 10.5 THOU/MM3 (ref 1.8–7.7)
SODIUM SERPL-SCNC: 139 MEQ/L (ref 135–145)
WBC # BLD AUTO: 13.3 THOU/MM3 (ref 4.8–10.8)

## 2024-02-23 PROCEDURE — 2580000003 HC RX 258: Performed by: NURSE PRACTITIONER

## 2024-02-23 PROCEDURE — 6360000002 HC RX W HCPCS: Performed by: NURSE PRACTITIONER

## 2024-02-23 PROCEDURE — 86900 BLOOD TYPING SEROLOGIC ABO: CPT

## 2024-02-23 PROCEDURE — 74177 CT ABD & PELVIS W/CONTRAST: CPT

## 2024-02-23 PROCEDURE — 96375 TX/PRO/DX INJ NEW DRUG ADDON: CPT

## 2024-02-23 PROCEDURE — 80053 COMPREHEN METABOLIC PANEL: CPT

## 2024-02-23 PROCEDURE — 96361 HYDRATE IV INFUSION ADD-ON: CPT

## 2024-02-23 PROCEDURE — 86850 RBC ANTIBODY SCREEN: CPT

## 2024-02-23 PROCEDURE — 83605 ASSAY OF LACTIC ACID: CPT

## 2024-02-23 PROCEDURE — 85025 COMPLETE CBC W/AUTO DIFF WBC: CPT

## 2024-02-23 PROCEDURE — 86901 BLOOD TYPING SEROLOGIC RH(D): CPT

## 2024-02-23 PROCEDURE — 96374 THER/PROPH/DIAG INJ IV PUSH: CPT

## 2024-02-23 PROCEDURE — 99285 EMERGENCY DEPT VISIT HI MDM: CPT

## 2024-02-23 PROCEDURE — 83690 ASSAY OF LIPASE: CPT

## 2024-02-23 PROCEDURE — 6360000004 HC RX CONTRAST MEDICATION: Performed by: NURSE PRACTITIONER

## 2024-02-23 PROCEDURE — 82248 BILIRUBIN DIRECT: CPT

## 2024-02-23 PROCEDURE — 36415 COLL VENOUS BLD VENIPUNCTURE: CPT

## 2024-02-23 RX ORDER — HYDROCODONE BITARTRATE AND ACETAMINOPHEN 5; 325 MG/1; MG/1
1 TABLET ORAL EVERY 6 HOURS PRN
Qty: 12 TABLET | Refills: 0 | Status: SHIPPED | OUTPATIENT
Start: 2024-02-23 | End: 2024-02-26

## 2024-02-23 RX ORDER — AMOXICILLIN AND CLAVULANATE POTASSIUM 875; 125 MG/1; MG/1
1 TABLET, FILM COATED ORAL 2 TIMES DAILY
Qty: 14 TABLET | Refills: 0 | Status: SHIPPED | OUTPATIENT
Start: 2024-02-23 | End: 2024-03-01

## 2024-02-23 RX ORDER — DIPHENHYDRAMINE HYDROCHLORIDE 50 MG/ML
25 INJECTION INTRAMUSCULAR; INTRAVENOUS ONCE
Status: COMPLETED | OUTPATIENT
Start: 2024-02-23 | End: 2024-02-23

## 2024-02-23 RX ORDER — ONDANSETRON 4 MG/1
4 TABLET, ORALLY DISINTEGRATING ORAL 3 TIMES DAILY PRN
Qty: 21 TABLET | Refills: 0 | Status: SHIPPED | OUTPATIENT
Start: 2024-02-23

## 2024-02-23 RX ORDER — MORPHINE SULFATE 4 MG/ML
4 INJECTION, SOLUTION INTRAMUSCULAR; INTRAVENOUS ONCE
Status: COMPLETED | OUTPATIENT
Start: 2024-02-23 | End: 2024-02-23

## 2024-02-23 RX ORDER — 0.9 % SODIUM CHLORIDE 0.9 %
1000 INTRAVENOUS SOLUTION INTRAVENOUS ONCE
Status: COMPLETED | OUTPATIENT
Start: 2024-02-23 | End: 2024-02-23

## 2024-02-23 RX ORDER — HYDROCODONE BITARTRATE AND ACETAMINOPHEN 5; 325 MG/1; MG/1
1 TABLET ORAL EVERY 6 HOURS PRN
Qty: 12 TABLET | Refills: 0 | Status: SHIPPED | OUTPATIENT
Start: 2024-02-23 | End: 2024-02-23

## 2024-02-23 RX ORDER — DICYCLOMINE HYDROCHLORIDE 10 MG/1
10 CAPSULE ORAL EVERY 6 HOURS PRN
Qty: 30 CAPSULE | Refills: 0 | Status: SHIPPED | OUTPATIENT
Start: 2024-02-23

## 2024-02-23 RX ORDER — PROCHLORPERAZINE EDISYLATE 5 MG/ML
10 INJECTION INTRAMUSCULAR; INTRAVENOUS ONCE
Status: COMPLETED | OUTPATIENT
Start: 2024-02-23 | End: 2024-02-23

## 2024-02-23 RX ADMIN — SODIUM CHLORIDE 1000 ML: 9 INJECTION, SOLUTION INTRAVENOUS at 04:38

## 2024-02-23 RX ADMIN — IOPAMIDOL 80 ML: 755 INJECTION, SOLUTION INTRAVENOUS at 05:22

## 2024-02-23 RX ADMIN — PROCHLORPERAZINE EDISYLATE 10 MG: 5 INJECTION INTRAMUSCULAR; INTRAVENOUS at 07:34

## 2024-02-23 RX ADMIN — DIPHENHYDRAMINE HYDROCHLORIDE 25 MG: 50 INJECTION INTRAMUSCULAR; INTRAVENOUS at 07:35

## 2024-02-23 RX ADMIN — MORPHINE SULFATE 4 MG: 4 INJECTION, SOLUTION INTRAMUSCULAR; INTRAVENOUS at 07:35

## 2024-02-23 ASSESSMENT — PAIN - FUNCTIONAL ASSESSMENT: PAIN_FUNCTIONAL_ASSESSMENT: NONE - DENIES PAIN

## 2024-02-23 NOTE — DISCHARGE INSTRUCTIONS
Drink plenty of fluids.  Use Mag Citrate Gummies for constipation.      Take medication as directed.      Return if you feel light headed, dizzy, chest pain, shortness of breath.  Follow up as directed.     If given narcotics (opiates) during this Emergency Department visit, please do not drink, drive or operate any machinery for at least 4 - 6 hours.    Avoid eating any spicy food, milk type products or drinks that have caffeine in it.  Take all medications as prescribed.  For pain use ibuprofen (Motrin) or acetaminophen (Tylenol), unless prescribed medications that have acetaminophen in it.  You can take over the counter acetaminophen tablets (1 - 2 tablets of the 500-mg strength every 6 hours) or ibuprofen tablets (2 tablets every 4 hours).    PLEASE RETURN TO THE EMERGENCY DEPARTMENT IMMEDIATELY for worsening symptoms, or if you develop any concerning symptoms such as: high fever not relieved by acetaminophen (Tylenol) and/or ibuprofen (Motrin), chills, shortness of breath, chest pain, persistent nausea and/or vomiting, numbness, weakness or tingling in the arms or legs or change in color of the extremities, changes in mental status, persistent headache, blurry vision.    Return within 8 - 12 hours if you have any of the following: worsening of pain in your abdomen, no food sounds good to you, you continue to vomit, pain goes to your back or testicles, have pain in the abdomen when going over a bump in the car or when you jump up and down, inability to urinate, unable to follow up with your physician, or other any other care or concern.

## 2024-02-23 NOTE — ED TRIAGE NOTES
Patient ambulatory through the lobby with chief complaint of rectal bleeding since last night at 2100. Patient reports several episodes of bright red blood in the toilet. Patient denies pain at this time or other symptoms. VSS.

## 2024-02-26 ENCOUNTER — HOSPITAL ENCOUNTER (OUTPATIENT)
Dept: MRI IMAGING | Age: 64
Discharge: HOME | End: 2024-02-26
Attending: RADIOLOGY

## 2024-02-26 DIAGNOSIS — Z00.6 ENCOUNTER FOR EXAMINATION FOR NORMAL COMPARISON OR CONTROL IN CLINICAL RESEARCH PROGRAM: ICD-10-CM

## 2024-02-28 NOTE — ED PROVIDER NOTES
Abnormal; Notable for the following components:       Result Value    CO2 22 (*)     All other components within normal limits   CBC WITH AUTO DIFFERENTIAL - Abnormal; Notable for the following components:    WBC 13.3 (*)     MPV 8.9 (*)     Segs Absolute 10.5 (*)     All other components within normal limits   HEPATIC FUNCTION PANEL   LIPASE   LACTIC ACID   ANION GAP   GLOMERULAR FILTRATION RATE, ESTIMATED   OSMOLALITY   TYPE AND SCREEN       (Any cultures that may have been sent were not resulted at the time of this patient visit)    MEDICAL DECISION MAKING / ED COURSE:     Summary of Patient Presentation:      Plan:labs, imaging, occult    1) Number and Complexity of Problems                    Differential Diagnosis includes (but not limited to):  Colitis, GI bleed, hemorrhoids, diverticulitis                   Pertinent Comorbid Conditions:    Reviewed per the chart    2)  Data Reviewed (none if left blank, additional information can be found in the ED course)          My Independent interpretations:     EKG:           Imaging: Colitis.  Incidental finding of increase in size of infrarenal abdominal aortic aneurysm as well as an increase in size and number of renal cysts    Labs:      H&H are normal.  White count a little elevated at 13.3.  Otherwise reassuring                   Decision Rules/Clinical Scores utilized:                        Previous visit summary and patient history available on EMR and was reviewed.     History obtained from chart review and the patient.     Pertinent previous records reviewed:  previous notes, admissions and hospitalizations .    Code Status: Not addressed at time of initial evaluation             See Formal Diagnostic Results above for the lab and radiology tests and orders.         3)  Treatment and Disposition         ED Reassessment/Response to interventions:   stable     NA         Case discussed with consulting clinician/attending physician:  None/None         Shared

## 2024-03-04 ENCOUNTER — TELEPHONE (OUTPATIENT)
Dept: UROLOGY | Age: 64
End: 2024-03-04

## 2024-03-04 RX ORDER — TADALAFIL 20 MG/1
10 TABLET ORAL DAILY PRN
Qty: 90 TABLET | Refills: 0 | Status: SHIPPED | OUTPATIENT
Start: 2024-03-04

## 2024-03-04 NOTE — TELEPHONE ENCOUNTER
Nicolas Walsh called requesting a refill on the following medications:  Requested Prescriptions     Pending Prescriptions Disp Refills    tadalafil (CIALIS) 20 MG tablet 90 tablet 0     Pharmacy [email or web address]      Date of last visit: 03/28/2023  Date of next visit (if applicable): 4/11/2024    Patient takes 10 mg daily. He cuts the 20 mg in 1/2

## 2024-03-18 ENCOUNTER — TELEPHONE (OUTPATIENT)
Dept: UROLOGY | Age: 64
End: 2024-03-18

## 2024-03-18 ENCOUNTER — HOSPITAL ENCOUNTER (OUTPATIENT)
Dept: ULTRASOUND IMAGING | Age: 64
Discharge: HOME | End: 2024-03-18
Payer: COMMERCIAL

## 2024-03-18 DIAGNOSIS — N50.82 SCROTAL PAIN: Primary | ICD-10-CM

## 2024-03-18 DIAGNOSIS — N50.82 SCROTAL PAIN: ICD-10-CM

## 2024-03-18 PROCEDURE — 76870 US EXAM SCROTUM: CPT

## 2024-03-18 RX ORDER — LEVOFLOXACIN 500 MG/1
500 TABLET, FILM COATED ORAL DAILY
Qty: 10 TABLET | Refills: 0 | Status: SHIPPED | OUTPATIENT
Start: 2024-03-18 | End: 2024-03-28

## 2024-03-18 NOTE — TELEPHONE ENCOUNTER
Patient would like the US scheduled at [facility] and will give the urine specimen to lab. He voiced understanding to start the levaquin.

## 2024-03-18 NOTE — TELEPHONE ENCOUNTER
Patient is having scrotal edema. He denies increase warmth or redness. He has a constant ache. It started a couple of weeks ago. He has a history of varicoceles    Pain rated 6-7 on scale of 0-10 and tender to the touch. He has a follow up on 04/11/2024.    Patient wanted to move up his appointment but 04/11/2024 was the first opening.

## 2024-03-19 ENCOUNTER — OFFICE VISIT (OUTPATIENT)
Dept: UROLOGY | Age: 64
End: 2024-03-19
Payer: COMMERCIAL

## 2024-03-19 VITALS
BODY MASS INDEX: 38.78 KG/M2 | SYSTOLIC BLOOD PRESSURE: 142 MMHG | WEIGHT: 286.3 LBS | DIASTOLIC BLOOD PRESSURE: 88 MMHG | HEIGHT: 72 IN | RESPIRATION RATE: 16 BRPM

## 2024-03-19 DIAGNOSIS — N50.82 SCROTAL PAIN: Primary | ICD-10-CM

## 2024-03-19 PROCEDURE — 3077F SYST BP >= 140 MM HG: CPT

## 2024-03-19 PROCEDURE — 99214 OFFICE O/P EST MOD 30 MIN: CPT

## 2024-03-19 PROCEDURE — 3079F DIAST BP 80-89 MM HG: CPT

## 2024-03-19 NOTE — PATIENT INSTRUCTIONS
Continue Cialis  Finish Levaquin  Scrotal Support  Follow-up as scheduled   Call with questions, comments, or concerns. I recommend going to the ED for further evaluation if you develop fever, chills, nausea, vomiting, chest pain, SOB, or calf pain.

## 2024-03-19 NOTE — PROGRESS NOTES
[facility] [place] [facility]  [facility] UROLOGY  [address]  [address]  [facility] [address]  Dept: [phone]  Loc: [phone]    Visit Date: 3/19/2024        HPI:     HPI  Mr. Walsh is a 63-year-old male that presents in follow-up.     He does currently taking Cialis 10 mg for BPH and ED symptomatology. Complains primarily of incomplete emptying, urinary frequency, urine urgency, intermittency, weak stream, nocturia, and needing to strain to urinate.     He was last seen by [facility] in 3/2023 with Amy Jackson CNP. In 3/2023 patient noted a \"spot on the scrotum\" and did also endorse scrotal pain. Mr. Walsh reported \"black spots\" on the underside of the scrotum and scrotal swelling. He was ultimately treated with Keflex and mupirocin ointment TID for impetigo of the scrotum.     He is s/p R spermatocelectomy and hydrocelectomy  7/24/2020 with Dr. Almanzar at [facility].       He presents today for evaluation of scrotal edema. Patient called the office on 3/18/2024 with concerns of swelling, redness, and increased scrotal warmth.  Mr. Walsh did also endorse a constant scrotal ache. Scrotal US obtained and remarkable for L varicocele, small bilateral hydroceles with internal debris, and a spermatocele on the R epididymal head. Patient started on levaquin    Current Outpatient Medications   Medication Sig Dispense Refill    levoFLOXacin (LEVAQUIN) 500 MG tablet Take 1 tablet by mouth daily for 10 days 10 tablet 0    tadalafil (CIALIS) 20 MG tablet Take 0.5 tablets by mouth daily as needed for Erectile Dysfunction 90 tablet 0    ondansetron (ZOFRAN-ODT) 4 MG disintegrating tablet Take 1 tablet by mouth 3 times daily as needed for Nausea or Vomiting 21 tablet 0    dicyclomine (BENTYL) 10 MG capsule Take 1 capsule by mouth every 6 hours as needed (abd cramping) 30 capsule 0    amLODIPine (NORVASC) 5 MG tablet Take 1 tablet by mouth daily 90 tablet 1

## 2024-03-20 ENCOUNTER — TELEPHONE (OUTPATIENT)
Dept: ENT CLINIC | Age: 64
End: 2024-03-20

## 2024-03-20 NOTE — TELEPHONE ENCOUNTER
Patient was calling in regarding his MRI results.  He said that he had not heard anything yet and would like to Stan to call him to discuss.    He cancelled his upcoming appointments since he will be out of town, but he will reschedule them if needed after he speaks to you.

## 2024-03-21 ENCOUNTER — TELEPHONE (OUTPATIENT)
Dept: FAMILY MEDICINE CLINIC | Age: 64
End: 2024-03-21

## 2024-03-21 DIAGNOSIS — G62.9 NEUROPATHY: Primary | ICD-10-CM

## 2024-03-24 DIAGNOSIS — I10 PRIMARY HYPERTENSION: ICD-10-CM

## 2024-03-25 DIAGNOSIS — I10 PRIMARY HYPERTENSION: ICD-10-CM

## 2024-03-25 RX ORDER — LISINOPRIL 20 MG/1
20 TABLET ORAL DAILY
Qty: 90 TABLET | Refills: 3 | Status: SHIPPED | OUTPATIENT
Start: 2024-03-25

## 2024-03-25 RX ORDER — LISINOPRIL 20 MG/1
20 TABLET ORAL DAILY
Qty: 30 TABLET | Refills: 1 | Status: SHIPPED | OUTPATIENT
Start: 2024-03-25 | End: 2024-03-25 | Stop reason: SDUPTHER

## 2024-03-25 NOTE — TELEPHONE ENCOUNTER
Patient's last appointment was : 2/1/2024 with our   Patient's next appointment is : 5/3/2024 with our Dr. Lopez  Last refilled on: 01/16/2024  Which pharmacy does the script need sent to: [facility]       Lab Results   Component Value Date    LABA1C 5.4 03/22/2017     Lab Results   Component Value Date    CHOL 182 10/13/2022    TRIG 231 (H) 10/13/2022    HDL 34 10/13/2022    LDLCALC 102 10/13/2022     Lab Results   Component Value Date     02/23/2024    K 3.6 02/23/2024     02/23/2024    CO2 22 (L) 02/23/2024    BUN 13 02/23/2024    CREATININE 0.8 02/23/2024    GLUCOSE 108 02/23/2024    CALCIUM 9.2 02/23/2024    PROT 7.0 02/23/2024    LABALBU 4.1 02/23/2024    BILITOT 0.5 02/23/2024    ALKPHOS 97 02/23/2024    AST 23 02/23/2024    ALT 28 02/23/2024    LABGLOM >60 02/23/2024     Lab Results   Component Value Date    TSH 2.350 05/11/2020    T4FREE 1.00 07/11/2019     Lab Results   Component Value Date    WBC 13.3 (H) 02/23/2024    HGB 15.9 02/23/2024    HCT 45.4 02/23/2024    MCV 83.9 02/23/2024     02/23/2024

## 2024-03-25 NOTE — TELEPHONE ENCOUNTER
Script sent to pharmacy.  Thank you,  Electronically signed by Jorge Lopez MD on 3/25/2024 at 12:04 PM

## 2024-03-25 NOTE — TELEPHONE ENCOUNTER
Patient called to request a refill of lisinopril. It appears that the pharmacy sent a request to his PCP, Dr. Lopez for this. The patient states that he thinks that [facility] fills this medication for him. Please advise

## 2024-03-29 DIAGNOSIS — E88.810 METABOLIC SYNDROME: ICD-10-CM

## 2024-03-29 DIAGNOSIS — E66.01 CLASS 3 SEVERE OBESITY DUE TO EXCESS CALORIES WITH BODY MASS INDEX (BMI) OF 40.0 TO 44.9 IN ADULT, UNSPECIFIED WHETHER SERIOUS COMORBIDITY PRESENT (HCC): ICD-10-CM

## 2024-03-29 RX ORDER — TIRZEPATIDE 2.5 MG/.5ML
2.5 INJECTION, SOLUTION SUBCUTANEOUS WEEKLY
Qty: 2 ML | Refills: 0 | Status: CANCELLED | OUTPATIENT
Start: 2024-03-29

## 2024-03-29 NOTE — TELEPHONE ENCOUNTER
I called patient and reviewed Tylers recommendation with him. He said that he would take it under advisement and let us know what he decides. He would call back when he is ready.

## 2024-03-29 NOTE — TELEPHONE ENCOUNTER
Patient's last appointment was : 2/1/2024 with our   Patient's next appointment is : 5/3/2024 with our Dr. Lopez  Last refilled on: 1-16-24  Which pharmacy does the script need sent to: [facility] [place]    Pt states that [facility] does not have the 7.5 dose and they recommended pt stay on 5mg until they get 7.5 in. Please advise.       Lab Results   Component Value Date    LABA1C 5.4 03/22/2017     Lab Results   Component Value Date    CHOL 182 10/13/2022    TRIG 231 (H) 10/13/2022    HDL 34 10/13/2022    LDLCALC 102 10/13/2022     Lab Results   Component Value Date     02/23/2024    K 3.6 02/23/2024     02/23/2024    CO2 22 (L) 02/23/2024    BUN 13 02/23/2024    CREATININE 0.8 02/23/2024    GLUCOSE 108 02/23/2024    CALCIUM 9.2 02/23/2024    PROT 7.0 02/23/2024    LABALBU 4.1 02/23/2024    BILITOT 0.5 02/23/2024    ALKPHOS 97 02/23/2024    AST 23 02/23/2024    ALT 28 02/23/2024    LABGLOM >60 02/23/2024     Lab Results   Component Value Date    TSH 2.350 05/11/2020    T4FREE 1.00 07/11/2019     Lab Results   Component Value Date    WBC 13.3 (H) 02/23/2024    HGB 15.9 02/23/2024    HCT 45.4 02/23/2024    MCV 83.9 02/23/2024     02/23/2024

## 2024-03-31 RX ORDER — TIRZEPATIDE 5 MG/.5ML
5 INJECTION, SOLUTION SUBCUTANEOUS WEEKLY
Qty: 2 ML | Refills: 1 | Status: SHIPPED | OUTPATIENT
Start: 2024-03-31 | End: 2024-04-30

## 2024-03-31 NOTE — TELEPHONE ENCOUNTER
Since pharmacy does not have 7.5 mg will continue at 5 mg until available.   Script sent to pharmacy.  Thank you,  Electronically signed by Jorge Lopez MD on 3/31/2024 at 1:31 PM

## 2024-04-24 NOTE — PATIENT INSTRUCTIONS
Your  Nurses  Today:  Keyla    Your Provider for Today: Dr. Gaming       You may receive a survey regarding the care you received during your visit.  Your input is valuable to us.  We encourage you to complete and return your survey.  We hope you will choose us in the future for your healthcare needs.

## 2024-04-25 ENCOUNTER — OFFICE VISIT (OUTPATIENT)
Dept: UROLOGY | Age: 64
End: 2024-04-25

## 2024-04-25 ENCOUNTER — OFFICE VISIT (OUTPATIENT)
Dept: CARDIOLOGY CLINIC | Age: 64
End: 2024-04-25
Payer: COMMERCIAL

## 2024-04-25 VITALS — BODY MASS INDEX: 38.6 KG/M2 | RESPIRATION RATE: 18 BRPM | HEIGHT: 72 IN | WEIGHT: 285 LBS

## 2024-04-25 VITALS
WEIGHT: 281 LBS | HEART RATE: 84 BPM | DIASTOLIC BLOOD PRESSURE: 64 MMHG | SYSTOLIC BLOOD PRESSURE: 104 MMHG | BODY MASS INDEX: 38.06 KG/M2 | HEIGHT: 72 IN

## 2024-04-25 DIAGNOSIS — I10 HYPERTENSION, UNSPECIFIED TYPE: Primary | ICD-10-CM

## 2024-04-25 DIAGNOSIS — N40.1 BENIGN PROSTATIC HYPERPLASIA WITH LOWER URINARY TRACT SYMPTOMS, SYMPTOM DETAILS UNSPECIFIED: Primary | ICD-10-CM

## 2024-04-25 LAB
BILIRUBIN URINE: NEGATIVE
BLOOD URINE, POC: NEGATIVE
CHARACTER, URINE: CLEAR
COLOR, URINE: YELLOW
GLUCOSE URINE: NEGATIVE MG/DL
KETONES, URINE: NEGATIVE
LEUKOCYTE CLUMPS, URINE: NEGATIVE
NITRITE, URINE: NEGATIVE
PH, URINE: 5.5 (ref 5–9)
POST VOID RESIDUAL (PVR): 90 ML
PROTEIN, URINE: NEGATIVE MG/DL
SPECIFIC GRAVITY, URINE: 1.01 (ref 1–1.03)
UROBILINOGEN, URINE: 0.2 EU/DL (ref 0–1)

## 2024-04-25 PROCEDURE — 3078F DIAST BP <80 MM HG: CPT | Performed by: INTERNAL MEDICINE

## 2024-04-25 PROCEDURE — 99214 OFFICE O/P EST MOD 30 MIN: CPT | Performed by: INTERNAL MEDICINE

## 2024-04-25 PROCEDURE — 3074F SYST BP LT 130 MM HG: CPT | Performed by: INTERNAL MEDICINE

## 2024-04-25 RX ORDER — TADALAFIL 20 MG/1
10 TABLET ORAL DAILY PRN
Qty: 90 TABLET | Refills: 0 | Status: SHIPPED | OUTPATIENT
Start: 2024-04-25

## 2024-04-25 ASSESSMENT — ENCOUNTER SYMPTOMS
ABDOMINAL PAIN: 0
NAUSEA: 0
BACK PAIN: 0
VOMITING: 0

## 2024-04-25 NOTE — PROGRESS NOTES
[facility] [place] [facility]  [facility] UROLOGY  [address]  [address]  [facility] [address]  Dept: [phone]  Loc: [phone]    Visit Date: 4/25/2024        HPI:     Nicolas Walsh is a 63 y.o. male who presents today for:  Chief Complaint   Patient presents with    Benign Prostatic Hypertrophy       HPI    Pt has a hx of BPH and ED for which he is taking cialis 10 mg daily.       Pt had R spermatocelectomy and hydrocelectomy  7/24/2020 by Dr. Almanzar at [facility].       Notes has improvement in urination with the Cialis.     Current Outpatient Medications   Medication Sig Dispense Refill    Tirzepatide-Weight Management (ZEPBOUND) 5 MG/0.5ML SOAJ Inject 5 mg into the skin once a week 2 mL 1    lisinopril (PRINIVIL;ZESTRIL) 20 MG tablet Take 1 tablet by mouth daily 90 tablet 3    tadalafil (CIALIS) 20 MG tablet Take 0.5 tablets by mouth daily as needed for Erectile Dysfunction 90 tablet 0    dicyclomine (BENTYL) 10 MG capsule Take 1 capsule by mouth every 6 hours as needed (abd cramping) 30 capsule 0    amLODIPine (NORVASC) 5 MG tablet Take 1 tablet by mouth daily 90 tablet 1    fluticasone (FLONASE) 50 MCG/ACT nasal spray 1 spray by Each Nostril route daily as needed for Rhinitis (Congestion) 1 each 2    bumetanide (BUMEX) 2 MG tablet Take 1 tablet by mouth daily 90 tablet 3    potassium chloride (KLOR-CON M) 20 MEQ extended release tablet TAKE 1 TABLET BY MOUTH EVERY  tablet 3    cetirizine (ZYRTEC) 10 MG tablet TAKE 1 TABLET BY MOUTH DAILY 90 tablet 1    Multiple Vitamins-Minerals (MULTIVITAMIN ADULT PO) Take by mouth Indications: Ultra Light 121      vitamin E 400 UNIT capsule Take 1 capsule by mouth daily      zinc 50 MG CAPS Take by mouth      Cholecalciferol (VITAMIN D3) 2000 units CAPS Take 5,000 Units by mouth       CPAP Machine MISC by Does not apply route Please change CPAP pressure to auto 5-15 cm H20. 1 each 0    Coenzyme Q10 (COQ10) 200 MG

## 2024-04-25 NOTE — PROGRESS NOTES
Pt here for 3 mo check up     Pt brought b/p log to review    Pt continues dizzy spells at times, sob , swelling in legs and feet,   
Date/Time    MG 2.2 02/21/2022 05:35 AM       Lab Results   Component Value Date    INR 0.93 02/21/2022    INR 0.92 03/22/2018         Lab Results   Component Value Date/Time    LABA1C 5.4 03/22/2017 12:00 AM       Lab Results   Component Value Date/Time    TRIG 231 10/13/2022 10:01 AM    HDL 34 10/13/2022 10:01 AM    LDLCALC 102 10/13/2022 10:01 AM       Lab Results   Component Value Date/Time    TSH 2.350 05/11/2020 12:22 PM         Testing Reviewed:      I haveindividually reviewed the below cardiac tests    EKG:    ECHO: No results found for this or any previous visit.      STRESS:    CATH:    Assessment/Plan       Diagnosis Orders   1. Hypertension, unspecified type            HTN-control  HLD  CELINA on CPAP  BMI 40  Occipital neuralgia     EKG is Sinus bradycardia   /72  On Bumex daily  On  lisinopril,  Will d/c metoprolol  Will add amlodipine 5mg daily  Getting imaging study of his left earring loss  BNP was 11  Echo showed EF 55-60%, no valvular dysfunction  The patient is asked to make an attempt to improve diet and exercise patterns to aid in medical management of this problem.  Advised more plant based nutrition/meditarrean diet   Advised patient to call office or seek immediate medical attention if there is any new onset of  any chest pain, sob, palpitations, lightheadedness, dizziness, orthopnea, PND or pedal edema.   All medication side effects were discussed in details.    Thank youfor allowing me to participate in the care of this patient.   Please do not hesitate to contact me for any further questions.     Return if symptoms worsen or fail to improve, for Review testing, Regular follow up.       Electronically signed by Kip Gaming MD [facility]  4/25/2024 at 3:01 PM EST

## 2024-05-03 ENCOUNTER — OFFICE VISIT (OUTPATIENT)
Dept: FAMILY MEDICINE CLINIC | Age: 64
End: 2024-05-03
Payer: COMMERCIAL

## 2024-05-03 VITALS
BODY MASS INDEX: 38.19 KG/M2 | HEART RATE: 69 BPM | DIASTOLIC BLOOD PRESSURE: 74 MMHG | SYSTOLIC BLOOD PRESSURE: 136 MMHG | WEIGHT: 282 LBS | HEIGHT: 72 IN | RESPIRATION RATE: 20 BRPM | TEMPERATURE: 97.9 F | OXYGEN SATURATION: 98 %

## 2024-05-03 DIAGNOSIS — L29.0 ANAL ITCHING: ICD-10-CM

## 2024-05-03 DIAGNOSIS — E88.810 METABOLIC SYNDROME: ICD-10-CM

## 2024-05-03 DIAGNOSIS — I10 PRIMARY HYPERTENSION: Primary | ICD-10-CM

## 2024-05-03 DIAGNOSIS — I25.10 CORONARY ARTERY DISEASE INVOLVING NATIVE CORONARY ARTERY OF NATIVE HEART WITHOUT ANGINA PECTORIS: ICD-10-CM

## 2024-05-03 DIAGNOSIS — L98.9 SKIN LESION: ICD-10-CM

## 2024-05-03 PROCEDURE — 3075F SYST BP GE 130 - 139MM HG: CPT | Performed by: STUDENT IN AN ORGANIZED HEALTH CARE EDUCATION/TRAINING PROGRAM

## 2024-05-03 PROCEDURE — 99214 OFFICE O/P EST MOD 30 MIN: CPT | Performed by: STUDENT IN AN ORGANIZED HEALTH CARE EDUCATION/TRAINING PROGRAM

## 2024-05-03 PROCEDURE — 3078F DIAST BP <80 MM HG: CPT | Performed by: STUDENT IN AN ORGANIZED HEALTH CARE EDUCATION/TRAINING PROGRAM

## 2024-05-03 RX ORDER — ROSUVASTATIN CALCIUM 10 MG/1
10 TABLET, COATED ORAL NIGHTLY
Qty: 30 TABLET | Refills: 3 | Status: CANCELLED | OUTPATIENT
Start: 2024-05-03

## 2024-05-03 NOTE — PROGRESS NOTES
[facility] PROFESSIONAL [facility] PRACTICE  [address]  [facility] [address]  Dept: [phone]  Loc: [phone]      Nicolas Walsh (:  1960) is a 63 y.o. male,Established patient, here for evaluation of the following chief complaint(s):  3 Month Follow-Up (Due to hypertension )      ASSESSMENT/PLAN:  1. Primary hypertension  -     CBC; Future  -     Comprehensive Metabolic Panel; Future  -     Hemoglobin A1C; Future  2. Metabolic syndrome  -     Lipid, Fasting; Future  -     Hemoglobin A1C; Future  3. Coronary artery disease involving native coronary artery of native heart without angina pectoris  -     Lipid, Fasting; Future  4. Skin lesion  -     nystatin-triamcinolone (MYCOLOG II) 100427-2.1 UNIT/GM-% cream; Apply topically 2 times daily., Disp-30 g, R-2, Normal  5. Anal itching  -     nystatin-triamcinolone (MYCOLOG II) 815949-0.1 UNIT/GM-% cream; Apply topically 2 times daily., Disp-30 g, R-2, Normal    HTN stable.  Blood pressure 138/86 in office today.  Continue lisinopril 20 mg.  Continue amlodipine 5mg.  Continue potassium supplement, BMP ordered 2 weeks ago.    Metabolic syndrome and obesity  Elevated Triglycerides  Low HDL  Obesity with BMI down to 38.25 from 40.33   Weight slightly down 282 lbs  Zepbound, call if can find availability and will order.   Continue healthy lifestyle changes including diet and exercise.    History of coronary artery disease.  ASCVD risk of 15.5%.  Patient reports body aches on statins.  Discussed option of even starting low-dose Crestor 5 or 10 mg tablets. Patient would like to wait on restarting statin at this time.    Skin rash on right medial lower leg, question etiology.  Trial nystatin cream.    History of anal itching resolved with nystatin-triamcinolone cream as needed.  Reordered for patient and discussed hygiene and maintain the areas clean and dry as possible.    Continue with Dr. Christy's office

## 2024-05-03 NOTE — PROGRESS NOTES
S: 63 y.o. male with   Chief Complaint   Patient presents with    3 Month Follow-Up     Due to hypertension      HPI per Dr. Lopez    BP Readings from Last 3 Encounters:   05/03/24 136/74   04/25/24 104/64   03/19/24 (!) 142/88     Wt Readings from Last 3 Encounters:   05/03/24 127.9 kg (282 lb)   04/25/24 127.5 kg (281 lb)   04/25/24 129.3 kg (285 lb)           O: VS:  height is 1.829 m (6') and weight is 127.9 kg (282 lb). His oral temperature is 97.9 °F (36.6 °C). His blood pressure is 136/74 and his pulse is 69. His respiration is 20 and oxygen saturation is 98%.   AAO/NAD, appropriate affect for mood       Diagnosis Orders   1. Primary hypertension        2. Metabolic syndrome        3. Coronary artery disease involving native coronary artery of native heart without angina pectoris        4. Anal itching            Plan  HTN- controlled in the office today.     Skin lesion- trial of lotrisone.     Recheck of labs as ordered.     Health Maintenance Due   Topic Date Due    COVID-19 Vaccine (1) Never done    Shingles vaccine (1 of 2) Never done    Diabetes screen  10/03/2021    Respiratory Syncytial Virus (RSV) Pregnant or age 60 yrs+ (1 - 1-dose 60+ series) Never done    Colorectal Cancer Screen  12/02/2023         Attending Physician Statement  I have discussed the case, including pertinent history and exam findings with the resident. I also have seen the patient and performed key portions of the examination.  I agree with the documented assessment and plan as documented by the resident.  GE modifier added to this encounter      Preet Nicole DO 5/3/2024 9:35 AM

## 2024-05-03 NOTE — PROGRESS NOTES
Health Maintenance Due   Topic Date Due    COVID-19 Vaccine (1) Never done    Shingles vaccine (1 of 2) Never done    Diabetes screen  10/03/2021    Respiratory Syncytial Virus (RSV) Pregnant or age 60 yrs+ (1 - 1-dose 60+ series) Never done    Colorectal Cancer Screen  12/02/2023

## 2024-05-07 ENCOUNTER — OFFICE VISIT (OUTPATIENT)
Dept: PHYSICAL MEDICINE AND REHAB | Age: 64
End: 2024-05-07
Payer: COMMERCIAL

## 2024-05-07 VITALS
HEIGHT: 72 IN | SYSTOLIC BLOOD PRESSURE: 128 MMHG | WEIGHT: 284 LBS | DIASTOLIC BLOOD PRESSURE: 78 MMHG | BODY MASS INDEX: 38.47 KG/M2

## 2024-05-07 DIAGNOSIS — M54.12 CERVICAL RADICULOPATHY: ICD-10-CM

## 2024-05-07 DIAGNOSIS — M54.81 BILATERAL OCCIPITAL NEURALGIA: Primary | ICD-10-CM

## 2024-05-07 DIAGNOSIS — M79.18 MYOFASCIAL PAIN: ICD-10-CM

## 2024-05-07 DIAGNOSIS — M47.812 CERVICAL SPONDYLOSIS: ICD-10-CM

## 2024-05-07 DIAGNOSIS — G89.4 CHRONIC PAIN SYNDROME: ICD-10-CM

## 2024-05-07 PROBLEM — R51.9 CHRONIC NONINTRACTABLE HEADACHE: Status: ACTIVE | Noted: 2021-03-22

## 2024-05-07 PROBLEM — G89.29 CHRONIC NONINTRACTABLE HEADACHE: Status: ACTIVE | Noted: 2021-03-22

## 2024-05-07 PROCEDURE — 20553 NJX 1/MLT TRIGGER POINTS 3/>: CPT | Performed by: NURSE PRACTITIONER

## 2024-05-07 PROCEDURE — 3078F DIAST BP <80 MM HG: CPT | Performed by: NURSE PRACTITIONER

## 2024-05-07 PROCEDURE — 3074F SYST BP LT 130 MM HG: CPT | Performed by: NURSE PRACTITIONER

## 2024-05-07 PROCEDURE — 99205 OFFICE O/P NEW HI 60 MIN: CPT | Performed by: NURSE PRACTITIONER

## 2024-05-07 RX ORDER — METHOCARBAMOL 100 MG/ML
100 INJECTION, SOLUTION INTRAMUSCULAR; INTRAVENOUS ONCE
Status: COMPLETED | OUTPATIENT
Start: 2024-05-07 | End: 2024-05-07

## 2024-05-07 RX ADMIN — METHOCARBAMOL 100 MG: 100 INJECTION, SOLUTION INTRAMUSCULAR; INTRAVENOUS at 10:44

## 2024-05-07 NOTE — PROGRESS NOTES
Functionality Assessment/Goals Worksheet     On a scale of 0 (Does not Interfere) to 10 (Completely Interferes)     1.  Which number describes how during the past week pain has interfered with           the following:  A.  General Activity:  7  B.  Mood: 7  C.  Walking Ability:  5  D.  Normal Work (Includes both work outside the home and housework):  5  E.  Relations with Other People:   7  F.  Sleep:   7  G.  Enjoyment of Life:   8    2.  Patient Prefers to Take their Pain Medications:     []  On a regular basis   [x]  Only when necessary    []  Does not take pain medications    3.  What are the Patient's Goals/Expectations for Visiting Pain Management?     [x]  Learn about my pain    []  Receive Medication   []  Physical Therapy     []  Treat Depression   []  Receive Injections    []  Treat Sleep   []  Deal with Anxiety and Stress   []  Treat Opoid Dependence/Addiction   []  Other:                                
Dysfunctional gallbladder    COLONOSCOPY  2013    normal    ELBOW SURGERY      Lyphoma removal 7- Dr. Albrecht    OTHER SURGICAL HISTORY  07/13/2020    s/p excision of right elbow lipoma done in the office Dr Albrecht    OTHER SURGICAL HISTORY  07/24/2020    cyst removed right testicle Dr Almanzar    PAIN MANAGEMENT PROCEDURE Right 8/26/2020    right thoracic medial branch block, 2 levels performed by Maciej Henry MD at [facility] OR    OH SEPTOPLASTY/SUBMUCOUS RESECJ W/WO CARTILAGE GRF N/A 4/30/2018    SEPTOPLASTY SUBMUCOUS RESECTION TURBINATES, PARTIAL RIGHT INFERIOR, NASAL ENDOSCOPY WITH LONNY BULLOSA performed by Tayo Garrett MD at [facility] OR    OH SIGMOIDOSCOPY FLX W/BIOPSY SINGLE/MULTIPLE Left 3/1/2018    SIGMOIDOSCOPY BIOPSY FLEXIBLE performed by Blaine Frederick MD at [facility]    TONSILLECTOMY         Family History   Problem Relation Age of Onset    Diabetes Mother 50    Heart Disease Mother 72        CAD    Clotting Disorder Mother     Heart Disease Father 48        CAD, CVA    Stroke Father 50        CVA    Heart Attack Father     Diabetes Brother 65    High Blood Pressure Brother 65    Heart Disease Brother 65        CHF    Other Brother 52        Neuropathy    Obesity Brother     Obesity Brother 10    High Blood Pressure Brother 60    Obesity Brother 50    Other Brother 40        back pain    Obesity Brother 45    Other Sister 65        leukemia    Alzheimer's Disease Maternal Grandmother 20    Early Death Paternal Grandfather 40        MI    High Blood Pressure Paternal Grandfather 40    Heart Disease Sister 45    High Blood Pressure Sister 45    Depression Sister 45    Other Brother 50        ANEURYSUM, AAA    Heart Disease Brother          Medications & Allergies:   Current Outpatient Medications   Medication Instructions    amLODIPine (NORVASC) 5 mg, Oral, DAILY    bumetanide (BUMEX) 2 mg, Oral, DAILY    cetirizine (ZYRTEC) 10 mg, Oral, DAILY    Coenzyme Q10

## 2024-05-20 ENCOUNTER — HOSPITAL ENCOUNTER (OUTPATIENT)
Age: 64
Discharge: HOME | End: 2024-05-20
Payer: COMMERCIAL

## 2024-05-20 ENCOUNTER — HOSPITAL ENCOUNTER (OUTPATIENT)
Dept: GENERAL RADIOLOGY | Age: 64
Discharge: HOME | End: 2024-05-20
Payer: COMMERCIAL

## 2024-05-20 ENCOUNTER — HOSPITAL ENCOUNTER (OUTPATIENT)
Dept: PHYSICAL THERAPY | Age: 64
Setting detail: THERAPIES SERIES
Discharge: HOME | End: 2024-05-20
Payer: COMMERCIAL

## 2024-05-20 DIAGNOSIS — G89.4 CHRONIC PAIN SYNDROME: ICD-10-CM

## 2024-05-20 DIAGNOSIS — M54.81 BILATERAL OCCIPITAL NEURALGIA: ICD-10-CM

## 2024-05-20 DIAGNOSIS — M47.812 CERVICAL SPONDYLOSIS: ICD-10-CM

## 2024-05-20 DIAGNOSIS — M79.18 MYOFASCIAL PAIN: ICD-10-CM

## 2024-05-20 DIAGNOSIS — M54.12 CERVICAL RADICULOPATHY: ICD-10-CM

## 2024-05-20 PROCEDURE — 97163 PT EVAL HIGH COMPLEX 45 MIN: CPT

## 2024-05-20 PROCEDURE — 72050 X-RAY EXAM NECK SPINE 4/5VWS: CPT

## 2024-05-20 NOTE — PROGRESS NOTES
Physical Therapy    ** PLEASE SIGN, DATE AND TIME CERTIFICATION BELOW AND RETURN TO [facility] OUTPATIENT REHABILITATION (FAX #: [phone]).  ATTEST/CO-SIGN IF ACCESSING VIA [employer].  THANK YOU.**    I certify that I have examined the patient below and determined that Physical Medicine and Rehabilitation service is necessary and that I approve the established plan of care for up to 90 days or as specifically noted.  Attestation, signature or co-signature of physician indicates approval of certification requirements.    ________________________ ____________ __________  Physician Signature   Date   Time  [facility]  PHYSICAL THERAPY  [x] EVALUATION  [] DAILY NOTE (LAND) [] DAILY NOTE (AQUATIC ) [] PROGRESS NOTE [] DISCHARGE NOTE    [x] OUTPATIENT REHABILITATION [facility]   [] [facility] CARE [place]    [] [facility]   [] [facility]    Date: 2024  Patient Name:  Nicolas Walsh  : 1960  MRN: [id number]  CSN: [id number]    Referring Practitioner Glendy Bundy APRN - CNP    Diagnosis Occipital neuralgia  Spondylosis without myelopathy or radiculopathy, cervical region  Radiculopathy, cervical region  Myalgia, other site  Chronic pain syndrome   Treatment Diagnosis M54.2  Neck Pain  M54.2, G89.29  Chronic Neck Pain  R29.3 Abnormal Posture  R53.1 Weakness  M62.81 Generalized Muscle Weakness  M25.60 Stiffness of Unspecified Joint   Date of Evaluation 24    Additional Pertinent History CELINA, Occipital neuralgia, obesity, HTN< hyperlipidemia, hearing loss, GERD, CAD , B shoulder partial RTC tear       Functional Outcome Measure Used Neck Disability Index   Functional Outcome Score 27 (24)       Insurance: Primary: Payor: JOSE /  /  / ,   Secondary:    Authorization Information: PRE CERTIFICATION REQUIRED: Precert required after initial evaluation.  INSURANCE THERAPY BENEFIT: Allowed 20 visits of OT, PT, and ST EACH per calendar year.  0

## 2024-06-04 ENCOUNTER — HOSPITAL ENCOUNTER (OUTPATIENT)
Dept: PHYSICAL THERAPY | Age: 64
Setting detail: THERAPIES SERIES
Discharge: HOME | End: 2024-06-04
Payer: COMMERCIAL

## 2024-06-04 ENCOUNTER — TELEPHONE (OUTPATIENT)
Dept: FAMILY MEDICINE CLINIC | Age: 64
End: 2024-06-04

## 2024-06-04 PROCEDURE — 97110 THERAPEUTIC EXERCISES: CPT

## 2024-06-04 NOTE — PROGRESS NOTES
[place]   [facility]  PHYSICAL THERAPY  [] EVALUATION  [x] DAILY NOTE (LAND) [] DAILY NOTE (AQUATIC ) [] PROGRESS NOTE [] DISCHARGE NOTE    [x] OUTPATIENT REHABILITATION CENTER [facility]   [] [facility]    [] [facility]   [] [facility]    Date: 2024  Patient Name:  Nicolas Walsh  : 1960  MRN: [id number]  CSN: [id number]    Referring Practitioner Glendy Bundy APRN - CNP    Diagnosis Cervicalgia  Abnormal posture  Weakness  Muscle weakness (generalized)  Stiffness of unspecified joint, not elsewhere classified  Other chronic pain  Occipital neuralgia  Spondylosis without myelopathy or radiculopathy, cervical region  Radiculopathy, cervical region  Myalgia, other site  Chronic pain syndrome   Treatment Diagnosis M54.2  Neck Pain  M54.2, G89.29  Chronic Neck Pain  R29.3 Abnormal Posture  R53.1 Weakness  M62.81 Generalized Muscle Weakness  M25.60 Stiffness of Unspecified Joint   Date of Evaluation 24    Additional Pertinent History CELINA, Occipital neuralgia, obesity, HTN< hyperlipidemia, hearing loss, GERD, CAD , B shoulder partial RTC tear       Functional Outcome Measure Used Neck Disability Index   Functional Outcome Score 27 (24)       Insurance: Primary: Payor: JOSE /  /  / ,   Secondary:    Authorization Information: PRE CERTIFICATION REQUIRED: Precert required after initial evaluation.  INSURANCE THERAPY BENEFIT: Allowed 20 visits of OT, PT, and ST EACH per calendar year.  0 visits have been used.. Hard Max..   AQUATIC THERAPY COVERED: Yes  MODALITIES COVERED:  Yes  TELEHEALTH COVERED:    REFERENCE NUMBER: [facility] [employer]   Approved Procedure Codes: 41992 - Therapeutic Exercise  , 12332 - Manual Therapy , 84035 - Aquatic Therapeutic Exercise, 45921 - Neuromuscular Re-Education , 86003 - PT ADL Training, 87968 - Gait Training, 37450 - Therapeutic Activities, 17480 - Ultrasound , and 42913 Mechanical Traction  (Codes requested

## 2024-06-04 NOTE — TELEPHONE ENCOUNTER
Attempted to notify, unable while talking with Pt, Phone Disconnect.     Attempted to return call. Left message on voicemail.

## 2024-06-04 NOTE — TELEPHONE ENCOUNTER
Received fax that patient needs prior auth for Zepound 7.5mg.     Looking into chart appears was discountinued by our Dr. Lopez on 03/31.

## 2024-06-04 NOTE — TELEPHONE ENCOUNTER
Was discontinued 5/2024 due to lack of availability.  Patient was to call if he could find it available so we could represcribe.  Please ask patient how he would like us to proceed.  Thank you,  Electronically signed by Jorge Lopez MD on 6/4/2024 at 3:37 PM

## 2024-06-05 NOTE — TELEPHONE ENCOUNTER
Received call from patient returning call. Reviewed note from Dr. Lopez. Patient confirmed that he still cannot find Zepbound at any pharmacy, advised patient I would let Dr. Lopez know that at this time, we do not need to complete PA. Patient stated he was open for suggestions and would like to know how Dr. Lopez feels about Adipex. Advised patient I would let him know and we would follow up once we receive response from Dr. Lopez. Patient voiced understanding.

## 2024-06-06 NOTE — TELEPHONE ENCOUNTER
Pt called office back and notified and voiced understanding. Did mention that he would be willing to try Mounjaro.

## 2024-06-06 NOTE — TELEPHONE ENCOUNTER
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  Some recommendations in the meantime is limiting portion sizes, limit sweets and carbonated beverages and make sure you get a minimum of 30 min exercise daily at least 5 days a week. Expected and long term maintenance of weight loss is losing 1-2 pounds per week so it does take time.  We can have him come in to discuss or as scheduled 8/26/2024 with PCP..  Thank you,  Electronically signed by Jorge Lopez MD on 6/6/2024 at 8:11 AM

## 2024-06-07 ENCOUNTER — HOSPITAL ENCOUNTER (OUTPATIENT)
Dept: PHYSICAL THERAPY | Age: 64
Setting detail: THERAPIES SERIES
Discharge: HOME | End: 2024-06-07
Payer: COMMERCIAL

## 2024-06-07 PROCEDURE — 97110 THERAPEUTIC EXERCISES: CPT

## 2024-06-07 NOTE — TELEPHONE ENCOUNTER
Mounjaro is a different brand for the same medication of Zepp bound.  It will not be covered by insurance for weight loss and patient will have to pay out-of-pocket.  We do have a few patients that are willing to do this, if this is something he would like to proceed with and pay out-of-pocket to please let us know.  Thank you,  Electronically signed by Jorge Lopez MD on 6/7/2024 at 9:36 AM

## 2024-06-07 NOTE — PROGRESS NOTES
[facility]  PHYSICAL THERAPY  [] EVALUATION  [x] DAILY NOTE (LAND) [] DAILY NOTE (AQUATIC ) [] PROGRESS NOTE [] DISCHARGE NOTE    [x] OUTPATIENT REHABILITATION CENTER [facility]   [] [place] [facility]    [] [facility]   [] [facility]    Date: 2024  Patient Name:  Nicolas Walsh  : 1960  MRN: [id number]  CSN: [id number]    Referring Practitioner Glendy Bundy APRN - CNP    Diagnosis Cervicalgia  Abnormal posture  Weakness  Muscle weakness (generalized)  Stiffness of unspecified joint, not elsewhere classified  Other chronic pain  Occipital neuralgia  Spondylosis without myelopathy or radiculopathy, cervical region  Radiculopathy, cervical region  Myalgia, other site  Chronic pain syndrome   Treatment Diagnosis M54.2  Neck Pain  M54.2, G89.29  Chronic Neck Pain  R29.3 Abnormal Posture  R53.1 Weakness  M62.81 Generalized Muscle Weakness  M25.60 Stiffness of Unspecified Joint   Date of Evaluation 24    Additional Pertinent History CELINA, Occipital neuralgia, obesity, HTN< hyperlipidemia, hearing loss, GERD, CAD , B shoulder partial RTC tear       Functional Outcome Measure Used Neck Disability Index   Functional Outcome Score 27 (24)       Insurance: Primary: Payor: JOSE /  /  / ,   Secondary:    Authorization Information: PRE CERTIFICATION REQUIRED: Precert required after initial evaluation.  INSURANCE THERAPY BENEFIT: Allowed 20 visits of OT, PT, and ST EACH per calendar year.  0 visits have been used.. Hard Max..   AQUATIC THERAPY COVERED: Yes  MODALITIES COVERED:  Yes  TELEHEALTH COVERED:    REFERENCE NUMBER: [employer]  Received auth for 8 PT visits (all PT CPT codes covered under \"umbrella\") from 24 through 24.    Approved Procedure Codes: 06091 - Therapeutic Exercise  , 83453 - Manual Therapy , 87744 - Aquatic Therapeutic Exercise, 24495 - Neuromuscular Re-Education , 81553 - PT ADL Training, 47911 - Gait Training,

## 2024-06-07 NOTE — TELEPHONE ENCOUNTER
Pt informed and verbalized understanding.  Pt states he is going to call around to try to find Zepbound and will not try Mounjaro at this time.

## 2024-06-10 ENCOUNTER — OFFICE VISIT (OUTPATIENT)
Dept: PHYSICAL MEDICINE AND REHAB | Age: 64
End: 2024-06-10
Payer: COMMERCIAL

## 2024-06-10 VITALS
DIASTOLIC BLOOD PRESSURE: 76 MMHG | HEIGHT: 72 IN | WEIGHT: 284 LBS | BODY MASS INDEX: 38.47 KG/M2 | SYSTOLIC BLOOD PRESSURE: 138 MMHG

## 2024-06-10 DIAGNOSIS — G89.4 CHRONIC PAIN SYNDROME: ICD-10-CM

## 2024-06-10 DIAGNOSIS — M54.81 BILATERAL OCCIPITAL NEURALGIA: ICD-10-CM

## 2024-06-10 DIAGNOSIS — M54.12 CERVICAL RADICULOPATHY: ICD-10-CM

## 2024-06-10 DIAGNOSIS — M79.18 MYOFASCIAL PAIN: Primary | ICD-10-CM

## 2024-06-10 DIAGNOSIS — M47.812 CERVICAL SPONDYLOSIS: ICD-10-CM

## 2024-06-10 PROCEDURE — 20553 NJX 1/MLT TRIGGER POINTS 3/>: CPT | Performed by: NURSE PRACTITIONER

## 2024-06-10 PROCEDURE — 3075F SYST BP GE 130 - 139MM HG: CPT | Performed by: NURSE PRACTITIONER

## 2024-06-10 PROCEDURE — 3078F DIAST BP <80 MM HG: CPT | Performed by: NURSE PRACTITIONER

## 2024-06-10 PROCEDURE — 99214 OFFICE O/P EST MOD 30 MIN: CPT | Performed by: NURSE PRACTITIONER

## 2024-06-10 RX ORDER — METHOCARBAMOL 100 MG/ML
100 INJECTION, SOLUTION INTRAMUSCULAR; INTRAVENOUS ONCE
Status: COMPLETED | OUTPATIENT
Start: 2024-06-10 | End: 2024-06-10

## 2024-06-10 RX ADMIN — METHOCARBAMOL 100 MG: 100 INJECTION, SOLUTION INTRAMUSCULAR; INTRAVENOUS at 09:08

## 2024-06-10 NOTE — PROGRESS NOTES
Functionality Assessment/Goals Worksheet     On a scale of 0 (Does not Interfere) to 10 (Completely Interferes)     1.  Which number describes how during the past week pain has interfered with           the following:  A.  General Activity:  7  B.  Mood: 7  C.  Walking Ability:  5  D.  Normal Work (Includes both work outside the home and housework):  7  E.  Relations with Other People:   7  F.  Sleep:   8  G.  Enjoyment of Life:   8    2.  Patient Prefers to Take their Pain Medications:     []  On a regular basis   [x]  Only when necessary    []  Does not take pain medications    3.  What are the Patient's Goals/Expectations for Visiting Pain Management?     [x]  Learn about my pain    []  Receive Medication   [x]  Physical Therapy     []  Treat Depression   [x]  Receive Injections    []  Treat Sleep   []  Deal with Anxiety and Stress   []  Treat Opoid Dependence/Addiction   []  Other:
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
## 2024-06-11 ENCOUNTER — HOSPITAL ENCOUNTER (OUTPATIENT)
Dept: PHYSICAL THERAPY | Age: 64
Setting detail: THERAPIES SERIES
Discharge: HOME | End: 2024-06-11
Payer: COMMERCIAL

## 2024-06-11 PROCEDURE — 97110 THERAPEUTIC EXERCISES: CPT

## 2024-06-11 NOTE — PROGRESS NOTES
in shaded column indicates activity completed today    “*\" next to exercise/intervention indicates progression   Modalities Parameters/  Location  Notes                     Manual Therapy Time/Technique  Notes   IDN: Cervical paraspinals C2-C7, B UT , suboccipitals, supraorbital, infraorbital and mental nerve ( not 6/11) thoracic paraspinals  8min x            8    Exercise/Intervention   Notes   UT stretch 15\" x3  x    Levator Stretch 15\" x3  x    Scap Squeeze X 10  x 5x15 sec today; cues to keep UTs relaxed    SCM Stretch 15\" x3  x    Dive Stretch 15\" x 3  x    Pec corner stretch 15\" x 3  x           Vestibular:       X1 viewing seated: horiztonal, vertical, letter N 1 min  x No dizziness                   Specific Interventions Next Treatment: Initiate Cervical ROM stretching pain free with extended stretching to 60 seconds.  Evaluate for vestibular abnormal function      Activity/Treatment Tolerance:  [x]  Patient tolerated treatment well  []  Patient limited by fatigue  []  Patient limited by pain   []  Patient limited by medical complications  []  Other:     Assessment:  Patient sitting in lobby with slumped forward head and shoulder posture.  Continued postural education on improving posture to decrease strain on cervical spine.  Patient demonstrates improved ROM this date during stretches with no exacerbation of pain noted following.  Continued IDN into thoracic spine this date.        GOALS:  Patient Goal: Get rid of this pain.     Short Term Goals: defer to LTG due to short duration of POC.     Long Term Goals: 6 weeks  Patient will improve AROM of cervical rotation  to 60 degrees bilatrally  for ease driving.   Patient will demonstrate good postural awareness with minimal verbal cues during therapy session to carry over to  home. .  Patient will improve postural strength to 5/5 for symptom control and tolerance to sitting and standing.   Patient will report reduced pain level to 2/10 to tolerate sitting,

## 2024-06-17 ENCOUNTER — OFFICE VISIT (OUTPATIENT)
Dept: PULMONOLOGY | Age: 64
End: 2024-06-17
Payer: COMMERCIAL

## 2024-06-17 VITALS
DIASTOLIC BLOOD PRESSURE: 62 MMHG | OXYGEN SATURATION: 97 % | WEIGHT: 286.4 LBS | TEMPERATURE: 98 F | BODY MASS INDEX: 38.79 KG/M2 | SYSTOLIC BLOOD PRESSURE: 112 MMHG | HEIGHT: 72 IN | HEART RATE: 78 BPM

## 2024-06-17 DIAGNOSIS — G47.33 OSA ON CPAP: Primary | ICD-10-CM

## 2024-06-17 DIAGNOSIS — E66.9 OBESITY (BMI 30-39.9): ICD-10-CM

## 2024-06-17 PROCEDURE — 99202 OFFICE O/P NEW SF 15 MIN: CPT | Performed by: NURSE PRACTITIONER

## 2024-06-17 PROCEDURE — 3074F SYST BP LT 130 MM HG: CPT | Performed by: NURSE PRACTITIONER

## 2024-06-17 PROCEDURE — 3078F DIAST BP <80 MM HG: CPT | Performed by: NURSE PRACTITIONER

## 2024-06-17 ASSESSMENT — ENCOUNTER SYMPTOMS
STRIDOR: 0
NAUSEA: 0
VOMITING: 0
EYES NEGATIVE: 1
CHEST TIGHTNESS: 0
GASTROINTESTINAL NEGATIVE: 1
RESPIRATORY NEGATIVE: 1
DIARRHEA: 0
WHEEZING: 0
ALLERGIC/IMMUNOLOGIC NEGATIVE: 1

## 2024-06-17 NOTE — PROGRESS NOTES
[facility]      Nicolas Walsh         [id number]  6/17/2024   Chief Complaint   Patient presents with    Follow-up     RE-ESTABLISHING- DOLV 12.26.18        Pt of Dr. jocelyn SHEARER Download:   Original or initial AHI: 55.8     Date of initial study: 7.22.13      Compliant  93%     Noncompliant 7 %     PAP Type autoset Level  10/20   Avg Hrs/Day 5 hr 33min  AHI: 1.9   Leaks : 95 th percentile: 12.6   Recorded compliance dates , 5.15.24  to 6.13.24   Machine/Mfg:   [x] ResMed    [] Respironics/Dreamstation   Interface:   [] Nasal    [x] Nasal pillows   [] FFM      Provider:      [x] [facility]     []Tamra     [] Armanico    [] [employer]    [] [facility]               [] [facility]      [] [employer]    [] [place] [place]:      [] Other    Neck Size:18.25   Mallampati 1  ESS:  15  SAQLI: 66    Here is a scan of the most recent download:              Presentation:   Nicolas presents for 5 year sleep medicine follow up for obstructive sleep apnea  Since the last visit, Nicolas hasn't been seen in our office since 2018.  Patient is having to \"smack the machine\" due to it humming at night and making noises  AHI is well controlled   Nicolas is compliant with his PAP therapy   BMI 38      Progress History:   Since last visit any new medical issues? No  Any trouble with Machine Yes  Any new sleep medicines? no  Trouble Falling Asleep No  Trouble Staying Asleep No  Snoring no    Equipment issues:  The pressure is  acceptable, the mask is acceptable     Review of Systems -   Review of Systems   Constitutional: Negative.  Negative for chills, fever and unexpected weight change.   HENT: Negative.     Eyes: Negative.    Respiratory: Negative.  Negative for chest tightness, wheezing and stridor.    Cardiovascular:  Negative for chest pain and leg swelling.   Gastrointestinal: Negative.  Negative for diarrhea, nausea and vomiting.   Endocrine: Negative.    Genitourinary: Negative.  Negative for dysuria.

## 2024-06-18 ENCOUNTER — HOSPITAL ENCOUNTER (OUTPATIENT)
Dept: PHYSICAL THERAPY | Age: 64
Setting detail: THERAPIES SERIES
Discharge: HOME | End: 2024-06-18
Payer: COMMERCIAL

## 2024-06-18 PROCEDURE — 97110 THERAPEUTIC EXERCISES: CPT
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
## 2024-06-20 ENCOUNTER — APPOINTMENT (OUTPATIENT)
Dept: PHYSICAL THERAPY | Age: 64
End: 2024-06-20
Payer: COMMERCIAL

## 2024-06-20 ENCOUNTER — APPOINTMENT (OUTPATIENT)
Dept: GENERAL RADIOLOGY | Age: 64
DRG: 287 | End: 2024-06-20
Payer: COMMERCIAL

## 2024-06-20 ENCOUNTER — APPOINTMENT (OUTPATIENT)
Dept: CT IMAGING | Age: 64
DRG: 287 | End: 2024-06-20
Payer: COMMERCIAL

## 2024-06-20 ENCOUNTER — HOSPITAL ENCOUNTER (INPATIENT)
Age: 64
LOS: 1 days | Discharge: HOME | DRG: 287 | End: 2024-06-21
Attending: STUDENT IN AN ORGANIZED HEALTH CARE EDUCATION/TRAINING PROGRAM
Payer: COMMERCIAL

## 2024-06-20 DIAGNOSIS — I71.21 ANEURYSM OF ASCENDING AORTA WITHOUT RUPTURE (HCC): ICD-10-CM

## 2024-06-20 DIAGNOSIS — I25.10 CORONARY ARTERY DISEASE INVOLVING NATIVE CORONARY ARTERY OF NATIVE HEART WITHOUT ANGINA PECTORIS: ICD-10-CM

## 2024-06-20 DIAGNOSIS — I20.0 UNSTABLE ANGINA (HCC): ICD-10-CM

## 2024-06-20 DIAGNOSIS — R07.9 CHEST PAIN WITH HIGH RISK FOR CARDIAC ETIOLOGY: Primary | ICD-10-CM

## 2024-06-20 DIAGNOSIS — R06.02 SHORTNESS OF BREATH: ICD-10-CM

## 2024-06-20 PROBLEM — T48.5X5A RHINITIS MEDICAMENTOSA: Status: RESOLVED | Noted: 2018-04-24 | Resolved: 2024-06-20

## 2024-06-20 PROBLEM — J31.0 RHINITIS MEDICAMENTOSA: Status: RESOLVED | Noted: 2018-04-24 | Resolved: 2024-06-20

## 2024-06-20 PROBLEM — Z78.9 DIFFICULTY WITH CPAP USE: Status: RESOLVED | Noted: 2018-04-24 | Resolved: 2024-06-20

## 2024-06-20 PROBLEM — J34.2 NASAL SEPTAL DEVIATION: Status: RESOLVED | Noted: 2018-04-24 | Resolved: 2024-06-20

## 2024-06-20 PROBLEM — J34.3 HYPERTROPHY OF INFERIOR NASAL TURBINATE: Status: RESOLVED | Noted: 2018-04-24 | Resolved: 2024-06-20

## 2024-06-20 PROBLEM — G89.18 POSTOPERATIVE PAIN: Status: RESOLVED | Noted: 2018-04-30 | Resolved: 2024-06-20

## 2024-06-20 PROBLEM — J34.89 CONCHA BULLOSA: Status: RESOLVED | Noted: 2018-04-24 | Resolved: 2024-06-20

## 2024-06-20 LAB
ACTIVATED CLOTTING TIME: 189 SECONDS (ref 1–150)
ANION GAP SERPL CALC-SCNC: 13 MEQ/L (ref 8–16)
APTT PPP: 70 SECONDS (ref 22–38)
BASOPHILS ABSOLUTE: 0.1 THOU/MM3 (ref 0–0.1)
BASOPHILS NFR BLD AUTO: 0.6 %
BILIRUB UR QL STRIP: NEGATIVE
BUN SERPL-MCNC: 14 MG/DL (ref 7–22)
CALCIUM SERPL-MCNC: 9.2 MG/DL (ref 8.5–10.5)
CHARACTER UR: CLEAR
CHLORIDE SERPL-SCNC: 100 MEQ/L (ref 98–111)
CHOLEST SERPL-MCNC: 126 MG/DL (ref 100–199)
CO2 SERPL-SCNC: 25 MEQ/L (ref 23–33)
COLOR UR: YELLOW
CREAT SERPL-MCNC: 0.9 MG/DL (ref 0.4–1.2)
CRP SERPL-MCNC: 4.77 MG/DL (ref 0–1)
D DIMER PPP IA.FEU-MCNC: 1690 NG/ML FEU (ref 0–500)
DEPRECATED RDW RBC AUTO: 39.8 FL (ref 35–45)
ECHO BSA: 2.59 M2
EKG ATRIAL RATE: 58 BPM
EKG ATRIAL RATE: 79 BPM
EKG P AXIS: 41 DEGREES
EKG P AXIS: 49 DEGREES
EKG P-R INTERVAL: 186 MS
EKG P-R INTERVAL: 186 MS
EKG Q-T INTERVAL: 372 MS
EKG Q-T INTERVAL: 424 MS
EKG QRS DURATION: 94 MS
EKG QRS DURATION: 98 MS
EKG QTC CALCULATION (BAZETT): 416 MS
EKG QTC CALCULATION (BAZETT): 426 MS
EKG R AXIS: -21 DEGREES
EKG R AXIS: 17 DEGREES
EKG T AXIS: 29 DEGREES
EKG T AXIS: 34 DEGREES
EKG VENTRICULAR RATE: 58 BPM
EKG VENTRICULAR RATE: 79 BPM
EOSINOPHIL NFR BLD AUTO: 1.8 %
EOSINOPHILS ABSOLUTE: 0.3 THOU/MM3 (ref 0–0.4)
ERYTHROCYTE [DISTWIDTH] IN BLOOD BY AUTOMATED COUNT: 12.3 % (ref 11.5–14.5)
ERYTHROCYTE [SEDIMENTATION RATE] IN BLOOD BY WESTERGREN METHOD: 45 MM/HR (ref 0–10)
GFR SERPL CREATININE-BSD FRML MDRD: > 90 ML/MIN/1.73M2
GLUCOSE SERPL-MCNC: 107 MG/DL (ref 70–108)
GLUCOSE UR QL STRIP.AUTO: NEGATIVE MG/DL
HCT VFR BLD AUTO: 41.7 % (ref 42–52)
HDLC SERPL-MCNC: 26 MG/DL
HEPARIN UNFRACTIONATED: 0.31 U/ML (ref 0.3–0.7)
HEPARIN UNFRACTIONATED: < 0.04 U/ML (ref 0.3–0.7)
HEPARIN UNFRACTIONATED: < 0.04 U/ML (ref 0.3–0.7)
HGB BLD-MCNC: 14.1 GM/DL (ref 14–18)
HGB UR QL STRIP.AUTO: NEGATIVE
IMM GRANULOCYTES # BLD AUTO: 0.06 THOU/MM3 (ref 0–0.07)
IMM GRANULOCYTES NFR BLD AUTO: 0.4 %
INR PPP: 1.1 (ref 0.85–1.13)
KETONES UR QL STRIP.AUTO: NEGATIVE
LDLC SERPL CALC-MCNC: 82 MG/DL
LEUKOCYTE ESTERASE UR QL STRIP.AUTO: NEGATIVE
LYMPHOCYTES ABSOLUTE: 1.8 THOU/MM3 (ref 1–4.8)
LYMPHOCYTES NFR BLD AUTO: 12.7 %
MAGNESIUM SERPL-MCNC: 2.1 MG/DL (ref 1.6–2.4)
MCH RBC QN AUTO: 29.6 PG (ref 26–33)
MCHC RBC AUTO-ENTMCNC: 33.8 GM/DL (ref 32.2–35.5)
MCV RBC AUTO: 87.6 FL (ref 80–94)
MONOCYTES ABSOLUTE: 1.3 THOU/MM3 (ref 0.4–1.3)
MONOCYTES NFR BLD AUTO: 9.5 %
NEUTROPHILS ABSOLUTE: 10.7 THOU/MM3 (ref 1.8–7.7)
NEUTROPHILS NFR BLD AUTO: 75 %
NITRITE UR QL STRIP.AUTO: NEGATIVE
NRBC BLD AUTO-RTO: 0 /100 WBC
NT-PROBNP SERPL IA-MCNC: 63.7 PG/ML (ref 0–124)
OSMOLALITY SERPL CALC.SUM OF ELEC: 276.6 MOSMOL/KG (ref 275–300)
PH UR STRIP.AUTO: 6.5 [PH] (ref 5–9)
PLATELET # BLD AUTO: 348 THOU/MM3 (ref 130–400)
PMV BLD AUTO: 8.2 FL (ref 9.4–12.4)
POTASSIUM SERPL-SCNC: 3.9 MEQ/L (ref 3.5–5.2)
PROT UR STRIP.AUTO-MCNC: NEGATIVE MG/DL
RBC # BLD AUTO: 4.76 MILL/MM3 (ref 4.7–6.1)
SODIUM SERPL-SCNC: 138 MEQ/L (ref 135–145)
SP GR UR REFRACT.AUTO: > 1.03 (ref 1–1.03)
TRIGL SERPL-MCNC: 90 MG/DL (ref 0–199)
TROPONIN, HIGH SENSITIVITY: 12 NG/L (ref 0–12)
TROPONIN, HIGH SENSITIVITY: 14 NG/L (ref 0–12)
UROBILINOGEN UR QL STRIP.AUTO: 0.2 EU/DL (ref 0–1)
WBC # BLD AUTO: 14.2 THOU/MM3 (ref 4.8–10.8)

## 2024-06-20 PROCEDURE — 85651 RBC SED RATE NONAUTOMATED: CPT

## 2024-06-20 PROCEDURE — 85610 PROTHROMBIN TIME: CPT

## 2024-06-20 PROCEDURE — 96374 THER/PROPH/DIAG INJ IV PUSH: CPT

## 2024-06-20 PROCEDURE — 80048 BASIC METABOLIC PNL TOTAL CA: CPT

## 2024-06-20 PROCEDURE — 6360000002 HC RX W HCPCS: Performed by: INTERNAL MEDICINE

## 2024-06-20 PROCEDURE — C1769 GUIDE WIRE: HCPCS | Performed by: INTERNAL MEDICINE

## 2024-06-20 PROCEDURE — 6370000000 HC RX 637 (ALT 250 FOR IP): Performed by: NURSE PRACTITIONER

## 2024-06-20 PROCEDURE — C1894 INTRO/SHEATH, NON-LASER: HCPCS | Performed by: INTERNAL MEDICINE

## 2024-06-20 PROCEDURE — 86140 C-REACTIVE PROTEIN: CPT

## 2024-06-20 PROCEDURE — 93010 ELECTROCARDIOGRAM REPORT: CPT | Performed by: INTERNAL MEDICINE

## 2024-06-20 PROCEDURE — 6360000002 HC RX W HCPCS: Performed by: NURSE PRACTITIONER

## 2024-06-20 PROCEDURE — 2500000003 HC RX 250 WO HCPCS: Performed by: INTERNAL MEDICINE

## 2024-06-20 PROCEDURE — 81003 URINALYSIS AUTO W/O SCOPE: CPT

## 2024-06-20 PROCEDURE — 85347 COAGULATION TIME ACTIVATED: CPT

## 2024-06-20 PROCEDURE — 6360000004 HC RX CONTRAST MEDICATION: Performed by: INTERNAL MEDICINE

## 2024-06-20 PROCEDURE — 99285 EMERGENCY DEPT VISIT HI MDM: CPT

## 2024-06-20 PROCEDURE — 83735 ASSAY OF MAGNESIUM: CPT

## 2024-06-20 PROCEDURE — 80061 LIPID PANEL: CPT

## 2024-06-20 PROCEDURE — 99223 1ST HOSP IP/OBS HIGH 75: CPT | Performed by: NURSE PRACTITIONER

## 2024-06-20 PROCEDURE — 71275 CT ANGIOGRAPHY CHEST: CPT

## 2024-06-20 PROCEDURE — 93458 L HRT ARTERY/VENTRICLE ANGIO: CPT | Performed by: INTERNAL MEDICINE

## 2024-06-20 PROCEDURE — 99152 MOD SED SAME PHYS/QHP 5/>YRS: CPT | Performed by: INTERNAL MEDICINE

## 2024-06-20 PROCEDURE — 85730 THROMBOPLASTIN TIME PARTIAL: CPT

## 2024-06-20 PROCEDURE — 99153 MOD SED SAME PHYS/QHP EA: CPT | Performed by: INTERNAL MEDICINE

## 2024-06-20 PROCEDURE — 2580000003 HC RX 258: Performed by: INTERNAL MEDICINE

## 2024-06-20 PROCEDURE — 36415 COLL VENOUS BLD VENIPUNCTURE: CPT

## 2024-06-20 PROCEDURE — 85025 COMPLETE CBC W/AUTO DIFF WBC: CPT

## 2024-06-20 PROCEDURE — 6360000004 HC RX CONTRAST MEDICATION: Performed by: NURSE PRACTITIONER

## 2024-06-20 PROCEDURE — C1760 CLOSURE DEV, VASC: HCPCS | Performed by: INTERNAL MEDICINE

## 2024-06-20 PROCEDURE — 4A023N7 MEASUREMENT OF CARDIAC SAMPLING AND PRESSURE, LEFT HEART, PERCUTANEOUS APPROACH: ICD-10-PCS | Performed by: INTERNAL MEDICINE

## 2024-06-20 PROCEDURE — 1200000003 HC TELEMETRY R&B

## 2024-06-20 PROCEDURE — 85520 HEPARIN ASSAY: CPT

## 2024-06-20 PROCEDURE — 83880 ASSAY OF NATRIURETIC PEPTIDE: CPT

## 2024-06-20 PROCEDURE — 99255 IP/OBS CONSLTJ NEW/EST HI 80: CPT | Performed by: INTERNAL MEDICINE

## 2024-06-20 PROCEDURE — B2151ZZ FLUOROSCOPY OF LEFT HEART USING LOW OSMOLAR CONTRAST: ICD-10-PCS | Performed by: INTERNAL MEDICINE

## 2024-06-20 PROCEDURE — 84484 ASSAY OF TROPONIN QUANT: CPT

## 2024-06-20 PROCEDURE — 71045 X-RAY EXAM CHEST 1 VIEW: CPT

## 2024-06-20 PROCEDURE — 93005 ELECTROCARDIOGRAM TRACING: CPT | Performed by: STUDENT IN AN ORGANIZED HEALTH CARE EDUCATION/TRAINING PROGRAM

## 2024-06-20 PROCEDURE — G0269 OCCLUSIVE DEVICE IN VEIN ART: HCPCS | Performed by: INTERNAL MEDICINE

## 2024-06-20 PROCEDURE — 2709999900 HC NON-CHARGEABLE SUPPLY: Performed by: INTERNAL MEDICINE

## 2024-06-20 PROCEDURE — 93005 ELECTROCARDIOGRAM TRACING: CPT | Performed by: NURSE PRACTITIONER

## 2024-06-20 PROCEDURE — 85379 FIBRIN DEGRADATION QUANT: CPT

## 2024-06-20 PROCEDURE — B2111ZZ FLUOROSCOPY OF MULTIPLE CORONARY ARTERIES USING LOW OSMOLAR CONTRAST: ICD-10-PCS | Performed by: INTERNAL MEDICINE

## 2024-06-20 RX ORDER — HEPARIN SODIUM 1000 [USP'U]/ML
4000 INJECTION, SOLUTION INTRAVENOUS; SUBCUTANEOUS PRN
Status: DISCONTINUED | OUTPATIENT
Start: 2024-06-20 | End: 2024-06-21 | Stop reason: HOSPADM

## 2024-06-20 RX ORDER — SODIUM CHLORIDE 9 MG/ML
INJECTION, SOLUTION INTRAVENOUS PRN
Status: DISCONTINUED | OUTPATIENT
Start: 2024-06-20 | End: 2024-06-21 | Stop reason: HOSPADM

## 2024-06-20 RX ORDER — SODIUM CHLORIDE 0.9 % (FLUSH) 0.9 %
5-40 SYRINGE (ML) INJECTION PRN
Status: DISCONTINUED | OUTPATIENT
Start: 2024-06-20 | End: 2024-06-20 | Stop reason: SDUPTHER

## 2024-06-20 RX ORDER — FENTANYL CITRATE 50 UG/ML
INJECTION, SOLUTION INTRAMUSCULAR; INTRAVENOUS PRN
Status: DISCONTINUED | OUTPATIENT
Start: 2024-06-20 | End: 2024-06-20 | Stop reason: HOSPADM

## 2024-06-20 RX ORDER — HEPARIN SODIUM 1000 [USP'U]/ML
4000 INJECTION, SOLUTION INTRAVENOUS; SUBCUTANEOUS ONCE
Status: COMPLETED | OUTPATIENT
Start: 2024-06-20 | End: 2024-06-20

## 2024-06-20 RX ORDER — MAGNESIUM SULFATE IN WATER 40 MG/ML
2000 INJECTION, SOLUTION INTRAVENOUS PRN
Status: DISCONTINUED | OUTPATIENT
Start: 2024-06-20 | End: 2024-06-21 | Stop reason: HOSPADM

## 2024-06-20 RX ORDER — ONDANSETRON 2 MG/ML
4 INJECTION INTRAMUSCULAR; INTRAVENOUS EVERY 6 HOURS PRN
Status: DISCONTINUED | OUTPATIENT
Start: 2024-06-20 | End: 2024-06-21 | Stop reason: HOSPADM

## 2024-06-20 RX ORDER — LISINOPRIL 20 MG/1
20 TABLET ORAL DAILY
Status: DISCONTINUED | OUTPATIENT
Start: 2024-06-20 | End: 2024-06-21 | Stop reason: HOSPADM

## 2024-06-20 RX ORDER — POTASSIUM CHLORIDE 7.45 MG/ML
10 INJECTION INTRAVENOUS PRN
Status: DISCONTINUED | OUTPATIENT
Start: 2024-06-20 | End: 2024-06-21 | Stop reason: HOSPADM

## 2024-06-20 RX ORDER — POTASSIUM CHLORIDE 20 MEQ/1
40 TABLET, EXTENDED RELEASE ORAL PRN
Status: DISCONTINUED | OUTPATIENT
Start: 2024-06-20 | End: 2024-06-21 | Stop reason: HOSPADM

## 2024-06-20 RX ORDER — SODIUM CHLORIDE 9 MG/ML
INJECTION, SOLUTION INTRAVENOUS PRN
Status: DISCONTINUED | OUTPATIENT
Start: 2024-06-20 | End: 2024-06-20 | Stop reason: SDUPTHER

## 2024-06-20 RX ORDER — NITROGLYCERIN 20 MG/100ML
5-200 INJECTION INTRAVENOUS CONTINUOUS
Status: DISCONTINUED | OUTPATIENT
Start: 2024-06-20 | End: 2024-06-21 | Stop reason: HOSPADM

## 2024-06-20 RX ORDER — LANOLIN ALCOHOL/MO/W.PET/CERES
3 CREAM (GRAM) TOPICAL NIGHTLY PRN
Status: DISCONTINUED | OUTPATIENT
Start: 2024-06-20 | End: 2024-06-21 | Stop reason: HOSPADM

## 2024-06-20 RX ORDER — ACETAMINOPHEN 325 MG/1
650 TABLET ORAL EVERY 6 HOURS PRN
Status: DISCONTINUED | OUTPATIENT
Start: 2024-06-20 | End: 2024-06-20 | Stop reason: SDUPTHER

## 2024-06-20 RX ORDER — BUMETANIDE 1 MG/1
2 TABLET ORAL DAILY
Status: DISCONTINUED | OUTPATIENT
Start: 2024-06-20 | End: 2024-06-21 | Stop reason: HOSPADM

## 2024-06-20 RX ORDER — SODIUM CHLORIDE 0.9 % (FLUSH) 0.9 %
5-40 SYRINGE (ML) INJECTION EVERY 12 HOURS SCHEDULED
Status: DISCONTINUED | OUTPATIENT
Start: 2024-06-20 | End: 2024-06-20 | Stop reason: SDUPTHER

## 2024-06-20 RX ORDER — SODIUM CHLORIDE 0.9 % (FLUSH) 0.9 %
5-40 SYRINGE (ML) INJECTION PRN
OUTPATIENT
Start: 2024-06-20

## 2024-06-20 RX ORDER — MULTIVITAMIN WITH IRON
1 TABLET ORAL DAILY
Status: DISCONTINUED | OUTPATIENT
Start: 2024-06-20 | End: 2024-06-21 | Stop reason: HOSPADM

## 2024-06-20 RX ORDER — ATROPINE SULFATE 0.4 MG/ML
0.5 INJECTION, SOLUTION INTRAVENOUS
Status: DISCONTINUED | OUTPATIENT
Start: 2024-06-20 | End: 2024-06-21 | Stop reason: HOSPADM

## 2024-06-20 RX ORDER — ASPIRIN 81 MG/1
162 TABLET, CHEWABLE ORAL ONCE
Status: COMPLETED | OUTPATIENT
Start: 2024-06-20 | End: 2024-06-20

## 2024-06-20 RX ORDER — SODIUM CHLORIDE 0.9 % (FLUSH) 0.9 %
5-40 SYRINGE (ML) INJECTION EVERY 12 HOURS SCHEDULED
OUTPATIENT
Start: 2024-06-20

## 2024-06-20 RX ORDER — ONDANSETRON 4 MG/1
4 TABLET, ORALLY DISINTEGRATING ORAL EVERY 8 HOURS PRN
Status: DISCONTINUED | OUTPATIENT
Start: 2024-06-20 | End: 2024-06-21 | Stop reason: HOSPADM

## 2024-06-20 RX ORDER — POLYETHYLENE GLYCOL 3350 17 G/17G
17 POWDER, FOR SOLUTION ORAL DAILY PRN
Status: DISCONTINUED | OUTPATIENT
Start: 2024-06-20 | End: 2024-06-21 | Stop reason: HOSPADM

## 2024-06-20 RX ORDER — MIDAZOLAM HYDROCHLORIDE 1 MG/ML
INJECTION INTRAMUSCULAR; INTRAVENOUS PRN
Status: DISCONTINUED | OUTPATIENT
Start: 2024-06-20 | End: 2024-06-20 | Stop reason: HOSPADM

## 2024-06-20 RX ORDER — SODIUM CHLORIDE 0.9 % (FLUSH) 0.9 %
5-40 SYRINGE (ML) INJECTION EVERY 12 HOURS SCHEDULED
Status: DISCONTINUED | OUTPATIENT
Start: 2024-06-20 | End: 2024-06-21 | Stop reason: HOSPADM

## 2024-06-20 RX ORDER — NITROGLYCERIN 0.4 MG/1
0.4 TABLET SUBLINGUAL EVERY 5 MIN PRN
Status: COMPLETED | OUTPATIENT
Start: 2024-06-20 | End: 2024-06-20

## 2024-06-20 RX ORDER — ACETAMINOPHEN 650 MG/1
650 SUPPOSITORY RECTAL EVERY 6 HOURS PRN
Status: DISCONTINUED | OUTPATIENT
Start: 2024-06-20 | End: 2024-06-21 | Stop reason: HOSPADM

## 2024-06-20 RX ORDER — HEPARIN SODIUM 10000 [USP'U]/100ML
5-30 INJECTION, SOLUTION INTRAVENOUS CONTINUOUS
Status: DISCONTINUED | OUTPATIENT
Start: 2024-06-20 | End: 2024-06-21 | Stop reason: HOSPADM

## 2024-06-20 RX ORDER — SODIUM CHLORIDE 0.9 % (FLUSH) 0.9 %
5-40 SYRINGE (ML) INJECTION PRN
Status: DISCONTINUED | OUTPATIENT
Start: 2024-06-20 | End: 2024-06-21 | Stop reason: HOSPADM

## 2024-06-20 RX ORDER — SODIUM CHLORIDE 9 MG/ML
INJECTION, SOLUTION INTRAVENOUS PRN
OUTPATIENT
Start: 2024-06-20

## 2024-06-20 RX ORDER — ACETAMINOPHEN 325 MG/1
650 TABLET ORAL ONCE
Status: COMPLETED | OUTPATIENT
Start: 2024-06-20 | End: 2024-06-20

## 2024-06-20 RX ORDER — HYDROCODONE BITARTRATE AND ACETAMINOPHEN 5; 325 MG/1; MG/1
1 TABLET ORAL ONCE
Status: COMPLETED | OUTPATIENT
Start: 2024-06-20 | End: 2024-06-20

## 2024-06-20 RX ORDER — SODIUM CHLORIDE 9 MG/ML
INJECTION, SOLUTION INTRAVENOUS CONTINUOUS
Status: DISCONTINUED | OUTPATIENT
Start: 2024-06-20 | End: 2024-06-21 | Stop reason: HOSPADM

## 2024-06-20 RX ORDER — HEPARIN SODIUM 1000 [USP'U]/ML
2000 INJECTION, SOLUTION INTRAVENOUS; SUBCUTANEOUS PRN
Status: DISCONTINUED | OUTPATIENT
Start: 2024-06-20 | End: 2024-06-21 | Stop reason: HOSPADM

## 2024-06-20 RX ORDER — AMLODIPINE BESYLATE 5 MG/1
5 TABLET ORAL DAILY
Status: DISCONTINUED | OUTPATIENT
Start: 2024-06-20 | End: 2024-06-21 | Stop reason: HOSPADM

## 2024-06-20 RX ORDER — ACETAMINOPHEN 325 MG/1
650 TABLET ORAL EVERY 4 HOURS PRN
Status: DISCONTINUED | OUTPATIENT
Start: 2024-06-20 | End: 2024-06-21 | Stop reason: HOSPADM

## 2024-06-20 RX ADMIN — LISINOPRIL 20 MG: 20 TABLET ORAL at 17:28

## 2024-06-20 RX ADMIN — ASPIRIN 81 MG 162 MG: 81 TABLET ORAL at 04:05

## 2024-06-20 RX ADMIN — ACETAMINOPHEN 650 MG: 325 TABLET ORAL at 04:23

## 2024-06-20 RX ADMIN — NITROGLYCERIN 0.4 MG: 0.4 TABLET SUBLINGUAL at 10:28

## 2024-06-20 RX ADMIN — HEPARIN SODIUM 7 UNITS/KG/HR: 10000 INJECTION, SOLUTION INTRAVENOUS at 06:53

## 2024-06-20 RX ADMIN — AMLODIPINE BESYLATE 5 MG: 5 TABLET ORAL at 17:28

## 2024-06-20 RX ADMIN — ACETAMINOPHEN 650 MG: 325 TABLET ORAL at 13:06

## 2024-06-20 RX ADMIN — HYDROCODONE BITARTRATE AND ACETAMINOPHEN 1 TABLET: 5; 325 TABLET ORAL at 05:01

## 2024-06-20 RX ADMIN — HEPARIN SODIUM 4000 UNITS: 1000 INJECTION INTRAVENOUS; SUBCUTANEOUS at 06:53

## 2024-06-20 RX ADMIN — BUMETANIDE 2 MG: 1 TABLET ORAL at 17:28

## 2024-06-20 RX ADMIN — NITROGLYCERIN 0.4 MG: 0.4 TABLET SUBLINGUAL at 04:06

## 2024-06-20 RX ADMIN — NITROGLYCERIN 0.4 MG: 0.4 TABLET SUBLINGUAL at 04:12

## 2024-06-20 RX ADMIN — IOPAMIDOL 80 ML: 755 INJECTION, SOLUTION INTRAVENOUS at 04:49

## 2024-06-20 RX ADMIN — Medication 1 TABLET: at 17:28

## 2024-06-20 RX ADMIN — SODIUM CHLORIDE: 9 INJECTION, SOLUTION INTRAVENOUS at 17:32

## 2024-06-20 RX ADMIN — NITROGLYCERIN 5 MCG/MIN: 20 INJECTION INTRAVENOUS at 04:23

## 2024-06-20 ASSESSMENT — PAIN SCALES - GENERAL
PAINLEVEL_OUTOF10: 2
PAINLEVEL_OUTOF10: 10
PAINLEVEL_OUTOF10: 7
PAINLEVEL_OUTOF10: 5
PAINLEVEL_OUTOF10: 5
PAINLEVEL_OUTOF10: 0
PAINLEVEL_OUTOF10: 6

## 2024-06-20 ASSESSMENT — PAIN DESCRIPTION - PAIN TYPE
TYPE: ACUTE PAIN
TYPE: ACUTE PAIN

## 2024-06-20 ASSESSMENT — PAIN DESCRIPTION - FREQUENCY
FREQUENCY: CONTINUOUS
FREQUENCY: CONTINUOUS

## 2024-06-20 ASSESSMENT — PAIN DESCRIPTION - DESCRIPTORS
DESCRIPTORS: ACHING
DESCRIPTORS: ACHING

## 2024-06-20 ASSESSMENT — PAIN DESCRIPTION - LOCATION
LOCATION: HEAD
LOCATION: CHEST
LOCATION: CHEST
LOCATION: JAW;CHEST
LOCATION: CHEST
LOCATION: CHEST

## 2024-06-20 ASSESSMENT — PAIN DESCRIPTION - ONSET: ONSET: AWAKENED FROM SLEEP

## 2024-06-20 ASSESSMENT — PAIN DESCRIPTION - DIRECTION: RADIATING_TOWARDS: NECK

## 2024-06-20 ASSESSMENT — PAIN - FUNCTIONAL ASSESSMENT: PAIN_FUNCTIONAL_ASSESSMENT: 0-10

## 2024-06-20 NOTE — ED NOTES
Patient states decrease in chest discomfort after receiving nitro sublingual. Pt still complains of jaw pain. Provider updated.

## 2024-06-20 NOTE — CARE COORDINATION
Spoke with patient who advised does not have ACP documents at home. Declined completing this admission. Educated on asking for spiritual health if changed mind; verbalized understanding. Denied concerns or needs.

## 2024-06-20 NOTE — ED NOTES
ED to inpatient nurses report      Chief Complaint:  Chief Complaint   Patient presents with    Chest Pain    Shortness of Breath     Present to ED from: home by private vehicle    MOA:     LOC: alert and orientated to name, place, date  Mobility: Independent  Oxygen Baseline: room air    Current needs required: room air     Code Status:   Prior    What abnormal results were found and what did you give/do to treat them? Chest pain, d-dimer elevated, troponin slightly elevated  Any procedures or intervention occur? Labs, CTA, nitro gtt started.    Mental Status:  Level of Consciousness: Alert (0)    Psych Assessment:    No risk    Vitals:  Patient Vitals for the past 24 hrs:   BP Temp Temp src Pulse Resp SpO2 Height Weight   06/20/24 0533 -- -- -- -- -- -- 1.829 m (6') 129.7 kg (286 lb)   06/20/24 0532 125/72 -- -- 71 25 96 % -- --   06/20/24 0510 127/70 -- -- 71 27 95 % -- --   06/20/24 0506 122/69 -- -- 72 23 96 % -- --   06/20/24 0502 (!) 131/59 -- -- -- -- -- -- --   06/20/24 0429 118/64 -- -- 76 26 93 % -- --   06/20/24 0422 133/68 -- -- 80 -- -- -- --   06/20/24 0413 126/70 -- -- 75 20 94 % -- --   06/20/24 0412 125/71 -- -- 78 -- -- -- --   06/20/24 0408 (!) 153/77 -- -- 79 18 96 % -- --   06/20/24 0406 (!) 142/86 -- -- 77 -- -- -- --   06/20/24 0308 (!) 140/77 -- -- 76 26 93 % -- --   06/20/24 0252 (!) 167/75 98.2 °F (36.8 °C) Oral 80 18 95 % -- --        LDAs:   Peripheral IV 06/20/24 Right Antecubital (Active)   Site Assessment Clean, dry & intact 06/20/24 0304   Line Status Blood return noted;Normal saline locked;Flushed;Specimen collected 06/20/24 0302       Ambulatory Status:  Presents to emergency department  because of falls (Syncope, seizure, or loss of consciousness): No, Age > 70: No, Altered Mental Status, Intoxication with alcohol or substance confusion (Disorientation, impaired judgment, poor safety awaremess, or inability to follow instructions): No, Impaired Mobility: Ambulates or transfers

## 2024-06-20 NOTE — H&P
Hospitalist  History and Physical    Patient:  Nicolas Walsh  MRN: [id number]    CHIEF COMPLAINT:  chest pain, jaw pain, headache and shortness of breath    History Obtained From:  patient, electronic medical record  PCP: Dari Escalante MD    HISTORY OF PRESENT ILLNESS:   Nicolas Walsh is a 63-year-old  male presented to [facility] ER on 6/20/2024 with chief complaint of chest pain, jaw pain, headache and shortness of breath.  Onset 3 to 4 hours prior to evaluation.  Patient states he was awoken with complaint of chest pain and shortness of breath and intensity of pain had gotten worse.  Patient had taken 2 Tylenol and 2 baby aspirin prior to leaving for ER evaluation.    Patient has a past medical history significant for lifetime non-smoker, CELINA-CPAP, CAD-C 2013,2015,2018,2022 nonobstructive CAD, Essential HPTN-TTE 1/2024 LVEF 55-60%, GERD, Dyslipidemia, Occipital neuralgia, Chronic pain syndrome, Class 1 obesity.     Patient identifies complaint of chest tightness with shortness of breath with radiation up into his neck and face. #10/10.  This was not associated with diaphoresis, nausea or vomiting.  Patient identifies he has never had chest tightness before. Previous angina complaint has always been shortness of breath. SL NTG was given with noted improvement in complaint of chest tightness. NTG continuous gtt was started. CTA Chest completed. Norco and Heparin gtt was started.     Past Medical History:        Diagnosis Date    CAD (coronary artery disease) 2014    GERD (gastroesophageal reflux disease)     Hearing loss 2020    Hyperlipidemia 2010    Hypertension 2008    Obesity     Occipital neuralgia     CELINA (obstructive sleep apnea) 2014    Dr. Edwards       Past Surgical History:        Procedure Laterality Date    CARDIAC CATHETERIZATION  2/21/15    03/2018    CHOLECYSTECTOMY  2008    laparoscopic, Dysfunctional gallbladder    COLONOSCOPY  2013    normal    ELBOW SURGERY      Lyphoma removal

## 2024-06-20 NOTE — ED PROVIDER NOTES
HISTORY     He indicated that his mother is . He indicated that his father is . He indicated that both of his sisters are alive. He indicated that two of his five brothers are alive. He indicated that his maternal grandmother is . He indicated that his maternal grandfather is . He indicated that his paternal grandmother is . He indicated that his paternal grandfather is .       SOCIAL HISTORY       Social History     Tobacco Use    Smoking status: Never    Smokeless tobacco: Never   Vaping Use    Vaping Use: Never used   Substance Use Topics    Alcohol use: No    Drug use: No       PHYSICAL EXAM       ED Triage Vitals [24 0252]   BP Temp Temp Source Pulse Respirations SpO2 Height Weight   (!) 167/75 98.2 °F (36.8 °C) Oral 80 18 95 % -- --       Physical Exam  Vitals and nursing note reviewed.   Constitutional:       General: He is not in acute distress.     Appearance: Normal appearance. He is well-developed. He is not ill-appearing or diaphoretic.   HENT:      Head: Normocephalic and atraumatic.      Nose: Nose normal.      Mouth/Throat:      Mouth: Mucous membranes are moist.      Pharynx: Oropharynx is clear.   Eyes:      General:         Right eye: No discharge.         Left eye: No discharge.      Conjunctiva/sclera: Conjunctivae normal.   Neck:      Trachea: No tracheal deviation.   Cardiovascular:      Rate and Rhythm: Normal rate and regular rhythm.      Pulses: Normal pulses.      Heart sounds: Normal heart sounds. No murmur heard.     No gallop.      Comments: Normal capillary refill  Pulmonary:      Effort: Pulmonary effort is normal. No respiratory distress.      Breath sounds: Normal breath sounds. No stridor.   Abdominal:      General: Bowel sounds are normal.      Palpations: Abdomen is soft.   Musculoskeletal:         General: No tenderness or deformity. Normal range of motion.      Cervical back: Normal range of motion.   Skin:     General: Skin  cultures that may have been sent were not resulted at the time of this patient visit)    MEDICAL DECISION MAKING / ED COURSE:     Summary of Patient Presentation:      Plan:labs, xray, NTG    1) Number and Complexity of Problems                    Differential Diagnosis includes (but not limited to):  ACS, CAD, CHF, PE, PNA                   Pertinent Comorbid Conditions:    Reviewed per the chart    2)  Data Reviewed (none if left blank, additional information can be found in the ED course)          My Independent interpretations:     EKG:      nsr    Imaging: CXR unremarkable no PE on CTA, 4.4 cm aneurysm    Labs:      dimer elevated, trop minimally elevated                   Decision Rules/Clinical Scores utilized:                        Previous visit summary and patient history available on EMR and was reviewed.     History obtained from chart review and the patient.     Pertinent previous records reviewed:  previous notes, admissions and hospitalizations .    Code Status: Not addressed at time of initial evaluation             See Formal Diagnostic Results above for the lab and radiology tests and orders.         3)  Treatment and Disposition         ED Reassessment/Response to interventions:  stable, improved with NTG     NA         Case discussed with consulting clinician/attending physician:  None/Hospitalist         Shared Decision-Making was performed and disposition discussed with the       Patient/Family and questions answered          Social determinants of health impacting treatment or disposition:     4) MIPS  N/A                    Medical Decision Making  Patient was seen evaluated the emergency room for chest pain.  Appropriate labs and imaging are ordered and reviewed.  Labs showed an elevated troponin at 14.  D-dimer was elevated.  Chest x-ray was unremarkable.  CTA shows no PE but does show 4.4 cm ascending thoracic aortic aneurysm.  Patient was started on a nitro drip after having improvement

## 2024-06-20 NOTE — ED NOTES
Patient resting in bed. Repeat EKG performed. Labs drawn and sent. Respirations easy and unlabored. No distress noted. Call light within reach.

## 2024-06-20 NOTE — CONSULTS
[facility]'s  Consult    Patient's Name/Date of Birth: Nicolas Walsh / 1960 (63 y.o.)    Date: June 20, 2024     Referring Provider: Nicole Mcwilliams MD    CHIEF COMPLAINT: chest pain    HPI: This is a pleasant 63 y.o. male with a history of non-obstructive CAD, preserved EF, who presents with chest pain radiating to jaw and L shoulder since yesterday that improved with nitro in the ED. Patient treated as unstable angina with heparin and nitro drips initiated in ED.     Labs:   Troponin trend 14, 12  BNP 63.    EKGs:  6/20/24 AM x2 - Sinus bradycardia    TTE:  1/18/2024 - Normal left ventricular systolic function with a visually estimated EF of 55 - 60%. Left ventricle size is normal. Normal wall thickness.     Stress Test:  2/9/2022 - suggestive of ischemia.    Left Heart Cath:  2/21/2022:  1.  Preserved systolic function of the left ventricle.  Ejection fraction about 50%.  No mitral regurg.  No gradient across the aortic valve.  2.  Left ventricular end-diastolic pressure was 25.  Diastolic dysfunction of the left ventricle.  3.  Dominant RCA with moderate disease about 60% narrowing of the mid dominant RCA.  4.  FFR of the RCA showed the ratio 0.84.  We decided to continue medical treatment for the RCA.  5.  Patent left main.  6.  LAD with nonobstructive disease proximally.  7.  Diagonal artery almost like a dual LAD system with secondary branch of the diagonal artery does exhibit about 90% stenosis at the ostium about 2 mm in diameter.  8.  Nonobstructive disease of the circumflex nondominant small caliber artery.    All labs, EKG's, diagnostic testing and images as well as cardiac cath, stress testing were reviewed during this encounter    Past Medical History:   Diagnosis Date    CAD (coronary artery disease) 2014    GERD (gastroesophageal reflux disease)     Hearing loss 2020    Hyperlipidemia 2010    Hypertension 2008    Obesity     Occipital neuralgia     CELINA (obstructive

## 2024-06-20 NOTE — ED TRIAGE NOTES
Patient to ED via intake due to chest pain, jaw pain, headache and SOB that started at 2300 last night. Patient states he fell asleep and woke up and pain was much worse. Pt took 2 tylenol and 2 81mg ASA prior to leaving the house. Pt sees Dr. Gaming.

## 2024-06-20 NOTE — PROGRESS NOTES
[facility]  Sedation/Analgesia History & Physical    Pt Name: Nicolas Walsh  Account number: [id number]  MRN: [id number]  YOB: 1960  Provider Performing Procedure: Kip Gaming MD MD [facility]  Primary Care Physician: Dari Escalante MD  Date: 6/20/2024    PRE-PROCEDURE    Code Status: FULL CODE  Brief History/Pre-Procedure Diagnosis: unstable angina     Consent: : I have discussed with the patient risks, benefits, and alternatives (and relevant risks, benefits, and side effects related to alternatives or not receiving care), and likelihood of the success.   The patient and/or representative appear to understand and agree to proceed.  The discussion encompasses risks, benefits, and side effects related to the alternatives and the risks related to not receiving the proposed care, treatment, and services.       PLANNED PROCEDURE   [x]Cath  [x]PCI                []Pacemaker/AICD  []MARIA EUGENIA             []Cardioversion []Peripheral angiography/PTA  []Other:      VITAL SIGNS   Vitals:    06/20/24 1201   BP: (!) 157/86   Pulse: 71   Resp: 26   Temp:    SpO2: 96%       PHYSICAL:   General: No acute distress  HEENT:  Unremarkable for age  Neck: without increased JVD, carotid pulses 2+ bilaterally without bruits  Heart: RRR, S1 & S2 WNL   Lungs: Clear to auscultation    Abdomen: BS present, without HSM, masses, or tenderness    Extremities: without C,C,E.  Pulses 2+ bilaterally  Mental Status: Alert & Oriented    SEDATION  Planned agent:[x]Midazolam []Meperidine []Sublimaze []Morphine  []Diazepam  [x]Other: fentanyl      ASA Classification:  []1 []2 [x]3 []4 []5  Class 1: A normal healthy patient  Class 2: Pt with mild to moderate systemic disease  Class 3: Severe systemic disease or disturbance  Class 4: Severe systemic disorders that are already life threatening.  Class 5: Moribund pt with little chances of survival, for more than 24 hours.  Mallampati I Airway Classification:   []1 []2 [x]3  infusion, 5-30 Units/kg/hr, IntraVENous, Continuous, Lianna Metcalf APRN - CNP, Last Rate: 9.1 mL/hr at 06/20/24 0859, 7 Units/kg/hr at 06/20/24 0859    sodium chloride flush 0.9 % injection 5-40 mL, 5-40 mL, IntraVENous, 2 times per day, Lianna Metcalf APRN - CNP    sodium chloride flush 0.9 % injection 5-40 mL, 5-40 mL, IntraVENous, PRN, Lianna Metcalf APRN - CNP    0.9 % sodium chloride infusion, , IntraVENous, PRN, Lianna Metcalf APRN - CNP    potassium chloride (KLOR-CON M) extended release tablet 40 mEq, 40 mEq, Oral, PRN **OR** potassium bicarb-citric acid (EFFER-K) effervescent tablet 40 mEq, 40 mEq, Oral, PRN **OR** potassium chloride 10 mEq/100 mL IVPB (Peripheral Line), 10 mEq, IntraVENous, PRN, Lianna Metcalf APRN - CNP    magnesium sulfate 2000 mg in 50 mL IVPB premix, 2,000 mg, IntraVENous, PRN, Lianna Metcalf APRN - CNP    ondansetron (ZOFRAN-ODT) disintegrating tablet 4 mg, 4 mg, Oral, Q8H PRN **OR** ondansetron (ZOFRAN) injection 4 mg, 4 mg, IntraVENous, Q6H PRN, Lianna Metcalf APRN - CNP    polyethylene glycol (GLYCOLAX) packet 17 g, 17 g, Oral, Daily PRN, Lianna Metcalf APRN - CNP    acetaminophen (TYLENOL) tablet 650 mg, 650 mg, Oral, Q6H PRN **OR** acetaminophen (TYLENOL) suppository 650 mg, 650 mg, Rectal, Q6H PRN, Lianna Metcalf APRN - CNP    melatonin tablet 3 mg, 3 mg, Oral, Nightly PRN, Lianna Metcalf APRN - CNP    Menthol lozenge 4.8 mg, 1 lozenge, Oral, Q2H PRN, Lianna Metcalf, APRN - CNP    Current Outpatient Medications:     diclofenac (VOLTAREN) 50 MG EC tablet, Take 1 tablet by mouth 2 times daily, Disp: 60 tablet, Rfl: 0    nystatin-triamcinolone (MYCOLOG II) 945373-1.1 UNIT/GM-% cream, Apply topically 2 times daily., Disp: 30 g, Rfl: 2    lisinopril (PRINIVIL;ZESTRIL) 20 MG tablet, Take 1 tablet by mouth daily,

## 2024-06-20 NOTE — CARE COORDINATION
06/20/24 1206   Service Assessment   Patient Orientation Alert and Oriented;Person;Place;Situation;Self   Cognition Alert   History Provided By Patient   Primary Caregiver Self   Accompanied By/Relationship Spouse Chasity   Support Systems Children;Spouse/Significant Other;Friends/Neighbors   Patient's Healthcare Decision Maker is: Legal Next of Kin   PCP Verified by CM Yes   Last Visit to PCP Within last 3 months   Prior Functional Level Independent in ADLs/IADLs;Bathing;Dressing;Toileting;Feeding;Housework;Shopping;Mobility   Current Functional Level Independent in ADLs/IADLs;Bathing;Dressing;Toileting;Feeding;Mobility   Can patient return to prior living arrangement Yes   Ability to make needs known: Good   Family able to assist with home care needs: Yes   Would you like for me to discuss the discharge plan with any other family members/significant others, and if so, who? Yes  (Wife - Chasity)   Financial Resources None   Community Resources None   CM/SW Referral Other (see comment)  (None)   Discharge Planning   Type of Residence House   Living Arrangements Spouse/Significant Other   Current Services Prior To Admission C-pap   Potential Assistance Needed N/A   DME Ordered? No   Potential Assistance Purchasing Medications Yes   Type of Home Care Services None   Patient expects to be discharged to: House   Follow Up Appointment: Best Day/Time  Tuesday AM   One/Two Story Residence One story   History of falls? 0   Services At/After Discharge   Transition of Care Consult (CM Consult) N/A   Services At/After Discharge None    Resource Information Provided? No   Mode of Transport at Discharge Other (see comment)  (Spouse)   Confirm Follow Up Transport Family   Condition of Participation: Discharge Planning   The Plan for Transition of Care is related to the following treatment goals: To feel better and get better     Denied needs at discharge. Lives with wife who will transport at discharge.

## 2024-06-20 NOTE — ED NOTES
Patient returned from CT. Pt ambulatory to bathroom with steady gait. Patient resting in bed. Nitro increased at this time due to increased CP from moving around-per pt. Respirations easy and unlabored. No distress noted. Call light within reach.

## 2024-06-20 NOTE — ED NOTES
Patient resting in bed with family at bedside, denies any further needs or concerns at this time. Call light in reach. Will continue to monitor.

## 2024-06-20 NOTE — BRIEF OP NOTE
[facility]  Sedation/Analgesia Post Sedation Record        Pt Name: Nicolas Walsh  MRN: [id number]  YOB: 1960  Procedure Performed By: Kip Gaming MD MD, NORMA, HILDA, GARCIA  Primary Care Physician: Dari Escalante MD    POST-PROCEDURE    Sedation/Anesthesia:  Local Anesthesia and IV Conscious Sedation with continuous O2 monitoring    Estimated Blood Loss: 10 cc     Specimens Removed:  [x]None []Other:      Disposition of Specimen:  []Pathology []Other        Complications:   [x]None Immediate []Other:       Procedure performed: Left heart cath    Post Procedure Diagnosis/Findings:  Non-obstructive Coronary Artery Disease        Recommendations:    Medical treatment  Routine post-cath care.               Kip Gaming MD MD, NORMA, HILDA, GARCIA  Electronically signed 6/20/2024 at 3:31 PM

## 2024-06-21 ENCOUNTER — APPOINTMENT (OUTPATIENT)
Age: 64
DRG: 287 | End: 2024-06-21
Attending: INTERNAL MEDICINE
Payer: COMMERCIAL

## 2024-06-21 VITALS
HEART RATE: 84 BPM | OXYGEN SATURATION: 93 % | DIASTOLIC BLOOD PRESSURE: 67 MMHG | WEIGHT: 292.11 LBS | TEMPERATURE: 98.1 F | HEIGHT: 72 IN | SYSTOLIC BLOOD PRESSURE: 138 MMHG | RESPIRATION RATE: 16 BRPM | BODY MASS INDEX: 39.57 KG/M2

## 2024-06-21 LAB
BASOPHILS ABSOLUTE: 0.1 THOU/MM3 (ref 0–0.1)
BASOPHILS NFR BLD AUTO: 0.5 %
DEPRECATED RDW RBC AUTO: 42.6 FL (ref 35–45)
ECHO AO ASC DIAM: 4 CM
ECHO AO ASCENDING AORTA INDEX: 1.6 CM/M2
ECHO AV CUSP MM: 2.3 CM
ECHO BSA: 2.59 M2
ECHO LA AREA 2C: 15 CM2
ECHO LA AREA 4C: 17.7 CM2
ECHO LA DIAMETER INDEX: 1.52 CM/M2
ECHO LA DIAMETER: 3.8 CM
ECHO LA MAJOR AXIS: 5.6 CM
ECHO LA MINOR AXIS: 4.9 CM
ECHO LA VOL BP: 45 ML (ref 18–58)
ECHO LA VOL MOD A2C: 38 ML (ref 18–58)
ECHO LA VOL MOD A4C: 46 ML (ref 18–58)
ECHO LA VOL/BSA BIPLANE: 18 ML/M2 (ref 16–34)
ECHO LA VOLUME INDEX MOD A2C: 15 ML/M2 (ref 16–34)
ECHO LA VOLUME INDEX MOD A4C: 18 ML/M2 (ref 16–34)
ECHO LV FRACTIONAL SHORTENING: 30 % (ref 28–44)
ECHO LV INTERNAL DIMENSION DIASTOLE INDEX: 1.84 CM/M2
ECHO LV INTERNAL DIMENSION DIASTOLIC: 4.6 CM (ref 4.2–5.9)
ECHO LV INTERNAL DIMENSION SYSTOLIC INDEX: 1.28 CM/M2
ECHO LV INTERNAL DIMENSION SYSTOLIC: 3.2 CM
ECHO LV IVSD: 1.2 CM (ref 0.6–1)
ECHO LV MASS 2D: 205 G (ref 88–224)
ECHO LV MASS INDEX 2D: 82 G/M2 (ref 49–115)
ECHO LV POSTERIOR WALL DIASTOLIC: 1.2 CM (ref 0.6–1)
ECHO LV RELATIVE WALL THICKNESS RATIO: 0.52
ECHO RV INTERNAL DIMENSION: 3.1 CM
EOSINOPHIL NFR BLD AUTO: 0.5 %
EOSINOPHILS ABSOLUTE: 0.1 THOU/MM3 (ref 0–0.4)
ERYTHROCYTE [DISTWIDTH] IN BLOOD BY AUTOMATED COUNT: 12.8 % (ref 11.5–14.5)
HCT VFR BLD AUTO: 43.2 % (ref 42–52)
HEPARIN UNFRACTIONATED: < 0.04 U/ML (ref 0.3–0.7)
HEPARIN UNFRACTIONATED: < 0.04 U/ML (ref 0.3–0.7)
HGB BLD-MCNC: 13.9 GM/DL (ref 14–18)
IMM GRANULOCYTES # BLD AUTO: 0.05 THOU/MM3 (ref 0–0.07)
IMM GRANULOCYTES NFR BLD AUTO: 0.4 %
LYMPHOCYTES ABSOLUTE: 1.4 THOU/MM3 (ref 1–4.8)
LYMPHOCYTES NFR BLD AUTO: 11.3 %
MCH RBC QN AUTO: 29.8 PG (ref 26–33)
MCHC RBC AUTO-ENTMCNC: 32.2 GM/DL (ref 32.2–35.5)
MCV RBC AUTO: 92.7 FL (ref 80–94)
MONOCYTES ABSOLUTE: 1.1 THOU/MM3 (ref 0.4–1.3)
MONOCYTES NFR BLD AUTO: 8.8 %
NEUTROPHILS ABSOLUTE: 9.4 THOU/MM3 (ref 1.8–7.7)
NEUTROPHILS NFR BLD AUTO: 78.5 %
NRBC BLD AUTO-RTO: 0 /100 WBC
PLATELET # BLD AUTO: 268 THOU/MM3 (ref 130–400)
PLATELET BLD QL SMEAR: ADEQUATE
PMV BLD AUTO: 8.9 FL (ref 9.4–12.4)
RBC # BLD AUTO: 4.66 MILL/MM3 (ref 4.7–6.1)
SCAN OF BLOOD SMEAR: NORMAL
WBC # BLD AUTO: 12 THOU/MM3 (ref 4.8–10.8)

## 2024-06-21 PROCEDURE — 85520 HEPARIN ASSAY: CPT

## 2024-06-21 PROCEDURE — 99239 HOSP IP/OBS DSCHRG MGMT >30: CPT | Performed by: INTERNAL MEDICINE

## 2024-06-21 PROCEDURE — 36415 COLL VENOUS BLD VENIPUNCTURE: CPT

## 2024-06-21 PROCEDURE — 93307 TTE W/O DOPPLER COMPLETE: CPT | Performed by: INTERNAL MEDICINE

## 2024-06-21 PROCEDURE — B24BZZ4 ULTRASONOGRAPHY OF HEART WITH AORTA, TRANSESOPHAGEAL: ICD-10-PCS | Performed by: INTERNAL MEDICINE

## 2024-06-21 PROCEDURE — 6370000000 HC RX 637 (ALT 250 FOR IP): Performed by: NURSE PRACTITIONER

## 2024-06-21 PROCEDURE — 93307 TTE W/O DOPPLER COMPLETE: CPT

## 2024-06-21 PROCEDURE — 85025 COMPLETE CBC W/AUTO DIFF WBC: CPT

## 2024-06-21 RX ORDER — AMOXICILLIN AND CLAVULANATE POTASSIUM 875; 125 MG/1; MG/1
1 TABLET, FILM COATED ORAL 2 TIMES DAILY
Qty: 14 TABLET | Refills: 0 | Status: SHIPPED | OUTPATIENT
Start: 2024-06-21 | End: 2024-06-28

## 2024-06-21 RX ADMIN — BUMETANIDE 2 MG: 1 TABLET ORAL at 07:27

## 2024-06-21 RX ADMIN — Medication 1 TABLET: at 07:27

## 2024-06-21 RX ADMIN — LISINOPRIL 20 MG: 20 TABLET ORAL at 07:27

## 2024-06-21 RX ADMIN — AMLODIPINE BESYLATE 5 MG: 5 TABLET ORAL at 07:27

## 2024-06-21 ASSESSMENT — PAIN SCALES - GENERAL: PAINLEVEL_OUTOF10: 2

## 2024-06-21 ASSESSMENT — PAIN DESCRIPTION - LOCATION: LOCATION: CHEST

## 2024-06-21 NOTE — DISCHARGE SUMMARY
pleural effusion. This document has been electronically signed by: Ivy Mello DO on 06/20/2024 03:27 AM       [unfilled]    Discharge Medications    Current Discharge Medication List    START taking these medications    amoxicillin-clavulanate (AUGMENTIN) 875-125 MG per tablet  Take 1 tablet by mouth 2 times daily for 7 days  Qty: 14 tablet Refills: 0          Current Discharge Medication List        Current Discharge Medication List    CONTINUE these medications which have NOT CHANGED    nystatin-triamcinolone (MYCOLOG II) 331429-4.1 UNIT/GM-% cream  Apply topically 2 times daily.  Qty: 30 g Refills: 2  Associated Diagnoses:Anal itching; Skin lesion    lisinopril (PRINIVIL;ZESTRIL) 20 MG tablet  Take 1 tablet by mouth daily  Qty: 90 tablet Refills: 3  Associated Diagnoses:Primary hypertension    amLODIPine (NORVASC) 5 MG tablet  Take 1 tablet by mouth daily  Qty: 90 tablet Refills: 1    fluticasone (FLONASE) 50 MCG/ACT nasal spray  1 spray by Each Nostril route daily as needed for Rhinitis (Congestion)  Qty: 1 each Refills: 2  Associated Diagnoses:Nasal septal deviation    bumetanide (BUMEX) 2 MG tablet  Take 1 tablet by mouth daily  Qty: 90 tablet Refills: 3  Associated Diagnoses:Coronary artery disease (CAD) excluded    Multiple Vitamins-Minerals (MULTIVITAMIN ADULT PO)  Take by mouth Indications: Ultra Light 121    vitamin E 400 UNIT capsule  Take 1 capsule by mouth daily    zinc 50 MG CAPS  Take by mouth    Cholecalciferol (VITAMIN D3) 2000 units CAPS  Take 5,000 Units by mouth     CPAP Machine MISC  by Does not apply route Please change CPAP pressure to auto 5-15 cm H20.  Qty: 1 each Refills: 0    Coenzyme Q10 (COQ10) 200 MG CAPS  Take  by mouth 2 times daily.          Current Discharge Medication List    STOP taking these medications    diclofenac (VOLTAREN) 50 MG EC tablet  Comments:  Reason for Stopping:    potassium chloride (KLOR-CON M) 20 MEQ extended release tablet  Comments:  Reason for

## 2024-06-21 NOTE — PROGRESS NOTES
Discharge teaching and instructions for diagnosis/procedure of Angina completed with patient using teachback method. AVS reviewed. Printed prescriptions given to patient. Patient voiced understanding regarding prescriptions, follow up appointments, and care of self at home. Discharged ambulatory to  home with support per family

## 2024-06-21 NOTE — CARE COORDINATION
6/21/24, 10:25 AM EDT    Patient goals/plan/ treatment preferences discussed by  and .  Patient goals/plan/ treatment preferences reviewed with patient/ family.  Patient/ family verbalize understanding of discharge plan and are in agreement with goal/plan/treatment preferences.  Understanding was demonstrated using the teach back method.  AVS provided by RN at time of discharge, which includes all necessary medical information pertaining to the patients current course of illness, treatment, post-discharge goals of care, and treatment preferences.     Services At/After Discharge: None    Cardiac Cath yesterday with no intervention. Discharge order in place. Plans return home with spouse as prior to admission.

## 2024-06-24 ENCOUNTER — TELEPHONE (OUTPATIENT)
Dept: FAMILY MEDICINE CLINIC | Age: 64
End: 2024-06-24

## 2024-06-24 NOTE — TELEPHONE ENCOUNTER
Patient called and stated [facility] [address] has the zepbound in stock but a script needs to be sent there asap before they run out.

## 2024-06-25 ENCOUNTER — HOSPITAL ENCOUNTER (OUTPATIENT)
Dept: PHYSICAL THERAPY | Age: 64
Setting detail: THERAPIES SERIES
Discharge: HOME | End: 2024-06-25
Payer: COMMERCIAL

## 2024-06-25 PROCEDURE — 97110 THERAPEUTIC EXERCISES: CPT

## 2024-06-25 NOTE — TELEPHONE ENCOUNTER
Script sent to pharmacy earlier.  Thank you,  Electronically signed by Jorge Lopez MD on 6/25/2024 at 1:38 PM

## 2024-06-25 NOTE — PROGRESS NOTES
shaded column indicates activity completed today    “*\" next to exercise/intervention indicates progression   Modalities Parameters/  Location  Notes                     Manual Therapy Time/Technique  Notes   IDN: Cervical paraspinals C2-C7, B UT , suboccipitals, (NT- supraorbital, infraorbital and mental nerve), thoracic paraspinals  10 min x                Exercise/Intervention   Notes   UT stretch 15\" x3  x    Levator Stretch 15\" x3  x    Scap Squeeze 15x5 sec  x cues to keep UTs relaxed    SCM Stretch 15\" x3  x    Dive Stretch 15\" x 3  x    Pec corner stretch 15\" x 3  x    Cervical retraction* 10x5 sec  x    Self SNAG for rotation B* 5x5 sec  x           Vestibular:       X1 viewing seated: horiztonal, vertical, letter N 1 min  x No dizziness                   Specific Interventions Next Treatment: Initiate Cervical ROM stretching pain free with extended stretching to 60 seconds.      Activity/Treatment Tolerance:  [x]  Patient tolerated treatment well  []  Patient limited by fatigue  []  Patient limited by pain   []  Patient limited by medical complications  []  Other:     Assessment:  Held IDN this date due to patient reporting pericardial infection.  Patient demonstrates positive gains in ROM, and strength, however continues to have pain that limits functional mobility.   Held progression of strengthening until cleared by cardio.        GOALS:  Patient Goal: Get rid of this pain.     Short Term Goals: defer to LTG due to short duration of POC.     Long Term Goals: 6 weeks  Patient will improve AROM of cervical rotation  to 60 degrees bilatrally  for ease driving.  60 degrees  R, 62 L   Patient will demonstrate good postural awareness with minimal verbal cues during therapy session to carry over to  home. .  Patient will improve postural strength to 5/5 for symptom control and tolerance to sitting and standing.   Patient will report reduced pain level to 2/10 to tolerate sitting, standing, and lying down

## 2024-06-28 ENCOUNTER — APPOINTMENT (OUTPATIENT)
Dept: PHYSICAL THERAPY | Age: 64
End: 2024-06-28
Payer: COMMERCIAL

## 2024-07-01 ENCOUNTER — HOSPITAL ENCOUNTER (OUTPATIENT)
Dept: PHYSICAL THERAPY | Age: 64
Setting detail: THERAPIES SERIES
Discharge: HOME | End: 2024-07-01
Payer: COMMERCIAL

## 2024-07-01 ENCOUNTER — TELEPHONE (OUTPATIENT)
Dept: CARDIOLOGY CLINIC | Age: 64
End: 2024-07-01

## 2024-07-01 ENCOUNTER — OFFICE VISIT (OUTPATIENT)
Dept: CARDIOLOGY CLINIC | Age: 64
End: 2024-07-01
Payer: COMMERCIAL

## 2024-07-01 VITALS
DIASTOLIC BLOOD PRESSURE: 73 MMHG | HEART RATE: 79 BPM | HEIGHT: 72 IN | WEIGHT: 278.8 LBS | BODY MASS INDEX: 37.76 KG/M2 | SYSTOLIC BLOOD PRESSURE: 122 MMHG

## 2024-07-01 DIAGNOSIS — I10 PRIMARY HYPERTENSION: Primary | ICD-10-CM

## 2024-07-01 DIAGNOSIS — Z86.79 H/O AORTIC ANEURYSM: ICD-10-CM

## 2024-07-01 DIAGNOSIS — I71.40 ABDOMINAL ANEURYSM (HCC): ICD-10-CM

## 2024-07-01 DIAGNOSIS — E78.5 HYPERLIPIDEMIA LDL GOAL <70: ICD-10-CM

## 2024-07-01 PROCEDURE — 3078F DIAST BP <80 MM HG: CPT | Performed by: PHYSICIAN ASSISTANT

## 2024-07-01 PROCEDURE — 3074F SYST BP LT 130 MM HG: CPT | Performed by: PHYSICIAN ASSISTANT

## 2024-07-01 PROCEDURE — 99214 OFFICE O/P EST MOD 30 MIN: CPT | Performed by: PHYSICIAN ASSISTANT

## 2024-07-01 PROCEDURE — 97110 THERAPEUTIC EXERCISES: CPT

## 2024-07-01 RX ORDER — NITROGLYCERIN 0.4 MG/1
0.4 TABLET SUBLINGUAL EVERY 5 MIN PRN
Qty: 25 TABLET | Refills: 3 | Status: SHIPPED | OUTPATIENT
Start: 2024-07-01

## 2024-07-01 NOTE — TELEPHONE ENCOUNTER
Patient has an aneurysm:  Vascular:  No pulmonary embolism.  4.4 cm ascending thoracic aortic aneurysm.    Pt states he does lifting at work typically 40-50# but sometimes can lift 100#.   Any lifting restrictions?

## 2024-07-01 NOTE — PROGRESS NOTES
[place] [address]                            CARDIAC CATHETERIZATION    PATIENT NAME: JUSTINO CHATTERJEE                   :        1960  MED REC NO:   [id number]                           ROOM:       0017  ACCOUNT NO:   [id number]                           ADMIT DATE: 2022  PROVIDER:     Belle Diop M.D.    DATE OF PROCEDURE:  2022    INDICATION FOR PROCEDURE:  This is a patient who is a 61-year-old  gentleman, who has been complaining of shortness of breath, chest pain.   He did have a stress Cardiolite test, it was abnormal, and the patient  was treated medically.  The patient has intolerance to lot of  medication, was referred for a heart cath, possible intervention.  The  patient understands the procedure, the benefits, the risks, alternative  methods of treatment, possible complications, and wants it to be done.    PROCEDURE PERFORMED:  1.  IV conscious sedation:  The patient was given  IV conscious sedation  in incremental dosages by the circulating cath lab RN, monitored by the  cath lab monitor tech under my supervision.  Procedure started at 07:20  and finished at 08:05.  No acute complication from the procedure.   Estimated blood loss was about 10 mL.    2.  Left heart cath:  Right radial artery was cannulated with a 6-Mongolian  sheath.  The subclavian artery is a very tortuous artery made  cannulation of the coronary artery somewhat difficult.    The coronary angiogram showed the RCA is a dominant artery.  There is at  least moderate disease, 60 to 70% narrowing of the mid RCA.  Distally,  the RCA was patent.    Left main was cannulated with the Tiger catheter.  Multiple catheters  have been utilized.    The LAD is a dominant artery, nonobstructive disease of the proximal LAD  after the origin of the diagonal artery.  Secondary diagonal branch  moderate size artery.  It had about 90% stenosis at the ostium of the  point of bifurcation with

## 2024-07-01 NOTE — PROGRESS NOTES
[facility]  PHYSICAL THERAPY  [] EVALUATION  [] DAILY NOTE (LAND) [] DAILY NOTE (AQUATIC ) [] PROGRESS NOTE [x] DISCHARGE NOTE    [x] OUTPATIENT REHABILITATION CENTER [facility]   [] [place] [facility]    [] [facility]   [] [facility]    Date: 2024  Patient Name:  Nicolas Walsh  : 1960  MRN: [id number]  CSN: [id number]    Referring Practitioner Glendy Bundy APRN - CNP    Diagnosis Cervicalgia  Abnormal posture  Weakness  Muscle weakness (generalized)  Stiffness of unspecified joint, not elsewhere classified  Other chronic pain  Occipital neuralgia  Spondylosis without myelopathy or radiculopathy, cervical region  Radiculopathy, cervical region  Myalgia, other site  Chronic pain syndrome   Treatment Diagnosis M54.2  Neck Pain  M54.2, G89.29  Chronic Neck Pain  R29.3 Abnormal Posture  R53.1 Weakness  M62.81 Generalized Muscle Weakness  M25.60 Stiffness of Unspecified Joint   Date of Evaluation 24    Additional Pertinent History CELINA, Occipital neuralgia, obesity, HTN< hyperlipidemia, hearing loss, GERD, CAD , B shoulder partial RTC tear       Functional Outcome Measure Used Neck Disability Index   Functional Outcome Score 27 (24)   27 24      Insurance: Primary: Payor: JOSE /  /  / ,   Secondary:    Authorization Information: PRE CERTIFICATION REQUIRED: Precert required after initial evaluation.  INSURANCE THERAPY BENEFIT: Allowed 20 visits of OT, PT, and ST EACH per calendar year.  0 visits have been used.. Hard Max..   AQUATIC THERAPY COVERED: Yes  MODALITIES COVERED:  Yes  TELEHEALTH COVERED:    REFERENCE NUMBER: [employer]  Received auth for 8 PT visits (all PT CPT codes covered under \"umbrella\") from 24 through 24.    Approved Procedure Codes: 92072 - Therapeutic Exercise  , 61523 - Manual Therapy , 20436 - Aquatic Therapeutic Exercise, 74919 - Neuromuscular Re-Education , 20337 - PT ADL Training, 12500 -

## 2024-07-09 ENCOUNTER — TELEPHONE (OUTPATIENT)
Dept: PULMONOLOGY | Age: 64
End: 2024-07-09

## 2024-07-09 DIAGNOSIS — G47.33 OSA ON CPAP: Primary | ICD-10-CM

## 2024-07-09 DIAGNOSIS — E66.9 OBESITY (BMI 30-39.9): ICD-10-CM

## 2024-07-09 NOTE — TELEPHONE ENCOUNTER
[facility] called stating the patient needs to have another sleep study done before he can receive a pap machine please advise thanks

## 2024-07-10 NOTE — TELEPHONE ENCOUNTER
Patient is scheduled for 8/1/24. Are we okay to keep his appointment in September, or should we move it? Please advise thanks

## 2024-07-15 ENCOUNTER — OFFICE VISIT (OUTPATIENT)
Dept: PHYSICAL MEDICINE AND REHAB | Age: 64
End: 2024-07-15
Payer: COMMERCIAL

## 2024-07-15 VITALS
BODY MASS INDEX: 37.65 KG/M2 | HEIGHT: 72 IN | WEIGHT: 278 LBS | DIASTOLIC BLOOD PRESSURE: 68 MMHG | SYSTOLIC BLOOD PRESSURE: 106 MMHG

## 2024-07-15 DIAGNOSIS — G89.4 CHRONIC PAIN SYNDROME: ICD-10-CM

## 2024-07-15 DIAGNOSIS — R04.0 EPISTAXIS: ICD-10-CM

## 2024-07-15 DIAGNOSIS — M54.12 CERVICAL RADICULOPATHY: ICD-10-CM

## 2024-07-15 DIAGNOSIS — M79.18 MYOFASCIAL PAIN: Primary | ICD-10-CM

## 2024-07-15 DIAGNOSIS — H93.13 TINNITUS OF BOTH EARS: ICD-10-CM

## 2024-07-15 DIAGNOSIS — M47.812 CERVICAL SPONDYLOSIS: ICD-10-CM

## 2024-07-15 DIAGNOSIS — M54.81 BILATERAL OCCIPITAL NEURALGIA: ICD-10-CM

## 2024-07-15 PROCEDURE — 3074F SYST BP LT 130 MM HG: CPT | Performed by: NURSE PRACTITIONER

## 2024-07-15 PROCEDURE — 99215 OFFICE O/P EST HI 40 MIN: CPT | Performed by: NURSE PRACTITIONER

## 2024-07-15 PROCEDURE — 20553 NJX 1/MLT TRIGGER POINTS 3/>: CPT | Performed by: NURSE PRACTITIONER

## 2024-07-15 PROCEDURE — 3078F DIAST BP <80 MM HG: CPT | Performed by: NURSE PRACTITIONER

## 2024-07-15 RX ORDER — METHOCARBAMOL 100 MG/ML
100 INJECTION, SOLUTION INTRAMUSCULAR; INTRAVENOUS ONCE
Status: COMPLETED | OUTPATIENT
Start: 2024-07-15 | End: 2024-07-15

## 2024-07-15 RX ADMIN — METHOCARBAMOL 100 MG: 100 INJECTION, SOLUTION INTRAMUSCULAR; INTRAVENOUS at 08:18

## 2024-07-15 NOTE — PROGRESS NOTES
Functionality Assessment/Goals Worksheet     On a scale of 0 (Does not Interfere) to 10 (Completely Interferes)     1.  Which number describes how during the past week pain has interfered with           the following:  A.  General Activity:  6  B.  Mood: 9  C.  Walking Ability:  7  D.  Normal Work (Includes both work outside the home and housework):  7  E.  Relations with Other People:   8  F.  Sleep:   8  G.  Enjoyment of Life:   8    2.  Patient Prefers to Take their Pain Medications:     [x]  On a regular basis   []  Only when necessary    []  Does not take pain medications    3.  What are the Patient's Goals/Expectations for Visiting Pain Management?     [x]  Learn about my pain    []  Receive Medication   []  Physical Therapy     []  Treat Depression   [x]  Receive Injections    []  Treat Sleep   []  Deal with Anxiety and Stress   []  Treat Opoid Dependence/Addiction   []  Other:                                
documentation.       CARLOS Headley CNP   Interventional Pain Management/PM&R   [facility]     Procedure      Nicolas Walsh is a 63 y.o. male presents today in the office for the following:  Chief Complaint   Patient presents with    Follow-up     Follow-up       History of Present Illness   Nicolas is here today for trigger point injections.     Pre-Op Diagnosis   Myofacial pain syndrome     Post-Op Diagnosis   Myofacial pain syndrome     Procedure: Trigger point injection(s)    Procedure Documentation   Patient identified. Consent signed. Site identified.  Trigger points were identified in the bilateral cervical paraspinals, bilateral trapezius, bilateral rhomboids, bilateral thoracic paraspinals for a total of 20 trigger point injections. Then with a 25g needle entered each trigger point and after negative aspiration, 1 cc of a mixture containing 1:10 - 100 mg methocarbamol: 0.25% bupivacaine was injected at each trigger point for a total of 20 trigger points.         Procedural Complications: None      Vitals:    07/15/24 0733   BP: 106/68   Site: Left Upper Arm   Position: Sitting   Weight: 126.1 kg (278 lb)   Height: 1.829 m (6' 0.01\")         Conclusion   No complications encountered.  Patient discharged stable condition.  Patient told if any problems to call office or go to ER.    Orders Placed This Encounter   Medications    methocarbamol (ROBAXIN) injection 100 mg       Electronically signed by CARLOS Headley CNP

## 2024-07-18 ENCOUNTER — TELEPHONE (OUTPATIENT)
Dept: SLEEP CENTER | Age: 64
End: 2024-07-18

## 2024-07-18 DIAGNOSIS — Z03.89 CORONARY ARTERY DISEASE (CAD) EXCLUDED: ICD-10-CM

## 2024-07-18 RX ORDER — AMLODIPINE BESYLATE 5 MG/1
5 TABLET ORAL DAILY
Qty: 90 TABLET | Refills: 3 | Status: SHIPPED | OUTPATIENT
Start: 2024-07-18

## 2024-07-18 RX ORDER — BUMETANIDE 2 MG/1
2 TABLET ORAL DAILY
Qty: 90 TABLET | Refills: 3 | Status: SHIPPED | OUTPATIENT
Start: 2024-07-18

## 2024-07-18 NOTE — TELEPHONE ENCOUNTER
Dr. Escalante/ Nereyda, please advise.    Thank you,  Misa Steve below has denied: 2024.      Patient name: Nicolas Walsh      : 1960      DOS: 2024      CPT: 73507      Insurance: JOSE GOULD ACA      Denial reason: Called JOSE [employer]OULD LARISSA @ [phone] s/w Felicia BYRD, as per rep Auth has been denied due to Not Medically Necessary and denial letter has been faxed to MS's office  Call Ref # [id number]      Case/Tracking/Auth/Ref # [id number]      Date of Denial: 2024      P2P Phone#: [phone] * [phone]     Please find the attached denial letter     Thanks, & Regards   Cari Tran

## 2024-07-26 ENCOUNTER — TELEPHONE (OUTPATIENT)
Dept: SLEEP CENTER | Age: 64
End: 2024-07-26

## 2024-07-26 NOTE — TELEPHONE ENCOUNTER
Ricardo Dawn.  You stated this was 'taken care of'- does that mean he is to remain on the schedule or what was decided to do regarding the denial?      Thank you,  Misa

## 2024-08-06 NOTE — H&P
Today, patient presents for planned medial branch blocks targeting bilateral cervical 2-3, cervical 3-4    This note is reflective of the patient's previous visit for evaluation. We will proceed with today's planned procedure. Since patient's last visit for evaluation, there have been no interval changes in medical history. Patient has no new numbness, weakness, or focal neurological deficit since evaluation. Patient has no contraindications to injection (no anticoagulation or recent antibiotic intake for active infections), and has a  present or is able to drive themselves (as discussed and cleared by physician).  Allergies to latex, contrast dye, and steroid medications have been confirmed with the patient prior to the procedure.  NPO necessity has been assessed and accepted based on procedure complexity. The risks and benefits of the procedure have been explained including but are not limited to infection, bleeding, paralysis, immediate post procedure weakness, and dizziness; the patient acknowledges understanding and desires to proceed with the procedure. Patient has signed consent for same procedure as discussed in previous clinic encounter. All other questions and concerns were addressed at bedside. See procedure note for full details.       Post procedure Instructions: The patient was advised not to drive during the day of the procedure and not to engage in any significant decision making (unless otherwise states by physician). The patient was also advised to be cautious with walking/activity for 24 hours following today's visit and asked not to engage in over-exertion (unless otherwise states by physician). After this time, it is ok to resume pre-procedure level of activity. Patient advised to apply ice to site of injection in situations of pain and discomfort. Patient advised to not submerge site of injection during bath or pool activities for approximately 24 hours post-procedure. Patient attested to

## 2024-08-06 NOTE — DISCHARGE INSTRUCTIONS
Post procedure Instructions:    No driving or making significant decisions for the remainder of the day.   Be cautions with walking and activity for 24 hours, do not over exert yourself.  Ok to resume pre-procedure activity level today.  Apply ice to site of injection site if you have pain or discomfort.  Do not submerge sit of injection during bath or pool activities for 48 hours post-procedure.  Resume blood thinning medications in 24 hours.     Call office [phone] if you have:  Temperature greater than 100.4  Persistent nausea and vomiting  Severe uncontrolled pain  Redness, tenderness, or signs of infection (pain, swelling, redness, odor or green/yellow discharge around the site)  Difficulty breathing, headache or visual disturbances  Hives  Persistent dizziness or light-headedness  Extreme fatigue  Any other questions or concerns you may have after discharge    In an emergency, call 911 or go to an Emergency Department at a nearby hospital    “Surgical Site Infections”      How can we work together to prevent Surgical Site Infections?   We would like to thank you for choosing [facility] for your Surgical Care.  Below you will find helpful information on how we can work together to prevent Surgical Site Infections.    What is a Surgical Site Infection (SSI)?  A surgical site infection is an infection that occurs after surgery in the part of the body where the surgery took place. Most patients who have surgery do not develop an infection. However, infections develop in about 1 to 3 out of every 100 patients who have surgery.  Some of the common symptoms of a surgical site infection are:  Redness and pain around the area where you had surgery  Drainage of cloudy fluid from your surgical wound  Fever    Can SSIs be treated?  Yes. Most surgical site infections can be treated with antibiotics. The antibiotic given to you depends on the bacteria (germs) causing the infection. Sometimes patients with

## 2024-08-07 ENCOUNTER — HOSPITAL ENCOUNTER (OUTPATIENT)
Age: 64
Setting detail: OUTPATIENT SURGERY
Discharge: HOME | End: 2024-08-07
Attending: ANESTHESIOLOGY | Admitting: ANESTHESIOLOGY
Payer: COMMERCIAL

## 2024-08-07 ENCOUNTER — APPOINTMENT (OUTPATIENT)
Dept: GENERAL RADIOLOGY | Age: 64
End: 2024-08-07
Attending: ANESTHESIOLOGY
Payer: COMMERCIAL

## 2024-08-07 VITALS
OXYGEN SATURATION: 96 % | BODY MASS INDEX: 36.44 KG/M2 | SYSTOLIC BLOOD PRESSURE: 144 MMHG | TEMPERATURE: 97.6 F | RESPIRATION RATE: 16 BRPM | WEIGHT: 269 LBS | HEIGHT: 72 IN | HEART RATE: 56 BPM | DIASTOLIC BLOOD PRESSURE: 67 MMHG

## 2024-08-07 PROCEDURE — 7100000010 HC PHASE II RECOVERY - FIRST 15 MIN: Performed by: ANESTHESIOLOGY

## 2024-08-07 PROCEDURE — 64490 INJ PARAVERT F JNT C/T 1 LEV: CPT | Performed by: ANESTHESIOLOGY

## 2024-08-07 PROCEDURE — 3600000056 HC PAIN LEVEL 4 BASE: Performed by: ANESTHESIOLOGY

## 2024-08-07 PROCEDURE — 2709999900 HC NON-CHARGEABLE SUPPLY: Performed by: ANESTHESIOLOGY

## 2024-08-07 PROCEDURE — 6360000002 HC RX W HCPCS: Performed by: ANESTHESIOLOGY

## 2024-08-07 PROCEDURE — 2500000003 HC RX 250 WO HCPCS: Performed by: ANESTHESIOLOGY

## 2024-08-07 PROCEDURE — 64491 INJ PARAVERT F JNT C/T 2 LEV: CPT | Performed by: ANESTHESIOLOGY

## 2024-08-07 RX ORDER — BUPIVACAINE HYDROCHLORIDE 5 MG/ML
INJECTION, SOLUTION PERINEURAL PRN
Status: DISCONTINUED | OUTPATIENT
Start: 2024-08-07 | End: 2024-08-07 | Stop reason: ALTCHOICE

## 2024-08-07 RX ORDER — LIDOCAINE HYDROCHLORIDE 10 MG/ML
INJECTION, SOLUTION EPIDURAL; INFILTRATION; INTRACAUDAL; PERINEURAL PRN
Status: DISCONTINUED | OUTPATIENT
Start: 2024-08-07 | End: 2024-08-07 | Stop reason: ALTCHOICE

## 2024-08-07 ASSESSMENT — PAIN - FUNCTIONAL ASSESSMENT
PAIN_FUNCTIONAL_ASSESSMENT: 0-10
PAIN_FUNCTIONAL_ASSESSMENT: 0-10

## 2024-08-07 NOTE — PROCEDURES
Pre-operative Diagnosis: Cervical spondylosis     Post-operative Diagnosis: Cervical spondylosis     Procedure: Cervical medial branch blocks targeting bilateral C2/C3 and C3/C4     Procedure Description:  After having obtained a signed informed consent, the patient was taken to the fluoroscopy suite and placed in the prone position.  The neck was prepped with chloraprep and draped in a sterile fashion. Then, 0.5 cc of  1 % lidocaine was used to anesthetize the skin and underlying subcutaneous superficial tissues.  Under fluoroscopic guidance, 22G 3.5 inch spinal needles were advanced on to the mid facet pilar of C2/C3 joint and C4 on the RIGHT.  There were no paresthesias, heme, or CSF aspiration.  Needle placement was confirmed using the AP and lateral views. Then, 0.5 cc 0.5% bupivacaine was injected. The needles were removed without any complication. The procedure was repeated on the contralateral side.  The patient tolerated the procedure well and was transported to the recovery room where he was observed for 15 minutes to then be discharged in ambulatory fashion.      Procedural Complications: None  Estimated Blood Loss: 0 mL        Adam Christy DO  Interventional Pain Management/PM&R   [facility] [place]

## 2024-08-07 NOTE — PROGRESS NOTES
0936 Patient arrived to phase II via cart.  Spontaneous respiraitons even and unlabored.  Placed on monitor--VSS.  Report received from OR RN    0937 Assessment completed.  Patient is alert and oriented x4.  Patient denies pain--will monitor.  Injection sites clean and dry.      0938 Snack provided. Pt. Denies all other needs at this time. Pt states readiness for discharge.    0939 Pt. Standing at bedside. With stand by assist of RN. Pt. Denies weakness or dizziness.    0941 Pt. Getting self dressed. Family notified of discharge    0943 Pt. Ambulated to private vehicle in stable condition with stand by assist of RN.

## 2024-08-21 ENCOUNTER — OFFICE VISIT (OUTPATIENT)
Dept: PHYSICAL MEDICINE AND REHAB | Age: 64
End: 2024-08-21
Payer: COMMERCIAL

## 2024-08-21 VITALS — HEART RATE: 56 BPM | SYSTOLIC BLOOD PRESSURE: 110 MMHG | DIASTOLIC BLOOD PRESSURE: 70 MMHG | OXYGEN SATURATION: 97 %

## 2024-08-21 DIAGNOSIS — M54.81 BILATERAL OCCIPITAL NEURALGIA: ICD-10-CM

## 2024-08-21 DIAGNOSIS — M79.18 MYOFASCIAL PAIN: Primary | ICD-10-CM

## 2024-08-21 DIAGNOSIS — M47.812 CERVICAL SPONDYLOSIS: ICD-10-CM

## 2024-08-21 DIAGNOSIS — M54.12 CERVICAL RADICULOPATHY: ICD-10-CM

## 2024-08-21 DIAGNOSIS — G89.4 CHRONIC PAIN SYNDROME: ICD-10-CM

## 2024-08-21 PROCEDURE — 3074F SYST BP LT 130 MM HG: CPT | Performed by: NURSE PRACTITIONER

## 2024-08-21 PROCEDURE — 20553 NJX 1/MLT TRIGGER POINTS 3/>: CPT | Performed by: NURSE PRACTITIONER

## 2024-08-21 PROCEDURE — 3078F DIAST BP <80 MM HG: CPT | Performed by: NURSE PRACTITIONER

## 2024-08-21 PROCEDURE — 99214 OFFICE O/P EST MOD 30 MIN: CPT | Performed by: NURSE PRACTITIONER

## 2024-08-21 RX ORDER — METHOCARBAMOL 100 MG/ML
100 INJECTION, SOLUTION INTRAMUSCULAR; INTRAVENOUS ONCE
Status: COMPLETED | OUTPATIENT
Start: 2024-08-21 | End: 2024-08-22

## 2024-08-21 NOTE — PROGRESS NOTES
Functionality Assessment/Goals Worksheet     On a scale of 0 (Does not Interfere) to 10 (Completely Interferes)     1.  Which number describes how during the past week pain has interfered with           the following:  A.  General Activity:  5  B.  Mood: 7  C.  Walking Ability:  7  D.  Normal Work (Includes both work outside the home and housework):  7  E.  Relations with Other People:   6  F.  Sleep:   6  G.  Enjoyment of Life:   7    2.  Patient Prefers to Take their Pain Medications:     [x]  On a regular basis   []  Only when necessary    []  Does not take pain medications    3.  What are the Patient's Goals/Expectations for Visiting Pain Management?     [x]  Learn about my pain    []  Receive Medication   []  Physical Therapy     []  Treat Depression   [x]  Receive Injections    []  Treat Sleep   []  Deal with Anxiety and Stress   []  Treat Opoid Dependence/Addiction   []  Other:                                
branch blocks targeting bilateral cervical 2-3, cervical 3-4, under fluoroscopy was chosen. The risks and benefits were discussed in detail with the patient. Patient wants to proceed with the injection with Dr. Christy    Procedure educations:  Discussed with patient about risks with procedure including infection, reaction to medication, increased pain, failure of procedures, worsening of symptoms, or bleeding.    Anticoagulation/NPO Recommendations:   Patient does not need to hold any medications prior to the procedure]  Patient will not need to be NPO x 8 hour prior to the procedure.  Local only    Multidisciplinary Pain Management:   In the presence of complex, chronic, and multi-factorial pain, the importance of a multidisciplinary approach to pain management in the patient’s management regimen was emphasized and discussed in great detail. Discussed compliance in plan of care, medications regimen and appointments in order to continue with clinic and ordered interventions.   PHYSICAL THERAPY: Patient is advised to see a physical therapist for gentle stretching exercises and conditioning exercises for management of pain.   The patient was also advised to continue physical therapy and stretching exercises at home and cognitive behavioral and/or group therapy.       Follow-up:   4 weeks    It was my pleasure to evaluate Nicolas Walsh today.  I spent over 35 minutes evaluating this patient, reviewing previous notes and images and completing documentation.       Glendy Bundy, APRN - CNP   Interventional Pain Management/PM&R   [facility]     Procedure      Nicolas Walsh is a 63 y.o. male presents today in the office for the following:  Chief Complaint   Patient presents with    Follow-up     Fu on procedure       History of Present Illness   Nicolas is here today for trigger point injections.     Pre-Op Diagnosis   Myofacial pain syndrome     Post-Op Diagnosis

## 2024-08-22 RX ADMIN — METHOCARBAMOL 100 MG: 100 INJECTION, SOLUTION INTRAMUSCULAR; INTRAVENOUS at 15:29

## 2024-10-16 ENCOUNTER — OFFICE VISIT (OUTPATIENT)
Dept: PHYSICAL MEDICINE AND REHAB | Age: 64
End: 2024-10-16

## 2024-10-16 VITALS
DIASTOLIC BLOOD PRESSURE: 68 MMHG | HEIGHT: 72 IN | SYSTOLIC BLOOD PRESSURE: 112 MMHG | WEIGHT: 269 LBS | BODY MASS INDEX: 36.44 KG/M2

## 2024-10-16 DIAGNOSIS — M47.812 CERVICAL SPONDYLOSIS: ICD-10-CM

## 2024-10-16 DIAGNOSIS — G89.4 CHRONIC PAIN SYNDROME: ICD-10-CM

## 2024-10-16 DIAGNOSIS — H93.13 TINNITUS OF BOTH EARS: ICD-10-CM

## 2024-10-16 DIAGNOSIS — M54.12 CERVICAL RADICULOPATHY: ICD-10-CM

## 2024-10-16 DIAGNOSIS — M79.18 MYOFASCIAL PAIN: Primary | ICD-10-CM

## 2024-10-16 DIAGNOSIS — M54.81 BILATERAL OCCIPITAL NEURALGIA: ICD-10-CM

## 2024-10-16 RX ORDER — METHOCARBAMOL 100 MG/ML
100 INJECTION, SOLUTION INTRAMUSCULAR; INTRAVENOUS ONCE
Status: SHIPPED | OUTPATIENT
Start: 2024-10-16

## 2024-10-16 NOTE — PROGRESS NOTES
Functionality Assessment/Goals Worksheet     On a scale of 0 (Does not Interfere) to 10 (Completely Interferes)     1.  Which number describes how during the past week pain has interfered with           the following:  A.  General Activity:  5  B.  Mood: 5  C.  Walking Ability:  3  D.  Normal Work (Includes both work outside the home and housework):  5  E.  Relations with Other People:   5  F.  Sleep:   5  G.  Enjoyment of Life:   5    2.  Patient Prefers to Take their Pain Medications:     []  On a regular basis   []  Only when necessary    [x]  Does not take pain medications    3.  What are the Patient's Goals/Expectations for Visiting Pain Management?     [x]  Learn about my pain    []  Receive Medication   []  Physical Therapy     []  Treat Depression   [x]  Receive Injections    []  Treat Sleep   []  Deal with Anxiety and Stress   []  Treat Opoid Dependence/Addiction   []  Other:                                
points were identified in the bilateral cervical paraspinals, bilateral trapezius, bilateral rhomboids, bilateral thoracic paraspinals for a total of 20 trigger point injections. Then with a 25g needle entered each trigger point and after negative aspiration, 1 cc of a mixture containing 1:10 - 100 mg methocarbamol: 0.25% bupivacaine was injected at each trigger point for a total of 20 trigger points.         Procedural Complications: None      Vitals:    10/16/24 0753   BP: 112/68   Site: Left Upper Arm   Position: Sitting   Weight: 122 kg (269 lb)   Height: 1.829 m (6' 0.01\")           Conclusion   No complications encountered.  Patient discharged stable condition.  Patient told if any problems to call office or go to ER.    Orders Placed This Encounter   Medications    methocarbamol (ROBAXIN) injection 100 mg       Electronically signed by CARLOS Headley - CNP

## 2024-11-14 ENCOUNTER — HOSPITAL ENCOUNTER (OUTPATIENT)
Dept: MRI IMAGING | Age: 64
Discharge: HOME | End: 2024-11-14
Attending: RADIOLOGY

## 2024-11-14 ENCOUNTER — OFFICE VISIT (OUTPATIENT)
Dept: PHYSICAL MEDICINE AND REHAB | Age: 64
End: 2024-11-14

## 2024-11-14 VITALS
BODY MASS INDEX: 36.43 KG/M2 | DIASTOLIC BLOOD PRESSURE: 72 MMHG | SYSTOLIC BLOOD PRESSURE: 118 MMHG | WEIGHT: 268.96 LBS | HEIGHT: 72 IN

## 2024-11-14 DIAGNOSIS — M79.18 MYOFASCIAL PAIN: Primary | ICD-10-CM

## 2024-11-14 DIAGNOSIS — Z00.6 ENCOUNTER FOR EXAMINATION FOR NORMAL COMPARISON OR CONTROL IN CLINICAL RESEARCH PROGRAM: ICD-10-CM

## 2024-11-14 DIAGNOSIS — M54.81 BILATERAL OCCIPITAL NEURALGIA: ICD-10-CM

## 2024-11-14 DIAGNOSIS — H93.13 TINNITUS OF BOTH EARS: ICD-10-CM

## 2024-11-14 DIAGNOSIS — M47.812 CERVICAL SPONDYLOSIS: ICD-10-CM

## 2024-11-14 DIAGNOSIS — G89.4 CHRONIC PAIN SYNDROME: ICD-10-CM

## 2024-11-14 DIAGNOSIS — M54.12 CERVICAL RADICULOPATHY: ICD-10-CM

## 2024-11-14 RX ORDER — METHOCARBAMOL 100 MG/ML
100 INJECTION, SOLUTION INTRAMUSCULAR; INTRAVENOUS ONCE
Status: COMPLETED | OUTPATIENT
Start: 2024-11-14 | End: 2024-11-14

## 2024-11-14 RX ADMIN — METHOCARBAMOL 100 MG: 100 INJECTION, SOLUTION INTRAMUSCULAR; INTRAVENOUS at 09:00

## 2024-11-14 NOTE — PROGRESS NOTES
Functionality Assessment/Goals Worksheet     On a scale of 0 (Does not Interfere) to 10 (Completely Interferes)     1.  Which number describes how during the past week pain has interfered with           the following:  A.  General Activity:  7  B.  Mood: 8  C.  Walking Ability:  4  D.  Normal Work (Includes both work outside the home and housework):  7  E.  Relations with Other People:   8  F.  Sleep:   6  G.  Enjoyment of Life:   6    2.  Patient Prefers to Take their Pain Medications:     []  On a regular basis   [x]  Only when necessary    []  Does not take pain medications    3.  What are the Patient's Goals/Expectations for Visiting Pain Management?     [x]  Learn about my pain    []  Receive Medication   []  Physical Therapy     []  Treat Depression   [x]  Receive Injections    []  Treat Sleep   []  Deal with Anxiety and Stress   []  Treat Opoid Dependence/Addiction   []  Other:
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
## 2024-12-03 NOTE — PROGRESS NOTES
[facility]      Nicolas Walsh         [id number]  12/4/2024   Chief Complaint   Patient presents with    Follow-up     6 month CELINA set up on replacement machine 8.5.24        Patient of Dr. Escalante     Assessment/Plan   1. CELINA on CPAP    2. Obesity (BMI 30-39.9)         -Yearly supply order placed? No  -Download was personally reviewed and discussed with patient at length.  -The symptoms of CELINA have improved. The patient is benefiting from therapy and should continue use of PAP device.  -Patient's symptoms and AHI are controlled with current settings of 10-20 cmH2O. Continue current pressure settings.  -Advised to continue current positive airway pressure therapy with above described pressure.   -Advised to keep good compliance with current recommended pressure to get optimal results and clinical improvement  -Recommend 7-9 hours of sleep with PAP  -Instructed to call [employer] company regarding supplies if needed.   -Patient to call my office for earlier appointment if needed for worsening of sleep symptoms.   -Patient is not to drive if feeling sleepy  -Congratulated on recent weight loss and encouraged to continue with additional weight loss.    Educated about my impression and plan. Patient verbalizes understanding.      Return in about 1 year (around 12/4/2025) for celina. with download.      Subjective   Presentation:   Nicolas presents for sleep medicine follow up for obstructive sleep apnea.  Since the last visit, Nicolas has been doing very well on PAP therapy and reports good benefit from compliant use of PAP machine. No complaints of problems with machine, mask, or pressures at this time.    Sometimes falls asleep without putting machine on.   Traveled recently and brought his old machine, no data from those nights.     Sleep issues:  Do you feel better? Yes  More rested?Yes   Better concentration? yes  Difficulty falling sleep?  No  Difficulty staying asleep?

## 2024-12-04 ENCOUNTER — OFFICE VISIT (OUTPATIENT)
Dept: PULMONOLOGY | Age: 64
End: 2024-12-04
Payer: COMMERCIAL

## 2024-12-04 VITALS
TEMPERATURE: 97.9 F | BODY MASS INDEX: 33.51 KG/M2 | SYSTOLIC BLOOD PRESSURE: 130 MMHG | DIASTOLIC BLOOD PRESSURE: 80 MMHG | OXYGEN SATURATION: 98 % | HEIGHT: 72 IN | WEIGHT: 247.4 LBS | HEART RATE: 87 BPM

## 2024-12-04 DIAGNOSIS — E66.9 OBESITY (BMI 30-39.9): ICD-10-CM

## 2024-12-04 DIAGNOSIS — G47.33 OSA ON CPAP: Primary | ICD-10-CM

## 2024-12-04 PROCEDURE — 99213 OFFICE O/P EST LOW 20 MIN: CPT

## 2024-12-04 PROCEDURE — 3079F DIAST BP 80-89 MM HG: CPT

## 2024-12-04 PROCEDURE — 3075F SYST BP GE 130 - 139MM HG: CPT

## 2024-12-04 ASSESSMENT — ENCOUNTER SYMPTOMS
SHORTNESS OF BREATH: 0
RHINORRHEA: 0
SORE THROAT: 0
COUGH: 0
WHEEZING: 0

## 2024-12-10 ENCOUNTER — OFFICE VISIT (OUTPATIENT)
Dept: PHYSICAL MEDICINE AND REHAB | Age: 64
End: 2024-12-10
Payer: COMMERCIAL

## 2024-12-10 ENCOUNTER — TELEPHONE (OUTPATIENT)
Dept: PHYSICAL MEDICINE AND REHAB | Age: 64
End: 2024-12-10

## 2024-12-10 VITALS
HEIGHT: 72 IN | BODY MASS INDEX: 33.5 KG/M2 | WEIGHT: 247.36 LBS | SYSTOLIC BLOOD PRESSURE: 134 MMHG | DIASTOLIC BLOOD PRESSURE: 70 MMHG

## 2024-12-10 DIAGNOSIS — M79.18 MYOFASCIAL PAIN: Primary | ICD-10-CM

## 2024-12-10 DIAGNOSIS — G62.9 NEUROPATHY: ICD-10-CM

## 2024-12-10 DIAGNOSIS — M79.672 BILATERAL FOOT PAIN: ICD-10-CM

## 2024-12-10 DIAGNOSIS — M54.16 LUMBAR RADICULOPATHY: ICD-10-CM

## 2024-12-10 DIAGNOSIS — M79.671 BILATERAL FOOT PAIN: ICD-10-CM

## 2024-12-10 DIAGNOSIS — M54.81 BILATERAL OCCIPITAL NEURALGIA: ICD-10-CM

## 2024-12-10 DIAGNOSIS — M47.812 CERVICAL SPONDYLOSIS: ICD-10-CM

## 2024-12-10 DIAGNOSIS — G89.4 CHRONIC PAIN SYNDROME: ICD-10-CM

## 2024-12-10 DIAGNOSIS — M54.12 CERVICAL RADICULOPATHY: ICD-10-CM

## 2024-12-10 DIAGNOSIS — H93.13 TINNITUS OF BOTH EARS: ICD-10-CM

## 2024-12-10 PROCEDURE — 99214 OFFICE O/P EST MOD 30 MIN: CPT | Performed by: NURSE PRACTITIONER

## 2024-12-10 PROCEDURE — 3078F DIAST BP <80 MM HG: CPT | Performed by: NURSE PRACTITIONER

## 2024-12-10 PROCEDURE — 20553 NJX 1/MLT TRIGGER POINTS 3/>: CPT | Performed by: NURSE PRACTITIONER

## 2024-12-10 PROCEDURE — 3075F SYST BP GE 130 - 139MM HG: CPT | Performed by: NURSE PRACTITIONER

## 2024-12-10 RX ORDER — TRIAMCINOLONE ACETONIDE 40 MG/ML
40 INJECTION, SUSPENSION INTRA-ARTICULAR; INTRAMUSCULAR ONCE
Status: COMPLETED | OUTPATIENT
Start: 2024-12-10 | End: 2024-12-10

## 2024-12-10 RX ADMIN — TRIAMCINOLONE ACETONIDE 40 MG: 40 INJECTION, SUSPENSION INTRA-ARTICULAR; INTRAMUSCULAR at 08:49
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
## 2024-12-10 NOTE — PROGRESS NOTES
[facility] PROFESSIONAL SERVS  [facility] NEUROSCIENCE AND REHABILITATION [address]  [facility] [address]  Dept: [phone]  Dept Fax: [phone]  Loc: [phone]    Visit Date: 12/10/2024    Functionality Assessment/Goals Worksheet     On a scale of 0 (Does not Interfere) to 10 (Completely Interferes)     1.  Which number describes how during the past week pain has interfered with       the following:  A.  General Activity:  6  B.  Mood: 6  C.  Walking Ability:  3  D.  Normal Work (Includes both work outside the home and housework):  5  E.  Relations with Other People:   5  F.  Sleep:   5  G.  Enjoyment of Life:   2    2.  Patient Prefers to Take their Pain Medications:     []  On a regular basis   [x]  Only when necessary    []  Does not take pain medications    3.  What are the Patient's Goals/Expectations for Visiting Pain Management?     [x]  Learn about my pain    []  Receive Medication   []  Physical Therapy     []  Treat Depression   [x]  Receive Injections    []  Treat Sleep   []  Deal with Anxiety and Stress   []  Treat Opoid Dependence/Addiction   []  Other:

## 2025-02-04 ENCOUNTER — OFFICE VISIT (OUTPATIENT)
Dept: PHYSICAL MEDICINE AND REHAB | Age: 65
End: 2025-02-04
Payer: COMMERCIAL

## 2025-02-04 VITALS
DIASTOLIC BLOOD PRESSURE: 72 MMHG | HEIGHT: 72 IN | BODY MASS INDEX: 33.5 KG/M2 | SYSTOLIC BLOOD PRESSURE: 134 MMHG | WEIGHT: 247.36 LBS

## 2025-02-04 DIAGNOSIS — M47.812 CERVICAL SPONDYLOSIS: ICD-10-CM

## 2025-02-04 DIAGNOSIS — G62.9 NEUROPATHY: ICD-10-CM

## 2025-02-04 DIAGNOSIS — M79.671 BILATERAL FOOT PAIN: ICD-10-CM

## 2025-02-04 DIAGNOSIS — M79.672 BILATERAL FOOT PAIN: ICD-10-CM

## 2025-02-04 DIAGNOSIS — H93.13 TINNITUS OF BOTH EARS: ICD-10-CM

## 2025-02-04 DIAGNOSIS — M79.18 MYOFASCIAL PAIN: Primary | ICD-10-CM

## 2025-02-04 DIAGNOSIS — M54.81 BILATERAL OCCIPITAL NEURALGIA: ICD-10-CM

## 2025-02-04 DIAGNOSIS — G89.4 CHRONIC PAIN SYNDROME: ICD-10-CM

## 2025-02-04 DIAGNOSIS — M54.16 LUMBAR RADICULOPATHY: ICD-10-CM

## 2025-02-04 DIAGNOSIS — M54.12 CERVICAL RADICULOPATHY: ICD-10-CM

## 2025-02-04 PROCEDURE — 3075F SYST BP GE 130 - 139MM HG: CPT | Performed by: NURSE PRACTITIONER

## 2025-02-04 PROCEDURE — 3078F DIAST BP <80 MM HG: CPT | Performed by: NURSE PRACTITIONER

## 2025-02-04 PROCEDURE — 20553 NJX 1/MLT TRIGGER POINTS 3/>: CPT | Performed by: NURSE PRACTITIONER

## 2025-02-04 PROCEDURE — 99215 OFFICE O/P EST HI 40 MIN: CPT | Performed by: NURSE PRACTITIONER

## 2025-02-04 RX ORDER — TRIAMCINOLONE ACETONIDE 40 MG/ML
40 INJECTION, SUSPENSION INTRA-ARTICULAR; INTRAMUSCULAR ONCE
Status: COMPLETED | OUTPATIENT
Start: 2025-02-04 | End: 2025-02-04

## 2025-02-04 RX ADMIN — TRIAMCINOLONE ACETONIDE 40 MG: 40 INJECTION, SUSPENSION INTRA-ARTICULAR; INTRAMUSCULAR at 09:06

## 2025-02-04 NOTE — PROGRESS NOTES
[facility]ESSION[facility]  [facility] NEUROSCIENCE AND REHABILITATION [address]  [facility] [address]  Dept: [phone]  Dept Fax: [phone]  Loc: [phone]    Visit Date: 2/4/2025    Functionality Assessment/Goals Worksheet     On a scale of 0 (Does not Interfere) to 10 (Completely Interferes)     1.  Which number describes how during the past week pain has interfered with       the following:  A.  General Activity:  4  B.  Mood: 4  C.  Walking Ability:  4  D.  Normal Work (Includes both work outside the home and housework):  4  E.  Relations with Other People:   4  F.  Sleep:   4  G.  Enjoyment of Life:   4    2.  Patient Prefers to Take their Pain Medications:     []  On a regular basis   [x]  Only when necessary    [x]  Does not take pain medications    3.  What are the Patient's Goals/Expectations for Visiting Pain Management?     [x]  Learn about my pain    []  Receive Medication   []  Physical Therapy     []  Treat Depression   [x]  Receive Injections    []  Treat Sleep   []  Deal with Anxiety and Stress   []  Treat Opoid Dependence/Addiction   []  Other:  
following treatment recommendations and plan were discussed in detail with Nicolas Walsh.    Imaging:   I have reviewed patient’s imaging of cervical MRI, brain MRI and results were discussed with patient today.     Analgesics:   Patient is taking Acetaminophen. Patient informed that the maximum amount of acetaminophen taken on a regular basis should only be 4000 mg per day.     Patient is advised to take 500 mg vitamin C supplementation and 600 mg of alpha lipoic acid for nerve health.  These can be obtained over-the-counter at any local drugstore.    Adjuvants:   None    Interventions:   none    Procedure educations:  Discussed with patient about risks with procedure including infection, reaction to medication, increased pain, failure of procedures, worsening of symptoms, or bleeding.    Anticoagulation/NPO Recommendations:   Patient does not need to hold any medications prior to the procedure]  Patient will not need to be NPO x 8 hour prior to the procedure.  Local only    Multidisciplinary Pain Management:   In the presence of complex, chronic, and multi-factorial pain, the importance of a multidisciplinary approach to pain management in the patient’s management regimen was emphasized and discussed in great detail. Discussed compliance in plan of care, medications regimen and appointments in order to continue with clinic and ordered interventions.   PHYSICAL THERAPY: Patient is advised to see a physical therapist for gentle stretching exercises and conditioning exercises for management of pain.   The patient was also advised to continue physical therapy and stretching exercises at home and cognitive behavioral and/or group therapy.     Referral:  None    Follow-up:   4 months    It was my pleasure to evaluate Nicolas Walsh today.  I spent over 45 minutes evaluating this patient, reviewing previous notes and images and completing documentation.       Glendy Bundy, CARLOS - CNP   Interventional Pain

## 2025-02-04 NOTE — PATIENT INSTRUCTIONS
Patient is advised to take 500 mg vitamin C supplementation and 600 mg of alpha lipoic acid for nerve health.  These can be obtained over-the-counter at any local drugstore.

## 2025-02-15 ENCOUNTER — LAB (OUTPATIENT)
Dept: LAB | Age: 65
End: 2025-02-15

## 2025-02-16 LAB
SHBG SERPL-SCNC: 24 NMOL/L (ref 19–76)
TESTOST FREE MFR SERPL: 47.7 PG/ML (ref 47–244)
TESTOST SERPL-MCNC: 210 NG/DL (ref 193–740)

## 2025-03-05 RX ORDER — TADALAFIL 20 MG/1
20 TABLET ORAL PRN
COMMUNITY

## 2025-03-05 RX ORDER — ASCORBIC ACID 500 MG
500 TABLET ORAL DAILY
COMMUNITY

## 2025-03-05 RX ORDER — SILODOSIN 8 MG/1
8 CAPSULE ORAL EVERY EVENING
COMMUNITY

## 2025-03-13 ENCOUNTER — TRANSCRIBE ORDERS (OUTPATIENT)
Dept: ADMINISTRATIVE | Age: 65
End: 2025-03-13

## 2025-03-13 DIAGNOSIS — N28.9 KIDNEY LESION, NATIVE, BILATERAL: Primary | ICD-10-CM

## 2025-03-19 ENCOUNTER — OFFICE VISIT (OUTPATIENT)
Dept: NEPHROLOGY | Age: 65
End: 2025-03-19
Payer: COMMERCIAL

## 2025-03-19 ENCOUNTER — HOSPITAL ENCOUNTER (OUTPATIENT)
Dept: CT IMAGING | Age: 65
Discharge: HOME | End: 2025-03-19
Payer: COMMERCIAL

## 2025-03-19 VITALS
HEART RATE: 100 BPM | OXYGEN SATURATION: 100 % | SYSTOLIC BLOOD PRESSURE: 120 MMHG | BODY MASS INDEX: 31.15 KG/M2 | HEIGHT: 72 IN | DIASTOLIC BLOOD PRESSURE: 62 MMHG | WEIGHT: 230 LBS

## 2025-03-19 DIAGNOSIS — R79.89 ELEVATED SERUM CREATININE: Primary | ICD-10-CM

## 2025-03-19 DIAGNOSIS — N28.9 KIDNEY LESION, NATIVE, BILATERAL: ICD-10-CM

## 2025-03-19 LAB — POC CREATININE WHOLE BLOOD: 1.3 MG/DL (ref 0.5–1.2)

## 2025-03-19 PROCEDURE — 99204 OFFICE O/P NEW MOD 45 MIN: CPT | Performed by: INTERNAL MEDICINE

## 2025-03-19 PROCEDURE — 74170 CT ABD WO CNTRST FLWD CNTRST: CPT

## 2025-03-19 PROCEDURE — 3078F DIAST BP <80 MM HG: CPT | Performed by: INTERNAL MEDICINE

## 2025-03-19 PROCEDURE — 6360000004 HC RX CONTRAST MEDICATION: Performed by: NURSE PRACTITIONER

## 2025-03-19 PROCEDURE — 3074F SYST BP LT 130 MM HG: CPT | Performed by: INTERNAL MEDICINE

## 2025-03-19 PROCEDURE — 82565 ASSAY OF CREATININE: CPT

## 2025-03-19 RX ORDER — IOPAMIDOL 755 MG/ML
85 INJECTION, SOLUTION INTRAVASCULAR
Status: COMPLETED | OUTPATIENT
Start: 2025-03-19 | End: 2025-03-19

## 2025-03-19 RX ADMIN — IOPAMIDOL 85 ML: 755 INJECTION, SOLUTION INTRAVENOUS at 10:38

## 2025-03-19 NOTE — PROGRESS NOTES
[facility] [place] [facility]  [facility] KIDNEY AND HYPERTENSION  [address]  [address]  [facility] [address]  Dept: [phone]  Loc: [phone]  Outpatient Consult  [phone]  3/19/2025 1:14 PM EDT        Pt Name:    Nicolas Walsh  YOB: 1960  Primary Care Physician:  Skylar Fatima, CARLOS - SYLVIA     Chief Complaint:   Chief Complaint   Patient presents with    New Patient     Ref Tamir kidney cyst         Background Information/Interval History:   The patient is a 64 y.o. year old  male referred for B/L renal cysts. He had CT scan  last year which did show increase in size and number of renal cysts.   Underwent repeat CT with IV contrast showing B/L benign renal cysts unchanged from previous.   He does have some right upper-mid back pain which he is wondering if this is from the cysts.     Of note his creatinine was elevated today at 1.3 - was previously 0.9 mg/dL     Hx HTN, controlled on meds, HLD, nonobstructive CAD.   On bumex 2 mg daily.   Denies nsaid use        Allergies:  Tramadol, Bactrim [sulfamethoxazole-trimethoprim], and Zocor [simvastatin]        Past Medical History:  Past Medical History:   Diagnosis Date    CAD (coronary artery disease) 2014    GERD (gastroesophageal reflux disease)     Hearing loss 2020    Hyperlipidemia 2010    Hypertension 2008    Obesity     Occipital neuralgia     CELINA (obstructive sleep apnea) 2014    Dr. Edwards        Past Surgical History:  Past Surgical History:   Procedure Laterality Date    CARDIAC CATHETERIZATION  2/21/15    03/2018    CARDIAC PROCEDURE N/A 6/20/2024    Left heart cath / coronary angiography performed by Kip Gaming MD at [facility] CARDIAC CATH LAB    CHOLECYSTECTOMY  2008    laparoscopic, Dysfunctional gallbladder    COLONOSCOPY  2013    normal    ELBOW SURGERY      Lyphoma removal 7- Dr. Albrecht    FACET JOINT INJECTION Bilateral 8/7/2024    bilateral cervical 2-3, 3-4 medial branch block

## 2025-03-31 ENCOUNTER — TELEPHONE (OUTPATIENT)
Dept: NEPHROLOGY | Age: 65
End: 2025-03-31

## 2025-03-31 ENCOUNTER — LAB (OUTPATIENT)
Dept: LAB | Age: 65
End: 2025-03-31

## 2025-03-31 ENCOUNTER — RESULTS FOLLOW-UP (OUTPATIENT)
Dept: NEPHROLOGY | Age: 65
End: 2025-03-31

## 2025-03-31 DIAGNOSIS — R79.89 ELEVATED SERUM CREATININE: ICD-10-CM

## 2025-03-31 DIAGNOSIS — N40.1 BENIGN PROSTATIC HYPERPLASIA WITH LOWER URINARY TRACT SYMPTOMS, SYMPTOM DETAILS UNSPECIFIED: ICD-10-CM

## 2025-03-31 LAB
ANION GAP SERPL CALC-SCNC: 10 MEQ/L (ref 8–16)
BUN SERPL-MCNC: 15 MG/DL (ref 8–23)
CALCIUM SERPL-MCNC: 9.1 MG/DL (ref 8.8–10.2)
CHLORIDE SERPL-SCNC: 103 MEQ/L (ref 98–111)
CO2 SERPL-SCNC: 27 MEQ/L (ref 22–29)
CREAT SERPL-MCNC: 1 MG/DL (ref 0.7–1.2)
GFR SERPL CREATININE-BSD FRML MDRD: 84 ML/MIN/1.73M2
GLUCOSE SERPL-MCNC: 91 MG/DL (ref 74–109)
POTASSIUM SERPL-SCNC: 3.6 MEQ/L (ref 3.5–5.2)
PSA SERPL-MCNC: 0.42 NG/ML (ref 0–1)
SODIUM SERPL-SCNC: 140 MEQ/L (ref 135–145)

## 2025-04-24 ENCOUNTER — OFFICE VISIT (OUTPATIENT)
Dept: UROLOGY | Age: 65
End: 2025-04-24
Payer: COMMERCIAL

## 2025-04-24 VITALS
BODY MASS INDEX: 32.2 KG/M2 | WEIGHT: 237.7 LBS | SYSTOLIC BLOOD PRESSURE: 132 MMHG | HEIGHT: 72 IN | DIASTOLIC BLOOD PRESSURE: 80 MMHG

## 2025-04-24 DIAGNOSIS — N40.1 BENIGN PROSTATIC HYPERPLASIA WITH LOWER URINARY TRACT SYMPTOMS, SYMPTOM DETAILS UNSPECIFIED: Primary | ICD-10-CM

## 2025-04-24 LAB
BILIRUBIN, URINE: NEGATIVE
BLOOD URINE, POC: NEGATIVE
CHARACTER, URINE: CLEAR
COLOR, UA: YELLOW
GLUCOSE URINE: NEGATIVE MG/DL
KETONES, URINE: NEGATIVE
LEUKOCYTE CLUMPS, URINE: NEGATIVE
NITRITE, URINE: NEGATIVE
PH, URINE: 7.5 (ref 5–9)
POST VOID RESIDUAL (PVR): 24 ML
PROTEIN, URINE: NEGATIVE MG/DL
SPECIFIC GRAVITY UA: 1.01 (ref 1–1.03)
UROBILINOGEN, URINE: 0.2 EU/DL (ref 0–1)

## 2025-04-24 PROCEDURE — 51798 US URINE CAPACITY MEASURE: CPT | Performed by: NURSE PRACTITIONER

## 2025-04-24 PROCEDURE — 3075F SYST BP GE 130 - 139MM HG: CPT | Performed by: NURSE PRACTITIONER

## 2025-04-24 PROCEDURE — 3079F DIAST BP 80-89 MM HG: CPT | Performed by: NURSE PRACTITIONER

## 2025-04-24 PROCEDURE — 99214 OFFICE O/P EST MOD 30 MIN: CPT | Performed by: NURSE PRACTITIONER

## 2025-04-24 PROCEDURE — 81003 URINALYSIS AUTO W/O SCOPE: CPT | Performed by: NURSE PRACTITIONER

## 2025-04-24 RX ORDER — TADALAFIL 20 MG/1
10 TABLET ORAL PRN
Qty: 30 TABLET | Refills: 1 | Status: SHIPPED | OUTPATIENT
Start: 2025-04-24

## 2025-04-24 RX ORDER — TIRZEPATIDE 15 MG/.5ML
INJECTION, SOLUTION SUBCUTANEOUS
COMMUNITY
Start: 2025-04-11

## 2025-04-24 ASSESSMENT — ENCOUNTER SYMPTOMS
NAUSEA: 0
VOMITING: 0
ABDOMINAL PAIN: 0
BACK PAIN: 0

## 2025-04-24 NOTE — PROGRESS NOTES
[facility] [place] [facility]  [facility]OLOG[facility]  [address]  [address]  [facility] [address]  Dept: [phone]  Loc: [phone]    Visit Date: 4/24/2025        HPI:     Nicolas Walsh is a 64 y.o. male who presents today for:  Chief Complaint   Patient presents with    Benign Prostatic Hypertrophy     PSA prior       HPI    Pt has a hx of BPH and ED for which he is taking cialis 10 mg daily.       Pt had R spermatocelectomy and hydrocelectomy  7/24/2020 by Dr. Almanzar at [facility].      Was prescribed  Cialis 10 mg daily for BPH and ED.  Reports he is not longer taking daily and is using PRN as when took daily it caused him headaches.      Pt has not previously been able to tolerate alpha blocker therapy.  Reports his family doctor referred him to [facility] in the last year and he was seen there and started on rapaflo but was unable to tolerate the diarrhea it caused.    Reports urination is worsened.  At times flow just dribbles out to gravity.  Feels pressure decreased.      Current Outpatient Medications   Medication Sig Dispense Refill    Coenzyme Q10 (CO Q 10 PO) Take by mouth daily      ZEPBOUND 15 MG/0.5ML SOAJ subCUTAneous auto-injector pen inject the contents of 1 pen subcutaneously once weekly      tadalafil (CIALIS) 20 MG tablet Take 0.5 tablets by mouth as needed for Erectile Dysfunction (No more often than every 48 hours) 30 tablet 1    vitamin C (ASCORBIC ACID) 500 MG tablet Take 1 tablet by mouth daily      amLODIPine (NORVASC) 5 MG tablet TAKE 1 TABLET BY MOUTH EVERY DAY 90 tablet 3    bumetanide (BUMEX) 2 MG tablet TAKE 1 TABLET BY MOUTH EVERY DAY 90 tablet 3    lisinopril (PRINIVIL;ZESTRIL) 20 MG tablet Take 1 tablet by mouth daily 90 tablet 3    fluticasone (FLONASE) 50 MCG/ACT nasal spray 1 spray by Each Nostril route daily as needed for Rhinitis (Congestion) 1 each 2    Multiple Vitamins-Minerals (MULTIVITAMIN ADULT PO) Take by mouth

## 2025-05-01 ENCOUNTER — TELEPHONE (OUTPATIENT)
Dept: PHYSICAL MEDICINE AND REHAB | Age: 65
End: 2025-05-01

## 2025-05-01 NOTE — TELEPHONE ENCOUNTER
Patient was last seen 2/4/2025 and his next appt is 6/4/2025.  He is calling in asking if he can come in again for the neck injections that he has gotten in the office before?  Please call him to advise.

## 2025-05-01 NOTE — TELEPHONE ENCOUNTER
Yes this is fine, please make sure he reviewed with him concerns if insurance denies, the estimated out-of-pocket is, but he can be scheduled with me at any time.

## 2025-05-07 ENCOUNTER — OFFICE VISIT (OUTPATIENT)
Dept: PHYSICAL MEDICINE AND REHAB | Age: 65
End: 2025-05-07
Payer: COMMERCIAL

## 2025-05-07 VITALS
SYSTOLIC BLOOD PRESSURE: 126 MMHG | WEIGHT: 237.66 LBS | BODY MASS INDEX: 32.19 KG/M2 | DIASTOLIC BLOOD PRESSURE: 78 MMHG | HEIGHT: 72 IN

## 2025-05-07 DIAGNOSIS — G24.3 CERVICAL DYSTONIA: ICD-10-CM

## 2025-05-07 DIAGNOSIS — M54.81 BILATERAL OCCIPITAL NEURALGIA: ICD-10-CM

## 2025-05-07 DIAGNOSIS — M79.18 MYOFASCIAL PAIN: Primary | ICD-10-CM

## 2025-05-07 DIAGNOSIS — M79.671 BILATERAL FOOT PAIN: ICD-10-CM

## 2025-05-07 DIAGNOSIS — H93.13 TINNITUS OF BOTH EARS: ICD-10-CM

## 2025-05-07 DIAGNOSIS — M54.12 CERVICAL RADICULOPATHY: ICD-10-CM

## 2025-05-07 DIAGNOSIS — M54.16 LUMBAR RADICULOPATHY: ICD-10-CM

## 2025-05-07 DIAGNOSIS — M79.672 BILATERAL FOOT PAIN: ICD-10-CM

## 2025-05-07 DIAGNOSIS — G89.4 CHRONIC PAIN SYNDROME: ICD-10-CM

## 2025-05-07 DIAGNOSIS — M47.812 CERVICAL SPONDYLOSIS: ICD-10-CM

## 2025-05-07 DIAGNOSIS — G62.9 NEUROPATHY: ICD-10-CM

## 2025-05-07 PROCEDURE — 3078F DIAST BP <80 MM HG: CPT | Performed by: NURSE PRACTITIONER

## 2025-05-07 PROCEDURE — 20553 NJX 1/MLT TRIGGER POINTS 3/>: CPT | Performed by: NURSE PRACTITIONER

## 2025-05-07 PROCEDURE — 3074F SYST BP LT 130 MM HG: CPT | Performed by: NURSE PRACTITIONER

## 2025-05-07 PROCEDURE — 99214 OFFICE O/P EST MOD 30 MIN: CPT | Performed by: NURSE PRACTITIONER

## 2025-05-07 RX ORDER — TRIAMCINOLONE ACETONIDE 40 MG/ML
40 INJECTION, SUSPENSION INTRA-ARTICULAR; INTRAMUSCULAR ONCE
Status: COMPLETED | OUTPATIENT
Start: 2025-05-07 | End: 2025-05-07

## 2025-05-07 RX ADMIN — TRIAMCINOLONE ACETONIDE 40 MG: 40 INJECTION, SUSPENSION INTRA-ARTICULAR; INTRAMUSCULAR at 09:34

## 2025-05-07 NOTE — PROGRESS NOTES
[facility] PROFESSIONAL SERVS  [facility] NEUROSCIENCE AND REHABILITATION [address]  [facility] [address]  Dept: [phone]  Dept Fax: [phone]  Loc: [phone]    Visit Date: 5/7/2025    Functionality Assessment/Goals Worksheet     On a scale of 0 (Does not Interfere) to 10 (Completely Interferes)     1.  Which number describes how during the past week pain has interfered with       the following:  A.  General Activity:  6  B.  Mood: 8  C.  Walking Ability:  3  D.  Normal Work (Includes both work outside the home and housework):  6  E.  Relations with Other People:   6  F.  Sleep:   9  G.  Enjoyment of Life:   7    2.  Patient Prefers to Take their Pain Medications:     [x]  On a regular basis   [x]  Only when necessary    []  Does not take pain medications    3.  What are the Patient's Goals/Expectations for Visiting Pain Management?     []  Learn about my pain    []  Receive Medication   []  Physical Therapy     []  Treat Depression   [x]  Receive Injections    []  Treat Sleep   []  Deal with Anxiety and Stress   []  Treat Opoid Dependence/Addiction   []  Other:  
  CANALS - I.A.C.''s)        REASON FOR EXAM:   Male, 63 years old.  LT SIDED HEAD PAIN, LT EAR HEARING    LOSS AND TINNITUS. NO PRIOR SURGERY OR INJURY TO HEAD        TECHNIQUE:   Standardized multiplanar fat and water weighted pulse    sequences were obtained.  IV Gadavist 13mL was administered for the    contrast portion of the examination.        COMPARISON:   February 23, 2021 MR brain    ___________________________________        FINDINGS:    Normal bilateral temporal bones.  Normal bilateral internal auditory    canals.   There is no demonstrated intracanalicular or cisternal    vestibular schwannoma (\"acoustic neuroma\").   There is no enhancement of    the bilateral VIIth or VIIIth cranial nerves.  Normal bilateral cochlea,    vestibules and semicircular canals.        Normal size of the ventricles and extra-axial spaces for the patient''s    age.  Normal white matter tracts of the supratentorial brain.        Normal bilateral basal ganglia.  Normal thalami.        Normal flow voids within the major intracranial circulation suggesting    patency by spin echo criteria.  Normal venous enhancement.  There is no    enhancing intra-axial or extra-axial abnormality.        There is no extra-axial fluid accumulation.        Normal sella turcica, pituitary gland, infundibular stalk, optic chiasm    and hypothalamus.  Normal tectal plate and pineal gland.        Normal midbrain, fito and medulla.  Normal cerebellum.  Normal basal    cisterns.  No demonstrated orbital abnormality, within the constraints of    a routine brain study.  Normal visualized paranasal sinuses.        Normal calvarium and skull base.  Normal visualized soft tissue    structures.  Normal visualized upper cervical spine.    ___________________________________        IMPRESSION:    Normal unenhanced and enhanced MRI of the bilateral internal auditory    canals (I.A.C''s). No change.       Ordering Provider: Miky Neley

## 2025-06-11 DIAGNOSIS — I10 PRIMARY HYPERTENSION: ICD-10-CM

## 2025-06-11 RX ORDER — LISINOPRIL 20 MG/1
20 TABLET ORAL DAILY
Qty: 30 TABLET | Refills: 0 | Status: SHIPPED | OUTPATIENT
Start: 2025-06-11

## 2025-06-29 ENCOUNTER — PATIENT MESSAGE (OUTPATIENT)
Dept: PHYSICAL MEDICINE AND REHAB | Age: 65
End: 2025-06-29

## 2025-06-30 NOTE — TELEPHONE ENCOUNTER
As patient requested, please call him to let him know that we can do the trigger point injections about every 4 weeks depending on the length of results he had.  The difference is what medication we are using, as he had steroid at last visit, this can only be done every 3-4 months, and we can utilize muscle relaxer in between.  He can make an appointment with me at any time, to set up repeat trigger point injections, but again we we will need to rotate how often we are using steroid and muscle relaxer.

## 2025-07-01 ENCOUNTER — OFFICE VISIT (OUTPATIENT)
Dept: PHYSICAL MEDICINE AND REHAB | Age: 65
End: 2025-07-01
Payer: COMMERCIAL

## 2025-07-01 ENCOUNTER — LAB (OUTPATIENT)
Dept: LAB | Age: 65
End: 2025-07-01

## 2025-07-01 VITALS
HEIGHT: 72 IN | DIASTOLIC BLOOD PRESSURE: 80 MMHG | WEIGHT: 237.66 LBS | SYSTOLIC BLOOD PRESSURE: 124 MMHG | BODY MASS INDEX: 32.19 KG/M2

## 2025-07-01 DIAGNOSIS — M54.16 LUMBAR RADICULOPATHY: ICD-10-CM

## 2025-07-01 DIAGNOSIS — M79.18 MYOFASCIAL PAIN: Primary | ICD-10-CM

## 2025-07-01 DIAGNOSIS — M54.12 CERVICAL RADICULOPATHY: ICD-10-CM

## 2025-07-01 DIAGNOSIS — M54.81 BILATERAL OCCIPITAL NEURALGIA: ICD-10-CM

## 2025-07-01 DIAGNOSIS — G89.4 CHRONIC PAIN SYNDROME: ICD-10-CM

## 2025-07-01 DIAGNOSIS — G24.3 CERVICAL DYSTONIA: ICD-10-CM

## 2025-07-01 DIAGNOSIS — H93.13 TINNITUS OF BOTH EARS: ICD-10-CM

## 2025-07-01 DIAGNOSIS — M47.812 CERVICAL SPONDYLOSIS: ICD-10-CM

## 2025-07-01 DIAGNOSIS — G62.9 NEUROPATHY: ICD-10-CM

## 2025-07-01 LAB
ALBUMIN SERPL BCG-MCNC: 4.3 G/DL (ref 3.4–4.9)
ALP SERPL-CCNC: 89 U/L (ref 40–129)
ALT SERPL W/O P-5'-P-CCNC: 18 U/L (ref 10–50)
ANION GAP SERPL CALC-SCNC: 11 MEQ/L (ref 8–16)
AST SERPL-CCNC: 25 U/L (ref 10–50)
BASOPHILS ABSOLUTE: 0 THOU/MM3 (ref 0–0.1)
BASOPHILS NFR BLD AUTO: 0.6 %
BILIRUB SERPL-MCNC: 0.6 MG/DL (ref 0.3–1.2)
BUN SERPL-MCNC: 12 MG/DL (ref 8–23)
CALCIUM SERPL-MCNC: 9.5 MG/DL (ref 8.8–10.2)
CHLORIDE SERPL-SCNC: 102 MEQ/L (ref 98–111)
CO2 SERPL-SCNC: 27 MEQ/L (ref 22–29)
CREAT SERPL-MCNC: 1.1 MG/DL (ref 0.7–1.2)
DEPRECATED MEAN GLUCOSE BLD GHB EST-ACNC: 96 MG/DL (ref 70–126)
DEPRECATED RDW RBC AUTO: 40.5 FL (ref 35–45)
EOSINOPHIL NFR BLD AUTO: 1.3 %
EOSINOPHILS ABSOLUTE: 0.1 THOU/MM3 (ref 0–0.4)
ERYTHROCYTE [DISTWIDTH] IN BLOOD BY AUTOMATED COUNT: 13 % (ref 11.5–14.5)
ERYTHROCYTE [SEDIMENTATION RATE] IN BLOOD BY WESTERGREN METHOD: 5 MM/HR (ref 0–20)
GFR SERPL CREATININE-BSD FRML MDRD: 75 ML/MIN/1.73M2
GLUCOSE SERPL-MCNC: 81 MG/DL (ref 74–109)
HBA1C MFR BLD HPLC: 5.2 % (ref 4–6)
HCT VFR BLD AUTO: 42 % (ref 42–52)
HGB BLD-MCNC: 15.5 GM/DL (ref 14–18)
IMM GRANULOCYTES # BLD AUTO: 0.01 THOU/MM3 (ref 0–0.07)
IMM GRANULOCYTES NFR BLD AUTO: 0.1 %
LYMPHOCYTES ABSOLUTE: 1.2 THOU/MM3 (ref 1–4.8)
LYMPHOCYTES NFR BLD AUTO: 17.6 %
MCH RBC QN AUTO: 34.2 PG (ref 26–33)
MCHC RBC AUTO-ENTMCNC: 36.9 GM/DL (ref 32.2–35.5)
MCV RBC AUTO: 92.7 FL (ref 80–94)
MONOCYTES ABSOLUTE: 0.6 THOU/MM3 (ref 0.4–1.3)
MONOCYTES NFR BLD AUTO: 9 %
NEUTROPHILS ABSOLUTE: 5.1 THOU/MM3 (ref 1.8–7.7)
NEUTROPHILS NFR BLD AUTO: 71.4 %
NRBC BLD AUTO-RTO: 0 /100 WBC
PLATELET # BLD AUTO: 169 THOU/MM3 (ref 130–400)
PMV BLD AUTO: 8.9 FL (ref 9.4–12.4)
POTASSIUM SERPL-SCNC: 4.1 MEQ/L (ref 3.5–5.2)
PROT SERPL-MCNC: 7.4 G/DL (ref 6.4–8.3)
RBC # BLD AUTO: 4.53 MILL/MM3 (ref 4.7–6.1)
SODIUM SERPL-SCNC: 140 MEQ/L (ref 135–145)
WBC # BLD AUTO: 7.1 THOU/MM3 (ref 4.8–10.8)

## 2025-07-01 PROCEDURE — 99214 OFFICE O/P EST MOD 30 MIN: CPT | Performed by: NURSE PRACTITIONER

## 2025-07-01 PROCEDURE — 96372 THER/PROPH/DIAG INJ SC/IM: CPT | Performed by: NURSE PRACTITIONER

## 2025-07-01 PROCEDURE — 3074F SYST BP LT 130 MM HG: CPT | Performed by: NURSE PRACTITIONER

## 2025-07-01 PROCEDURE — 20553 NJX 1/MLT TRIGGER POINTS 3/>: CPT | Performed by: NURSE PRACTITIONER

## 2025-07-01 PROCEDURE — 3079F DIAST BP 80-89 MM HG: CPT | Performed by: NURSE PRACTITIONER

## 2025-07-01 RX ORDER — METHOCARBAMOL 100 MG/ML
100 INJECTION, SOLUTION INTRAMUSCULAR; INTRAVENOUS ONCE
Status: COMPLETED | OUTPATIENT
Start: 2025-07-01 | End: 2025-07-01

## 2025-07-01 RX ADMIN — METHOCARBAMOL 100 MG: 100 INJECTION, SOLUTION INTRAMUSCULAR; INTRAVENOUS at 10:13

## 2025-07-01 NOTE — PROGRESS NOTES
[facility] [place] [facility]  [facility]  [address]  [facility] [address]  Dept: [phone]  Dept Fax: [phone]  Loc: [phone]    Visit Date: 7/1/2025    Functionality Assessment/Goals Worksheet     On a scale of 0 (Does not Interfere) to 10 (Completely Interferes)     1.  Which number describes how during the past week pain has interfered with       the following:  A.  General Activity:  6  B.  Mood: 5  C.  Walking Ability:  8  D.  Normal Work (Includes both work outside the home and housework):  7  E.  Relations with Other People:   5  F.  Sleep:   5  G.  Enjoyment of Life:   7    2.  Patient Prefers to Take their Pain Medications:     []  On a regular basis   [x]  Only when necessary    []  Does not take pain medications    3.  What are the Patient's Goals/Expectations for Visiting Pain Management?     [x]  Learn about my pain    []  Receive Medication   []  Physical Therapy     []  Treat Depression   [x]  Receive Injections    []  Treat Sleep   []  Deal with Anxiety and Stress   []  Treat Opoid Dependence/Addiction   []  Other:
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
## 2025-07-02 DIAGNOSIS — Z03.89 CORONARY ARTERY DISEASE (CAD) EXCLUDED: ICD-10-CM

## 2025-07-02 RX ORDER — BUMETANIDE 2 MG/1
2 TABLET ORAL DAILY
Qty: 90 TABLET | Refills: 0 | Status: SHIPPED | OUTPATIENT
Start: 2025-07-02

## 2025-07-02 NOTE — TELEPHONE ENCOUNTER
Nicolas Walsh called requesting a refill on the following medications:  Requested Prescriptions     Pending Prescriptions Disp Refills    bumetanide (BUMEX) 2 MG tablet 90 tablet 3     Sig: Take 1 tablet by mouth daily     Pharmacy verified:[facility]      Date of last visit: 7/1/24  Date of next visit (if applicable): 7/7/2025

## 2025-07-03 LAB — NUCLEAR IGG SER QL IA: NORMAL

## 2025-07-07 ENCOUNTER — OFFICE VISIT (OUTPATIENT)
Dept: CARDIOLOGY CLINIC | Age: 65
End: 2025-07-07
Payer: COMMERCIAL

## 2025-07-07 VITALS
BODY MASS INDEX: 32.29 KG/M2 | DIASTOLIC BLOOD PRESSURE: 82 MMHG | SYSTOLIC BLOOD PRESSURE: 120 MMHG | HEIGHT: 72 IN | HEART RATE: 56 BPM | WEIGHT: 238.4 LBS

## 2025-07-07 DIAGNOSIS — I10 PRIMARY HYPERTENSION: ICD-10-CM

## 2025-07-07 DIAGNOSIS — Z03.89 CORONARY ARTERY DISEASE (CAD) EXCLUDED: ICD-10-CM

## 2025-07-07 PROCEDURE — 93000 ELECTROCARDIOGRAM COMPLETE: CPT | Performed by: INTERNAL MEDICINE

## 2025-07-07 PROCEDURE — 3079F DIAST BP 80-89 MM HG: CPT | Performed by: INTERNAL MEDICINE

## 2025-07-07 PROCEDURE — 3074F SYST BP LT 130 MM HG: CPT | Performed by: INTERNAL MEDICINE

## 2025-07-07 PROCEDURE — 99214 OFFICE O/P EST MOD 30 MIN: CPT | Performed by: INTERNAL MEDICINE

## 2025-07-07 RX ORDER — BUMETANIDE 2 MG/1
2 TABLET ORAL DAILY
Qty: 90 TABLET | Refills: 3 | Status: SHIPPED | OUTPATIENT
Start: 2025-07-07

## 2025-07-07 RX ORDER — LISINOPRIL 20 MG/1
20 TABLET ORAL DAILY
Qty: 90 TABLET | Refills: 3 | Status: SHIPPED | OUTPATIENT
Start: 2025-07-07

## 2025-07-07 RX ORDER — AMLODIPINE BESYLATE 5 MG/1
5 TABLET ORAL DAILY
Qty: 90 TABLET | Refills: 3 | Status: SHIPPED | OUTPATIENT
Start: 2025-07-07

## 2025-07-07 NOTE — PROGRESS NOTES
1 year follow up    EKG done today    Denies SOB, chest pain, palpitations, and dizziness and lightheadedness    C/o leg swelling  
48) in his father; Heart Disease (age of onset: 65) in his brother; Heart Disease (age of onset: 72) in his mother; High Blood Pressure (age of onset: 40) in his paternal grandfather; High Blood Pressure (age of onset: 45) in his sister; High Blood Pressure (age of onset: 60) in his brother; High Blood Pressure (age of onset: 65) in his brother; Obesity in his brother; Obesity (age of onset: 10) in his brother; Obesity (age of onset: 45) in his brother; Obesity (age of onset: 50) in his brother; Other (age of onset: 40) in his brother; Other (age of onset: 50) in his brother; Other (age of onset: 52) in his brother; Other (age of onset: 65) in his sister; Stroke (age of onset: 50) in his father.    Past Surgical History   Past Surgical History:   Procedure Laterality Date    CARDIAC CATHETERIZATION  2/21/15    03/2018    CARDIAC PROCEDURE N/A 6/20/2024    Left heart cath / coronary angiography performed by Kip Gaming MD at [facility] CARDIAC CATH LAB    CHOLECYSTECTOMY  2008    laparoscopic, Dysfunctional gallbladder    COLONOSCOPY  2013    normal    ELBOW SURGERY      Lyphoma removal 7- Dr. Albrecht    FACET JOINT INJECTION Bilateral 8/7/2024    bilateral cervical 2-3, 3-4 medial branch block performed by Adam Christy DO at [facility] OR    OTHER SURGICAL HISTORY  07/13/2020    s/p excision of right elbow lipoma done in the office Dr Albrecht    OTHER SURGICAL HISTORY  07/24/2020    cyst removed right testicle Dr Almanzar    PAIN MANAGEMENT PROCEDURE Right 8/26/2020    right thoracic medial branch block, 2 levels performed by Maciej Henry MD at [facility] [place] OR    DE SEPTOPLASTY/SUBMUCOUS RESECJ W/WO CARTILAGE GRF N/A 4/30/2018    SEPTOPLASTY SUBMUCOUS RESECTION TURBINATES, PARTIAL RIGHT INFERIOR, NASAL ENDOSCOPY WITH LONNY BULLOSA performed by Tayo Garrett MD at [facility] OR    DE SIGMOIDOSCOPY FLX W/BIOPSY SINGLE/MULTIPLE Left 3/1/2018    SIGMOIDOSCOPY BIOPSY FLEXIBLE

## 2025-08-07 ENCOUNTER — OFFICE VISIT (OUTPATIENT)
Dept: PHYSICAL MEDICINE AND REHAB | Age: 65
End: 2025-08-07
Payer: COMMERCIAL

## 2025-08-07 VITALS — DIASTOLIC BLOOD PRESSURE: 62 MMHG | SYSTOLIC BLOOD PRESSURE: 116 MMHG

## 2025-08-07 DIAGNOSIS — G24.3 CERVICAL DYSTONIA: ICD-10-CM

## 2025-08-07 DIAGNOSIS — M47.812 CERVICAL SPONDYLOSIS: ICD-10-CM

## 2025-08-07 DIAGNOSIS — G89.4 CHRONIC PAIN SYNDROME: ICD-10-CM

## 2025-08-07 DIAGNOSIS — M54.81 BILATERAL OCCIPITAL NEURALGIA: ICD-10-CM

## 2025-08-07 DIAGNOSIS — M54.16 LUMBAR RADICULOPATHY: ICD-10-CM

## 2025-08-07 DIAGNOSIS — G62.9 NEUROPATHY: ICD-10-CM

## 2025-08-07 DIAGNOSIS — M54.12 CERVICAL RADICULOPATHY: ICD-10-CM

## 2025-08-07 DIAGNOSIS — M79.18 MYOFASCIAL PAIN: Primary | ICD-10-CM

## 2025-08-07 PROCEDURE — 3074F SYST BP LT 130 MM HG: CPT | Performed by: NURSE PRACTITIONER

## 2025-08-07 PROCEDURE — 20553 NJX 1/MLT TRIGGER POINTS 3/>: CPT | Performed by: NURSE PRACTITIONER

## 2025-08-07 PROCEDURE — 3078F DIAST BP <80 MM HG: CPT | Performed by: NURSE PRACTITIONER

## 2025-08-07 PROCEDURE — 99214 OFFICE O/P EST MOD 30 MIN: CPT | Performed by: NURSE PRACTITIONER

## 2025-08-07 RX ORDER — TRIAMCINOLONE ACETONIDE 40 MG/ML
40 INJECTION, SUSPENSION INTRA-ARTICULAR; INTRAMUSCULAR ONCE
Status: COMPLETED | OUTPATIENT
Start: 2025-08-07 | End: 2025-08-07

## 2025-08-07 RX ADMIN — TRIAMCINOLONE ACETONIDE 40 MG: 40 INJECTION, SUSPENSION INTRA-ARTICULAR; INTRAMUSCULAR at 09:16

## (undated) DEVICE — TUBING, SUCTION, 1/4" X 20', STRAIGHT: Brand: MEDLINE INDUSTRIES, INC.

## (undated) DEVICE — Device

## (undated) DEVICE — YANKAUER,BULB TIP,W/O VENT,RIGID,STERILE: Brand: MEDLINE

## (undated) DEVICE — SC PAIN PACK: Brand: MEDLINE INDUSTRIES, INC.

## (undated) DEVICE — GLOVE ORANGE PI 8 1/2   MSG9085

## (undated) DEVICE — BLADE 1884002HRE M4 SERR 4MM ROTATE: Brand: STRAIGHTSHOT

## (undated) DEVICE — ANGIO-SEAL VIP VASCULAR CLOSURE DEVICE: Brand: ANGIO-SEAL

## (undated) DEVICE — SUREFIT, DUAL DISPERSIVE ELECTRODE, CONTACT QUALITY MONITOR: Brand: SUREFIT

## (undated) DEVICE — CARDIAC CATH LAB PACK LF

## (undated) DEVICE — ANTI-FOG SOLUTION WITH FOAM PAD: Brand: DEVON

## (undated) DEVICE — HYPODERMIC SAFETY NEEDLE: Brand: MAGELLAN

## (undated) DEVICE — SET ENEMA BG AD 1500ML POLY PRELUB EMP W/ 54IN SFT VYN TB

## (undated) DEVICE — CANISTER, RIGID, 2000CC: Brand: MEDLINE INDUSTRIES, INC.

## (undated) DEVICE — PAD,NON-ADHERENT,3X8,STERILE,LF,1/PK: Brand: MEDLINE

## (undated) DEVICE — SYRINGE MEDICAL 3ML CLEAR PLASTIC STANDARD NON CONTROL LUERLOCK TIP DISPOSABLE

## (undated) DEVICE — SINU FOAM: Brand: SINU-FOAM

## (undated) DEVICE — BAND COMPR L24CM REG CLR PLAS HEMSTAT EXT HK AND LOOP RETEN

## (undated) DEVICE — APPLICATOR MEDICATED 26 CC SOLUTION HI LT ORNG CHLORAPREP

## (undated) DEVICE — DRAPE,EENT,SPLIT,STERILE: Brand: MEDLINE

## (undated) DEVICE — NEEDLE SPNL 22GA L3.5IN BLK HUB S STL REG WALL FIT STYL

## (undated) DEVICE — AGENT HEMSTAT 3GM PURIFIED PLNT STARCH PWD ABSRB ARISTA AH

## (undated) DEVICE — TOWEL,OR,DSP,ST,BLUE,STD,4/PK,20PK/CS: Brand: MEDLINE

## (undated) DEVICE — COVER ARMBRD W13XL28.5IN IMPERV BLU FOR OP RM

## (undated) DEVICE — CONMED DISPOSABLE BIPOLAR CABLE, 10' (3.05M): Brand: CONMED

## (undated) DEVICE — SOLUTION IV 1000ML 0.9% SOD CHL PH 5 INJ USP VIAFLX PLAS

## (undated) DEVICE — COVER,MAYO STAND,STERILE: Brand: MEDLINE

## (undated) DEVICE — GOWN,SIRUS,NONRNF,SETINSLV,XL,20/CS: Brand: MEDLINE

## (undated) DEVICE — GLOVE SURG SZ 65 THK91MIL LTX FREE SYN POLYISOPRENE

## (undated) DEVICE — 260 CM J TIP WIRE .035

## (undated) DEVICE — SHEET,DRAPE,3/4,53X77,STERILE: Brand: MEDLINE

## (undated) DEVICE — 3M™ BAIR HUGGER® MULTI ACCESS BLANKET, PEDIATRIC, FULL BODY, 10 PER CASE 31000: Brand: BAIR HUGGER™

## (undated) DEVICE — GAUZE,SPONGE,8"X4",12PLY,XRAY,STRL,LF: Brand: MEDLINE

## (undated) DEVICE — GLIDESHEATH SLENDER ACCESS KIT: Brand: GLIDESHEATH SLENDER

## (undated) DEVICE — MICRO TIP WIPE: Brand: DEVON

## (undated) DEVICE — DRAPE KIT RAMAPR RADIATION SHIELD

## (undated) DEVICE — CODMAN® SURGICAL PATTIES 1/2" X 3" (1.27CM X 7.62CM): Brand: CODMAN®

## (undated) DEVICE — CATHETER COR DIAG JUDKINS L 3.5 CRV 6FR 100CM 0 SIDE H

## (undated) DEVICE — 1010 S-DRAPE TOWEL DRAPE 10/BX: Brand: STERI-DRAPE™

## (undated) DEVICE — TURBINATOR WAND: Brand: COBLATION

## (undated) DEVICE — GOWN,SIRUS,NON REINFRCD,LARGE,SET IN SL: Brand: MEDLINE

## (undated) DEVICE — NEEDLE SYR 18GA L1.5IN RED PLAS HUB S STL BLNT FILL W/O

## (undated) DEVICE — CATHETER COR DIAG AMPLATZ L 1.0 CRV 6FR 100CM 0 SIDE H

## (undated) DEVICE — Z INACTIVE USE 2735373 APPLICATOR FBR LAIN COT WOOD TIP ECONOMICAL

## (undated) DEVICE — SUTURE MCRYL SZ 3-0 L27IN ABSRB UD L24MM PS-1 3/8 CIR PRIM Y936H

## (undated) DEVICE — GAUZE,SPONGE,4"X4",12PLY,STERILE,LF,2'S: Brand: MEDLINE

## (undated) DEVICE — SYRINGE MED 10ML TRNSLUC BRL PLUNG BLK MRK POLYPR CTRL

## (undated) DEVICE — OFF - ST. RITAS VASC: Brand: MEDLINE INDUSTRIES, INC.

## (undated) DEVICE — Y-TYPE TUR/BLADDER IRRIGATION SET, REGULATING CLAMP

## (undated) DEVICE — SPONGE GZ W4XL4IN COT 12 PLY TYP VII WVN C FLD DSGN

## (undated) DEVICE — 6 ML SYRINGE LUER-LOCK TIP: Brand: MONOJECT

## (undated) DEVICE — SET LNR RED GRN W/ BASE CLEANASCOPE

## (undated) DEVICE — SYRINGE MED 10ML LUERLOCK TIP W/O SFTY DISP

## (undated) DEVICE — ROYAL SILK SURGICAL GOWN, XXL: Brand: CONVERTORS

## (undated) DEVICE — BANDAGE ADH W1XL3IN NAT FAB WVN FLX DURABLE N ADH PD SEAL

## (undated) DEVICE — PINNACLE INTRODUCER SHEATH: Brand: PINNACLE

## (undated) DEVICE — 150CM STANDARD JWIRE

## (undated) DEVICE — CATHETER 5FR 3DRC MEDTRONIC 100CM

## (undated) DEVICE — BASIC SINGLE BASIN BTC-LF: Brand: MEDLINE INDUSTRIES, INC.

## (undated) DEVICE — PACK PROCEDURE SURG SET UP SRMC

## (undated) DEVICE — GLIDESHEATH SLENDER NITINOL HYDROPHILIC COATED INTRODUCER SHEATH: Brand: GLIDESHEATH SLENDER

## (undated) DEVICE — NEEDLE SPNL 22GA L3.5IN BLK HUB S STL REG WALL FIT STYL W/

## (undated) DEVICE — 4F (1.0MM ID) X 9CM STIFF4F (1.0 MICRO-STICK®INTRODUCER SE WITH NITINOL GUIDEWIREWITH NITIN WITH RADIOPAQUE TIPWITH RADIOPAQ: Brand: MICRO-STICK SETMICRO-STICK SET

## (undated) DEVICE — ENDO KIT: Brand: MEDLINE INDUSTRIES, INC.

## (undated) DEVICE — CATHETER 6FR JR4 MEDTRONIC 100CM

## (undated) DEVICE — FORCEPS BX L240CM JAW DIA3.2MM L CAP W/ NDL MIC MESH TOOTH

## (undated) DEVICE — GLOVE SURG SZ 75 L12IN FNGR THK94MIL BRN BISQUE LTX POLYMER